# Patient Record
Sex: FEMALE | Race: WHITE | NOT HISPANIC OR LATINO | Employment: OTHER | URBAN - METROPOLITAN AREA
[De-identification: names, ages, dates, MRNs, and addresses within clinical notes are randomized per-mention and may not be internally consistent; named-entity substitution may affect disease eponyms.]

---

## 2020-08-26 ENCOUNTER — TRANSFERRED RECORDS (OUTPATIENT)
Dept: HEALTH INFORMATION MANAGEMENT | Facility: CLINIC | Age: 50
End: 2020-08-26

## 2020-09-07 ENCOUNTER — TRANSFERRED RECORDS (OUTPATIENT)
Dept: HEALTH INFORMATION MANAGEMENT | Facility: CLINIC | Age: 50
End: 2020-09-07

## 2020-11-04 ENCOUNTER — TRANSFERRED RECORDS (OUTPATIENT)
Dept: HEALTH INFORMATION MANAGEMENT | Facility: CLINIC | Age: 50
End: 2020-11-04

## 2020-11-06 ENCOUNTER — TRANSFERRED RECORDS (OUTPATIENT)
Dept: HEALTH INFORMATION MANAGEMENT | Facility: CLINIC | Age: 50
End: 2020-11-06

## 2020-12-27 ENCOUNTER — TRANSFERRED RECORDS (OUTPATIENT)
Dept: HEALTH INFORMATION MANAGEMENT | Facility: CLINIC | Age: 50
End: 2020-12-27

## 2020-12-28 ENCOUNTER — TRANSFERRED RECORDS (OUTPATIENT)
Dept: HEALTH INFORMATION MANAGEMENT | Facility: CLINIC | Age: 50
End: 2020-12-28

## 2021-01-06 ENCOUNTER — TRANSFERRED RECORDS (OUTPATIENT)
Dept: HEALTH INFORMATION MANAGEMENT | Facility: CLINIC | Age: 51
End: 2021-01-06

## 2021-01-11 ENCOUNTER — TRANSFERRED RECORDS (OUTPATIENT)
Dept: HEALTH INFORMATION MANAGEMENT | Facility: CLINIC | Age: 51
End: 2021-01-11

## 2021-01-21 ENCOUNTER — TRANSFERRED RECORDS (OUTPATIENT)
Dept: HEALTH INFORMATION MANAGEMENT | Facility: CLINIC | Age: 51
End: 2021-01-21

## 2021-02-23 ENCOUNTER — MEDICAL CORRESPONDENCE (OUTPATIENT)
Dept: HEALTH INFORMATION MANAGEMENT | Facility: CLINIC | Age: 51
End: 2021-02-23

## 2021-02-24 ENCOUNTER — TRANSCRIBE ORDERS (OUTPATIENT)
Dept: OTHER | Age: 51
End: 2021-02-24

## 2021-02-24 DIAGNOSIS — K86.1 CHRONIC PANCREATITIS (H): Primary | ICD-10-CM

## 2021-03-01 ENCOUNTER — REFERRAL (OUTPATIENT)
Dept: TRANSPLANT | Facility: CLINIC | Age: 51
End: 2021-03-01

## 2021-03-01 DIAGNOSIS — K86.1 CHRONIC PANCREATITIS (H): Primary | ICD-10-CM

## 2021-03-01 NOTE — LETTER
Meme Robins  [address] [facility] [address]                March 9, 2021 March 9, 2021        Meme Robins  [address] [facility] [address]      Dear Meme,       You have recently expressed interest in our Solid Organ Transplant Program and have requested to begin the evaluation process.  We have made several attempts to get in touch with you but have been unable to reach you.    If you are still interested and/or have any questions, please contact us at  [phone] or [phone]   Monday - Friday, between the hours of 8:30am and 5:00pm central time.    We look forward to hearing from you.      Regards,     Solid Organ Transplant Intake   [facility]'s [address], [facility] [address]  [place] [address]

## 2021-03-03 ENCOUNTER — TELEPHONE (OUTPATIENT)
Dept: TRANSPLANT | Facility: CLINIC | Age: 51
End: 2021-03-03

## 2021-03-03 NOTE — TELEPHONE ENCOUNTER
Long discussion with Meme about her health issues.  ~ Gall bladder removed 2001/2  ~First attack of acute pancreatitis December 2003 six months after the birth of her last child.  ~ Multiple episodes with elevated Lipase  ~Was doctored in [place] for several years and then  [facility] . Had had multiple stents, sphincterotomies.etc.  ~John and subsequent revision in 2014. Did better for a while  ~ Continues to have severe flares of pain and nausea  ~ Has port and has intermittent TPN  ~ Last EUS in January this year showed 10 % left pf pancreas  ~ Has intermittent severe ( 20-30 ) episodes oh hypoglycemia. She is NOT taking any Insulin. At this time she has hypoglycemic awareness.  ~ Looking for pain relief and help with the blood sugers

## 2021-03-04 ENCOUNTER — DOCUMENTATION ONLY (OUTPATIENT)
Dept: TRANSPLANT | Facility: CLINIC | Age: 51
End: 2021-03-04

## 2021-03-04 NOTE — PROGRESS NOTES
Patient's case discussed at [facility] ([facility]) on 3/3/21. Plan to get recent records and evaluate for Total Pancreatectomy -Islet Auto Transplant. Patient informed today.

## 2021-03-09 NOTE — TELEPHONE ENCOUNTER
The following questions were asked during patients intake call.     PCP: Dr. Padmini Hatch   Surgeon: Dr. Oseas Jaffe   GI: Dr. Les Sandoval  Endocrinologist: Dr. Juanita Sequeira   Mental Health Provider: Dr. Valdez Bleckley     1. Why are you considering a total pancreatectomy/islet auto-transplant? To get rid of pain. EUS in January. Blood sugars are in the teens and twenty's. Hospitalized four times a year for pancreatitis.   2. Have you been in the hospital in the last six months? Yes-three times.             a. If yes, please give the name and address of the hospital: [facility]             b. When were you there and why? Pancreatitis, nausea, vomiting, pain, and dehydration.   3. Have you had any surgeries or procedures? Yes  If yes, what kind, when and where? Yes, multiple.   4. Have you ever smoked? No             a. If yes, for how long? NA How many packs per day? NA             b. Have you quit? NA             c. Are you using any form of nicotine? No  5. Do you drink alcohol? No  6. Drug use Past Present Frequency: No  Liquor/beer/wine: No  Marijuana: No  Street drugs: No  7. Do you have a history of alcohol or other drug abuse? No  If yes:  a. How long have you been sober or drug-free? NA              b. In the past two years, have you been through treatment? No  8. In the past two years, have you had any counseling or mental health treatment? Yes  If yes, please explain: Sees a psychologist on a regular basis.     Insurance Information: [facility]   Policy [place]: Spouse   Subscriber/Policy/ID Number: [id number]  Group Number: [id number]    Referral intake process completed.  Patient is aware that after financial approval is received, medical records will be requested.   Confirmed coordinator will discuss evaluation process in more detail at the time of their call.   Patient instructed to call assigned coordinator Maddi @ [phone] with questions.     Patient gave verbal consent during  intake call to obtain medical records and documents outside of [facility]:  Yes    CAMILLE Jesus, LPN   Solid Organ Transplant

## 2021-03-30 DIAGNOSIS — K86.1 CHRONIC PANCREATITIS (H): Primary | ICD-10-CM

## 2021-05-01 ENCOUNTER — HEALTH MAINTENANCE LETTER (OUTPATIENT)
Age: 51
End: 2021-05-01

## 2021-05-19 ENCOUNTER — ALLIED HEALTH/NURSE VISIT (OUTPATIENT)
Dept: TRANSPLANT | Facility: CLINIC | Age: 51
End: 2021-05-19
Attending: PEDIATRICS
Payer: COMMERCIAL

## 2021-05-19 VITALS
HEART RATE: 72 BPM | DIASTOLIC BLOOD PRESSURE: 70 MMHG | SYSTOLIC BLOOD PRESSURE: 104 MMHG | WEIGHT: 121.69 LBS | OXYGEN SATURATION: 96 %

## 2021-05-19 VITALS
OXYGEN SATURATION: 96 % | SYSTOLIC BLOOD PRESSURE: 104 MMHG | WEIGHT: 121.69 LBS | HEART RATE: 72 BPM | DIASTOLIC BLOOD PRESSURE: 70 MMHG

## 2021-05-19 DIAGNOSIS — E27.49 GLUCOCORTICOID DEFICIENCY (H): Primary | ICD-10-CM

## 2021-05-19 DIAGNOSIS — K86.1 CHRONIC PANCREATITIS (H): ICD-10-CM

## 2021-05-19 DIAGNOSIS — K91.2 HYPOGLYCEMIA AFTER GI (GASTROINTESTINAL) SURGERY: ICD-10-CM

## 2021-05-19 DIAGNOSIS — R73.03 PRE-DIABETES: ICD-10-CM

## 2021-05-19 DIAGNOSIS — K86.1 CHRONIC PANCREATITIS, UNSPECIFIED PANCREATITIS TYPE (H): Primary | ICD-10-CM

## 2021-05-19 LAB
ALBUMIN SERPL-MCNC: 3.9 G/DL (ref 3.4–5)
ALP SERPL-CCNC: 82 U/L (ref 40–150)
ALT SERPL W P-5'-P-CCNC: 20 U/L (ref 0–50)
AMYLASE SERPL-CCNC: 39 U/L (ref 30–110)
ANION GAP SERPL CALCULATED.3IONS-SCNC: 5 MMOL/L (ref 3–14)
AST SERPL W P-5'-P-CCNC: 16 U/L (ref 0–45)
BASOPHILS # BLD AUTO: 0 10E9/L (ref 0–0.2)
BASOPHILS NFR BLD AUTO: 0.7 %
BILIRUB SERPL-MCNC: 0.5 MG/DL (ref 0.2–1.3)
BUN SERPL-MCNC: 8 MG/DL (ref 7–30)
C PEPTIDE SERPL-MCNC: 1.6 NG/ML (ref 0.9–6.9)
CALCIUM SERPL-MCNC: 9.2 MG/DL (ref 8.5–10.1)
CEA SERPL-MCNC: 1.2 UG/L (ref 0–2.5)
CHLORIDE SERPL-SCNC: 105 MMOL/L (ref 94–109)
CHOLEST SERPL-MCNC: 212 MG/DL
CO2 SERPL-SCNC: 29 MMOL/L (ref 20–32)
CORTIS SERPL-MCNC: 2.7 UG/DL (ref 4–22)
CREAT SERPL-MCNC: 0.67 MG/DL (ref 0.52–1.04)
DIFFERENTIAL METHOD BLD: NORMAL
EOSINOPHIL # BLD AUTO: 0.2 10E9/L (ref 0–0.7)
EOSINOPHIL NFR BLD AUTO: 3.6 %
ERYTHROCYTE [DISTWIDTH] IN BLOOD BY AUTOMATED COUNT: 12.9 % (ref 10–15)
FERRITIN SERPL-MCNC: 27 NG/ML (ref 8–252)
GFR SERPL CREATININE-BSD FRML MDRD: >90 ML/MIN/{1.73_M2}
GLUCOSE SERPL-MCNC: 103 MG/DL (ref 70–99)
GLUCOSE SERPL-MCNC: 112 MG/DL (ref 70–99)
GLUCOSE SERPL-MCNC: 124 MG/DL (ref 70–99)
HBA1C MFR BLD: 6 % (ref 0–5.6)
HCT VFR BLD AUTO: 36.4 % (ref 35–47)
HCV AB SERPL QL IA: NONREACTIVE
HDLC SERPL-MCNC: 62 MG/DL
HGB BLD-MCNC: 12 G/DL (ref 11.7–15.7)
IMM GRANULOCYTES # BLD: 0 10E9/L (ref 0–0.4)
IMM GRANULOCYTES NFR BLD: 0 %
IRON SATN MFR SERPL: 18 % (ref 15–46)
IRON SERPL-MCNC: 61 UG/DL (ref 35–180)
LDLC SERPL CALC-MCNC: 119 MG/DL
LIPASE SERPL-CCNC: 104 U/L (ref 73–393)
LYMPHOCYTES # BLD AUTO: 1.6 10E9/L (ref 0.8–5.3)
LYMPHOCYTES NFR BLD AUTO: 39.2 %
MCH RBC QN AUTO: 28.6 PG (ref 26.5–33)
MCHC RBC AUTO-ENTMCNC: 33 G/DL (ref 31.5–36.5)
MCV RBC AUTO: 87 FL (ref 78–100)
MONOCYTES # BLD AUTO: 0.6 10E9/L (ref 0–1.3)
MONOCYTES NFR BLD AUTO: 14.4 %
NEUTROPHILS # BLD AUTO: 1.8 10E9/L (ref 1.6–8.3)
NEUTROPHILS NFR BLD AUTO: 42.1 %
NONHDLC SERPL-MCNC: 149 MG/DL
NRBC # BLD AUTO: 0 10*3/UL
NRBC BLD AUTO-RTO: 0 /100
PLATELET # BLD AUTO: 233 10E9/L (ref 150–450)
POTASSIUM SERPL-SCNC: 3.6 MMOL/L (ref 3.4–5.3)
PROT SERPL-MCNC: 7.5 G/DL (ref 6.8–8.8)
RBC # BLD AUTO: 4.19 10E12/L (ref 3.8–5.2)
SODIUM SERPL-SCNC: 139 MMOL/L (ref 133–144)
TIBC SERPL-MCNC: 338 UG/DL (ref 240–430)
TRIGL SERPL-MCNC: 151 MG/DL
WBC # BLD AUTO: 4.2 10E9/L (ref 4–11)

## 2021-05-19 PROCEDURE — 86803 HEPATITIS C AB TEST: CPT | Performed by: PATHOLOGY

## 2021-05-19 PROCEDURE — 99000 SPECIMEN HANDLING OFFICE-LAB: CPT | Performed by: PATHOLOGY

## 2021-05-19 PROCEDURE — 83540 ASSAY OF IRON: CPT | Performed by: PATHOLOGY

## 2021-05-19 PROCEDURE — 36415 COLL VENOUS BLD VENIPUNCTURE: CPT | Performed by: PATHOLOGY

## 2021-05-19 PROCEDURE — 84590 ASSAY OF VITAMIN A: CPT | Performed by: PATHOLOGY

## 2021-05-19 PROCEDURE — 85025 COMPLETE CBC W/AUTO DIFF WBC: CPT | Performed by: PATHOLOGY

## 2021-05-19 PROCEDURE — 36415 COLL VENOUS BLD VENIPUNCTURE: CPT | Performed by: PEDIATRICS

## 2021-05-19 PROCEDURE — 80053 COMPREHEN METABOLIC PANEL: CPT | Performed by: PATHOLOGY

## 2021-05-19 PROCEDURE — 82150 ASSAY OF AMYLASE: CPT | Performed by: PATHOLOGY

## 2021-05-19 PROCEDURE — 99417 PROLNG OP E/M EACH 15 MIN: CPT | Performed by: PEDIATRICS

## 2021-05-19 PROCEDURE — 80061 LIPID PANEL: CPT | Performed by: PATHOLOGY

## 2021-05-19 PROCEDURE — 84446 ASSAY OF VITAMIN E: CPT | Performed by: PATHOLOGY

## 2021-05-19 PROCEDURE — 82533 TOTAL CORTISOL: CPT | Performed by: PEDIATRICS

## 2021-05-19 PROCEDURE — 82947 ASSAY GLUCOSE BLOOD QUANT: CPT | Mod: 59 | Performed by: PATHOLOGY

## 2021-05-19 PROCEDURE — 86341 ISLET CELL ANTIBODY: CPT | Performed by: PATHOLOGY

## 2021-05-19 PROCEDURE — 82306 VITAMIN D 25 HYDROXY: CPT | Performed by: PATHOLOGY

## 2021-05-19 PROCEDURE — 99205 OFFICE O/P NEW HI 60 MIN: CPT | Performed by: PEDIATRICS

## 2021-05-19 PROCEDURE — 84681 ASSAY OF C-PEPTIDE: CPT | Performed by: PATHOLOGY

## 2021-05-19 PROCEDURE — 83550 IRON BINDING TEST: CPT | Performed by: PATHOLOGY

## 2021-05-19 PROCEDURE — 83036 HEMOGLOBIN GLYCOSYLATED A1C: CPT | Performed by: PATHOLOGY

## 2021-05-19 PROCEDURE — 86301 IMMUNOASSAY TUMOR CA 19-9: CPT | Performed by: PATHOLOGY

## 2021-05-19 PROCEDURE — 82378 CARCINOEMBRYONIC ANTIGEN: CPT | Mod: 90 | Performed by: PATHOLOGY

## 2021-05-19 PROCEDURE — 83690 ASSAY OF LIPASE: CPT | Performed by: PATHOLOGY

## 2021-05-19 PROCEDURE — 86337 INSULIN ANTIBODIES: CPT | Performed by: PATHOLOGY

## 2021-05-19 PROCEDURE — 82728 ASSAY OF FERRITIN: CPT | Performed by: PATHOLOGY

## 2021-05-19 RX ORDER — CLONAZEPAM 1 MG/1
TABLET ORAL
COMMUNITY
Start: 2021-02-09

## 2021-05-19 RX ORDER — OXYMORPHONE HYDROCHLORIDE 10 MG/1
10 TABLET, FILM COATED, EXTENDED RELEASE ORAL EVERY 8 HOURS
Status: ON HOLD | COMMUNITY
Start: 2021-05-17 | End: 2021-08-20

## 2021-05-19 RX ORDER — SERTRALINE HYDROCHLORIDE 100 MG/1
150 TABLET, FILM COATED ORAL AT BEDTIME
COMMUNITY
Start: 2021-02-01

## 2021-05-19 RX ORDER — ONDANSETRON 4 MG/1
4 TABLET, ORALLY DISINTEGRATING ORAL EVERY 8 HOURS PRN
COMMUNITY

## 2021-05-19 RX ORDER — PANTOPRAZOLE SODIUM 40 MG/1
40 TABLET, DELAYED RELEASE ORAL 2 TIMES DAILY
COMMUNITY
Start: 2021-02-03 | End: 2021-08-26 | Stop reason: DRUGHIGH

## 2021-05-19 RX ORDER — HYDROMORPHONE HYDROCHLORIDE 4 MG/1
4 TABLET ORAL EVERY 6 HOURS PRN
Status: ON HOLD | COMMUNITY
Start: 2021-05-17 | End: 2021-08-20

## 2021-05-19 RX ORDER — TRAZODONE HYDROCHLORIDE 100 MG/1
100 TABLET ORAL AT BEDTIME
COMMUNITY
Start: 2021-02-01

## 2021-05-19 SDOH — HEALTH STABILITY: MENTAL HEALTH: HOW MANY STANDARD DRINKS CONTAINING ALCOHOL DO YOU HAVE ON A TYPICAL DAY?: NOT ASKED

## 2021-05-19 SDOH — HEALTH STABILITY: MENTAL HEALTH: HOW OFTEN DO YOU HAVE A DRINK CONTAINING ALCOHOL?: NOT ASKED

## 2021-05-19 SDOH — HEALTH STABILITY: MENTAL HEALTH: HOW OFTEN DO YOU HAVE 6 OR MORE DRINKS ON ONE OCCASION?: NOT ASKED

## 2021-05-19 NOTE — PROGRESS NOTES
Administered Boost: 9:17am    FBS: 112    Called lab with boost time :     10:20am    11:20am    Patient is aware and given time to return to lab.    Meme Orellana, CMA

## 2021-05-19 NOTE — LETTER
5/19/2021         RE: Meme Robins  [address] [facility] [address]        Dear Colleague,    Thank you for referring your patient, Meme Robins, to the [facility]. Please see a copy of my visit note below.    Pediatric Endocrinology/ Islet Autotransplant  Chronic Pancreatitis Consult    Patient Active Problem List   Diagnosis     Abdominal pain, RUQ (right upper quadrant)     Acute upper respiratory infection     Anemia     Cellulitis and abscess     Chronic abdominal pain     Chronic pancreatitis (H)     Dysfunctional sphincter of Oddi     Diarrhea     Degeneration of cervical intervertebral disc     Dysthymic disorder     Iron deficiency anemia     Glucocorticoid deficiency (H)     Joint pain, hip     Long term current use of anticoagulant therapy     Other symptoms involving urinary system(788.99)     Nausea alone     Myalgia and myositis     Migraine     Malaise and fatigue     Major depressive disorder, single episode     Urinary tract infection     Pulmonary embolism (H)     Primary hypercoagulable state (H)       Assessment/Plan:  1.  Chronic pancreatitis, complicated by atrophy and prior John procedure  2.  Prediabetes  3.  Hypoglycemia     Meme is a 50 year old with pancreatitis, considering surgical management. She has a 17 year history of well documented pancreatitis, initially recurrent acute and then progressing to chronic pancreatitis. She has failed medical and endoscopic management and two prior pancreas surgeries, now having more days with pain than not and on long-acting opioids for about 1 year.  She is very thoughtful about the decision to proceed to TPIAT and I think despite her complexities will be a good candidate.    She does have endocrine history that is notable for pre-diabetes (documented here by both fasting glucose and A1c) as well as hypoglycemia (lowest to 20s).  The hypoglycemia is likely multifactorial related to malnutrition and  reactive hypoglycemia from prior GI surgery and islet dysfunction.   However, she has also had a past history of central adrenal insufficiency, not on treatment for many years, but unclear if she has really be reassessed.  We do see HPA axis dysregulation in some patients with pancreatitis or TPIAT and is probably important to assess this prior to surgery so that we are aware for post op management.     The primary purpose of today's visit was to review the patient's endocrine history and provide counseling on islet autotransplantation.  We discussed the procedure of islet autotransplant, the post-operative management, and the prognosis.  We specifically discussed that all patients are on insulin after this procedure, typically for at least several months.  About 1/3rd of patients will become insulin independent, but I do not expect that to be the case for Meme, given her atrophic pancreas, prior surgery, and pre-diabetes.  We did discuss that for Meme my goal is simply to get enough islets to restore some endogenous beta cell function and keep diabetes easier to manage;  Because of the high risk of diabetes mellitus, all patients must be willing to accept diabetes and insulin injections as a trade off for relief from pancreatic pain.    All surgical consults are reviewed by our multi-disciplinary team to determine if surgery is an appropriate next option.  FAITH Bermeo MD  [facility] [place]  Phone:  [phone]  Fax:  [phone]    I spent 2 hours with Meme and her  face to face, of which about 90 minutes was focused on counseling about the TPIAT surgery and diabetes.                  CC:  Chronic pancreatitis, surgical evaluation    HPI:  Meme Robins is a 50 year old female referred by Dr. Phelps at [facility]  for new patient consultation of endocrine function in the setting of chronic  pancreatitis.    Pancreatitis history is as follows:  Meme has a long history of diagnosed pancreatitis that dates back to 2003.  She has been managed initially in [place] and at [facility] and more recently in [place]. She had her gallbladder out in 2001 for some abdominal pain that in retrospect may be similar to later pancreatitis pain.  She had her first episode of acute pancreatitis that was diagnosed in 2003 and subsequently went about 1.5 years without another episode when it recurred, and she developed recurrent acute pancreatitis.  She then had a number of ERCP procedures with pancreatic sphincterotomy and repeated PD stenting in [place].  She recalls these helped- she was back to 'normal' after the ERCP and towards the end of the ~3 mos interval between stent replacements would have recurrence of pain. She finally reached a point where it was felt that ERCP was no longer indicated and she was seen at [place] in surgical consultation at which time she had a John surgery in 2011.  She did however continue to have recurrent acute pancreatitis which I see very well documented in some older admission notes from [facility] from 2013.  Her  and her recall that around that time in 2013 and 2014 she was spending over 200 days per year in the hospital because she was admitted so often.  They then found that she appeared to have a part of the pancreas duct that had not been opened to drainage during the John, so she went in and they did a modified or revised John. This did help, and her frequency of hospitalizations was decreased.  Now, however, over the past 2 years she is having episodes needing hospital admits about 4-5 times per year and is having more pain days at home. She has started a long-acting oxymorphone 1 year ago and on some days (but not all days) also takes dilaudid. She is on ZenPep 20, 3 per meal, for exocrine insufficiency but struggles with constipation (also linzess  treated) and diarrhea.  She reports she has had genetic testing for pancreatitis in the past that was negative, but I don't have these results to confirm which genes were tested.  Of note, she did have one celiac plexus block done during open surgery in May 2014 (revised John) at which time they did a alcohol block. She also had a history of poorly managed pain during surgery in past. She has notable endocrine history of pre-diabetes, hypoglycemia, and adrenal insufficiency that are documented below.      Evaluation/ imaging/ treatments:  Elevated amylase and lipase by history:  YES- her most recent records are not available in [facility] (but I did review visits scanned from 1/2021 from [facility]) but back in 2013 in Care Everywhere I can see multiple documented AP.  Etiology of disease: iodpathic   Number of hospitalizations in last 1 year: 4-5  Recent imaging studies: CT will be done tomorrow.   She does have an EUS scanned in Epic that I reviewed from earlier this year but it was non descript other than severe atrophy.   Medical treatment(s): as above  ERCP procedures: YES;   Number of ERCPs:  About 30-- as she recalls these did not include biliary sphincterotomy or biliary stenting but it did include pancreatic duct sphincterotomy and PD stenting.   Prior pancreatic surgery: YES, as above  Had cholecystectomy  Had nerve esteban    Endocrine history:    - Previously diagnosed as pre-diabetes and clearly does have pre-diabetes here, C-peptide pending  -- Has hypoglycemia-- describes this as worst in hospital stays, when fasting for prolonged period but also gets reactive hypoglycemia at home.  She does not think any real lab work up done-- sounds like critical sample ordered during a hospital admit but was treated with dextrose before these could be drawn.  Is so far thought secondary to pancreatitis.   -- Interestingly had 3 steroid injections in neck in very distant past (2012 or 2013?) and then developed  or was diagnosed as central adrenal insufficiency, and treated with cortef which I can see documented in 2013.  She thinks she has been off this for at least 3 or 4 years and no recent testing that she recalls.     Other GI:  Nausea is a huge component of her symptom burden.         Review of Systems:  A comprehensive 10 point review of systems was performed and was negative except as noted in the HPI above.    Past Medical History:  Patient Active Problem List   Diagnosis     Abdominal pain, RUQ (right upper quadrant)     Acute upper respiratory infection     Anemia     Cellulitis and abscess     Chronic abdominal pain     Chronic pancreatitis (H)     Dysfunctional sphincter of Oddi     Diarrhea     Degeneration of cervical intervertebral disc     Dysthymic disorder     Iron deficiency anemia     Glucocorticoid deficiency (H)     Joint pain, hip     Long term current use of anticoagulant therapy     Other symptoms involving urinary system(788.99)     Nausea alone     Myalgia and myositis     Migraine     Malaise and fatigue     Major depressive disorder, single episode     Urinary tract infection     Pulmonary embolism (H)     Primary hypercoagulable state (H)     Hypoglycemia after GI (gastrointestinal) surgery       Past Medical History:   Diagnosis Date     Adrenal insufficiency (H)     iatrogenic, around 2013, off treatment for several years as of 2021 visit     Anemia      Depression      Esophageal reflux      Idiopathic chronic pancreatitis (H)      Pre-diabetes      Pulmonary embolism (H)      Past Surgical History:   Procedure Laterality Date     CELIAC PLEXUS BLOCK  05/29/2014    with alcohol x 1     ENDOMETRIAL ABLATION  03/29/2014     ENDOSCOPIC RETROGRADE CHOLANGIOPANCREATOGRAM      about 30, most with PD stentes     ENDOSCOPIC ULTRASOUND, ESOPHAGOSCOPY, GASTROSCOPY, DUODENOSCOPY (EGD), COMBINED        SHOULDER ARTHROSCOPY,SURGICAL BICEPS TENODESIS  02/09/2017     JEJUNOSTOMY CARE      multiple  feeding tubes x 3, at least one was direct J     PANCREAS SURGERY  05/29/2014    John procedure revision     PANCREAS SURGERY  02/2011    John       Current medications:  Current Outpatient Medications   Medication Sig Dispense Refill     clonazePAM (KLONOPIN) 1 MG tablet Take 1 mg by mouth 2 times daily       HYDROmorphone (DILAUDID) 4 MG tablet Take 4 mg by mouth as needed       hyoscyamine SL (LEVSIN/SL) 0.125 MG sublingual tablet Take 125 mcg by mouth as needed       lipase-protease-amylase (ZENPEP) 54737-17779 units CPEP Take 3 capsules by mouth 3 times daily (with meals)       ondansetron (ZOFRAN-ODT) 4 MG ODT tab Take 4 mg by mouth as needed       Oxymorphone HCl 10 MG TB12 Take 10 mg by mouth 3 times daily as needed       pantoprazole (PROTONIX) 40 MG EC tablet Take 40 mg by mouth 2 times daily       sertraline (ZOLOFT) 100 MG tablet Take 150 mg by mouth At Bedtime       traZODone (DESYREL) 100 MG tablet Take 100 mg by mouth At Bedtime         Family History:  The family history was reviewed with particular attention to diabetes and pancreatitis history, and updated by me as pertinent, and is as documented below.    Family History   Problem Relation Age of Onset     Breast Cancer Mother      Diabetes No family hx of      Pancreatitis No family hx of        Social History:  Social History     Social History Narrative    Meme is  and has three children.         Physical Exam:  Vitals: /70   Pulse 72   Wt 55.2 kg (121 lb 11.1 oz)   SpO2 96%   BMI= There is no height or weight on file to calculate BMI.  General:  Appearance is normal, no acute distress  HEENT:  NC/AT, sclera appear white  Neck:  No obvious thyromegaly  CV/Lungs:  Non distressed breathing  Skin:  No apparent rashes  Neuro:  Normal mental status  Psych:  Normal affect      Results:  Hemoglobin A1c levels (this and prior visits):  Lab Results   Component Value Date    A1C 6.0 05/19/2021       Mixed meal test results (from Boost  HP):  C Peptide   Date Value Ref Range Status   05/19/2021 1.6 0.9 - 6.9 ng/mL Final     Glucose   Date Value Ref Range Status   05/19/2021 124 (H) 70 - 99 mg/dL Final   05/19/2021 103 (H) 70 - 99 mg/dL Final   05/19/2021 112 (H) 70 - 99 mg/dL Final         Liver enzymes:  AST   Date Value Ref Range Status   05/19/2021 16 0 - 45 U/L Final     ALT   Date Value Ref Range Status   05/19/2021 20 0 - 50 U/L Final     No results found for: BILICONJ   Bilirubin Total   Date Value Ref Range Status   05/19/2021 0.5 0.2 - 1.3 mg/dL Final     Albumin   Date Value Ref Range Status   05/19/2021 3.9 3.4 - 5.0 g/dL Final     Protein Total   Date Value Ref Range Status   05/19/2021 7.5 6.8 - 8.8 g/dL Final      Alkaline Phosphatase   Date Value Ref Range Status   05/19/2021 82 40 - 150 U/L Final       Creatinine:  Creatinine   Date Value Ref Range Status   05/19/2021 0.67 0.52 - 1.04 mg/dL Final   ]    Complete Blood Count:  CBC RESULTS:   Recent Labs   Lab Test 05/19/21  0903   WBC 4.2   RBC 4.19   HGB 12.0   HCT 36.4   MCV 87   MCH 28.6   MCHC 33.0   RDW 12.9            Again, thank you for allowing me to participate in the care of your patient.        Sincerely,        Patricia Bermeo MD

## 2021-05-19 NOTE — PROGRESS NOTES
[facility] Solid Organ Transplant  Outpatient MNT: TP AIT Evaluation    Current BMI: 22.7 (HT 61 in,  lbs/54 kg)  Goal BMI for TP AIT <30     Time Spent: 30 minutes  Visit Type: Initial   Referring Physician: Fern   Pt accompanied by: her , Brendan     Nutrition Assessment  Limits fat in her diet. Eats smaller, more frequent meals. Nausea bad in AM. Nothing really helps until just has to wait until the afternoon to feel better. Liquids may be tolerated (smoothie, ensure clear) late AM over solids.  Has port and intermittent PN.     Symptoms:  Pain: chronic  N/V: vomiting during a flare, nausea greatly impacts day  Bloating: yes, some  D/C: alternates     Vitamins, Supplements, Pertinent Meds: B complex, vit E, vit D   Herbal Medicines/Supplements: melatonin prn      PERT: zenpep 20,000 x 3 with meals, x 2 with snacks; (1090 ul/kg/meal - within therapeutic range)  - How enzymes are taken in relation to meal: throughout the meal   - Duration of PERT: several years   - Improvements seen since starting: less oil in stool   - Oily/floaty stools (steatorrhea): still some  - Tried other enzyme brands in the past: Creon--no intolerance    Weight hx: hormonal changes/menopause, at most 5 lb gain    Food Security: any concerns about having enough money to buy food or access to grocery stores? No     Diet Recall  Breakfast    Lunch Feels better/grazes ~3 pm   Dinner Protein (chicken or burger 4 oz) + veggies/fruit and less cho    Snacks Yogurt, fruit, crackers, oatmeal, toast with PB   Beverages Water, some juice (8 oz/day), coffee, sips on Coke to help with nausea, occ iced tea   Alcohol None    Dining out 3x/week      Physical Activity  Occasional walking the dogs  Was doing heated yoga pre covid, but has not returned to doing this      Nutrition Diagnosis  No nutrition diagnosis identified at this time.     Nutrition Intervention  Nutrition education provided:  Reviewed current diet. Reviewed some other  alternatives to Ensure Clear ([id number], Premier Protein Clear, whey protein powder mixed into smoothie).      Brief overview of what to expect from nutrition standpoint post TP AIT:   -- Enteral nutrition regimen and anticipated diet advancement   -- Ongoing need for PERT  -- Vitamin/mineral needs, assessing for fat-soluble vitamin deficiencies  -- Need for DM education and brief overview of cho counting    Patient Understanding: Pt verbalized understanding of education provided.  Expected Engagement: Good  Follow-Up Plans: PRN     Nutrition Goals  No nutrition goals     Brianna Pablo, RD, LD, CCTD

## 2021-05-20 ENCOUNTER — ANCILLARY PROCEDURE (OUTPATIENT)
Dept: CT IMAGING | Facility: CLINIC | Age: 51
End: 2021-05-20
Attending: TRANSPLANT SURGERY
Payer: COMMERCIAL

## 2021-05-20 ENCOUNTER — ALLIED HEALTH/NURSE VISIT (OUTPATIENT)
Dept: TRANSPLANT | Facility: CLINIC | Age: 51
End: 2021-05-20
Attending: TRANSPLANT SURGERY
Payer: COMMERCIAL

## 2021-05-20 VITALS
WEIGHT: 120 LBS | SYSTOLIC BLOOD PRESSURE: 100 MMHG | HEART RATE: 73 BPM | OXYGEN SATURATION: 99 % | BODY MASS INDEX: 22.66 KG/M2 | HEIGHT: 61 IN | DIASTOLIC BLOOD PRESSURE: 67 MMHG

## 2021-05-20 DIAGNOSIS — K86.1 CHRONIC PANCREATITIS, UNSPECIFIED PANCREATITIS TYPE (H): Primary | ICD-10-CM

## 2021-05-20 DIAGNOSIS — Z95.828 PORT-A-CATH IN PLACE: Primary | ICD-10-CM

## 2021-05-20 DIAGNOSIS — K86.1 IDIOPATHIC CHRONIC PANCREATITIS (H): Primary | ICD-10-CM

## 2021-05-20 DIAGNOSIS — K86.1 CHRONIC PANCREATITIS (H): ICD-10-CM

## 2021-05-20 LAB
C PEPTIDE SERPL-MCNC: 1.8 NG/ML (ref 0.9–6.9)
C PEPTIDE SERPL-MCNC: 2.5 NG/ML (ref 0.9–6.9)
CANCER AG19-9 SERPL-ACNC: 1 U/ML (ref 0–37)

## 2021-05-20 PROCEDURE — 74178 CT ABD&PLV WO CNTR FLWD CNTR: CPT | Performed by: RADIOLOGY

## 2021-05-20 PROCEDURE — 97802 MEDICAL NUTRITION INDIV IN: CPT | Performed by: DIETITIAN, REGISTERED

## 2021-05-20 PROCEDURE — 99205 OFFICE O/P NEW HI 60 MIN: CPT | Performed by: TRANSPLANT SURGERY

## 2021-05-20 RX ORDER — HEPARIN SODIUM (PORCINE) LOCK FLUSH IV SOLN 100 UNIT/ML 100 UNIT/ML
500 SOLUTION INTRAVENOUS ONCE
Status: COMPLETED | OUTPATIENT
Start: 2021-05-20 | End: 2021-05-20

## 2021-05-20 RX ORDER — IOPAMIDOL 755 MG/ML
74 INJECTION, SOLUTION INTRAVASCULAR ONCE
Status: COMPLETED | OUTPATIENT
Start: 2021-05-20 | End: 2021-05-20

## 2021-05-20 RX ADMIN — HEPARIN SODIUM (PORCINE) LOCK FLUSH IV SOLN 100 UNIT/ML 500 UNITS: 100 SOLUTION at 12:18

## 2021-05-20 RX ADMIN — IOPAMIDOL 74 ML: 755 INJECTION, SOLUTION INTRAVASCULAR at 11:34

## 2021-05-20 ASSESSMENT — MIFFLIN-ST. JEOR: SCORE: 1096.7

## 2021-05-20 NOTE — PROGRESS NOTES
Psychosocial Assessment for Total Pancreatectomy and Auto Islet Cell Transplant  Patient Name/ Age: Meme Robins 50 year old   Medical Record #: [id number]  Duration of Interview: 30 min  Process:   Face-to-Face Interview                (counseling < 50%)   Present at Appointment: Meme and her  Brendan        : JAEL Mcnulty Date:  May 20, 2021            Current Living Situation    Location:   [address] [place] [address]  With Whom: lives with their family-  and 3 sons       Family/ Social Support:    Meme has three sons. Two are in college and one is in high school. She has two older brothers. Her parents live near her.    available, helpful   Committed Relationship: Meme is  to Brendan.     Stable/Supportive   Other Supports: In-laws, friends, neighbors   available, helpful       Activities/ Functional Ability    Current level: ambulatory, visually impaired (glasses) and independent with ADL's     Transportation drives self       Vocational/Employment/Financial     Employment   disabled   Job Description   Meme reported she has been on social security disability for a few years. Her  is employed full time.      Income   SSDI and Spouse's Income   Insurance      At this time, patient can afford medication costs:  Yes  Private Insurance (BCBS through spouse employer) and Medicare Part A       Medical Status    Complications Glucose Unawareness       Behavioral    Tobacco Use No Chemical Dependency No   Meme denied any tobacco use. Meme denied any alcohol, substance use, or chemical dependency treatment.      Psychiatric Impairment Yes   Meme reported she is diagnosed with anxiety and depression. She currently takes medication for her mental health, which is prescribed by her primary care provider. She also sees a psychologist every other week. She reported she feels her mental health is well managed at this time.     Reading Ability  Good  Education Level: Bachelors Degree Recent Legal History No      Coping Style/Strategies: Talk with her  or friends, exercise       Ability to Adhere to Complex Medical Regime: Yes     Adherence History: Meme reported she follows her physician's recommendations, takes her medications as prescribed, and attends her appointments as scheduled.         Education  _X_ Rehabilitation  _X_ Community resources  _X_ Post discharge physician  _X_ Post discharge housing  _X_ Financial resources  _X_ Medical insurance options  _X_ Psych adjustment  _X_ Family adjustment  _X_ Health Care Directive Provided Education   Psychosocial Risks of Transplant Reviewed and Discussed:  _X_ Increased stress related to emotional,            family, social, employment or financial           situation  _X_ Affect on work and/or disability benefits  _X_ Affect on future health and life           insurance  _X_ Transplant outcome expectations may           not be met  _X_ Mental Health Risks: anxiety,           depression, PTSD, guilt, grief and           chronic fatigue     Notable Items:   Discussed the need to remain locally for up to four weeks post discharge from the hospital and what lodging options are available. Also discussed availability and cost of weekly and monthly parking passes if needed. Provided patient with the Pre TP-IAT Cheat Sheet.       Final Evaluation/Assessment   Patient seemed to process information well. Appeared well informed, motivated and able to follow post transplant requirements. Behavior was appropriate during interview. Has adequate income and insurance coverage. Adequate social support. No major contraindications noted for transplant.       Recommendation  Acceptable    Selection Criteria Met:  Plan for support Yes   Chemical Dependence Yes   Smoking Yes   Mental Health Yes   Adequate Finances Yes    Signature: JAEL Guillen  [facility]   Title: Clinical

## 2021-05-20 NOTE — LETTER
5/20/2021         RE: Meme Robins  [address] [facility] [address]        Dear Colleague,    Thank you for referring your patient, Meme Robins, to the [facility]. Please see a copy of my visit note below.    Chronic Pancreatitis Group Consult Note     Patient: Meme Robins,   YOB: 1970,   Medical record number: [id number]  Consult requested by Dr. Phelps for evaluation of chronic pancreatitis.    Assessment:  1. Chronic painful pancreatitis   2. Etiology: Idiopathic Pancreatitis    Criteria for TPIAT: failed endoscopic stenting with small duct pancreatitis, chronic pain affecting daily functioning, non-diabetic.    Recommendations: Ms. Meme Robins appears to be a good candidate for total pancreatectomy and islet autotransplant.   - would like to get hematology consult regarding yasmeen-operative anti-coagulation and risk stratification  - need to discuss yasmeen-operative steroids use in perioperative period given h/o endocrine insuffiency    The majority of our consultation visit today was spent discussing potential surgical and medical complications of total pancreatectomy, the range of outcomes for pain control, diabetes, and long term sequela. The goal of the operation is relief from chronic pain, with restoration of a more normal functional status. Surgical risks include bleeding, possible transfusion, infection, deep vein or portal vein clotting, bile leak or stricture, injury to the liver vasculature, delayed gastric emptying, hernia, need for reoperation, bowel obstruction, difficulties with nausea, constipation and diarrhea, as well as other medical risks such as pneumonia, cardiac risks, malnutrition, brittle diabetes, and a 0.5% perioperative risk of death. The patient was told that oral pancreatic enzyme replacement therapy and acid blockade would be permanently required with meals and snacks in order to prevent malnutrition and vitamin  deficiencies and ulcer disease. There is risk of developing motility issues (diarrhea, constipation, nausea) that may be difficult to manage, especially if the patient cannot completely wean from narcotics. The patient understands that the islet cell autotransplant portion of the procedure is to prevent or ameliorate the otherwise inevitable insulin dependent and brittle diabetes that would result from total pancreatectomy. There is a very low (but not zero) risk of transplanting cancerous pancreatic tissue unknowingly. The patient understands the need to accept diabetes as a potential consequence of proceeding with surgery, and we discussed the importance of proper short and long-term diabetes management. While it is not possible to completely predict the postoperative diabetes outcome with certainty in a single individual's case, in our series of more than 700 cases, approximately 1/3 of patients are insulin independent, 1/3 required basal insulin only, and 1/3 required basal/bolus or 'typical diabetic' insulin therapy.  Rarely, we find intraoperatively that  pancreatectomy with islet cell transplant is not possible/safe due to vascular involvement or discovery of cancer. We discussed the average inpatient length of stay (10 days), the typical yasmeen- and post-operative patient experience, care plan for pain management, nutrition, and posttransplant intensive insulin therapy for at least several months and potentially permanently, as well as the requirement that a  care partner  be available to help with post-discharge outpatient care which usually lasts up to several weeks while the patient remains near the medical center for necessary outpatient care and monitoring.     Overall candidacy for total pancreatectomy and islet autotransplant will be determined by our Multidisciplinary Chronic Pancreatitis Team, and further recommendations will follow.  Thank you for the opportunity to participate in Ms. Meme Robins's  care.    Total time: 60 minutes        Elfego Shirley MD  ---------------------------------------------------------------------------------------------------  HPI:   51 yo lady with h/o pancreatitis since age of 15 yo.  She underwent lap nanci in 01' for abdominal pain.  Her first episode of AP was in 03'.  She eventually developed recurrent AP, managed by ERCP (~30) procedures with sphincterotomy and stenting.  Although, pain has improved with ERCP, symptoms would recurrent at the end of the 3 month period.  She underwent John in 11' in [place], but her symptoms persisted.  Around 2013'-14', she spent over 200 days at the hospital.  She underwent John revision with symptomatic improvement.  However, over the past two years, her symptoms got worse with 4-5 episodes per year.  She is on oxymorphone for pain, zenpep for exocrine insufficiency, but reports constipation.    She has h/o pre-diabetes and adrenal isufficiency.  CT:  There is atrophy of the distal body and tail of the pancreas  associated with a dilated pancreatic duct. This may be related to  chronic pancreatic duct stenosis. No obstructing lesion demonstrated.  No replaced right hepatic artery  Genetic Evaluation: Negative.  Per report            Past Medical History:   Diagnosis Date     Adrenal insufficiency (H)     iatrogenic, around 2013, off treatment for several years as of 2021 visit     Anemia      Depression      Esophageal reflux      Idiopathic chronic pancreatitis (H)      Pre-diabetes      Pulmonary embolism (H)      Past Surgical History:   Procedure Laterality Date     CELIAC PLEXUS BLOCK  05/29/2014    with alcohol x 1     ENDOMETRIAL ABLATION  03/29/2014     ENDOSCOPIC RETROGRADE CHOLANGIOPANCREATOGRAM      about 30, most with PD stentes     ENDOSCOPIC ULTRASOUND, ESOPHAGOSCOPY, GASTROSCOPY, DUODENOSCOPY (EGD), COMBINED        SHOULDER ARTHROSCOPY,SURGICAL BICEPS TENODESIS  02/09/2017     JEJUNOSTOMY CARE      multiple feeding  tubes x 3, at least one was direct J     PANCREAS SURGERY  05/29/2014    John procedure revision     PANCREAS SURGERY  02/2011    John     Family History   Problem Relation Age of Onset     Breast Cancer Mother      Diabetes No family hx of      Pancreatitis No family hx of      Social History     Socioeconomic History     Marital status:      Spouse name: Not on file     Number of children: Not on file     Years of education: Not on file     Highest education level: Not on file   Occupational History     Not on file   Social Needs     Financial resource strain: Not on file     Food insecurity     Worry: Not on file     Inability: Not on file     Transportation needs     Medical: Not on file     Non-medical: Not on file   Tobacco Use     Smoking status: Never Smoker     Smokeless tobacco: Never Used   Substance and Sexual Activity     Alcohol use: Not Currently     Drug use: Never     Sexual activity: Not on file   Lifestyle     Physical activity     Days per week: Not on file     Minutes per session: Not on file     Stress: Not on file   Relationships     Social connections     Talks on phone: Not on file     Gets together: Not on file     Attends [employer] service: Not on file     Active member of club or organization: Not on file     Attends meetings of clubs or organizations: Not on file     Relationship status: Not on file     Intimate partner violence     Fear of current or ex partner: Not on file     Emotionally abused: Not on file     Physically abused: Not on file     Forced sexual activity: Not on file   Other Topics Concern     Not on file   Social History Narrative    Meme is  and has three children.         ROS:  A 12 point review of systems was performed and was negative except for those listed above    Allergies:   Allergies   Allergen Reactions     Metoclopramide      Morphine      Penicillins      Prochlorperazine      Promethazine        Medications:  Prescription Medications as of  6/17/2021       Rx Number Disp Refills Start End Last Dispensed Date Next Fill Date Owning Pharmacy    clonazePAM (KLONOPIN) 1 MG tablet    2/9/2021        Sig: Take 1 mg by mouth 2 times daily    Class: Historical    Route: Oral    HYDROmorphone (DILAUDID) 4 MG tablet    5/17/2021        Sig: Take 4 mg by mouth as needed    Class: Historical    Earliest Fill Date: 5/17/2021    Route: Oral    hyoscyamine SL (LEVSIN/SL) 0.125 MG sublingual tablet            Sig: Take 125 mcg by mouth as needed    Class: Historical    Route: Oral    lipase-protease-amylase (ZENPEP) 84783-84188 units CPEP            Sig: Take 3 capsules by mouth 3 times daily (with meals)    Class: Historical    Route: Oral    ondansetron (ZOFRAN-ODT) 4 MG ODT tab            Sig: Take 4 mg by mouth as needed    Class: Historical    Route: Oral    Oxymorphone HCl 10 MG TB12    5/17/2021        Sig: Take 10 mg by mouth 3 times daily as needed    Class: Historical    Earliest Fill Date: 5/17/2021    Route: Oral    pantoprazole (PROTONIX) 40 MG EC tablet    2/3/2021        Sig: Take 40 mg by mouth 2 times daily    Class: Historical    Route: Oral    sertraline (ZOLOFT) 100 MG tablet    2/1/2021        Sig: Take 150 mg by mouth At Bedtime    Class: Historical    Route: Oral    traZODone (DESYREL) 100 MG tablet    2/1/2021        Sig: Take 100 mg by mouth At Bedtime    Class: Historical    Route: Oral          Exam:      Appearance: in no apparent distress.   Head and Neck: Normal, no rashes or jaundice  Respiratory: easy respirations, normal I:E, no audible wheezing.  Cardiovascular: regular rate and rhythm  Abdomen: No distention   Extremeties: Edema, none  Neuro: without deficit, Full affect.    Diagnostics:   No results found for this or any previous visit (from the past 336 hour(s)).     Negative for prothrombin gene mutation in 2012, negative for Factor V Leiden mutation in 2005  Positive for hexagonal phase phospholipid in 2012    CT: images  interpreted personally, no replaced right hepatic artery, chronic pancreatitis with atrophic pancreas    Lab Results   Component Value Date    WBC 4.2 05/19/2021     Lab Results   Component Value Date    RBC 4.19 05/19/2021     Lab Results   Component Value Date    HGB 12.0 05/19/2021     Lab Results   Component Value Date    HCT 36.4 05/19/2021     No components found for: MCT  Lab Results   Component Value Date    MCV 87 05/19/2021     Lab Results   Component Value Date    MCH 28.6 05/19/2021     Lab Results   Component Value Date    MCHC 33.0 05/19/2021     Lab Results   Component Value Date    RDW 12.9 05/19/2021     Lab Results   Component Value Date     05/19/2021     Last Comprehensive Metabolic Panel:  Sodium   Date Value Ref Range Status   05/19/2021 139 133 - 144 mmol/L Final     Potassium   Date Value Ref Range Status   05/19/2021 3.6 3.4 - 5.3 mmol/L Final     Chloride   Date Value Ref Range Status   05/19/2021 105 94 - 109 mmol/L Final     Carbon Dioxide   Date Value Ref Range Status   05/19/2021 29 20 - 32 mmol/L Final     Anion Gap   Date Value Ref Range Status   05/19/2021 5 3 - 14 mmol/L Final     Glucose   Date Value Ref Range Status   05/19/2021 124 (H) 70 - 99 mg/dL Final     Urea Nitrogen   Date Value Ref Range Status   05/19/2021 8 7 - 30 mg/dL Final     Creatinine   Date Value Ref Range Status   05/19/2021 0.67 0.52 - 1.04 mg/dL Final     GFR Estimate   Date Value Ref Range Status   05/19/2021 >90 >60 mL/min/[1.73_m2] Final     Comment:     Non  GFR Calc  Starting 12/18/2018, serum creatinine based estimated GFR (eGFR) will be   calculated using the Chronic Kidney Disease Epidemiology Collaboration   (CKD-EPI) equation.       Calcium   Date Value Ref Range Status   05/19/2021 9.2 8.5 - 10.1 mg/dL Final     Bilirubin Total   Date Value Ref Range Status   05/19/2021 0.5 0.2 - 1.3 mg/dL Final     Alkaline Phosphatase   Date Value Ref Range Status   05/19/2021 82 40 - 150  U/L Final     ALT   Date Value Ref Range Status   05/19/2021 20 0 - 50 U/L Final     AST   Date Value Ref Range Status   05/19/2021 16 0 - 45 U/L Final     Again, thank you for allowing me to participate in the care of your patient.        Sincerely,        Elfego Shirley MD

## 2021-05-20 NOTE — PROGRESS NOTES
Pediatric Endocrinology/ Islet Autotransplant  Chronic Pancreatitis Consult    Patient Active Problem List   Diagnosis     Abdominal pain, RUQ (right upper quadrant)     Acute upper respiratory infection     Anemia     Cellulitis and abscess     Chronic abdominal pain     Chronic pancreatitis (H)     Dysfunctional sphincter of Oddi     Diarrhea     Degeneration of cervical intervertebral disc     Dysthymic disorder     Iron deficiency anemia     Glucocorticoid deficiency (H)     Joint pain, hip     Long term current use of anticoagulant therapy     Other symptoms involving urinary system(788.99)     Nausea alone     Myalgia and myositis     Migraine     Malaise and fatigue     Major depressive disorder, single episode     Urinary tract infection     Pulmonary embolism (H)     Primary hypercoagulable state (H)       Assessment/Plan:  1.  Chronic pancreatitis, complicated by atrophy and prior John procedure  2.  Prediabetes  3.  Hypoglycemia     Meme is a 50 year old with pancreatitis, considering surgical management. She has a 17 year history of well documented pancreatitis, initially recurrent acute and then progressing to chronic pancreatitis. She has failed medical and endoscopic management and two prior pancreas surgeries, now having more days with pain than not and on long-acting opioids for about 1 year.  She is very thoughtful about the decision to proceed to TPIAT and I think despite her complexities will be a good candidate.    She does have endocrine history that is notable for pre-diabetes (documented here by both fasting glucose and A1c) as well as hypoglycemia (lowest to 20s).  The hypoglycemia is likely multifactorial related to malnutrition and reactive hypoglycemia from prior GI surgery and islet dysfunction.   However, she has also had a past history of central adrenal insufficiency, not on treatment for many years, but unclear if she has really be reassessed.  We do see HPA axis dysregulation in  some patients with pancreatitis or TPIAT and is probably important to assess this prior to surgery so that we are aware for post op management.     The primary purpose of today's visit was to review the patient's endocrine history and provide counseling on islet autotransplantation.  We discussed the procedure of islet autotransplant, the post-operative management, and the prognosis.  We specifically discussed that all patients are on insulin after this procedure, typically for at least several months.  About 1/3rd of patients will become insulin independent, but I do not expect that to be the case for Meme, given her atrophic pancreas, prior surgery, and pre-diabetes.  We did discuss that for Meme my goal is simply to get enough islets to restore some endogenous beta cell function and keep diabetes easier to manage;  Because of the high risk of diabetes mellitus, all patients must be willing to accept diabetes and insulin injections as a trade off for relief from pancreatic pain.    All surgical consults are reviewed by our multi-disciplinary team to determine if surgery is an appropriate next option.  I    Patricia Bermeo MD  [facility]  Phone:  [phone]  Fax:  [phone]    I spent 2 hours with Meme and her  face to face, of which about 90 minutes was focused on counseling about the TPIAT surgery and diabetes.                  CC:  Chronic pancreatitis, surgical evaluation    HPI:  Meme Robins is a 50 year old female referred by Dr. Phelps at [facility]  for new patient consultation of endocrine function in the setting of chronic pancreatitis.    Pancreatitis history is as follows:  Meme has a long history of diagnosed pancreatitis that dates back to 2003.  She has been managed initially in [place] and at [facility] and more recently in [place]. She had her gallbladder out in 2001 for some  abdominal pain that in retrospect may be similar to later pancreatitis pain.  She had her first episode of acute pancreatitis that was diagnosed in 2003 and subsequently went about 1.5 years without another episode when it recurred, and she developed recurrent acute pancreatitis.  She then had a number of ERCP procedures with pancreatic sphincterotomy and repeated PD stenting in [place].  She recalls these helped- she was back to 'normal' after the ERCP and towards the end of the ~3 mos interval between stent replacements would have recurrence of pain. She finally reached a point where it was felt that ERCP was no longer indicated and she was seen at [place] in surgical consultation at which time she had a John surgery in 2011.  She did however continue to have recurrent acute pancreatitis which I see very well documented in some older admission notes from [facility] from 2013.  Her  and her recall that around that time in 2013 and 2014 she was spending over 200 days per year in the hospital because she was admitted so often.  They then found that she appeared to have a part of the pancreas duct that had not been opened to drainage during the John, so she went in and they did a modified or revised John. This did help, and her frequency of hospitalizations was decreased.  Now, however, over the past 2 years she is having episodes needing hospital admits about 4-5 times per year and is having more pain days at home. She has started a long-acting oxymorphone 1 year ago and on some days (but not all days) also takes dilaudid. She is on ZenPep 20, 3 per meal, for exocrine insufficiency but struggles with constipation (also linzess treated) and diarrhea.  She reports she has had genetic testing for pancreatitis in the past that was negative, but I don't have these results to confirm which genes were tested.  Of note, she did have one celiac plexus block done during open surgery in May 2014 (revised John) at  which time they did a alcohol block. She also had a history of poorly managed pain during surgery in past. She has notable endocrine history of pre-diabetes, hypoglycemia, and adrenal insufficiency that are documented below.      Evaluation/ imaging/ treatments:  Elevated amylase and lipase by history:  YES- her most recent records are not available in [facility] (but I did review visits scanned from 1/2021 from [place] [facility]) but back in 2013 in [facility] I can see multiple documented AP.  Etiology of disease: iodpathic   Number of hospitalizations in last 1 year: 4-5  Recent imaging studies: CT will be done tomorrow.   She does have an EUS scanned in Epic that I reviewed from earlier this year but it was non descript other than severe atrophy.   Medical treatment(s): as above  ERCP procedures: YES;   Number of ERCPs:  About 30-- as she recalls these did not include biliary sphincterotomy or biliary stenting but it did include pancreatic duct sphincterotomy and PD stenting.   Prior pancreatic surgery: YES, as above  Had cholecystectomy  Had nerve esteban    Endocrine history:    - Previously diagnosed as pre-diabetes and clearly does have pre-diabetes here, C-peptide pending  -- Has hypoglycemia-- describes this as worst in hospital stays, when fasting for prolonged period but also gets reactive hypoglycemia at home.  She does not think any real lab work up done-- sounds like critical sample ordered during a hospital admit but was treated with dextrose before these could be drawn.  Is so far thought secondary to pancreatitis.   -- Interestingly had 3 steroid injections in neck in very distant past (2012 or 2013?) and then developed or was diagnosed as central adrenal insufficiency, and treated with cortef which I can see documented in 2013.  She thinks she has been off this for at least 3 or 4 years and no recent testing that she recalls.     Other GI:  Nausea is a huge component of her symptom burden.          Review of Systems:  A comprehensive 10 point review of systems was performed and was negative except as noted in the HPI above.    Past Medical History:  Patient Active Problem List   Diagnosis     Abdominal pain, RUQ (right upper quadrant)     Acute upper respiratory infection     Anemia     Cellulitis and abscess     Chronic abdominal pain     Chronic pancreatitis (H)     Dysfunctional sphincter of Oddi     Diarrhea     Degeneration of cervical intervertebral disc     Dysthymic disorder     Iron deficiency anemia     Glucocorticoid deficiency (H)     Joint pain, hip     Long term current use of anticoagulant therapy     Other symptoms involving urinary system(788.99)     Nausea alone     Myalgia and myositis     Migraine     Malaise and fatigue     Major depressive disorder, single episode     Urinary tract infection     Pulmonary embolism (H)     Primary hypercoagulable state (H)     Hypoglycemia after GI (gastrointestinal) surgery       Past Medical History:   Diagnosis Date     Adrenal insufficiency (H)     iatrogenic, around 2013, off treatment for several years as of 2021 visit     Anemia      Depression      Esophageal reflux      Idiopathic chronic pancreatitis (H)      Pre-diabetes      Pulmonary embolism (H)      Past Surgical History:   Procedure Laterality Date     CELIAC PLEXUS BLOCK  05/29/2014    with alcohol x 1     ENDOMETRIAL ABLATION  03/29/2014     ENDOSCOPIC RETROGRADE CHOLANGIOPANCREATOGRAM      about 30, most with PD stentes     ENDOSCOPIC ULTRASOUND, ESOPHAGOSCOPY, GASTROSCOPY, DUODENOSCOPY (EGD), COMBINED       HC SHOULDER ARTHROSCOPY,SURGICAL BICEPS TENODESIS  02/09/2017     JEJUNOSTOMY CARE      multiple feeding tubes x 3, at least one was direct J     PANCREAS SURGERY  05/29/2014    John procedure revision     PANCREAS SURGERY  02/2011    John       Current medications:  Current Outpatient Medications   Medication Sig Dispense Refill     clonazePAM (KLONOPIN) 1 MG tablet Take 1  mg by mouth 2 times daily       HYDROmorphone (DILAUDID) 4 MG tablet Take 4 mg by mouth as needed       hyoscyamine SL (LEVSIN/SL) 0.125 MG sublingual tablet Take 125 mcg by mouth as needed       lipase-protease-amylase (ZENPEP) 20143-69731 units CPEP Take 3 capsules by mouth 3 times daily (with meals)       ondansetron (ZOFRAN-ODT) 4 MG ODT tab Take 4 mg by mouth as needed       Oxymorphone HCl 10 MG TB12 Take 10 mg by mouth 3 times daily as needed       pantoprazole (PROTONIX) 40 MG EC tablet Take 40 mg by mouth 2 times daily       sertraline (ZOLOFT) 100 MG tablet Take 150 mg by mouth At Bedtime       traZODone (DESYREL) 100 MG tablet Take 100 mg by mouth At Bedtime         Family History:  The family history was reviewed with particular attention to diabetes and pancreatitis history, and updated by me as pertinent, and is as documented below.    Family History   Problem Relation Age of Onset     Breast Cancer Mother      Diabetes No family hx of      Pancreatitis No family hx of        Social History:  Social History     Social History Narrative    Meme is  and has three children.         Physical Exam:  Vitals: /70   Pulse 72   Wt 55.2 kg (121 lb 11.1 oz)   SpO2 96%   BMI= There is no height or weight on file to calculate BMI.  General:  Appearance is normal, no acute distress  HEENT:  NC/AT, sclera appear white  Neck:  No obvious thyromegaly  CV/Lungs:  Non distressed breathing  Skin:  No apparent rashes  Neuro:  Normal mental status  Psych:  Normal affect      Results:  Hemoglobin A1c levels (this and prior visits):  Lab Results   Component Value Date    A1C 6.0 05/19/2021       Mixed meal test results (from [facility]):  C Peptide   Date Value Ref Range Status   05/19/2021 1.6 0.9 - 6.9 ng/mL Final     Glucose   Date Value Ref Range Status   05/19/2021 124 (H) 70 - 99 mg/dL Final   05/19/2021 103 (H) 70 - 99 mg/dL Final   05/19/2021 112 (H) 70 - 99 mg/dL Final         Liver enzymes:  AST    Date Value Ref Range Status   05/19/2021 16 0 - 45 U/L Final     ALT   Date Value Ref Range Status   05/19/2021 20 0 - 50 U/L Final     No results found for: BILICONJ   Bilirubin Total   Date Value Ref Range Status   05/19/2021 0.5 0.2 - 1.3 mg/dL Final     Albumin   Date Value Ref Range Status   05/19/2021 3.9 3.4 - 5.0 g/dL Final     Protein Total   Date Value Ref Range Status   05/19/2021 7.5 6.8 - 8.8 g/dL Final      Alkaline Phosphatase   Date Value Ref Range Status   05/19/2021 82 40 - 150 U/L Final       Creatinine:  Creatinine   Date Value Ref Range Status   05/19/2021 0.67 0.52 - 1.04 mg/dL Final   ]    Complete Blood Count:  CBC RESULTS:   Recent Labs   Lab Test 05/19/21  0903   WBC 4.2   RBC 4.19   HGB 12.0   HCT 36.4   MCV 87   MCH 28.6   MCHC 33.0   RDW 12.9

## 2021-05-22 LAB
A-TOCOPHEROL VIT E SERPL-MCNC: 14.2 MG/L (ref 5.5–18)
ACYLCARNITINE SERPL-SCNC: 15 UMOL/L (ref 5–29)
ANNOTATION COMMENT IMP: NORMAL
BETA+GAMMA TOCOPHEROL SERPL-MCNC: 1.2 MG/L (ref 0–6)
CARN ESTERS/C0 SERPL-SRTO: 0.4 {RATIO} (ref 0.1–1)
CARNITINE FREE SERPL-SCNC: 34 UMOL/L (ref 25–60)
CARNITINE SERPL-SCNC: 49 UMOL/L (ref 34–86)
DEPRECATED CALCIDIOL+CALCIFEROL SERPL-MC: <84 UG/L (ref 20–75)
PANC ISLET CELL AB TITR SER: NORMAL {TITER}
RETINYL PALMITATE SERPL-MCNC: <0.02 MG/L (ref 0–0.1)
VIT A SERPL-MCNC: 0.52 MG/L (ref 0.3–1.2)
VITAMIN D2 SERPL-MCNC: <5 UG/L
VITAMIN D3 SERPL-MCNC: 79 UG/L

## 2021-05-23 LAB
GAD65 AB SER IA-ACNC: <5 IU/ML (ref 0–5)
INSULIN HUMAN AB SER-ACNC: <0.4 U/ML (ref 0–0.4)

## 2021-05-27 ENCOUNTER — DOCUMENTATION ONLY (OUTPATIENT)
Dept: TRANSPLANT | Facility: CLINIC | Age: 51
End: 2021-05-27

## 2021-05-27 NOTE — PROGRESS NOTES
Patient's case discussed at [facility] on 5/26/21.     After team discussion, it was determined that patient is a candidate for a Total Pancreatectomy-Islet Auto Transplant.     Patient requirements before scheduling surgery: pre-op immunizations.     Patient updated and agreeable to plan of care: LVM for patient with update on status. Provided direct contact information with a request to call back to discuss surgical dates and pre-op scheduling.        Verified that patient has coordinator contact information should they have any further questions. Coordinator will work with patient to have pre-op vaccinations adminstered and appointments scheduled.         Maddi Haynes RN BSN  Transplant coordinator   Total Pancreatectomy and Islet Auto Transplant program

## 2021-06-14 ENCOUNTER — PREP FOR PROCEDURE (OUTPATIENT)
Dept: TRANSPLANT | Facility: CLINIC | Age: 51
End: 2021-06-14

## 2021-06-14 DIAGNOSIS — K86.1 CHRONIC PANCREATITIS (H): Primary | ICD-10-CM

## 2021-06-17 DIAGNOSIS — Z11.59 ENCOUNTER FOR SCREENING FOR OTHER VIRAL DISEASES: ICD-10-CM

## 2021-06-18 NOTE — PROGRESS NOTES
Ch[facility] Pancreatitis Group Consult Note     Patient: Meme Robins,   YOB: 1970,   Medical record number: [id number]  Consult requested by Dr. Phelps for evaluation of chronic pancreatitis.    Assessment:  1. Chronic painful pancreatitis   2. Etiology: Idiopathic Pancreatitis    Criteria for TPIAT: failed endoscopic stenting with small duct pancreatitis, chronic pain affecting daily functioning, non-diabetic.    Recommendations: Ms. Meme Robins appears to be a good candidate for total pancreatectomy and islet autotransplant.   - would like to get hematology consult regarding yasmeen-operative anti-coagulation and risk stratification  - need to discuss yasmeen-operative steroids use in perioperative period given h/o endocrine insuffiency    The majority of our consultation visit today was spent discussing potential surgical and medical complications of total pancreatectomy, the range of outcomes for pain control, diabetes, and long term sequela. The goal of the operation is relief from chronic pain, with restoration of a more normal functional status. Surgical risks include bleeding, possible transfusion, infection, deep vein or portal vein clotting, bile leak or stricture, injury to the liver vasculature, delayed gastric emptying, hernia, need for reoperation, bowel obstruction, difficulties with nausea, constipation and diarrhea, as well as other medical risks such as pneumonia, cardiac risks, malnutrition, brittle diabetes, and a 0.5% perioperative risk of death. The patient was told that oral pancreatic enzyme replacement therapy and acid blockade would be permanently required with meals and snacks in order to prevent malnutrition and vitamin deficiencies and ulcer disease. There is risk of developing motility issues (diarrhea, constipation, nausea) that may be difficult to manage, especially if the patient cannot completely wean from narcotics. The patient understands that the islet cell  autotransplant portion of the procedure is to prevent or ameliorate the otherwise inevitable insulin dependent and brittle diabetes that would result from total pancreatectomy. There is a very low (but not zero) risk of transplanting cancerous pancreatic tissue unknowingly. The patient understands the need to accept diabetes as a potential consequence of proceeding with surgery, and we discussed the importance of proper short and long-term diabetes management. While it is not possible to completely predict the postoperative diabetes outcome with certainty in a single individual's case, in our series of more than 700 cases, approximately 1/3 of patients are insulin independent, 1/3 required basal insulin only, and 1/3 required basal/bolus or 'typical diabetic' insulin therapy.  Rarely, we find intraoperatively that  pancreatectomy with islet cell transplant is not possible/safe due to vascular involvement or discovery of cancer. We discussed the average inpatient length of stay (10 days), the typical yasmeen- and post-operative patient experience, care plan for pain management, nutrition, and posttransplant intensive insulin therapy for at least several months and potentially permanently, as well as the requirement that a  care partner  be available to help with post-discharge outpatient care which usually lasts up to several weeks while the patient remains near the [facility] center for necessary outpatient care and monitoring.     Overall candidacy for total pancreatectomy and islet autotransplant will be determined by our Multidisciplinary Chronic Pancreatitis Team, and further recommendations will follow.  Thank you for the opportunity to participate in Ms. Meme Robins's care.    Total time: 60 minutes        Elfego Shirley MD  ---------------------------------------------------------------------------------------------------  HPI:   51 yo lady with h/o pancreatitis since age of 17 yo.  She underwent lap nanci  in 01' for abdominal pain.  Her first episode of AP was in 03'.  She eventually developed recurrent AP, managed by ERCP (~30) procedures with sphincterotomy and stenting.  Although, pain has improved with ERCP, symptoms would recurrent at the end of the 3 month period.  She underwent John in 11' in [place], but her symptoms persisted.  Around 2013'-14', she spent over 200 days at the hospital.  She underwent John revision with symptomatic improvement.  However, over the past two years, her symptoms got worse with 4-5 episodes per year.  She is on oxymorphone for pain, zenpep for exocrine insufficiency, but reports constipation.    She has h/o pre-diabetes and adrenal isufficiency.  CT:  There is atrophy of the distal body and tail of the pancreas  associated with a dilated pancreatic duct. This may be related to  chronic pancreatic duct stenosis. No obstructing lesion demonstrated.  No replaced right hepatic artery  Genetic Evaluation: Negative.  Per report            Past Medical History:   Diagnosis Date     Adrenal insufficiency (H)     iatrogenic, around 2013, off treatment for several years as of 2021 visit     Anemia      Depression      Esophageal reflux      Idiopathic chronic pancreatitis (H)      Pre-diabetes      Pulmonary embolism (H)      Past Surgical History:   Procedure Laterality Date     CELIAC PLEXUS BLOCK  05/29/2014    with alcohol x 1     ENDOMETRIAL ABLATION  03/29/2014     ENDOSCOPIC RETROGRADE CHOLANGIOPANCREATOGRAM      about 30, most with PD stentes     ENDOSCOPIC ULTRASOUND, ESOPHAGOSCOPY, GASTROSCOPY, DUODENOSCOPY (EGD), COMBINED        SHOULDER ARTHROSCOPY,SURGICAL BICEPS TENODESIS  02/09/2017     JEJUNOSTOMY CARE      multiple feeding tubes x 3, at least one was direct J     PANCREAS SURGERY  05/29/2014    John procedure revision     PANCREAS SURGERY  02/2011    John     Family History   Problem Relation Age of Onset     Breast Cancer Mother      Diabetes No family hx of       Pancreatitis No family hx of      Social History     Socioeconomic History     Marital status:      Spouse name: Not on file     Number of children: Not on file     Years of education: Not on file     Highest education level: Not on file   Occupational History     Not on file   Social Needs     Financial resource strain: Not on file     Food insecurity     Worry: Not on file     Inability: Not on file     Transportation needs     Medical: Not on file     Non-medical: Not on file   Tobacco Use     Smoking status: Never Smoker     Smokeless tobacco: Never Used   Substance and Sexual Activity     Alcohol use: Not Currently     Drug use: Never     Sexual activity: Not on file   Lifestyle     Physical activity     Days per week: Not on file     Minutes per session: Not on file     Stress: Not on file   Relationships     Social connections     Talks on phone: Not on file     Gets together: Not on file     Attends Congregational service: Not on file     Active member of club or organization: Not on file     Attends meetings of clubs or organizations: Not on file     Relationship status: Not on file     Intimate partner violence     Fear of current or ex partner: Not on file     Emotionally abused: Not on file     Physically abused: Not on file     Forced sexual activity: Not on file   Other Topics Concern     Not on file   Social History Narrative    Meme is  and has three children.         ROS:  A 12 point review of systems was performed and was negative except for those listed above    Allergies:   Allergies   Allergen Reactions     Metoclopramide      Morphine      Penicillins      Prochlorperazine      Promethazine        Medications:  Prescription Medications as of 6/17/2021       Rx Number Disp Refills Start End Last Dispensed Date Next Fill Date Owning Pharmacy    clonazePAM (KLONOPIN) 1 MG tablet    2/9/2021        Sig: Take 1 mg by mouth 2 times daily    Class: Historical    Route: Oral    HYDROmorphone  (DILAUDID) 4 MG tablet    5/17/2021        Sig: Take 4 mg by mouth as needed    Class: Historical    Earliest Fill Date: 5/17/2021    Route: Oral    hyoscyamine SL (LEVSIN/SL) 0.125 MG sublingual tablet            Sig: Take 125 mcg by mouth as needed    Class: Historical    Route: Oral    lipase-protease-amylase (ZENPEP) 95121-04955 units CPEP            Sig: Take 3 capsules by mouth 3 times daily (with meals)    Class: Historical    Route: Oral    ondansetron (ZOFRAN-ODT) 4 MG ODT tab            Sig: Take 4 mg by mouth as needed    Class: Historical    Route: Oral    Oxymorphone HCl 10 MG TB12    5/17/2021        Sig: Take 10 mg by mouth 3 times daily as needed    Class: Historical    Earliest Fill Date: 5/17/2021    Route: Oral    pantoprazole (PROTONIX) 40 MG EC tablet    2/3/2021        Sig: Take 40 mg by mouth 2 times daily    Class: Historical    Route: Oral    sertraline (ZOLOFT) 100 MG tablet    2/1/2021        Sig: Take 150 mg by mouth At Bedtime    Class: Historical    Route: Oral    traZODone (DESYREL) 100 MG tablet    2/1/2021        Sig: Take 100 mg by mouth At Bedtime    Class: Historical    Route: Oral          Exam:      Appearance: in no apparent distress.   Head and Neck: Normal, no rashes or jaundice  Respiratory: easy respirations, normal I:E, no audible wheezing.  Cardiovascular: regular rate and rhythm  Abdomen: No distention   Extremeties: Edema, none  Neuro: without deficit, Full affect.    Diagnostics:   No results found for this or any previous visit (from the past 336 hour(s)).     Negative for prothrombin gene mutation in 2012, negative for Factor V Leiden mutation in 2005  Positive for hexagonal phase phospholipid in 2012    CT: images interpreted personally, no replaced right hepatic artery, chronic pancreatitis with atrophic pancreas    Lab Results   Component Value Date    WBC 4.2 05/19/2021     Lab Results   Component Value Date    RBC 4.19 05/19/2021     Lab Results   Component  Value Date    HGB 12.0 05/19/2021     Lab Results   Component Value Date    HCT 36.4 05/19/2021     No components found for: MCT  Lab Results   Component Value Date    MCV 87 05/19/2021     Lab Results   Component Value Date    MCH 28.6 05/19/2021     Lab Results   Component Value Date    MCHC 33.0 05/19/2021     Lab Results   Component Value Date    RDW 12.9 05/19/2021     Lab Results   Component Value Date     05/19/2021     Last Comprehensive Metabolic Panel:  Sodium   Date Value Ref Range Status   05/19/2021 139 133 - 144 mmol/L Final     Potassium   Date Value Ref Range Status   05/19/2021 3.6 3.4 - 5.3 mmol/L Final     Chloride   Date Value Ref Range Status   05/19/2021 105 94 - 109 mmol/L Final     Carbon Dioxide   Date Value Ref Range Status   05/19/2021 29 20 - 32 mmol/L Final     Anion Gap   Date Value Ref Range Status   05/19/2021 5 3 - 14 mmol/L Final     Glucose   Date Value Ref Range Status   05/19/2021 124 (H) 70 - 99 mg/dL Final     Urea Nitrogen   Date Value Ref Range Status   05/19/2021 8 7 - 30 mg/dL Final     Creatinine   Date Value Ref Range Status   05/19/2021 0.67 0.52 - 1.04 mg/dL Final     GFR Estimate   Date Value Ref Range Status   05/19/2021 >90 >60 mL/min/[1.73_m2] Final     Comment:     Non  GFR Calc  Starting 12/18/2018, serum creatinine based estimated GFR (eGFR) will be   calculated using the Chronic Kidney Disease Epidemiology Collaboration   (CKD-EPI) equation.       Calcium   Date Value Ref Range Status   05/19/2021 9.2 8.5 - 10.1 mg/dL Final     Bilirubin Total   Date Value Ref Range Status   05/19/2021 0.5 0.2 - 1.3 mg/dL Final     Alkaline Phosphatase   Date Value Ref Range Status   05/19/2021 82 40 - 150 U/L Final     ALT   Date Value Ref Range Status   05/19/2021 20 0 - 50 U/L Final     AST   Date Value Ref Range Status   05/19/2021 16 0 - 45 U/L Final

## 2021-06-24 ENCOUNTER — TELEPHONE (OUTPATIENT)
Dept: TRANSPLANT | Facility: CLINIC | Age: 51
End: 2021-06-24

## 2021-06-24 NOTE — TELEPHONE ENCOUNTER
LMV asking Meme to call me re Dr Muñoz pre op recommendations  Hematology consult to assess risk for PE  ACT stim test

## 2021-07-02 ENCOUNTER — DOCUMENTATION ONLY (OUTPATIENT)
Dept: ENDOCRINOLOGY | Facility: CLINIC | Age: 51
End: 2021-07-02

## 2021-07-02 DIAGNOSIS — K86.1 CHRONIC PANCREATITIS (H): Primary | ICD-10-CM

## 2021-07-08 ENCOUNTER — DOCUMENTATION ONLY (OUTPATIENT)
Dept: TRANSPLANT | Facility: CLINIC | Age: 51
End: 2021-07-08

## 2021-07-08 DIAGNOSIS — E27.49 GLUCOCORTICOID DEFICIENCY (H): Primary | ICD-10-CM

## 2021-07-08 DIAGNOSIS — I26.99 PULMONARY EMBOLISM (H): ICD-10-CM

## 2021-07-08 RX ORDER — MEPERIDINE HYDROCHLORIDE 25 MG/ML
25 INJECTION INTRAMUSCULAR; INTRAVENOUS; SUBCUTANEOUS EVERY 30 MIN PRN
Status: CANCELLED | OUTPATIENT
Start: 2021-08-04

## 2021-07-08 RX ORDER — DIPHENHYDRAMINE HYDROCHLORIDE 50 MG/ML
50 INJECTION INTRAMUSCULAR; INTRAVENOUS
Status: CANCELLED
Start: 2021-08-04

## 2021-07-08 RX ORDER — METHYLPREDNISOLONE SODIUM SUCCINATE 125 MG/2ML
125 INJECTION, POWDER, LYOPHILIZED, FOR SOLUTION INTRAMUSCULAR; INTRAVENOUS
Status: CANCELLED
Start: 2021-08-04

## 2021-07-08 RX ORDER — ALBUTEROL SULFATE 90 UG/1
1-2 AEROSOL, METERED RESPIRATORY (INHALATION)
Status: CANCELLED
Start: 2021-08-04

## 2021-07-08 RX ORDER — ALBUTEROL SULFATE 0.83 MG/ML
2.5 SOLUTION RESPIRATORY (INHALATION)
Status: CANCELLED | OUTPATIENT
Start: 2021-08-04

## 2021-07-08 RX ORDER — EPINEPHRINE 1 MG/ML
0.3 INJECTION, SOLUTION, CONCENTRATE INTRAVENOUS EVERY 5 MIN PRN
Status: CANCELLED | OUTPATIENT
Start: 2021-08-04

## 2021-07-08 RX ORDER — NALOXONE HYDROCHLORIDE 0.4 MG/ML
0.2 INJECTION, SOLUTION INTRAMUSCULAR; INTRAVENOUS; SUBCUTANEOUS
Status: CANCELLED | OUTPATIENT
Start: 2021-08-04

## 2021-07-08 RX ORDER — COSYNTROPIN 0.25 MG/ML
250 INJECTION, POWDER, FOR SOLUTION INTRAMUSCULAR; INTRAVENOUS ONCE
Status: CANCELLED
Start: 2021-08-04 | End: 2021-08-04

## 2021-07-09 NOTE — TELEPHONE ENCOUNTER
FUTURE VISIT INFORMATION      SURGERY INFORMATION:    Date: 8/9/21    Location: uu or    Surgeon:  Elfego Shirley MD    Anesthesia Type:  Combined General with Block    Procedure: Laparoscopic hand-assisted total pancreatectomy with autologous islet cell transplantation, possible cholecystectomy, splenectomy, and incidental appendectomy    RECORDS REQUESTED FROM:       Primary Care Provider:  Padmini Hatch MD- [facility]    Pertinent Medical History: pulmonary embolism    Most recent ECHO: 12/21/12-  [facility]

## 2021-07-20 ENCOUNTER — DOCUMENTATION ONLY (OUTPATIENT)
Dept: TRANSPLANT | Facility: CLINIC | Age: 51
End: 2021-07-20

## 2021-07-20 DIAGNOSIS — Z01.812 PRE-PROCEDURE LAB EXAM: Primary | ICD-10-CM

## 2021-08-03 RX ORDER — POTASSIUM CHLORIDE 1500 MG/1
20 TABLET, EXTENDED RELEASE ORAL DAILY PRN
Status: ON HOLD | COMMUNITY
End: 2021-08-20

## 2021-08-03 RX ORDER — DIPHENHYDRAMINE HCL 25 MG
25 TABLET ORAL EVERY 6 HOURS PRN
COMMUNITY
End: 2022-03-06

## 2021-08-03 RX ORDER — FUROSEMIDE 20 MG
20 TABLET ORAL DAILY PRN
Status: ON HOLD | COMMUNITY
End: 2021-08-20

## 2021-08-03 RX ORDER — METHOCARBAMOL 750 MG/1
750 TABLET, FILM COATED ORAL 4 TIMES DAILY PRN
Status: ON HOLD | COMMUNITY
End: 2021-08-20

## 2021-08-03 RX ORDER — PHENOL 1.4 %
10 AEROSOL, SPRAY (ML) MUCOUS MEMBRANE
COMMUNITY

## 2021-08-03 RX ORDER — AMOXICILLIN 250 MG
1 CAPSULE ORAL DAILY PRN
Status: ON HOLD | COMMUNITY
End: 2021-08-20

## 2021-08-03 NOTE — PHARMACY - PREOPERATIVE ASSESSMENT CENTER
Preoperative Assessment Center Medication History Note    Medication history completed on August 3, 2021 by this writer. See Epic admission navigator for prior to admission medications. Operating room staff will still need to confirm medications and last dose information on day of surgery.     Medication history interview sources  Patient interview: Yes  Care Everywhere records: Yes  Surescripts pharmacy refill records: Yes  Other (if applicable): None    Changes made to PTA medication list (reason)  Added:   -- benadryl  -- potassium  -- furosemide  -- senokot  -- melatonin    Deleted: None    Changed:   -- added directions for methocarbamol  -- updated instructions for oxymorphone     Additional medication history information (including reliability of information, actions taken by pharmacist):    -- No recent (within 30 days) course of antibiotics  -- No recent (within 30 days) course of systemic steroids  -- Patient declines being on any other prescription or over-the-counter medications  -- has lasix as needed for fluid retention uses it about twice a month, will take potassium when she does use lasix  -- takes oxyCODONE morphone 10 every 8 in addition to HYDROmorphone 4mg by mouth every 6 hour as needed (averages 3 doses/day). I asked her about use of oxyCODONE since we don't carry oxymorphone and she said she can't recall. oxyCODONE gets metabolized into oxymorphone so I imagine she would tolerate a switch to oxycontin, which we have on formulary.     Prior to Admission medications    Medication Sig Last Dose Taking? Auth Provider   clonazePAM (KLONOPIN) 1 MG tablet 1mg morning and 1.5mg every evening Taking Yes Reported, Patient   diphenhydrAMINE (BENADRYL) 25 MG tablet Take 25 mg by mouth every 6 hours as needed for itching or allergies Taking Yes Unknown, Entered By History   furosemide (LASIX) 20 MG tablet Take 20 mg by mouth daily as needed Fluid retention Taking Yes Unknown, Entered By History    HYDROmorphone (DILAUDID) 4 MG tablet Take 4 mg by mouth every 6 hours as needed  Taking Yes Reported, Patient   hyoscyamine SL (LEVSIN/SL) 0.125 MG sublingual tablet Take 125 mcg by mouth 4 times daily as needed  Taking Yes Reported, Patient   lipase-protease-amylase (ZENPEP) 91367-24791 units CPEP Take 3 capsules by mouth 3 times daily (with meals) Taking Yes Reported, Patient   Melatonin 10 MG TABS tablet Take 10 mg by mouth nightly as needed for sleep Taking Yes Unknown, Entered By History   methocarbamol (ROBAXIN) 750 MG tablet Take 750 mg by mouth 4 times daily as needed for muscle spasms Taking Yes Unknown, Entered By History   ondansetron (ZOFRAN-ODT) 4 MG ODT tab Take 4 mg by mouth as needed Taking Yes Reported, Patient   Oxymorphone HCl 10 MG TB12 Take 10 mg by mouth every 8 hours  Taking Yes Reported, Patient   pantoprazole (PROTONIX) 40 MG EC tablet Take 40 mg by mouth 2 times daily Taking Yes Reported, Patient   potassium chloride ER (KLOR-CON M) 20 MEQ CR tablet Take 20 mEq by mouth daily as needed for potassium supplementation Taking Yes Unknown, Entered By History   senna-docusate (SENOKOT-S/PERICOLACE) 8.6-50 MG tablet Take 1 tablet by mouth daily as needed for constipation Taking Yes Unknown, Entered By History   sertraline (ZOLOFT) 100 MG tablet Take 150 mg by mouth At Bedtime Taking Yes Reported, Patient   traZODone (DESYREL) 100 MG tablet Take 100 mg by mouth At Bedtime Taking Yes Reported, Patient       Medication history completed by: Chandra Al RPH

## 2021-08-04 ENCOUNTER — ALLIED HEALTH/NURSE VISIT (OUTPATIENT)
Dept: EDUCATION SERVICES | Facility: CLINIC | Age: 51
End: 2021-08-04
Payer: COMMERCIAL

## 2021-08-04 ENCOUNTER — LAB (OUTPATIENT)
Dept: LAB | Facility: CLINIC | Age: 51
End: 2021-08-04
Attending: PEDIATRICS
Payer: COMMERCIAL

## 2021-08-04 ENCOUNTER — LAB (OUTPATIENT)
Dept: LAB | Facility: CLINIC | Age: 51
End: 2021-08-04
Payer: COMMERCIAL

## 2021-08-04 ENCOUNTER — VIRTUAL VISIT (OUTPATIENT)
Dept: HEMATOLOGY | Facility: CLINIC | Age: 51
End: 2021-08-04
Attending: INTERNAL MEDICINE
Payer: COMMERCIAL

## 2021-08-04 ENCOUNTER — ALLIED HEALTH/NURSE VISIT (OUTPATIENT)
Dept: TRANSPLANT | Facility: CLINIC | Age: 51
End: 2021-08-04
Attending: PEDIATRICS
Payer: COMMERCIAL

## 2021-08-04 ENCOUNTER — LAB (OUTPATIENT)
Dept: LAB | Facility: CLINIC | Age: 51
End: 2021-08-04
Attending: TRANSPLANT SURGERY
Payer: COMMERCIAL

## 2021-08-04 VITALS — WEIGHT: 122 LBS | BODY MASS INDEX: 23.05 KG/M2

## 2021-08-04 VITALS — WEIGHT: 122.14 LBS | BODY MASS INDEX: 23.08 KG/M2

## 2021-08-04 DIAGNOSIS — K86.1 CHRONIC PANCREATITIS (H): ICD-10-CM

## 2021-08-04 DIAGNOSIS — Z01.812 PRE-PROCEDURE LAB EXAM: ICD-10-CM

## 2021-08-04 DIAGNOSIS — Z01.812 PRE-PROCEDURE LAB EXAM: Primary | ICD-10-CM

## 2021-08-04 DIAGNOSIS — Z86.711 HISTORY OF PULMONARY EMBOLISM: Primary | ICD-10-CM

## 2021-08-04 LAB
ABO/RH(D): NORMAL
ALBUMIN SERPL-MCNC: 3.6 G/DL (ref 3.4–5)
ALBUMIN UR-MCNC: NEGATIVE MG/DL
ALP SERPL-CCNC: 88 U/L (ref 40–150)
ALT SERPL W P-5'-P-CCNC: 23 U/L (ref 0–50)
ANION GAP SERPL CALCULATED.3IONS-SCNC: 2 MMOL/L (ref 3–14)
ANTIBODY SCREEN: NEGATIVE
APPEARANCE UR: ABNORMAL
AST SERPL W P-5'-P-CCNC: 16 U/L (ref 0–45)
BASOPHILS # BLD AUTO: 0.1 10E3/UL (ref 0–0.2)
BASOPHILS NFR BLD AUTO: 1 %
BILIRUB SERPL-MCNC: 0.5 MG/DL (ref 0.2–1.3)
BILIRUB UR QL STRIP: NEGATIVE
BUN SERPL-MCNC: 14 MG/DL (ref 7–30)
C PEPTIDE SERPL-MCNC: 1.1 NG/ML (ref 0.9–6.9)
C PEPTIDE SERPL-MCNC: 3 NG/ML (ref 0.9–6.9)
C PEPTIDE SERPL-MCNC: 3.4 NG/ML (ref 0.9–6.9)
CALCIUM SERPL-MCNC: 9.3 MG/DL (ref 8.5–10.1)
CHLORIDE BLD-SCNC: 106 MMOL/L (ref 94–109)
CHOLEST SERPL-MCNC: 240 MG/DL
CO2 SERPL-SCNC: 29 MMOL/L (ref 20–32)
COLOR UR AUTO: YELLOW
CREAT SERPL-MCNC: 0.71 MG/DL (ref 0.52–1.04)
CREAT UR-MCNC: 193 MG/DL
EOSINOPHIL # BLD AUTO: 0.1 10E3/UL (ref 0–0.7)
EOSINOPHIL NFR BLD AUTO: 1 %
ERYTHROCYTE [DISTWIDTH] IN BLOOD BY AUTOMATED COUNT: 12.7 % (ref 10–15)
FASTING STATUS PATIENT QL REPORTED: NO
FASTING STATUS PATIENT QL REPORTED: NO
FASTING STATUS PATIENT QL REPORTED: YES
FASTING STATUS PATIENT QL REPORTED: YES
GFR SERPL CREATININE-BSD FRML MDRD: >90 ML/MIN/1.73M2
GLUCOSE BLD-MCNC: 109 MG/DL (ref 70–99)
GLUCOSE BLD-MCNC: 88 MG/DL (ref 70–99)
GLUCOSE BLD-MCNC: 89 MG/DL (ref 70–99)
GLUCOSE BLD-MCNC: 90 MG/DL (ref 70–99)
GLUCOSE UR STRIP-MCNC: NEGATIVE MG/DL
HBA1C MFR BLD: 5.5 % (ref 0–5.6)
HCG UR QL: NEGATIVE
HCT VFR BLD AUTO: 36.3 % (ref 35–47)
HDLC SERPL-MCNC: 74 MG/DL
HGB BLD-MCNC: 11.9 G/DL (ref 11.7–15.7)
HGB UR QL STRIP: NEGATIVE
HYALINE CASTS: 3 /LPF
IMM GRANULOCYTES # BLD: 0 10E3/UL
IMM GRANULOCYTES NFR BLD: 0 %
INR PPP: 1 (ref 0.85–1.15)
KETONES UR STRIP-MCNC: NEGATIVE MG/DL
LDLC SERPL CALC-MCNC: 150 MG/DL
LEUKOCYTE ESTERASE UR QL STRIP: ABNORMAL
LYMPHOCYTES # BLD AUTO: 1.7 10E3/UL (ref 0.8–5.3)
LYMPHOCYTES NFR BLD AUTO: 30 %
MCH RBC QN AUTO: 28.4 PG (ref 26.5–33)
MCHC RBC AUTO-ENTMCNC: 32.8 G/DL (ref 31.5–36.5)
MCV RBC AUTO: 87 FL (ref 78–100)
MICROALBUMIN UR-MCNC: 14 MG/L
MICROALBUMIN/CREAT UR: 7.25 MG/G CR (ref 0–25)
MONOCYTES # BLD AUTO: 0.6 10E3/UL (ref 0–1.3)
MONOCYTES NFR BLD AUTO: 11 %
MUCOUS THREADS #/AREA URNS LPF: PRESENT /LPF
NEUTROPHILS # BLD AUTO: 3.2 10E3/UL (ref 1.6–8.3)
NEUTROPHILS NFR BLD AUTO: 57 %
NITRATE UR QL: NEGATIVE
NONHDLC SERPL-MCNC: 166 MG/DL
NRBC # BLD AUTO: 0 10E3/UL
NRBC BLD AUTO-RTO: 0 /100
PH UR STRIP: 5 [PH] (ref 5–7)
PLATELET # BLD AUTO: 339 10E3/UL (ref 150–450)
POTASSIUM BLD-SCNC: 3.8 MMOL/L (ref 3.4–5.3)
PREALB SERPL IA-MCNC: 26 MG/DL (ref 15–45)
PROT SERPL-MCNC: 7.3 G/DL (ref 6.8–8.8)
RBC # BLD AUTO: 4.19 10E6/UL (ref 3.8–5.2)
RBC URINE: 1 /HPF
SODIUM SERPL-SCNC: 137 MMOL/L (ref 133–144)
SP GR UR STRIP: 1.02 (ref 1–1.03)
SPECIMEN EXPIRATION DATE: NORMAL
SQUAMOUS EPITHELIAL: 1 /HPF
TRANSITIONAL EPI: 1 /HPF
TRIGL SERPL-MCNC: 82 MG/DL
UROBILINOGEN UR STRIP-MCNC: NORMAL MG/DL
WBC # BLD AUTO: 5.7 10E3/UL (ref 4–11)
WBC URINE: 12 /HPF

## 2021-08-04 PROCEDURE — 81001 URINALYSIS AUTO W/SCOPE: CPT | Performed by: PATHOLOGY

## 2021-08-04 PROCEDURE — 85610 PROTHROMBIN TIME: CPT | Performed by: PATHOLOGY

## 2021-08-04 PROCEDURE — 83036 HEMOGLOBIN GLYCOSYLATED A1C: CPT | Performed by: PATHOLOGY

## 2021-08-04 PROCEDURE — 36415 COLL VENOUS BLD VENIPUNCTURE: CPT | Performed by: PATHOLOGY

## 2021-08-04 PROCEDURE — 84446 ASSAY OF VITAMIN E: CPT | Mod: 90 | Performed by: PATHOLOGY

## 2021-08-04 PROCEDURE — 99207 PR DIABETES SELF MANAGEMENT: CPT | Performed by: REGISTERED NURSE

## 2021-08-04 PROCEDURE — 82947 ASSAY GLUCOSE BLOOD QUANT: CPT

## 2021-08-04 PROCEDURE — 81025 URINE PREGNANCY TEST: CPT | Performed by: PATHOLOGY

## 2021-08-04 PROCEDURE — 85025 COMPLETE CBC W/AUTO DIFF WBC: CPT | Performed by: PATHOLOGY

## 2021-08-04 PROCEDURE — 84134 ASSAY OF PREALBUMIN: CPT | Performed by: PATHOLOGY

## 2021-08-04 PROCEDURE — 99204 OFFICE O/P NEW MOD 45 MIN: CPT | Mod: 95 | Performed by: INTERNAL MEDICINE

## 2021-08-04 PROCEDURE — 86900 BLOOD TYPING SEROLOGIC ABO: CPT | Mod: 90 | Performed by: PATHOLOGY

## 2021-08-04 PROCEDURE — 82947 ASSAY GLUCOSE BLOOD QUANT: CPT | Performed by: PATHOLOGY

## 2021-08-04 PROCEDURE — 82043 UR ALBUMIN QUANTITATIVE: CPT | Performed by: PATHOLOGY

## 2021-08-04 PROCEDURE — 84590 ASSAY OF VITAMIN A: CPT | Mod: 90 | Performed by: PATHOLOGY

## 2021-08-04 PROCEDURE — 86901 BLOOD TYPING SEROLOGIC RH(D): CPT | Mod: 90 | Performed by: PATHOLOGY

## 2021-08-04 PROCEDURE — 86850 RBC ANTIBODY SCREEN: CPT | Mod: 90 | Performed by: PATHOLOGY

## 2021-08-04 PROCEDURE — 80053 COMPREHEN METABOLIC PANEL: CPT | Performed by: PATHOLOGY

## 2021-08-04 PROCEDURE — 80061 LIPID PANEL: CPT | Performed by: PATHOLOGY

## 2021-08-04 PROCEDURE — 250N000011 HC RX IP 250 OP 636: Performed by: PEDIATRICS

## 2021-08-04 PROCEDURE — 82306 VITAMIN D 25 HYDROXY: CPT | Performed by: TRANSPLANT SURGERY

## 2021-08-04 PROCEDURE — 84681 ASSAY OF C-PEPTIDE: CPT | Performed by: TRANSPLANT SURGERY

## 2021-08-04 PROCEDURE — 87086 URINE CULTURE/COLONY COUNT: CPT | Performed by: TRANSPLANT SURGERY

## 2021-08-04 PROCEDURE — 84681 ASSAY OF C-PEPTIDE: CPT

## 2021-08-04 RX ORDER — HEPARIN SODIUM (PORCINE) LOCK FLUSH IV SOLN 100 UNIT/ML 100 UNIT/ML
5 SOLUTION INTRAVENOUS EVERY 8 HOURS
Status: DISCONTINUED | OUTPATIENT
Start: 2021-08-04 | End: 2021-08-13 | Stop reason: CLARIF

## 2021-08-04 RX ADMIN — Medication 5 ML: at 11:09

## 2021-08-04 RX ADMIN — Medication 5 ML: at 10:21

## 2021-08-04 NOTE — PROGRESS NOTES
Chief Complaint   Patient presents with     Allied Health Visit     Boost      Administered Boost:8:53am    FBS: 108    Called lab with boost time :     9:55am    10:55am    Patient is aware and given time to return to lab    Meme Orellana CMA

## 2021-08-04 NOTE — PROGRESS NOTES
[facility]  [facility]ot[facility]  [facility], [address], [place] [address]  Main: [phone], Fax: [phone]        Outpatient Clinic Visit  Date:  08/04/2021      NOTE:  Due to the ongoing COVID-19 pandemic, this visit was conducted by video, with the patient's approval.      Meme Robins is a 51-year-old woman with chronic pancreatitis, scheduled for total pancreatectomy and islet autotransplant on August 9, 2021.  We are asked to see her with regard to her history of venous thromboembolism.    She presented in 2012 with bilateral pulmonary emboli.  Her symptoms developed abruptly and consisted of shortness of breath and a sensation of chest heaviness.  She had no symptoms to suggest deep vein thrombosis and none were found on imaging.  She was treated with warfarin for 1 year.  She had no prior history of venous thrombosis nor any subsequent events.    She had been started on oral estrogen about 3 months prior, to manage perimenopausal symptoms.  She had been on estrogen in the past and had 3 full-term pregnancies, but had not been estrogen exposed for at least a few years prior being restarted on estrogen 3 months prior to the clotting event.  The estrogen was stopped and she was told to avoid it in the future.  In addition, she had traveled by air from [place] to [place] and back about 1 to 2 weeks prior to this, although the flight is only approximately 90 minutes.  Thus, this is not a significant risk factor.  No other triggers including recent surgery, immobility, illness, or trauma are identified.  She had not had a flare of her chronic pancreatitis for at least a few months prior to this.  She has no residual pulmonary symptoms.    There is no known family history of venous thrombosis.    Physical exam: She appears healthy.  Detailed exam not performed (video visit).    Labs: No labs obtained as part of today's visit.  Testing in 2012  reportedly showed that she does not have factor V Leiden or the prothrombin gene mutation.      ASSESSMENT / PLAN:  History of pulmonary embolism.    Meme has a history of a single episode of venous thromboembolism which occurred almost 10 years ago.  This was clearly a provoked event, triggered by initiation of oral estrogen therapy about 3 months prior.  No other triggers are identified.  She has had multiple hospitalizations and surgical procedures related to her chronic pancreatitis, with no history of thrombosis associated with those triggers.    She has been tested for the 2 most common genetic thrombophilias and not found to have either.  Given her personal history and the absence of any family history of venous thrombosis, additional thrombophilia testing is not indicated.    For the upcoming surgery, she should receive standard perioperative VTE prophylaxis.      Total time 45 minutes, including review of medical records and labs, video visit, and documentation.      Alonso Hutchinson MD  Professor of Medicine  Division of Hematology, Oncology, and Transplantation  Director, [facility]

## 2021-08-04 NOTE — NURSING NOTE
Chief Complaint   Patient presents with     Port Draw     heparin locked,      120 min post boost labs drawn and T&C    covid test to be done Friday    Oralia Gilmore RN on 8/4/2021 at 11:12 AM

## 2021-08-04 NOTE — PROGRESS NOTES
Patient was contacted to complete the pre-visit call prior to their video visit with the provider.      Allergies and medications were reviewed.    I thanked them for their time to cover this information     Syd Burgos CMA

## 2021-08-05 ENCOUNTER — OFFICE VISIT (OUTPATIENT)
Dept: TRANSPLANT | Facility: CLINIC | Age: 51
End: 2021-08-05
Attending: TRANSPLANT SURGERY
Payer: COMMERCIAL

## 2021-08-05 ENCOUNTER — OFFICE VISIT (OUTPATIENT)
Dept: SURGERY | Facility: CLINIC | Age: 51
End: 2021-08-05
Attending: TRANSPLANT SURGERY
Payer: COMMERCIAL

## 2021-08-05 ENCOUNTER — ANESTHESIA EVENT (OUTPATIENT)
Dept: SURGERY | Facility: CLINIC | Age: 51
End: 2021-08-05

## 2021-08-05 ENCOUNTER — PRE VISIT (OUTPATIENT)
Dept: SURGERY | Facility: CLINIC | Age: 51
End: 2021-08-05

## 2021-08-05 ENCOUNTER — INFUSION THERAPY VISIT (OUTPATIENT)
Dept: INFUSION THERAPY | Facility: CLINIC | Age: 51
End: 2021-08-05
Attending: PEDIATRICS
Payer: COMMERCIAL

## 2021-08-05 VITALS
HEART RATE: 94 BPM | BODY MASS INDEX: 23.49 KG/M2 | OXYGEN SATURATION: 98 % | WEIGHT: 124.3 LBS | SYSTOLIC BLOOD PRESSURE: 113 MMHG | DIASTOLIC BLOOD PRESSURE: 75 MMHG

## 2021-08-05 VITALS
HEART RATE: 98 BPM | DIASTOLIC BLOOD PRESSURE: 81 MMHG | BODY MASS INDEX: 23.62 KG/M2 | TEMPERATURE: 98.4 F | WEIGHT: 125 LBS | SYSTOLIC BLOOD PRESSURE: 120 MMHG | OXYGEN SATURATION: 97 % | RESPIRATION RATE: 16 BRPM

## 2021-08-05 VITALS
HEART RATE: 78 BPM | SYSTOLIC BLOOD PRESSURE: 144 MMHG | TEMPERATURE: 98 F | OXYGEN SATURATION: 98 % | RESPIRATION RATE: 16 BRPM | DIASTOLIC BLOOD PRESSURE: 84 MMHG

## 2021-08-05 DIAGNOSIS — E27.49 GLUCOCORTICOID DEFICIENCY (H): Primary | ICD-10-CM

## 2021-08-05 DIAGNOSIS — E27.49 GLUCOCORTICOID DEFICIENCY (H): ICD-10-CM

## 2021-08-05 DIAGNOSIS — Z01.818 PREOP EXAMINATION: Primary | ICD-10-CM

## 2021-08-05 DIAGNOSIS — K86.1 CHRONIC PANCREATITIS (H): ICD-10-CM

## 2021-08-05 LAB
BACTERIA UR CULT: NORMAL
CORTICOSTER 1H P 250 UG ACTH SERPL-SCNC: 41.4 UG/DL (ref 20–150)
CORTICOSTER 30M P 250 UG ACTH SERPL-SCNC: 39.5 UG/DL (ref 20–150)
CORTICOSTER SERPL-MCNC: 35.6 UG/DL (ref 4–22)
TIME OF COSYNTROPIN INJECTION: NORMAL

## 2021-08-05 PROCEDURE — 99204 OFFICE O/P NEW MOD 45 MIN: CPT | Performed by: PHYSICIAN ASSISTANT

## 2021-08-05 PROCEDURE — 80400 ACTH STIMULATION PANEL: CPT | Performed by: PEDIATRICS

## 2021-08-05 PROCEDURE — 99207 PR SATISFY VISIT NUMBER: CPT | Performed by: NURSE PRACTITIONER

## 2021-08-05 PROCEDURE — 36591 DRAW BLOOD OFF VENOUS DEVICE: CPT | Performed by: NURSE PRACTITIONER

## 2021-08-05 PROCEDURE — 250N000011 HC RX IP 250 OP 636: Performed by: PEDIATRICS

## 2021-08-05 PROCEDURE — 82533 TOTAL CORTISOL: CPT | Performed by: PEDIATRICS

## 2021-08-05 PROCEDURE — 96374 THER/PROPH/DIAG INJ IV PUSH: CPT

## 2021-08-05 PROCEDURE — 36591 DRAW BLOOD OFF VENOUS DEVICE: CPT

## 2021-08-05 PROCEDURE — G0463 HOSPITAL OUTPT CLINIC VISIT: HCPCS | Mod: 25

## 2021-08-05 RX ORDER — DIPHENHYDRAMINE HYDROCHLORIDE 50 MG/ML
50 INJECTION INTRAMUSCULAR; INTRAVENOUS
Status: CANCELLED
Start: 2021-08-05

## 2021-08-05 RX ORDER — EPINEPHRINE 1 MG/ML
0.3 INJECTION, SOLUTION INTRAMUSCULAR; SUBCUTANEOUS EVERY 5 MIN PRN
Status: CANCELLED | OUTPATIENT
Start: 2021-08-05

## 2021-08-05 RX ORDER — COSYNTROPIN 0.25 MG/ML
250 INJECTION, POWDER, FOR SOLUTION INTRAMUSCULAR; INTRAVENOUS ONCE
Status: CANCELLED
Start: 2021-08-05 | End: 2021-08-05

## 2021-08-05 RX ORDER — NALOXONE HYDROCHLORIDE 0.4 MG/ML
0.2 INJECTION, SOLUTION INTRAMUSCULAR; INTRAVENOUS; SUBCUTANEOUS
Status: CANCELLED | OUTPATIENT
Start: 2021-08-05

## 2021-08-05 RX ORDER — MEPERIDINE HYDROCHLORIDE 25 MG/ML
25 INJECTION INTRAMUSCULAR; INTRAVENOUS; SUBCUTANEOUS EVERY 30 MIN PRN
Status: CANCELLED | OUTPATIENT
Start: 2021-08-05

## 2021-08-05 RX ORDER — METHYLPREDNISOLONE SODIUM SUCCINATE 125 MG/2ML
125 INJECTION, POWDER, LYOPHILIZED, FOR SOLUTION INTRAMUSCULAR; INTRAVENOUS
Status: CANCELLED
Start: 2021-08-05

## 2021-08-05 RX ORDER — COSYNTROPIN 0.25 MG/ML
250 INJECTION, POWDER, FOR SOLUTION INTRAMUSCULAR; INTRAVENOUS ONCE
Status: COMPLETED | OUTPATIENT
Start: 2021-08-05 | End: 2021-08-05

## 2021-08-05 RX ORDER — ALBUTEROL SULFATE 0.83 MG/ML
2.5 SOLUTION RESPIRATORY (INHALATION)
Status: CANCELLED | OUTPATIENT
Start: 2021-08-05

## 2021-08-05 RX ORDER — ALBUTEROL SULFATE 90 UG/1
1-2 AEROSOL, METERED RESPIRATORY (INHALATION)
Status: CANCELLED
Start: 2021-08-05

## 2021-08-05 RX ADMIN — COSYNTROPIN 250 MCG: 0.25 INJECTION, POWDER, LYOPHILIZED, FOR SOLUTION INTRAMUSCULAR; INTRAVENOUS at 09:32

## 2021-08-05 ASSESSMENT — PAIN SCALES - GENERAL: PAINLEVEL: MILD PAIN (3)

## 2021-08-05 NOTE — PROGRESS NOTES
[facility] Consult Note     Patient: Meme Robins,   YOB: 1970,   Medical record number: [id number]    Assessment:  1. Chronic painful pancreatitis   2. Etiology: Idiopathic Pancreatitis  3. Medically and surgically appropriatetotal pancreatectomy and islet autotransplant.       Criteria for TPIAT: failed endoscopic stenting with small duct pancreatitis, chronic pain affecting daily functioning, non-diabetic.    Recommendations: Ms. Meme Robins appears to be a good candidate for total pancreatectomy and islet autotransplant.     The majority of our  visit today was spent discussing potential surgical and medical complications of total pancreatectomy, the range of outcomes for pain control, diabetes, and long term sequela.  All of this was previously discussed with the surgeon of record.       Total time: 35 minutes  Counselling time: 25 minutes      ---------------------------------------------------------------------------------------------------  HPI:     50 yo lady with h/o pancreatitis since age of 17 yo.  She underwent lap nanci in 01' for abdominal pain.  Her first episode of AP was in 03'.  She eventually developed recurrent AP, managed by ERCP (~30) procedures with sphincterotomy and stenting.  Although, pain has improved with ERCP, symptoms would recurrent at the end of the 3 month period.  She underwent John in 11' in [place], but her symptoms persisted.  Around 2013'-14', she spent over 200 days at the hospital.  She underwent John revision with symptomatic improvement.  However, over the past two years, her symptoms got worse with 4-5 episodes per year.  She is on oxymorphone for pain, zenpep for exocrine insufficiency, but reports constipation.    She has h/o pre-diabetes and adrenal isufficiency. She has previously been on pred.   No recent fevers, or chills.     She is concerned everyone know she only tolerates zofran.        CT:  There is atrophy of the distal body  and tail of the pancreas  associated with a dilated pancreatic duct. This may be related to  chronic pancreatic duct stenosis. No obstructing lesion demonstrated.  No replaced right hepatic artery  Genetic Evaluation: Negative.  Per report    Past Medical History:   Diagnosis Date     Adrenal insufficiency (H)     iatrogenic, around 2013, off treatment for several years as of 2021 visit     Anemia      Depression      Esophageal reflux      Idiopathic chronic pancreatitis (H)      Pre-diabetes      Pulmonary embolism (H)      Past Surgical History:   Procedure Laterality Date     CELIAC PLEXUS BLOCK  05/29/2014    with alcohol x 1     ENDOMETRIAL ABLATION  03/29/2014     ENDOSCOPIC RETROGRADE CHOLANGIOPANCREATOGRAM      about 30, most with PD stentes     ENDOSCOPIC ULTRASOUND, ESOPHAGOSCOPY, GASTROSCOPY, DUODENOSCOPY (EGD), COMBINED        SHOULDER ARTHROSCOPY,SURGICAL BICEPS TENODESIS  02/09/2017     JEJUNOSTOMY CARE      multiple feeding tubes x 3, at least one was direct J     PANCREAS SURGERY  05/29/2014    John procedure revision     PANCREAS SURGERY  02/2011    John     Family History   Problem Relation Age of Onset     Breast Cancer Mother      Diabetes No family hx of      Pancreatitis No family hx of      Social History     Socioeconomic History     Marital status:      Spouse name: Not on file     Number of children: Not on file     Years of education: Not on file     Highest education level: Not on file   Occupational History     Not on file   Tobacco Use     Smoking status: Never Smoker     Smokeless tobacco: Never Used   Substance and Sexual Activity     Alcohol use: Not Currently     Drug use: Never     Sexual activity: Not on file   Other Topics Concern     Not on file   Social History Narrative    Meme is  and has three children.       Social Determinants of Health     Financial Resource Strain:      Difficulty of Paying Living Expenses:    Food Insecurity:      Worried About Running  Out of Food in the Last Year:      Ran Out of Food in the Last Year:    Transportation Needs:      Lack of Transportation (Medical):      Lack of Transportation (Non-Medical):    Physical Activity:      Days of Exercise per Week:      Minutes of Exercise per Session:    Stress:      Feeling of Stress :    Social Connections:      Frequency of Communication with Friends and Family:      Frequency of Social Gatherings with Friends and Family:      Attends [employer] Services:      Active Member of Clubs or Organizations:      Attends Club or Organization Meetings:      Marital Status:    Intimate Partner Violence:      Fear of Current or Ex-Partner:      Emotionally Abused:      Physically Abused:      Sexually Abused:        ROS:  A 12 point review of systems was performed and was negative except for those listed above.     Allergies:   Allergies   Allergen Reactions     Metoclopramide      Morphine      Penicillins      Prochlorperazine      Promethazine        Medications:  Prescription Medications as of 2021         Rx Number Disp Refills Start End Last Dispensed Date Next Fill Date Owning Pharmacy    clonazePAM (KLONOPIN) 1 MG tablet    2021        Simg morning and 1.5mg every evening    Class: Historical    diphenhydrAMINE (BENADRYL) 25 MG tablet            Sig: Take 25 mg by mouth every 6 hours as needed for itching or allergies    Class: Historical    Route: Oral    furosemide (LASIX) 20 MG tablet            Sig: Take 20 mg by mouth daily as needed Fluid retention    Class: Historical    Route: Oral    HYDROmorphone (DILAUDID) 4 MG tablet    2021        Sig: Take 4 mg by mouth every 6 hours as needed     Class: Historical    Earliest Fill Date: 2021    Route: Oral    hyoscyamine SL (LEVSIN/SL) 0.125 MG sublingual tablet            Sig: Take 125 mcg by mouth 4 times daily as needed     Class: Historical    Route: Oral    lipase-protease-amylase (ZENPEP) 78108-90257 units CPEP             Sig: Take 3 capsules by mouth 3 times daily (with meals)    Class: Historical    Route: Oral    Melatonin 10 MG TABS tablet            Sig: Take 10 mg by mouth nightly as needed for sleep    Class: Historical    Route: Oral    methocarbamol (ROBAXIN) 750 MG tablet            Sig: Take 750 mg by mouth 4 times daily as needed for muscle spasms    Class: Historical    Route: Oral    ondansetron (ZOFRAN-ODT) 4 MG ODT tab            Sig: Take 4 mg by mouth as needed    Class: Historical    Route: Oral    Oxymorphone HCl 10 MG TB12    5/17/2021        Sig: Take 10 mg by mouth every 8 hours     Class: Historical    Earliest Fill Date: 5/17/2021    Route: Oral    pantoprazole (PROTONIX) 40 MG EC tablet    2/3/2021        Sig: Take 40 mg by mouth 2 times daily    Class: Historical    Route: Oral    potassium chloride ER (KLOR-CON M) 20 MEQ CR tablet            Sig: Take 20 mEq by mouth daily as needed for potassium supplementation    Class: Historical    Route: Oral    senna-docusate (SENOKOT-S/PERICOLACE) 8.6-50 MG tablet            Sig: Take 1 tablet by mouth daily as needed for constipation    Class: Historical    Route: Oral    sertraline (ZOLOFT) 100 MG tablet    2/1/2021        Sig: Take 150 mg by mouth At Bedtime    Class: Historical    Route: Oral    traZODone (DESYREL) 100 MG tablet    2/1/2021        Sig: Take 100 mg by mouth At Bedtime    Class: Historical    Route: Oral          Clinic-Administered Medications as of 8/9/2021         Dose Frequency Start End    heparin 100 UNIT/ML injection 5 mL 5 mL EVERY 8 HOURS 8/4/2021     Route: Intracatheter    heparin 100 UNIT/ML injection 5 mL 5 mL EVERY 8 HOURS 8/4/2021     Route: Intracatheter          Hospital Medications as of 8/9/2021         Dose Frequency Start End    - MEDICATION INSTRUCTIONS -  CONTINUOUS PRN 8/9/2021     Admin Instructions: - All continuous nerve block medications must be preservative free<BR>- All orders must be compounded in preservative free  Normal Saline<BR>- All perineural medications must be PRESERVATIVE FREE <BR>- All orders must be compounded in preservative free Normal Saline <BR>- Absolutely NO anticoagulants, thrombolytics, or antiplatelet medications without prior notification of the managing Anesthesia Service <BR>- All patients who receive perineural medications must have IV access.    Class: E-Prescribe    Route: Does not apply    acetaminophen (TYLENOL) tablet 975 mg (Completed) 975 mg ONCE 8/9/2021 8/9/2021    Admin Instructions: Not to exceed 1g in last 8 hours<BR>Maximum acetaminophen dose from all sources = 75 mg/kg/day not to exceed 4 grams/day.    Class: E-Prescribe    Route: Oral    ertapenem (INVanz) 1 g vial to attach to  mL bag (Completed) 1 g PRE-OP/PRE-PROCEDURE 8/9/2021 8/9/2021    Admin Instructions: First dose within 1 hour prior to incision.    Class: E-Prescribe    Route: Intravenous    ertapenem (INVanz) 1 g vial to attach to  mL bag 1 g EVERY 8 HOURS PRN 8/9/2021     Admin Instructions: Give every 8 hours while patient is in surgery, starting 8 hours after pre-op dose. Re-Dosing in renal impairment: If CrCl less than 50 mL/min, DOUBLE the time interval. If CrCl less than 10 mL/min (on dialysis), do NOT re-dose.    Class: E-Prescribe    Route: Intravenous    esmolol (BREVIBLOC) injection  PRN 8/9/2021     Route: Intravenous    fentaNYL (PF) (SUBLIMAZE) injection 25-50 mcg 25-50 mcg EVERY 2 MIN PRN 8/9/2021     Admin Instructions: Caution: may have synergistic effect when used with midazolam (VERSED). If inadequate response may repeat 25-50 mcg IV slowly Q 5 minutes PRN pain (Maximum of 200 mcg total dose in 60 minutes.) Doses can be exceeded under direct oversight of patient by provider.Only given for procedural sedation while provider present. Nurse to discontinue this medication when procedure complete.<BR>For ordered IV doses 1-100 mcg give IV Push undiluted over a minimum of 3-5 minutes.    Route:  "Intravenous    fentaNYL (PF) (SUBLIMAZE) injection  PRN 8/9/2021     Route: Intravenous    fluconazole (DIFLUCAN) intermittent infusion 400 mg in NaCl 400 mg EVERY 24 HOURS 8/9/2021     Class: E-Prescribe    Route: Intravenous    flumazenil (ROMAZICON) injection 0.2 mg 0.2 mg EVERY 1 MIN PRN 8/9/2021     Admin Instructions: Give over 15 seconds. If inadequate response after 45 seconds, may repeat up to a MAX total dose of 1 mg. Continue monitoring until discharge criteria met of minimum of 2 hours.<BR>Irritant. For ordered IV doses 0.1-1 mg, give IV Push undiluted. Administer each 0.2mg over 15 seconds.<BR>Use with caution in patients on benzodiazepine therapy.    Class: E-Prescribe    Route: Intravenous    gabapentin (NEURONTIN) capsule 300 mg (Completed) 300 mg ONCE 8/9/2021 8/9/2021    Class: E-Prescribe    Route: Oral    heparin 5,000 units in 500 mL 0.9% sodium chloride  PRN 8/9/2021     Class: E-Prescribe    insulin regular (HumuLIN R,NovoLIN R) infusion (1 unit/mL) ADULT/PEDS  CONTINUOUS PRN 8/9/2021     Route: Intravenous    ketamine (KETALAR) 2 mg/mL in sodium chloride 0.9 % 50 mL ANALGESIA infusion 5-30 mg/hr CONTINUOUS 8/9/2021     Class: E-Prescribe    Route: Intravenous    labetalol (NORMODYNE/TRANDATE) syringe  PRN 8/9/2021     Route: Intravenous    lactated ringers infusion  CONTINUOUS 8/9/2021     Admin Instructions: IF patient NOT on dialysis.    Class: E-Prescribe    Route: Intravenous    lactated ringers infusion  CONTINUOUS PRN 8/9/2021     Route: Intravenous    lidocaine (LMX4) cream  EVERY 1 HOUR PRN 8/9/2021     Admin Instructions: Apply at least 30 minutes prior to VAD insertion in divided doses as needed for size of site for insertion.<BR>MAX Dose: 2.5 g (  of 5 g tube)<BR>Do NOT give if patient has a history of allergy to any local anesthetic or any \"karin\" product.<BR>Do NOT use both lidocaine intradermal/subcutaneous injection and the lidocaine cream on the same site.    Class: " "E-Prescribe    Route: Topical    lidocaine (LMX4) cream  EVERY 1 HOUR PRN 8/9/2021     Admin Instructions: Apply at least 30 minutes prior to VAD insertion in divided doses as needed for size of site for insertion.<BR>MAX Dose: 2.5 g (  of 5 g tube)<BR>Do NOT give if patient has a history of allergy to any local anesthetic or any \"karin\" product.<BR>Do NOT use both lidocaine intradermal/subcutaneous injection and the lidocaine cream on the same site.    Class: E-Prescribe    Route: Topical    lidocaine 1 % 0.1-1 mL 0.1-1 mL EVERY 1 HOUR PRN 8/9/2021     Admin Instructions: MAX dose 1 mL subcutaneous OR intradermal along the side of the vein in divided doses as needed for VAD insertion.<BR>Do NOT give if patient has a history of allergy to any local anesthetic or any \"karin\" product.<BR>Do NOT use both lidocaine intradermal/subcutaneous injection and the lidocaine cream on the same site.    Class: E-Prescribe    Route: Other    lidocaine 1 % 0.1-1 mL 0.1-1 mL EVERY 1 HOUR PRN 8/9/2021     Admin Instructions: MAX dose 1 mL subcutaneous OR intradermal along the side of the vein in divided doses as needed for VAD insertion.<BR>Do NOT give if patient has a history of allergy to any local anesthetic or any \"karin\" product.<BR>Do NOT use both lidocaine intradermal/subcutaneous injection and the lidocaine cream on the same site.    Class: E-Prescribe    Route: Other    midazolam (VERSED) injection 1-2 mg 1-2 mg EVERY 4 MIN PRN 8/9/2021     Admin Instructions: Caution: when used with opioids, may need lower doses. If inadequate response may repeat 1 mg IV slowly Q 4 minutes PRN sedation until desired response (Maximum of 5 mg total dose.) Doses can be exceeded under direct oversight of patient by provider. Nurse to discontinue this medication when procedure complete.<BR>This drug may cause significant respiratory depression.  Monitor respiratory status and vital signs carefully for 1 hour after each dose.    Class: " "E-Prescribe    Route: Intravenous    midazolam (VERSED) injection  PRN 8/9/2021     Route: Intravenous    naloxone (NARCAN) injection 0.2 mg 0.2 mg EVERY 2 MIN PRN 8/9/2021     Admin Instructions: Administer intravenous route when available and notify provider when administered.<BR>For unintended sedation or respiratory depression if all of the below criteria are met:<BR>~ respiratory rate LESS than or EQUAL to 8. <BR>~SaO2 less than 92% and or/end-tidal CO2 is greater than 50.<BR>~ the patient is receiving an opioid, has unintended sedations assessed as RASS (-3), and is currently not on mechanical ventilation.  RASS scale moderate (-3) is movement or eye opening to voice but no eye contact.<BR><BR>Patient Monitoring<BR>Once the patient has demonstrated a response to the naloxone, continue to monitor respiratory rate, depth, oxygen saturation and end-tidal CO2 (if available) every 15 minutes x 2, then every 30 minutes x 2, then every 1 hour x 1 after each naloxone dose.  Consider transfer to ICU if patient respiratory parameters have not improved after 4 naloxone doses.<BR>For ordered IV doses 0.1-2mg give IVP. Give each 0.4mg over 15 seconds in emergency situations. For non-emergent situations further dilute in 9mL of NS to facilitate titration of response.    Class: E-Prescribe    Route: Intravenous    Linked Group 1: \"Or\" Linked Group Details        naloxone (NARCAN) injection 0.2 mg 0.2 mg EVERY 2 MIN PRN 8/9/2021     Admin Instructions: Administer intramuscular if an intravenous route is not available and notify provider when administered.<BR>For unintended sedation or respiratory depression if all of the below criteria are met:<BR>~ respiratory rate LESS than or EQUAL to 8. <BR>~SaO2 less than 92% and or/end-tidal CO2 is greater than 50.<BR>~ the patient is receiving an opioid, has unintended sedations assessed as RASS (-3), and is currently not on mechanical ventilation.  RASS scale moderate (-3) is movement " "or eye opening to voice but no eye contact.<BR><BR>Patient Monitoring<BR>Once the patient has demonstrated a response to the naloxone, continue to monitor respiratory rate, depth, oxygen saturation and end-tidal CO2 (if available) every 15 minutes x 2, then every 30 minutes x 2, then every 1 hour x 1 after each naloxone dose.  Consider transfer to ICU if patient respiratory parameters have not improved after 4 naloxone doses.<BR>For ordered IV doses 0.1-2mg give IVP. Give each 0.4mg over 15 seconds in emergency situations. For non-emergent situations further dilute in 9mL of NS to facilitate titration of response.    Class: E-Prescribe    Route: Intramuscular    Linked Group 1: \"Or\" Linked Group Details        naloxone (NARCAN) injection 0.4 mg 0.4 mg EVERY 2 MIN PRN 8/9/2021     Admin Instructions: Administer intravenous route when available and notify provider when administered.<BR>For unintended sedation or respiratory depression if all of the below criteria are met:<BR>~ respiratory rate LESS than or EQUAL to 8.<BR>~ SaO2 less than 92% and or/end-tidal CO2 is greater than 50.<BR>~ the patient is receiving an opioid, has unintended sedation assessed as RASS (-4) or (-5) and patient is currently not on mechanical ventilation.  RASS scale (-4) is deep sedation with no response to voice but movement or eye opening to physical stimulation.  RASS scale (-5) is unarousable.  <BR><BR>Patient Monitoring<BR>Once the patient has demonstrated a response to the naloxone, continue to monitor respiratory rate, depth, oxygen saturation and end-tidal CO2 (if available) every 15 minutes x 2, then every 30 minutes x 2, then every 1 hour x 1 after each naloxone dose.  Consider transfer to ICU if patient respiratory parameters have not improved after 4 naloxone doses.<BR>For ordered IV doses 0.1-2mg give IVP. Give each 0.4mg over 15 seconds in emergency situations. For non-emergent situations further dilute in 9mL of NS to " "facilitate titration of response.    Class: E-Prescribe    Route: Intravenous    Linked Group 1: \"Or\" Linked Group Details        naloxone (NARCAN) injection 0.4 mg 0.4 mg EVERY 2 MIN PRN 8/9/2021     Admin Instructions: Administer intramuscular if an intravenous route is not available and notify provider when administered.<BR>For unintended sedation or respiratory depression if all of the below criteria are met:<BR>~ respiratory rate LESS than or EQUAL to 8.<BR>~ SaO2 less than 92% and or/end-tidal CO2 is greater than 50.<BR>~ the patient is receiving an opioid, has unintended sedation assessed as RASS (-4) or (-5) and patient is currently not on mechanical ventilation.  RASS scale (-4) is deep sedation with no response to voice but movement or eye opening to physical stimulation.  RASS scale (-5) is unarousable.  <BR><BR>Patient Monitoring<BR>Once the patient has demonstrated a response to the naloxone, continue to monitor respiratory rate, depth, oxygen saturation and end-tidal CO2 (if available) every 15 minutes x 2, then every 30 minutes x 2, then every 1 hour x 1 after each naloxone dose.  Consider transfer to ICU if patient respiratory parameters have not improved after 4 naloxone doses.<BR>For ordered IV doses 0.1-2mg give IVP. Give each 0.4mg over 15 seconds in emergency situations. For non-emergent situations further dilute in 9mL of NS to facilitate titration of response.    Class: E-Prescribe    Route: Intramuscular    Linked Group 1: \"Or\" Linked Group Details        No Anticoagulation unless approved by Anesthesia Provider.  CONTINUOUS PRN 8/9/2021     Admin Instructions: .    Class: E-Prescribe    Route: Does not apply    ondansetron (ZOFRAN) injection  PRN 8/9/2021     Route: Intravenous    propofol (DIPRIVAN) injection 10 mg/mL vial  PRN 8/9/2021     Route: Intravenous    rocuronium injection  PRN 8/9/2021     Route: Intravenous    ropivacaine 0.2% (NAROPIN) 750 mL in ON-Q C-Bloc select flow ([id number] " "holds 600-750 mL) dual cath disposable pump  CONTINUOUS 8/9/2021     Admin Instructions: Type of Nerve Block: erector spinae (ES)<BR>Location: bilateral <BR>Instructions: Initiate infusion at 7mL/hr each catheter, total infusion 14 mL/hr. Do NOT titrate unless ordered by managing Anesthesia Service and call provider with any questions.    Class: E-Prescribe    Route: Irrigation    scopolamine (TRANSDERM) 72 hr patch 1 patch (Completed) 1 patch ONCE 8/9/2021 8/9/2021    Admin Instructions: Apply patch to skin, behind ear.  Remove every 72 hours.<BR>Each 1.5 mg patch delivers 1 mg of scopolamine.  Reminder: Remove previous patch before applying new patch.    Class: E-Prescribe    Route: Transdermal    Linked Group 2: \"And\" Linked Group Details        scopolamine (TRANSDERM-SCOP) Patch in Place  EVERY 8 HOURS 8/9/2021     Admin Instructions: Chart every shift, confirming that patch is still in place on patient (no barcode scan needed). See patch order for dose information.    Class: E-Prescribe    Route: Transdermal    Linked Group 2: \"And\" Linked Group Details        sodium chloride (PF) 0.9% PF flush 3 mL 3 mL EVERY 8 HOURS 8/9/2021     Admin Instructions: to lock peripheral IV dormant line    Class: E-Prescribe    Route: Intracatheter    sodium chloride (PF) 0.9% PF flush 3 mL 3 mL EVERY 1 MIN PRN 8/9/2021     Class: E-Prescribe    Route: Intracatheter    sodium chloride (PF) 0.9% PF flush 3 mL 3 mL EVERY 8 HOURS 8/9/2021     Admin Instructions: to lock peripheral IV dormant line    Class: E-Prescribe    Route: Intracatheter    sodium chloride (PF) 0.9% PF flush 3 mL 3 mL EVERY 1 MIN PRN 8/9/2021     Class: E-Prescribe    Route: Intracatheter            Exam:   Temp:  [98.6  F (37  C)] 98.6  F (37  C)  Pulse:  [71-85] 71  Resp:  [16] 16  BP: (110-129)/(70-83) 115/73  SpO2:  [96 %-100 %] 100 %  Appearance: in no apparent distress.   Head and Neck: Normal, no rashes or jaundice  Respiratory: easy respirations, normal " I:E, no audible wheezing.  Cardiovascular: regular rate and rhythm  Abdomen: No distention   Extremeties: Edema, none  Neuro: without deficit, Full affect.    Diagnostics:   Recent Results (from the past 336 hour(s))   INR    Collection Time: 08/04/21  8:29 AM   Result Value Ref Range    INR 1.00 0.85 - 1.15   Vitamin E    Collection Time: 08/04/21  8:29 AM   Result Value Ref Range    Vitamin E 13.2 5.5 - 18.0 mg/L    Vitamin E Gamma 0.8 0.0 - 6.0 mg/L   Vitamin A    Collection Time: 08/04/21  8:29 AM   Result Value Ref Range    Vitamin A 0.60 0.30 - 1.20 mg/L    Retinol Palmitate <0.02 0.00 - 0.10 mg/L    Vitamin A Interp Normal    25 Hydroxyvitamin D2 and D3    Collection Time: 08/04/21  8:29 AM   Result Value Ref Range    25 OH Vitamin D2 <5 ug/L    25 OH Vitamin D3 73 ug/L    25 OH Vit D Total <78 (H) 20 - 75 ug/L   Glucose   *Fasting    Collection Time: 08/04/21  8:29 AM   Result Value Ref Range    Glucose 88 70 - 99 mg/dL    Patient Fasting > 8hrs? Yes    C-peptide  *Fasting    Collection Time: 08/04/21  8:29 AM   Result Value Ref Range    C Peptide 1.1 0.9 - 6.9 ng/mL   Lipid Profile    Collection Time: 08/04/21  8:29 AM   Result Value Ref Range    Cholesterol 240 (H) <200 mg/dL    Triglycerides 82 <150 mg/dL    Direct Measure HDL 74 >=50 mg/dL    LDL Cholesterol Calculated 150 (H) <=100 mg/dL    Non HDL Cholesterol 166 (H) <130 mg/dL    Patient Fasting > 8hrs? Yes    Prealbumin    Collection Time: 08/04/21  8:29 AM   Result Value Ref Range    Prealbumin 26 15 - 45 mg/dL   UA with Microscopic reflex to Culture    Collection Time: 08/04/21  8:37 AM    Specimen: Urine, Midstream   Result Value Ref Range    Color Urine Yellow Colorless, Straw, Light Yellow, Yellow    Appearance Urine Slightly Cloudy (A) Clear    Glucose Urine Negative Negative mg/dL    Bilirubin Urine Negative Negative    Ketones Urine Negative Negative mg/dL    Specific Gravity Urine 1.018 1.003 - 1.035    Blood Urine Negative Negative    pH  Urine 5.0 5.0 - 7.0    Protein Albumin Urine Negative Negative mg/dL    Urobilinogen Urine Normal Normal, 2.0 mg/dL    Nitrite Urine Negative Negative    Leukocyte Esterase Urine Trace (A) Negative    Mucus Urine Present (A) None Seen /LPF    RBC Urine 1 <=2 /HPF    WBC Urine 12 (H) <=5 /HPF    Squamous Epithelials Urine 1 <=1 /HPF    Transitional Epithelials Urine 1 <=1 /HPF    Hyaline Casts Urine 3 (H) <=2 /LPF   Albumin Random Urine Quantitative with Creat Ratio    Collection Time: 08/04/21  8:37 AM   Result Value Ref Range    Creatinine Urine mg/dL 193 mg/dL    Albumin Urine mg/L 14 mg/L    Albumin Urine mg/g Cr 7.25 0.00 - 25.00 mg/g Cr   Urine Culture    Collection Time: 08/04/21  8:37 AM    Specimen: Urine, Midstream   Result Value Ref Range    Culture <10,000 CFU/mL Mixture of urogenital patti    HCG qualitative urine    Collection Time: 08/04/21  8:37 AM   Result Value Ref Range    hCG Urine Qualitative Negative Negative   Hemoglobin A1c    Collection Time: 08/04/21  9:59 AM   Result Value Ref Range    Hemoglobin A1C 5.5 0.0 - 5.6 %   Comprehensive metabolic panel    Collection Time: 08/04/21  9:59 AM   Result Value Ref Range    Sodium 137 133 - 144 mmol/L    Potassium 3.8 3.4 - 5.3 mmol/L    Chloride 106 94 - 109 mmol/L    Carbon Dioxide (CO2) 29 20 - 32 mmol/L    Anion Gap 2 (L) 3 - 14 mmol/L    Urea Nitrogen 14 7 - 30 mg/dL    Creatinine 0.71 0.52 - 1.04 mg/dL    Calcium 9.3 8.5 - 10.1 mg/dL    Glucose 90 70 - 99 mg/dL    Alkaline Phosphatase 88 40 - 150 U/L    AST 16 0 - 45 U/L    ALT 23 0 - 50 U/L    Protein Total 7.3 6.8 - 8.8 g/dL    Albumin 3.6 3.4 - 5.0 g/dL    Bilirubin Total 0.5 0.2 - 1.3 mg/dL    GFR Estimate >90 >60 mL/min/1.73m2   CBC with platelets and differential    Collection Time: 08/04/21  9:59 AM   Result Value Ref Range    WBC Count 5.7 4.0 - 11.0 10e3/uL    RBC Count 4.19 3.80 - 5.20 10e6/uL    Hemoglobin 11.9 11.7 - 15.7 g/dL    Hematocrit 36.3 35.0 - 47.0 %    MCV 87 78 - 100 fL     MCH 28.4 26.5 - 33.0 pg    MCHC 32.8 31.5 - 36.5 g/dL    RDW 12.7 10.0 - 15.0 %    Platelet Count 339 150 - 450 10e3/uL    % Neutrophils 57 %    % Lymphocytes 30 %    % Monocytes 11 %    % Eosinophils 1 %    % Basophils 1 %    % Immature Granulocytes 0 %    NRBCs per 100 WBC 0 <1 /100    Absolute Neutrophils 3.2 1.6 - 8.3 10e3/uL    Absolute Lymphocytes 1.7 0.8 - 5.3 10e3/uL    Absolute Monocytes 0.6 0.0 - 1.3 10e3/uL    Absolute Eosinophils 0.1 0.0 - 0.7 10e3/uL    Absolute Basophils 0.1 0.0 - 0.2 10e3/uL    Absolute Immature Granulocytes 0.0 <=0.0 10e3/uL    Absolute NRBCs 0.0 10e3/uL   Glucose *60 minutes after boost/mixed meal    Collection Time: 08/04/21  9:59 AM   Result Value Ref Range    Glucose 89 70 - 99 mg/dL    Patient Fasting > 8hrs? No    C-peptide *60 minutes after boost/mixed meal    Collection Time: 08/04/21  9:59 AM   Result Value Ref Range    C Peptide 3.4 0.9 - 6.9 ng/mL   C-peptide *120 minutes after boost/mixed meal    Collection Time: 08/04/21 11:04 AM   Result Value Ref Range    C Peptide 3.0 0.9 - 6.9 ng/mL   Adult Type and Screen    Collection Time: 08/04/21 11:04 AM   Result Value Ref Range    ABO/RH(D) A POS     Antibody Screen Negative Negative    SPECIMEN EXPIRATION DATE [id number]    Glucose    Collection Time: 08/04/21 11:04 AM   Result Value Ref Range    Glucose 109 (H) 70 - 99 mg/dL    Patient Fasting > 8hrs? No    Cosyntropin stimulation study baseline    Collection Time: 08/05/21  9:30 AM   Result Value Ref Range    Cortisol Baseline 35.6 (H) 4.0 - 22.0 ug/dL   Cosyntropin stimulation study post 30    Collection Time: 08/05/21 10:11 AM   Result Value Ref Range    Cortisol 30 min Post-dose 39.5 >20.0 - 150.0 ug/dL   Cosyntropin stimulation study post 60    Collection Time: 08/05/21 10:41 AM   Result Value Ref Range    Cortisol 60 min Post-dose 41.4 >20.0 - 150.0 ug/dL    Time of Cosyntropin Injection 8/5/21    SARS-COV2 (COVID-19) Virus RT-PCR    Collection Time: 08/06/21  3:29  PM    Specimen: Nasopharyngeal; Swab   Result Value Ref Range    SARS CoV2 PCR Negative Negative   Glucose by meter    Collection Time: 08/09/21  5:47 AM   Result Value Ref Range    GLUCOSE BY METER POCT 104 (H) 70 - 99 mg/dL   Creatinine    Collection Time: 08/09/21  7:03 AM   Result Value Ref Range    Creatinine 0.70 0.52 - 1.04 mg/dL    GFR Estimate >90 >60 mL/min/1.73m2   Potassium    Collection Time: 08/09/21  7:03 AM   Result Value Ref Range    Potassium 3.7 3.4 - 5.3 mmol/L   INR    Collection Time: 08/09/21  7:03 AM   Result Value Ref Range    INR 0.99 0.85 - 1.15   Hemoglobin    Collection Time: 08/09/21  7:05 AM   Result Value Ref Range    Hemoglobin 10.9 (L) 11.7 - 15.7 g/dL   Adult Type and Screen    Collection Time: 08/09/21  7:37 AM   Result Value Ref Range    ABO/RH(D) A POS     Antibody Screen Negative Negative    SPECIMEN EXPIRATION DATE [id number]    Arterial Panel POCT    Collection Time: 08/09/21 10:15 AM   Result Value Ref Range    pH Arterial POCT 7.48 (H) 7.35 - 7.45    pCO2 Arterial POCT 35 35 - 45 mm Hg    pO2 Arterial POCT 142 (H) 80 - 105 mm Hg    Bicarbonate Arterial POCT 26 21 - 28 mmol/L    Sodium POCT 140 133 - 144 mmol/L    Potassium POCT 3.5 3.5 - 5.0 mmol/L    Hemoglobin POCT 12.0 11.7 - 15.7 g/dL    Glucose Whole Blood POCT 166 (H) 70 - 99 mg/dL    Calcium, Ionized Whole Blood POCT 4.8 4.4 - 5.2 mg/dL    Base Excess/Deficit (+/-) POCT 2.5 (H) -9.6 - 2.0 mmol/L    Lactic Acid POCT 1.4 <=2.0 mmol/L   Oxyhemoglobin, arterial POCT    Collection Time: 08/09/21 10:15 AM   Result Value Ref Range    Oxyhemoglobin POCT 97 92 - 100 %

## 2021-08-05 NOTE — PATIENT INSTRUCTIONS
Preparing for Your Surgery      Name:  Meme Robins   MRN:  [id number]   :  1970   Today's Date:  2021       Arriving for surgery:  Surgery date:2021    Arrival time:  5:00 am    Restrictions due to COVID 19:       One visitor is allowed in the Pre Op area. When you go into surgery, one visitor is allowed to wait in the Surgery Waiting Room       (provided there is enough space to social distance).   After surgery- Two visitors are allowed at a time if you have a private room and one visitor is allowed for those in a semi-private room.   Every 4 days the visitor(s) can rotate. During the 4 day period, the visitor(s) must be consistent. No visitors under the age of 18 years old.   Visiting Hours: 8 am - 8:30 pm   No ill visitors.   All visitors must wear face mask.    [facility] parking is available for anyone with mobility limitations or disabilities.  ([facility]  24 hours/ 7 days a week; [facility]  7 am- 3:30 pm, Mon- Fri)    Please come to:     [facility] [address]  [address] [place] [address]       -    Please proceed to [address] on the 3rd floor. [phone]?     - ?If you are in need of directions, wheelchair or escort please stop at the Information Desk in the lobby.       What can I eat or drink?  -  You may eat and drink normally for up to 8 hours before your surgery. (Until 21 at 11 pm)  -  You may have clear liquids until 2 hours before surgery. (Until 5 am arrival time)    Examples of clear liquids:  Water  Clear broth  Juices (apple, white grape, white cranberry  and cider) without pulp  Noncarbonated, powder based beverages  (lemonade and Jose-Aid)  Sodas (Sprite, 7-Up, ginger ale and seltzer)  Coffee or tea (without milk or cream)  Gatorade    -  No Alcohol for at least 24 hours before surgery     Which medicines can I take?    Hold Aspirin for 7 days before surgery.   Hold Multivitamins for 7 days before  surgery.  Hold Supplements for 7 days before surgery.  Hold Ibuprofen (Advil, Motrin) for 1 day before surgery--unless otherwise directed by surgeon.  Hold Naproxen (Aleve) for 4 days before surgery.    -  DO NOT take these medications the day of surgery:  Furosemide   Lipase -protease (Zenpep)  Potassium      -  PLEASE TAKE these medications the day of surgery:  Clonazepam (klonopin)   Hyoscyamine   Methocarbamol  Ondansetron if needed  Oxymorphone per your routine  Pantoprazole       How do I prepare myself?  - Please take 2 showers before surgery using Scrubcare or Hibiclens soap.    Use this soap only from the neck to your toes.     Leave the soap on your skin for one minute--then rinse thoroughly.      You may use your own shampoo and conditioner; no other hair products.   - Please remove all jewelry and body piercings.  - No lotions, deodorants or fragrance.  - No makeup or fingernail polish.   - Bring your ID and insurance card.    -If you have a Deep Brain Stimulator, Spinal Cord Stimulator or any neuro stimulator device---you must bring the remote control to the hospital     - All patients are required to have a Covid-19 test within 4 days of surgery/procedure.      -Patients will be contacted by the [facility] scheduling team within 1 week of surgery to make an appointment.      - Patients may call the Scheduling team at [phone] if they have not been scheduled within 4 days of  surgery.          Questions or Concerns:    - For any questions regarding the day of surgery or your hospital stay, please contact the Pre Admission Nursing Office at [phone].       - If you have health changes between today and your surgery please call your surgeon.       For questions after surgery please call your surgeons office.

## 2021-08-05 NOTE — PROGRESS NOTES
"Meme Robins presents today for education related to diabetes secondary to total pancreatectomy with auto-islet cell transplant planned for Monday, August 9, 2021  Meme Robins is accompanied by son, Shaheed who will be assisting with her post op care.      Has pre-diabetes  She has a long history of pancreatitis and has had several pancreatic procedures done.      ASSESSMENT:  Patient glucose self monitoring as follows: She is checking periodically because of history of hypoglycemia.  Name of glucose meter:  One Touch Verio     Exercise: no regular exercise program    Nutrition:  Because of difficulties with digestion, she generally fasts daily until about 8pm and consumes most of her calories between 8pm and MN.  Weight is stable, she states.           EDUCATION and INSTRUCTION PROVIDED AT THIS VISIT:     She has been having significant hypoglycemia which seems to occur without precipitating factors, like high carb meals.  She states that her glucose drops rapidly.  Most recently she had a BG of 22, which happened \"out of the blue\".  This was about a week ago.  She states that she has frequently required assistance from a family member, and has come close to losing consciousness, but has not to date.  She states that her previous endocrinologist recommended a Nneka sensor, which she tried for a while but felt that it was inaccurate, and stopped wearing it.  She has a One Touch Verio meter and checks when she doesn't feel right.  In the interest of keeping her safe, we started her on a Dexcom today, using a starter kit.  She was instructed in all aspects of using the CGM, was assisted with pairing it with her phone and linking her Dexcom account with our clinic.  She was given an additional sensor to use after this one is done.  We will need to wait until she has a diagnosis of diabetes to submit an application for her own Dexcom.  We will also need supporting documentation from Dr. Bermeo.  Low alert set at 80 " with a high alert of 150.  She will be having both her son, Shaheed, and her  following her.        Expectations following surgery related to taking insulin, checking blood glucose, and need for excellent control in the period immediately following surgery and beyond.   Reviewed the normal endocrine function of the pancreas, and how exogenous insulin differs from endogenous insulin   Explained the difference between short and long acting insulin, including onset, duration, and action.   Explained the difference between meal dosing insulin and correction insulin, and explained how she will be using both a long and short acting insulin to control BG's following surgery.   Demonstrated and had patient do return demonstration of administration of insulin using an insulin pen. Patient was able to do this successfully.    Reviewed BG normals and BG goals and emphasized the importance of contacting medical team if goals are not being met.   Explained the recognition and treatment of hypoglycemia . Will instruct in glucagon use during  followup visit after discharge from the hospital.   Patient-stated goal written and given to Meme Robins.  Verbalized and demonstrated understanding of instructions.     PLAN:  See patient instructions  AVS printed and given to patient    FOLLOW-UP:      Follow up visit with RN and BERTA FOOTEE's scheduled for 3 weeks from date of surgery.  Goal at that visit with be to teach carbohydrate counting and administration of glucagon, as well as other diabetes education    Time spent with patient at today's visit was 60 minutes.

## 2021-08-05 NOTE — LETTER
8/5/2021         RE: Meme Robins  [id number] [facility] [id number]        Dear Colleague,    Thank you for referring your patient, Meme Robins, to the [facility]. Please see a copy of my visit note below.    Cosyntropin Stimulation Study Nursing Note    [facility] for a [facility]H timed test. Pt's vitals recorded  and entered into flow sheet. Medications recorded on MAR. Access recorded in flow sheet.    Progress Note    Vitals were reviewed     Patient tolerated the procedure well    Note:   RN provided patient with educational handout regarding timed test.  RN confirmed patient did not take steroids (hydrocortisone) on the morning of the test. Port accessed and  baseline labs drawn. RN administered Cortrosyn per IV push followed by 2 mls NS. Cortisol labs drawn again at 30 minutes and 60 minutes. Following test patient instructed to take usual dose of hydrocortisone for the day.    Medication given:     Administrations This Visit     cosyntropin (CORTROSYN) injection 250 mcg     Admin Date  08/05/2021 Action  Given Dose  250 mcg Route  Intravenous Administered By  Karen Horton RN                Discharge Plan    Discharge instructions reviewed with patient: YES  Patient/Representative verbalized understanding, all questions answered: YES    Discharged from unit at 1100 with whom: self to next appointment.    Karen Horton RN   BP (!) 144/84   Pulse 78   Temp 98  F (36.7  C) (Oral)   Resp 16   SpO2 98%         Again, thank you for allowing me to participate in the care of your patient.        Sincerely,        [facility]

## 2021-08-05 NOTE — ANESTHESIA PREPROCEDURE EVALUATION
Anesthesia Pre-Procedure Evaluation    Patient: Meme Robins   MRN: [id number] : 1970        Preoperative Diagnosis: * No surgery found *   Procedure :      Past Medical History:   Diagnosis Date     Adrenal insufficiency (H)     iatrogenic, around , off treatment for several years as of  visit     Anemia      Depression      Esophageal reflux      Idiopathic chronic pancreatitis (H)      Pre-diabetes      Pulmonary embolism (H)       Past Surgical History:   Procedure Laterality Date     CELIAC PLEXUS BLOCK  2014    with alcohol x 1     ENDOMETRIAL ABLATION  2014     ENDOSCOPIC RETROGRADE CHOLANGIOPANCREATOGRAM      about 30, most with PD stentes     ENDOSCOPIC ULTRASOUND, ESOPHAGOSCOPY, GASTROSCOPY, DUODENOSCOPY (EGD), COMBINED       HC SHOULDER ARTHROSCOPY,SURGICAL BICEPS TENODESIS  2017     JEJUNOSTOMY CARE      multiple feeding tubes x 3, at least one was direct J     PANCREAS SURGERY  2014    John procedure revision     PANCREAS SURGERY  2011    John      Allergies   Allergen Reactions     Metoclopramide      Morphine      Penicillins      Prochlorperazine      Promethazine       Social History     Tobacco Use     Smoking status: Never Smoker     Smokeless tobacco: Never Used   Substance Use Topics     Alcohol use: Not Currently      Wt Readings from Last 1 Encounters:   21 55.3 kg (122 lb)        Anesthesia Evaluation   Pt has had prior anesthetic. Type: General.    History of anesthetic complications  - PONV.      ROS/MED HX  ENT/Pulmonary:  - neg pulmonary ROS     Neurologic:       Cardiovascular:     (+) -----No previous cardiac testing     METS/Exercise Tolerance: >4 METS    Hematologic: Comments: PE     (+) History of blood clots, pt is not anticoagulated, history of blood transfusion, no previous transfusion reaction,     Musculoskeletal: Comment: Degenerative disc disease of cervical spine        GI/Hepatic: Comment: Chronic pancreatitis     (+)  GERD, Asymptomatic on medication,     Renal/Genitourinary:  - neg Renal ROS     Endo: Comment: H/o adrenal insufficiency ~ 2012  Has been off of Cortef for many years  Recent Cortisol low at 2.7     A1c 5.5, 8/4/21   (-) thyroid disease   Psychiatric/Substance Use:     (+) psychiatric history anxiety and depression     Infectious Disease:  - neg infectious disease ROS     Malignancy:  - neg malignancy ROS     Other:  - neg other ROS          Physical Exam    Airway  airway exam normal      Mallampati: II   TM distance: > 3 FB   Neck ROM: full   Mouth opening: > 3 cm    Respiratory Devices and Support         Dental  no notable dental history         Cardiovascular          Rhythm and rate: regular and normal     Pulmonary   pulmonary exam normal        breath sounds clear to auscultation           OUTSIDE LABS:  CBC:   Lab Results   Component Value Date    WBC 5.7 08/04/2021    WBC 4.2 05/19/2021    HGB 11.9 08/04/2021    HGB 12.0 05/19/2021    HCT 36.3 08/04/2021    HCT 36.4 05/19/2021     08/04/2021     05/19/2021     BMP:   Lab Results   Component Value Date     08/04/2021     05/19/2021    POTASSIUM 3.8 08/04/2021    POTASSIUM 3.6 05/19/2021    CHLORIDE 106 08/04/2021    CHLORIDE 105 05/19/2021    CO2 29 08/04/2021    CO2 29 05/19/2021    BUN 14 08/04/2021    BUN 8 05/19/2021    CR 0.71 08/04/2021    CR 0.67 05/19/2021     (H) 08/04/2021    GLC 90 08/04/2021    GLC 89 08/04/2021     COAGS:   Lab Results   Component Value Date    INR 1.00 08/04/2021     POC:   Lab Results   Component Value Date    HCG Negative 08/04/2021     HEPATIC:   Lab Results   Component Value Date    ALBUMIN 3.6 08/04/2021    PROTTOTAL 7.3 08/04/2021    ALT 23 08/04/2021    AST 16 08/04/2021    ALKPHOS 88 08/04/2021    BILITOTAL 0.5 08/04/2021     OTHER:   Lab Results   Component Value Date    A1C 5.5 08/04/2021    VIKAS 9.3 08/04/2021    LIPASE 104 05/19/2021    AMYLASE 39 05/19/2021             PAC  Discussion and Assessment    ASA Classification: 3  Case is suitable for: [employer]  Anesthetic techniques and relevant risks discussed: GA with regional block for post-op pain control  Invasive monitoring and risk discussed: No    Possibility and Risk of blood transfusion discussed: Yes            PAC Resident/NP Anesthesia Assessment: Meme Robins is a 51 year old female scheduled for Laparoscopic hand-assisted total pancreatectomy with autologous islet cell transplantation, possible splenectomy, and incidental appendectomy on 8/9/21 by Dr. Shirley in treatment of chronic pancreatitis.  PAC referral for risk assessment and optimization for anesthesia:    Pre-operative considerations:  1.  Cardiac:  Functional status- METS >4.  High risk surgery with 0.9% (RCRI #) risk of major adverse cardiac event.   --denies chest pain, SOB, NELSON, palpitations, edema  --no cardiac history    2.  Pulm:  Airway feasible.  GALE risk: Low  --non smoker    3.  GI:  Risk of PONV score =  4.  If > 2, anti-emetic intervention recommended.  --Pt reports h/o nausea after prior procedures.  She states Zofran is very helpful for her.   --continue pantoprazole  --long h/o pancreatitis s/p multiple ERCP, John procedure 2011, subsequent John revision with extended hospital stay (200 days).      4.  Endo:  --h/o adrenal insufficiency; was on Cortef per chart records.  By patient report she has been off of treatment for many years  --currently being worked up by Dr. Bermeo, endocrinology.  Had cortisol drawn in May which was low.  --ACTH stimulation test currently in process  --consider stress dose steroids if indicated.  Final plan per attending anesthesiologist DOS  --A1c 5.5, 8/4/21    5.  Pain:  --takes oxymorphone 10mg every 8 in addition to HYDROmorphone 4mg by mouth every 6 hour as needed (averages 3 doses/day).    6.  Heme:  --h/o provoked PE 2012 after starting OCP  --recent eval by Dr. Evangelina Morgan. Recommends standard VTE  prophylaxis.    7.  Psych:  --h/o anxiety and depression  --continue Zoloft at HS, Klonopin bid prn    VTE risk: 0.26%    Patient is optimized and is acceptable candidate for the proposed procedure.  No further diagnostic evaluation is needed.         **For further details of assessment, testing, and physical exam please see H and P completed on same date.          Rimma Holden PA-C, [facility]    Reviewed and Signed by PAC Mid-Level Provider/Resident  Mid-Level Provider/Resident: Rimma Holden  Date: 8/4/21                                 Rimma Holden PA-C

## 2021-08-05 NOTE — H&P
Pre-Operative H & P     CC:  Preoperative exam to assess for increased cardiopulmonary risk while undergoing surgery and anesthesia.    Date of Encounter: 8/5/2021  Primary Care Physician:  Padmini Hatch  Associated Diagnosis: chronic  Pancreatitis    HPI  Meme Robins is a 51 year old female who presents for pre-operative H & P in preparation for Laparoscopic hand-assisted total pancreatectomy with autologous islet cell transplantation, possible splenectomy, and incidental appendectomy with Dr. Shirley on 8/9/21 at [facility].     Ms. Robins is a 51 year old female with PMH significant for longstanding chronic painful pancreatitis, PE in 2012, adrenal insufficiency, GERD, and anxiety depression.  Patient developed pancreatitis when she was 16 years old.  She has undergone many ERCP procedures and underwent for a procedure in 2011.  Her symptoms persisted and then in [id number] she underwent a for a revision and spent 200+ days in the hospital.  Over the past 2 years her symptoms have been getting worse and she has been having acute flares of pancreatitis for 5 times a year.  She does take narcotics for her pain control and enzymes for pancreatic insufficiency.  Of note she is currently being worked up for adrenal insufficiency and her ACTH stimulation test is in process. She has no cardiac history, and reports positive history of postoperative nausea and vomiting.    History is obtained from the patient and the medical record.     Past Medical History  Past Medical History:   Diagnosis Date     Adrenal insufficiency (H)     iatrogenic, around 2013, off treatment for several years as of 2021 visit     Anemia      Depression      Esophageal reflux      Idiopathic chronic pancreatitis (H)      Pre-diabetes      Pulmonary embolism (H)        Past Surgical History  Past Surgical History:   Procedure Laterality Date     CELIAC PLEXUS BLOCK  05/29/2014    with alcohol x  1     ENDOMETRIAL ABLATION  03/29/2014     ENDOSCOPIC RETROGRADE CHOLANGIOPANCREATOGRAM      about 30, most with PD stentes     ENDOSCOPIC ULTRASOUND, ESOPHAGOSCOPY, GASTROSCOPY, DUODENOSCOPY (EGD), COMBINED        SHOULDER ARTHROSCOPY,SURGICAL BICEPS TENODESIS  02/09/2017     JEJUNOSTOMY CARE      multiple feeding tubes x 3, at least one was direct J     PANCREAS SURGERY  05/29/2014    John procedure revision     PANCREAS SURGERY  02/2011    John       Hx of Blood transfusions/reactions: yes, no reaction     Hx of abnormal bleeding or anti-platelet use: denies    Menstrual history: No LMP recorded. Patient is postmenopausal.:     Steroid use in the last year: denies    Personal or FH with difficulty with Anesthesia:  PONV    Prior to Admission Medications  Current Outpatient Medications   Medication Sig Dispense Refill     clonazePAM (KLONOPIN) 1 MG tablet 1mg morning and 1.5mg every evening       diphenhydrAMINE (BENADRYL) 25 MG tablet Take 25 mg by mouth every 6 hours as needed for itching or allergies       furosemide (LASIX) 20 MG tablet Take 20 mg by mouth daily as needed Fluid retention       HYDROmorphone (DILAUDID) 4 MG tablet Take 4 mg by mouth every 6 hours as needed        hyoscyamine SL (LEVSIN/SL) 0.125 MG sublingual tablet Take 125 mcg by mouth 4 times daily as needed        lipase-protease-amylase (ZENPEP) 23370-19846 units CPEP Take 3 capsules by mouth 3 times daily (with meals)       Melatonin 10 MG TABS tablet Take 10 mg by mouth nightly as needed for sleep       methocarbamol (ROBAXIN) 750 MG tablet Take 750 mg by mouth 4 times daily as needed for muscle spasms       ondansetron (ZOFRAN-ODT) 4 MG ODT tab Take 4 mg by mouth as needed       Oxymorphone HCl 10 MG TB12 Take 10 mg by mouth every 8 hours        pantoprazole (PROTONIX) 40 MG EC tablet Take 40 mg by mouth 2 times daily       potassium chloride ER (KLOR-CON M) 20 MEQ CR tablet Take 20 mEq by mouth daily as needed for potassium  supplementation       senna-docusate (SENOKOT-S/PERICOLACE) 8.6-50 MG tablet Take 1 tablet by mouth daily as needed for constipation       sertraline (ZOLOFT) 100 MG tablet Take 150 mg by mouth At Bedtime       traZODone (DESYREL) 100 MG tablet Take 100 mg by mouth At Bedtime         Allergies  Allergies   Allergen Reactions     Metoclopramide      Morphine      Penicillins      Prochlorperazine      Promethazine        Social History  Social History     Socioeconomic History     Marital status:      Spouse name: Not on file     Number of children: Not on file     Years of education: Not on file     Highest education level: Not on file   Occupational History     Not on file   Tobacco Use     Smoking status: Never Smoker     Smokeless tobacco: Never Used   Substance and Sexual Activity     Alcohol use: Not Currently     Drug use: Never     Sexual activity: Not on file   Other Topics Concern     Not on file   Social History Narrative    Meme is  and has three children.       Social Determinants of Health     Financial Resource Strain:      Difficulty of Paying Living Expenses:    Food Insecurity:      Worried About Running Out of Food in the Last Year:      Ran Out of Food in the Last Year:    Transportation Needs:      Lack of Transportation (Medical):      Lack of Transportation (Non-Medical):    Physical Activity:      Days of Exercise per Week:      Minutes of Exercise per Session:    Stress:      Feeling of Stress :    Social Connections:      Frequency of Communication with Friends and Family:      Frequency of Social Gatherings with Friends and Family:      Attends Tenri[employer] Services:      Active Member of Clubs or Organizations:      Attends Club or Organization Meetings:      Marital Status:    Intimate Partner Violence:      Fear of Current or Ex-Partner:      Emotionally Abused:      Physically Abused:      Sexually Abused:        Family History  Family History   Problem Relation Age of Onset      Breast Cancer Mother      Diabetes No family hx of      Pancreatitis No family hx of            Anesthesia Evaluation   Pt has had prior anesthetic. Type: General.    History of anesthetic complications  - PONV.      ROS/MED HX  ENT/Pulmonary:  - neg pulmonary ROS     Neurologic:       Cardiovascular:     (+) -----No previous cardiac testing     METS/Exercise Tolerance: >4 METS    Hematologic: Comments: PE 2012    (+) History of blood clots, pt is not anticoagulated, history of blood transfusion, no previous transfusion reaction,     Musculoskeletal: Comment: Degenerative disc disease of cervical spine        GI/Hepatic: Comment: Chronic pancreatitis     (+) GERD, Asymptomatic on medication,     Renal/Genitourinary:  - neg Renal ROS     Endo: Comment: H/o adrenal insufficiency ~ 2012  Has been off of Cortef for many years  Recent Cortisol low at 2.7     A1c 5.5, 8/4/21   (-) thyroid disease   Psychiatric/Substance Use:     (+) psychiatric history anxiety and depression     Infectious Disease:  - neg infectious disease ROS     Malignancy:  - neg malignancy ROS     Other:  - neg other ROS        The complete review of systems is negative other than noted in the HPI or here.   Temp: 98.4  F (36.9  C) Temp src: Oral BP: 120/81 Pulse: 98   Resp: 16 SpO2: 97 %         125 lbs 0 oz  Data Unavailable   Body mass index is 23.62 kg/m .       Physical Exam  Constitutional: Awake, alert, cooperative, no apparent distress, and appears stated age.  Eyes: Pupils equal, round and reactive to light, extra ocular muscles intact, sclera clear, conjunctiva normal.  HENT: Normocephalic, oral pharynx with moist mucus membranes, good dentition. No goiter appreciated.   Respiratory: Clear to auscultation bilaterally, no crackles or wheezing.  Cardiovascular: Regular rate and rhythm, normal S1 and S2, and no murmur noted.  Carotids +2, no bruits. No edema. Palpable pulses to radial  DP and PT arteries.   GI: Normal bowel  sounds  Lymph/Hematologic: No cervical lymphadenopathy and no supraclavicular lymphadenopathy.  Genitourinary:  deferred  Skin: Warm and dry.  No rashes at anticipated surgical site.   Musculoskeletal: Full ROM of neck. There is no redness, warmth, or swelling of the joints. Gross motor strength is normal.    Neurologic: Awake, alert, oriented to name, place and time. Cranial nerves II-XII are grossly intact. Neuropsychiatric: Calm, cooperative. Normal affect.     PRIOR LABS/DIAGNOSTIC STUDIES:  All labs and imaging personally reviewed    Component      Latest Ref Rng & Units 8/4/2021   WBC      4.0 - 11.0 10e3/uL 5.7   RBC Count      3.80 - 5.20 10e6/uL 4.19   Hemoglobin      11.7 - 15.7 g/dL 11.9   Hematocrit      35.0 - 47.0 % 36.3   MCV      78 - 100 fL 87   MCH      26.5 - 33.0 pg 28.4   MCHC      31.5 - 36.5 g/dL 32.8   RDW      10.0 - 15.0 % 12.7   Platelet Count      150 - 450 10e3/uL 339   % Neutrophils      % 57   % Lymphocytes      % 30   % Monocytes      % 11   % Eosinophils      % 1   % Basophils      % 1   % Immature Granulocytes      % 0   NRBCs per 100 WBC      <1 /100 0   Absolute Neutrophils      1.6 - 8.3 10e3/uL 3.2   Absolute Lymphocytes      0.8 - 5.3 10e3/uL 1.7   Absolute Monocytes      0.0 - 1.3 10e3/uL 0.6   Absolute Eosinophils      0.0 - 0.7 10e3/uL 0.1   Absolute Basophils      0.0 - 0.2 10e3/uL 0.1   Absolute Immature Granulocytes      <=0.0 10e3/uL 0.0   Absolute NRBCs      10e3/uL 0.0     Component      Latest Ref Rng & Units 8/4/2021   Sodium      133 - 144 mmol/L 137   Potassium      3.4 - 5.3 mmol/L 3.8   Chloride      94 - 109 mmol/L 106   Carbon Dioxide      20 - 32 mmol/L 29   Anion Gap      3 - 14 mmol/L 2 (L)   Urea Nitrogen      7 - 30 mg/dL 14   Creatinine      0.52 - 1.04 mg/dL 0.71   Calcium      8.5 - 10.1 mg/dL 9.3   Glucose      70 - 99 mg/dL 90   Alkaline Phosphatase      40 - 150 U/L 88   AST      0 - 45 U/L 16   ALT      0 - 50 U/L 23   Protein Total      6.8 - 8.8  g/dL 7.3   Albumin      3.4 - 5.0 g/dL 3.6   Bilirubin Total      0.2 - 1.3 mg/dL 0.5   GFR Estimate      >60 mL/min/1.73m2 >90     Component      Latest Ref Rng & Units 8/4/2021   Hemoglobin A1C      0.0 - 5.6 % 5.5     Component      Latest Ref Rng & Units 5/19/2021   Cortisol Serum      4 - 22 ug/dL 2.7 (L)         Outside records reviewed from: care everywhere    ASSESSMENT and PLAN  Meme Robins is a 51 year old female scheduled for Laparoscopic hand-assisted total pancreatectomy with autologous islet cell transplantation, possible splenectomy, and incidental appendectomy on 8/9/21 by Dr. Shirley in treatment of chronic pancreatitis.  PAC referral for risk assessment and optimization for anesthesia:    Pre-operative considerations:  1.  Cardiac:  Functional status- METS >4.  High risk surgery with 0.9% (RCRI #) risk of major adverse cardiac event.   --denies chest pain, SOB, NELSON, palpitations, edema  --no cardiac history    2.  Pulm:  Airway feasible.  GALE risk: Low  --non smoker    3.  GI:  Risk of PONV score =  4.  If > 2, anti-emetic intervention recommended.  --Pt reports h/o nausea after prior procedures.  She states Zofran is very helpful for her.   --continue pantoprazole  --long h/o pancreatitis s/p multiple ERCP, John procedure 2011, subsequent John revision with extended hospital stay (200 days).      4.  Endo:  --h/o adrenal insufficiency; was on Cortef per chart records.  By patient report she has been off of treatment for many years  --currently being worked up by Dr. Bermeo, endocrinology.  Had cortisol drawn in May which was low.  --ACTH stimulation test currently in process  --consider stress dose steroids if indicated.  Final plan per attending anesthesiologist DOS  --A1c 5.5, 8/4/21    5.  Pain:  --takes oxymorphone 10mg every 8 in addition to HYDROmorphone 4mg by mouth every 6 hour as needed (averages 3 doses/day).    6.  Heme:  --h/o provoked PE 2012 after starting OCP  --recent eval by  Dr. Evangelina Morgan. Recommends standard VTE prophylaxis.    7.  Psych:  --h/o anxiety and depression  --continue Zoloft at HS, Klonopin bid prn    VTE risk: 0.26%    Patient is optimized and is acceptable candidate for the proposed procedure.  No further diagnostic evaluation is needed.         Rimma Holden PA-C  Preoperative Assessment Center  [facility]  Phone: [phone]  Fax: [phone]

## 2021-08-05 NOTE — LETTER
8/5/2021         RE: Meme Robins  [id number] [facility] [id number]        Dear Colleague,    Thank you for referring your patient, Meme Robins, to the [facility]. Please see a copy of my visit note below.    Chronic Pancreatitis Group Consult Note     Patient: Meme Robins,   YOB: 1970,   Medical record number: [id number]    Assessment:  1. Chronic painful pancreatitis   2. Etiology: Idiopathic Pancreatitis  3. Medically and surgically appropriatetotal pancreatectomy and islet autotransplant.       Criteria for TPIAT: failed endoscopic stenting with small duct pancreatitis, chronic pain affecting daily functioning, non-diabetic.    Recommendations: Ms. Meme Robins appears to be a good candidate for total pancreatectomy and islet autotransplant.     The majority of our  visit today was spent discussing potential surgical and medical complications of total pancreatectomy, the range of outcomes for pain control, diabetes, and long term sequela.  All of this was previously discussed with the surgeon of record.       Total time: 35 minutes  Counselling time: 25 minutes      ---------------------------------------------------------------------------------------------------  HPI:     50 yo lady with h/o pancreatitis since age of 15 yo.  She underwent lap nanci in 01' for abdominal pain.  Her first episode of AP was in 03'.  She eventually developed recurrent AP, managed by ERCP (~30) procedures with sphincterotomy and stenting.  Although, pain has improved with ERCP, symptoms would recurrent at the end of the 3 month period.  She underwent John in 11' in [place], but her symptoms persisted.  Around 2013'-14', she spent over 200 days at the hospital.  She underwent John revision with symptomatic improvement.  However, over the past two years, her symptoms got worse with 4-5 episodes per year.  She is on oxymorphone for pain, zenpep for exocrine insufficiency,  but reports constipation.    She has h/o pre-diabetes and adrenal isufficiency. She has previously been on pred.   No recent fevers, or chills.     She is concerned everyone know she only tolerates zofran.        CT:  There is atrophy of the distal body and tail of the pancreas  associated with a dilated pancreatic duct. This may be related to  chronic pancreatic duct stenosis. No obstructing lesion demonstrated.  No replaced right hepatic artery  Genetic Evaluation: Negative.  Per report    Past Medical History:   Diagnosis Date     Adrenal insufficiency (H)     iatrogenic, around 2013, off treatment for several years as of 2021 visit     Anemia      Depression      Esophageal reflux      Idiopathic chronic pancreatitis (H)      Pre-diabetes      Pulmonary embolism (H)      Past Surgical History:   Procedure Laterality Date     CELIAC PLEXUS BLOCK  05/29/2014    with alcohol x 1     ENDOMETRIAL ABLATION  03/29/2014     ENDOSCOPIC RETROGRADE CHOLANGIOPANCREATOGRAM      about 30, most with PD stentes     ENDOSCOPIC ULTRASOUND, ESOPHAGOSCOPY, GASTROSCOPY, DUODENOSCOPY (EGD), COMBINED        SHOULDER ARTHROSCOPY,SURGICAL BICEPS TENODESIS  02/09/2017     JEJUNOSTOMY CARE      multiple feeding tubes x 3, at least one was direct J     PANCREAS SURGERY  05/29/2014    John procedure revision     PANCREAS SURGERY  02/2011    John     Family History   Problem Relation Age of Onset     Breast Cancer Mother      Diabetes No family hx of      Pancreatitis No family hx of      Social History     Socioeconomic History     Marital status:      Spouse name: Not on file     Number of children: Not on file     Years of education: Not on file     Highest education level: Not on file   Occupational History     Not on file   Tobacco Use     Smoking status: Never Smoker     Smokeless tobacco: Never Used   Substance and Sexual Activity     Alcohol use: Not Currently     Drug use: Never     Sexual activity: Not on file   Other  Topics Concern     Not on file   Social History Narrative    Meme is  and has three children.       Social Determinants of Health     Financial Resource Strain:      Difficulty of Paying Living Expenses:    Food Insecurity:      Worried About Running Out of Food in the Last Year:      Ran Out of Food in the Last Year:    Transportation Needs:      Lack of Transportation (Medical):      Lack of Transportation (Non-Medical):    Physical Activity:      Days of Exercise per Week:      Minutes of Exercise per Session:    Stress:      Feeling of Stress :    Social Connections:      Frequency of Communication with Friends and Family:      Frequency of Social Gatherings with Friends and Family:      Attends Islam Services:      Active Member of Clubs or Organizations:      Attends Club or Organization Meetings:      Marital Status:    Intimate Partner Violence:      Fear of Current or Ex-Partner:      Emotionally Abused:      Physically Abused:      Sexually Abused:        ROS:  A 12 point review of systems was performed and was negative except for those listed above.     Allergies:   Allergies   Allergen Reactions     Metoclopramide      Morphine      Penicillins      Prochlorperazine      Promethazine        Medications:  Prescription Medications as of 2021         Rx Number Disp Refills Start End Last Dispensed Date Next Fill Date Owning Pharmacy    clonazePAM (KLONOPIN) 1 MG tablet    2021        Simg morning and 1.5mg every evening    Class: Historical    diphenhydrAMINE (BENADRYL) 25 MG tablet            Sig: Take 25 mg by mouth every 6 hours as needed for itching or allergies    Class: Historical    Route: Oral    furosemide (LASIX) 20 MG tablet            Sig: Take 20 mg by mouth daily as needed Fluid retention    Class: Historical    Route: Oral    HYDROmorphone (DILAUDID) 4 MG tablet    2021        Sig: Take 4 mg by mouth every 6 hours as needed     Class: Historical    Earliest Fill  Date: 5/17/2021    Route: Oral    hyoscyamine SL (LEVSIN/SL) 0.125 MG sublingual tablet            Sig: Take 125 mcg by mouth 4 times daily as needed     Class: Historical    Route: Oral    lipase-protease-amylase (ZENPEP) 42270-96319 units CPEP            Sig: Take 3 capsules by mouth 3 times daily (with meals)    Class: Historical    Route: Oral    Melatonin 10 MG TABS tablet            Sig: Take 10 mg by mouth nightly as needed for sleep    Class: Historical    Route: Oral    methocarbamol (ROBAXIN) 750 MG tablet            Sig: Take 750 mg by mouth 4 times daily as needed for muscle spasms    Class: Historical    Route: Oral    ondansetron (ZOFRAN-ODT) 4 MG ODT tab            Sig: Take 4 mg by mouth as needed    Class: Historical    Route: Oral    Oxymorphone HCl 10 MG TB12    5/17/2021        Sig: Take 10 mg by mouth every 8 hours     Class: Historical    Earliest Fill Date: 5/17/2021    Route: Oral    pantoprazole (PROTONIX) 40 MG EC tablet    2/3/2021        Sig: Take 40 mg by mouth 2 times daily    Class: Historical    Route: Oral    potassium chloride ER (KLOR-CON M) 20 MEQ CR tablet            Sig: Take 20 mEq by mouth daily as needed for potassium supplementation    Class: Historical    Route: Oral    senna-docusate (SENOKOT-S/PERICOLACE) 8.6-50 MG tablet            Sig: Take 1 tablet by mouth daily as needed for constipation    Class: Historical    Route: Oral    sertraline (ZOLOFT) 100 MG tablet    2/1/2021        Sig: Take 150 mg by mouth At Bedtime    Class: Historical    Route: Oral    traZODone (DESYREL) 100 MG tablet    2/1/2021        Sig: Take 100 mg by mouth At Bedtime    Class: Historical    Route: Oral          Clinic-Administered Medications as of 8/9/2021         Dose Frequency Start End    heparin 100 UNIT/ML injection 5 mL 5 mL EVERY 8 HOURS 8/4/2021     Route: Intracatheter    heparin 100 UNIT/ML injection 5 mL 5 mL EVERY 8 HOURS 8/4/2021     Route: Intracatheter          Hospital  Medications as of 8/9/2021         Dose Frequency Start End    - MEDICATION INSTRUCTIONS -  CONTINUOUS PRN 8/9/2021     Admin Instructions: - All continuous nerve block medications must be preservative free- All orders must be compounded in preservative free Normal Saline- All perineural medications must be PRESERVATIVE FREE - All orders must be compounded in preservative free Normal Saline - Absolutely NO anticoagulants, thrombolytics, or antiplatelet medications without prior notification of the managing Anesthesia Service - All patients who receive perineural medications must have IV access.    Class: E-Prescribe    Route: Does not apply    acetaminophen (TYLENOL) tablet 975 mg (Completed) 975 mg ONCE 8/9/2021 8/9/2021    Admin Instructions: Not to exceed 1g in last 8 hoursMaximum acetaminophen dose from all sources = 75 mg/kg/day not to exceed 4 grams/day.    Class: E-Prescribe    Route: Oral    ertapenem (INVanz) 1 g vial to attach to  mL bag (Completed) 1 g PRE-OP/PRE-PROCEDURE 8/9/2021 8/9/2021    Admin Instructions: First dose within 1 hour prior to incision.    Class: E-Prescribe    Route: Intravenous    ertapenem (INVanz) 1 g vial to attach to  mL bag 1 g EVERY 8 HOURS PRN 8/9/2021     Admin Instructions: Give every 8 hours while patient is in surgery, starting 8 hours after pre-op dose. Re-Dosing in renal impairment: If CrCl less than 50 mL/min, DOUBLE the time interval. If CrCl less than 10 mL/min (on dialysis), do NOT re-dose.    Class: E-Prescribe    Route: Intravenous    esmolol (BREVIBLOC) injection  PRN 8/9/2021     Route: Intravenous    fentaNYL (PF) (SUBLIMAZE) injection 25-50 mcg 25-50 mcg EVERY 2 MIN PRN 8/9/2021     Admin Instructions: Caution: may have synergistic effect when used with midazolam (VERSED). If inadequate response may repeat 25-50 mcg IV slowly Q 5 minutes PRN pain (Maximum of 200 mcg total dose in 60 minutes.) Doses can be exceeded under direct oversight of patient  by provider.Only given for procedural sedation while provider present. Nurse to discontinue this medication when procedure complete.For ordered IV doses 1-100 mcg give IV Push undiluted over a minimum of 3-5 minutes.    Route: Intravenous    fentaNYL (PF) (SUBLIMAZE) injection  PRN 8/9/2021     Route: Intravenous    fluconazole (DIFLUCAN) intermittent infusion 400 mg in NaCl 400 mg EVERY 24 HOURS 8/9/2021     Class: E-Prescribe    Route: Intravenous    flumazenil (ROMAZICON) injection 0.2 mg 0.2 mg EVERY 1 MIN PRN 8/9/2021     Admin Instructions: Give over 15 seconds. If inadequate response after 45 seconds, may repeat up to a MAX total dose of 1 mg. Continue monitoring until discharge criteria met of minimum of 2 hours.Irritant. For ordered IV doses 0.1-1 mg, give IV Push undiluted. Administer each 0.2mg over 15 seconds.Use with caution in patients on benzodiazepine therapy.    Class: E-Prescribe    Route: Intravenous    gabapentin (NEURONTIN) capsule 300 mg (Completed) 300 mg ONCE 8/9/2021 8/9/2021    Class: E-Prescribe    Route: Oral    heparin 5,000 units in 500 mL 0.9% sodium chloride  PRN 8/9/2021     Class: E-Prescribe    insulin regular (HumuLIN R,NovoLIN R) infusion (1 unit/mL) ADULT/PEDS  CONTINUOUS PRN 8/9/2021     Route: Intravenous    ketamine (KETALAR) 2 mg/mL in sodium chloride 0.9 % 50 mL ANALGESIA infusion 5-30 mg/hr CONTINUOUS 8/9/2021     Class: E-Prescribe    Route: Intravenous    labetalol (NORMODYNE/TRANDATE) syringe  PRN 8/9/2021     Route: Intravenous    lactated ringers infusion  CONTINUOUS 8/9/2021     Admin Instructions: IF patient NOT on dialysis.    Class: E-Prescribe    Route: Intravenous    lactated ringers infusion  CONTINUOUS PRN 8/9/2021     Route: Intravenous    lidocaine (LMX4) cream  EVERY 1 HOUR PRN 8/9/2021     Admin Instructions: Apply at least 30 minutes prior to VAD insertion in divided doses as needed for size of site for insertion.MAX Dose: 2.5 g (  of 5 g tube)Do NOT give  "if patient has a history of allergy to any local anesthetic or any \"karin\" product.Do NOT use both lidocaine intradermal/subcutaneous injection and the lidocaine cream on the same site.    Class: E-Prescribe    Route: Topical    lidocaine (LMX4) cream  EVERY 1 HOUR PRN 8/9/2021     Admin Instructions: Apply at least 30 minutes prior to VAD insertion in divided doses as needed for size of site for insertion.MAX Dose: 2.5 g (  of 5 g tube)Do NOT give if patient has a history of allergy to any local anesthetic or any \"karin\" product.Do NOT use both lidocaine intradermal/subcutaneous injection and the lidocaine cream on the same site.    Class: E-Prescribe    Route: Topical    lidocaine 1 % 0.1-1 mL 0.1-1 mL EVERY 1 HOUR PRN 8/9/2021     Admin Instructions: MAX dose 1 mL subcutaneous OR intradermal along the side of the vein in divided doses as needed for VAD insertion.Do NOT give if patient has a history of allergy to any local anesthetic or any \"karin\" product.Do NOT use both lidocaine intradermal/subcutaneous injection and the lidocaine cream on the same site.    Class: E-Prescribe    Route: Other    lidocaine 1 % 0.1-1 mL 0.1-1 mL EVERY 1 HOUR PRN 8/9/2021     Admin Instructions: MAX dose 1 mL subcutaneous OR intradermal along the side of the vein in divided doses as needed for VAD insertion.Do NOT give if patient has a history of allergy to any local anesthetic or any \"karin\" product.Do NOT use both lidocaine intradermal/subcutaneous injection and the lidocaine cream on the same site.    Class: E-Prescribe    Route: Other    midazolam (VERSED) injection 1-2 mg 1-2 mg EVERY 4 MIN PRN 8/9/2021     Admin Instructions: Caution: when used with opioids, may need lower doses. If inadequate response may repeat 1 mg IV slowly Q 4 minutes PRN sedation until desired response (Maximum of 5 mg total dose.) Doses can be exceeded under direct oversight of patient by provider. Nurse to discontinue this medication when procedure " "complete.This drug may cause significant respiratory depression.  Monitor respiratory status and vital signs carefully for 1 hour after each dose.    Class: E-Prescribe    Route: Intravenous    midazolam (VERSED) injection  PRN 8/9/2021     Route: Intravenous    naloxone (NARCAN) injection 0.2 mg 0.2 mg EVERY 2 MIN PRN 8/9/2021     Admin Instructions: Administer intravenous route when available and notify provider when administered.For unintended sedation or respiratory depression if all of the below criteria are met:~ respiratory rate LESS than or EQUAL to 8. ~SaO2 less than 92% and or/end-tidal CO2 is greater than 50.~ the patient is receiving an opioid, has unintended sedations assessed as RASS (-3), and is currently not on mechanical ventilation.  RASS scale moderate (-3) is movement or eye opening to voice but no eye contact.Patient MonitoringOnce the patient has demonstrated a response to the naloxone, continue to monitor respiratory rate, depth, oxygen saturation and end-tidal CO2 (if available) every 15 minutes x 2, then every 30 minutes x 2, then every 1 hour x 1 after each naloxone dose.  Consider transfer to ICU if patient respiratory parameters have not improved after 4 naloxone doses.For ordered IV doses 0.1-2mg give IVP. Give each 0.4mg over 15 seconds in emergency situations. For non-emergent situations further dilute in 9mL of NS to facilitate titration of response.    Class: E-Prescribe    Route: Intravenous    Linked Group 1: \"Or\" Linked Group Details        naloxone (NARCAN) injection 0.2 mg 0.2 mg EVERY 2 MIN PRN 8/9/2021     Admin Instructions: Administer intramuscular if an intravenous route is not available and notify provider when administered.For unintended sedation or respiratory depression if all of the below criteria are met:~ respiratory rate LESS than or EQUAL to 8. ~SaO2 less than 92% and or/end-tidal CO2 is greater than 50.~ the patient is receiving an opioid, has unintended sedations " "assessed as RASS (-3), and is currently not on mechanical ventilation.  RASS scale moderate (-3) is movement or eye opening to voice but no eye contact.Patient MonitoringOnce the patient has demonstrated a response to the naloxone, continue to monitor respiratory rate, depth, oxygen saturation and end-tidal CO2 (if available) every 15 minutes x 2, then every 30 minutes x 2, then every 1 hour x 1 after each naloxone dose.  Consider transfer to ICU if patient respiratory parameters have not improved after 4 naloxone doses.For ordered IV doses 0.1-2mg give IVP. Give each 0.4mg over 15 seconds in emergency situations. For non-emergent situations further dilute in 9mL of NS to facilitate titration of response.    Class: E-Prescribe    Route: Intramuscular    Linked Group 1: \"Or\" Linked Group Details        naloxone (NARCAN) injection 0.4 mg 0.4 mg EVERY 2 MIN PRN 8/9/2021     Admin Instructions: Administer intravenous route when available and notify provider when administered.For unintended sedation or respiratory depression if all of the below criteria are met:~ respiratory rate LESS than or EQUAL to 8.~ SaO2 less than 92% and or/end-tidal CO2 is greater than 50.~ the patient is receiving an opioid, has unintended sedation assessed as RASS (-4) or (-5) and patient is currently not on mechanical ventilation.  RASS scale (-4) is deep sedation with no response to voice but movement or eye opening to physical stimulation.  RASS scale (-5) is unarousable.  Patient MonitoringOnce the patient has demonstrated a response to the naloxone, continue to monitor respiratory rate, depth, oxygen saturation and end-tidal CO2 (if available) every 15 minutes x 2, then every 30 minutes x 2, then every 1 hour x 1 after each naloxone dose.  Consider transfer to ICU if patient respiratory parameters have not improved after 4 naloxone doses.For ordered IV doses 0.1-2mg give IVP. Give each 0.4mg over 15 seconds in emergency situations. For " "non-emergent situations further dilute in 9mL of NS to facilitate titration of response.    Class: E-Prescribe    Route: Intravenous    Linked Group 1: \"Or\" Linked Group Details        naloxone (NARCAN) injection 0.4 mg 0.4 mg EVERY 2 MIN PRN 8/9/2021     Admin Instructions: Administer intramuscular if an intravenous route is not available and notify provider when administered.For unintended sedation or respiratory depression if all of the below criteria are met:~ respiratory rate LESS than or EQUAL to 8.~ SaO2 less than 92% and or/end-tidal CO2 is greater than 50.~ the patient is receiving an opioid, has unintended sedation assessed as RASS (-4) or (-5) and patient is currently not on mechanical ventilation.  RASS scale (-4) is deep sedation with no response to voice but movement or eye opening to physical stimulation.  RASS scale (-5) is unarousable.  Patient MonitoringOnce the patient has demonstrated a response to the naloxone, continue to monitor respiratory rate, depth, oxygen saturation and end-tidal CO2 (if available) every 15 minutes x 2, then every 30 minutes x 2, then every 1 hour x 1 after each naloxone dose.  Consider transfer to ICU if patient respiratory parameters have not improved after 4 naloxone doses.For ordered IV doses 0.1-2mg give IVP. Give each 0.4mg over 15 seconds in emergency situations. For non-emergent situations further dilute in 9mL of NS to facilitate titration of response.    Class: E-Prescribe    Route: Intramuscular    Linked Group 1: \"Or\" Linked Group Details        No Anticoagulation unless approved by Anesthesia Provider.  CONTINUOUS PRN 8/9/2021     Admin Instructions: .    Class: E-Prescribe    Route: Does not apply    ondansetron (ZOFRAN) injection  PRN 8/9/2021     Route: Intravenous    propofol (DIPRIVAN) injection 10 mg/mL vial  PRN 8/9/2021     Route: Intravenous    rocuronium injection  PRN 8/9/2021     Route: Intravenous    ropivacaine 0.2% (NAROPIN) 750 mL in ON-Q " "C-Bloc select flow ([id number] holds 600-750 mL) dual cath disposable pump  CONTINUOUS 8/9/2021     Admin Instructions: Type of Nerve Block: erector spinae (ES)Location: bilateral Instructions: Initiate infusion at 7mL/hr each catheter, total infusion 14 mL/hr. Do NOT titrate unless ordered by managing Anesthesia Service and call provider with any questions.    Class: E-Prescribe    Route: Irrigation    scopolamine (TRANSDERM) 72 hr patch 1 patch (Completed) 1 patch ONCE 8/9/2021 8/9/2021    Admin Instructions: Apply patch to skin, behind ear.  Remove every 72 hours.Each 1.5 mg patch delivers 1 mg of scopolamine.  Reminder: Remove previous patch before applying new patch.    Class: E-Prescribe    Route: Transdermal    Linked Group 2: \"And\" Linked Group Details        scopolamine (TRANSDERM-SCOP) Patch in Place  EVERY 8 HOURS 8/9/2021     Admin Instructions: Chart every shift, confirming that patch is still in place on patient (no barcode scan needed). See patch order for dose information.    Class: E-Prescribe    Route: Transdermal    Linked Group 2: \"And\" Linked Group Details        sodium chloride (PF) 0.9% PF flush 3 mL 3 mL EVERY 8 HOURS 8/9/2021     Admin Instructions: to lock peripheral IV dormant line    Class: E-Prescribe    Route: Intracatheter    sodium chloride (PF) 0.9% PF flush 3 mL 3 mL EVERY 1 MIN PRN 8/9/2021     Class: E-Prescribe    Route: Intracatheter    sodium chloride (PF) 0.9% PF flush 3 mL 3 mL EVERY 8 HOURS 8/9/2021     Admin Instructions: to lock peripheral IV dormant line    Class: E-Prescribe    Route: Intracatheter    sodium chloride (PF) 0.9% PF flush 3 mL 3 mL EVERY 1 MIN PRN 8/9/2021     Class: E-Prescribe    Route: Intracatheter            Exam:   Temp:  [98.6  F (37  C)] 98.6  F (37  C)  Pulse:  [71-85] 71  Resp:  [16] 16  BP: (110-129)/(70-83) 115/73  SpO2:  [96 %-100 %] 100 %  Appearance: in no apparent distress.   Head and Neck: Normal, no rashes or jaundice  Respiratory: easy " respirations, normal I:E, no audible wheezing.  Cardiovascular: regular rate and rhythm  Abdomen: No distention   Extremeties: Edema, none  Neuro: without deficit, Full affect.    Diagnostics:   Recent Results (from the past 336 hour(s))   INR    Collection Time: 08/04/21  8:29 AM   Result Value Ref Range    INR 1.00 0.85 - 1.15   Vitamin E    Collection Time: 08/04/21  8:29 AM   Result Value Ref Range    Vitamin E 13.2 5.5 - 18.0 mg/L    Vitamin E Gamma 0.8 0.0 - 6.0 mg/L   Vitamin A    Collection Time: 08/04/21  8:29 AM   Result Value Ref Range    Vitamin A 0.60 0.30 - 1.20 mg/L    Retinol Palmitate <0.02 0.00 - 0.10 mg/L    Vitamin A Interp Normal    25 Hydroxyvitamin D2 and D3    Collection Time: 08/04/21  8:29 AM   Result Value Ref Range    25 OH Vitamin D2 <5 ug/L    25 OH Vitamin D3 73 ug/L    25 OH Vit D Total <78 (H) 20 - 75 ug/L   Glucose   *Fasting    Collection Time: 08/04/21  8:29 AM   Result Value Ref Range    Glucose 88 70 - 99 mg/dL    Patient Fasting > 8hrs? Yes    C-peptide  *Fasting    Collection Time: 08/04/21  8:29 AM   Result Value Ref Range    C Peptide 1.1 0.9 - 6.9 ng/mL   Lipid Profile    Collection Time: 08/04/21  8:29 AM   Result Value Ref Range    Cholesterol 240 (H) <200 mg/dL    Triglycerides 82 <150 mg/dL    Direct Measure HDL 74 >=50 mg/dL    LDL Cholesterol Calculated 150 (H) <=100 mg/dL    Non HDL Cholesterol 166 (H) <130 mg/dL    Patient Fasting > 8hrs? Yes    Prealbumin    Collection Time: 08/04/21  8:29 AM   Result Value Ref Range    Prealbumin 26 15 - 45 mg/dL   UA with Microscopic reflex to Culture    Collection Time: 08/04/21  8:37 AM    Specimen: Urine, Midstream   Result Value Ref Range    Color Urine Yellow Colorless, Straw, Light Yellow, Yellow    Appearance Urine Slightly Cloudy (A) Clear    Glucose Urine Negative Negative mg/dL    Bilirubin Urine Negative Negative    Ketones Urine Negative Negative mg/dL    Specific Gravity Urine 1.018 1.003 - 1.035    Blood Urine  Negative Negative    pH Urine 5.0 5.0 - 7.0    Protein Albumin Urine Negative Negative mg/dL    Urobilinogen Urine Normal Normal, 2.0 mg/dL    Nitrite Urine Negative Negative    Leukocyte Esterase Urine Trace (A) Negative    Mucus Urine Present (A) None Seen /LPF    RBC Urine 1 <=2 /HPF    WBC Urine 12 (H) <=5 /HPF    Squamous Epithelials Urine 1 <=1 /HPF    Transitional Epithelials Urine 1 <=1 /HPF    Hyaline Casts Urine 3 (H) <=2 /LPF   Albumin Random Urine Quantitative with Creat Ratio    Collection Time: 08/04/21  8:37 AM   Result Value Ref Range    Creatinine Urine mg/dL 193 mg/dL    Albumin Urine mg/L 14 mg/L    Albumin Urine mg/g Cr 7.25 0.00 - 25.00 mg/g Cr   Urine Culture    Collection Time: 08/04/21  8:37 AM    Specimen: Urine, Midstream   Result Value Ref Range    Culture <10,000 CFU/mL Mixture of urogenital patti    HCG qualitative urine    Collection Time: 08/04/21  8:37 AM   Result Value Ref Range    hCG Urine Qualitative Negative Negative   Hemoglobin A1c    Collection Time: 08/04/21  9:59 AM   Result Value Ref Range    Hemoglobin A1C 5.5 0.0 - 5.6 %   Comprehensive metabolic panel    Collection Time: 08/04/21  9:59 AM   Result Value Ref Range    Sodium 137 133 - 144 mmol/L    Potassium 3.8 3.4 - 5.3 mmol/L    Chloride 106 94 - 109 mmol/L    Carbon Dioxide (CO2) 29 20 - 32 mmol/L    Anion Gap 2 (L) 3 - 14 mmol/L    Urea Nitrogen 14 7 - 30 mg/dL    Creatinine 0.71 0.52 - 1.04 mg/dL    Calcium 9.3 8.5 - 10.1 mg/dL    Glucose 90 70 - 99 mg/dL    Alkaline Phosphatase 88 40 - 150 U/L    AST 16 0 - 45 U/L    ALT 23 0 - 50 U/L    Protein Total 7.3 6.8 - 8.8 g/dL    Albumin 3.6 3.4 - 5.0 g/dL    Bilirubin Total 0.5 0.2 - 1.3 mg/dL    GFR Estimate >90 >60 mL/min/1.73m2   CBC with platelets and differential    Collection Time: 08/04/21  9:59 AM   Result Value Ref Range    WBC Count 5.7 4.0 - 11.0 10e3/uL    RBC Count 4.19 3.80 - 5.20 10e6/uL    Hemoglobin 11.9 11.7 - 15.7 g/dL    Hematocrit 36.3 35.0 - 47.0 %     MCV 87 78 - 100 fL    MCH 28.4 26.5 - 33.0 pg    MCHC 32.8 31.5 - 36.5 g/dL    RDW 12.7 10.0 - 15.0 %    Platelet Count 339 150 - 450 10e3/uL    % Neutrophils 57 %    % Lymphocytes 30 %    % Monocytes 11 %    % Eosinophils 1 %    % Basophils 1 %    % Immature Granulocytes 0 %    NRBCs per 100 WBC 0 <1 /100    Absolute Neutrophils 3.2 1.6 - 8.3 10e3/uL    Absolute Lymphocytes 1.7 0.8 - 5.3 10e3/uL    Absolute Monocytes 0.6 0.0 - 1.3 10e3/uL    Absolute Eosinophils 0.1 0.0 - 0.7 10e3/uL    Absolute Basophils 0.1 0.0 - 0.2 10e3/uL    Absolute Immature Granulocytes 0.0 <=0.0 10e3/uL    Absolute NRBCs 0.0 10e3/uL   Glucose *60 minutes after boost/mixed meal    Collection Time: 08/04/21  9:59 AM   Result Value Ref Range    Glucose 89 70 - 99 mg/dL    Patient Fasting > 8hrs? No    C-peptide *60 minutes after boost/mixed meal    Collection Time: 08/04/21  9:59 AM   Result Value Ref Range    C Peptide 3.4 0.9 - 6.9 ng/mL   C-peptide *120 minutes after boost/mixed meal    Collection Time: 08/04/21 11:04 AM   Result Value Ref Range    C Peptide 3.0 0.9 - 6.9 ng/mL   Adult Type and Screen    Collection Time: 08/04/21 11:04 AM   Result Value Ref Range    ABO/RH(D) A POS     Antibody Screen Negative Negative    SPECIMEN EXPIRATION DATE [id number]    Glucose    Collection Time: 08/04/21 11:04 AM   Result Value Ref Range    Glucose 109 (H) 70 - 99 mg/dL    Patient Fasting > 8hrs? No    Cosyntropin stimulation study baseline    Collection Time: 08/05/21  9:30 AM   Result Value Ref Range    Cortisol Baseline 35.6 (H) 4.0 - 22.0 ug/dL   Cosyntropin stimulation study post 30    Collection Time: 08/05/21 10:11 AM   Result Value Ref Range    Cortisol 30 min Post-dose 39.5 >20.0 - 150.0 ug/dL   Cosyntropin stimulation study post 60    Collection Time: 08/05/21 10:41 AM   Result Value Ref Range    Cortisol 60 min Post-dose 41.4 >20.0 - 150.0 ug/dL    Time of Cosyntropin Injection 8/5/21    SARS-COV2 (COVID-19) Virus RT-PCR     Collection Time: 08/06/21  3:29 PM    Specimen: Nasopharyngeal; Swab   Result Value Ref Range    SARS CoV2 PCR Negative Negative   Glucose by meter    Collection Time: 08/09/21  5:47 AM   Result Value Ref Range    GLUCOSE BY METER POCT 104 (H) 70 - 99 mg/dL   Creatinine    Collection Time: 08/09/21  7:03 AM   Result Value Ref Range    Creatinine 0.70 0.52 - 1.04 mg/dL    GFR Estimate >90 >60 mL/min/1.73m2   Potassium    Collection Time: 08/09/21  7:03 AM   Result Value Ref Range    Potassium 3.7 3.4 - 5.3 mmol/L   INR    Collection Time: 08/09/21  7:03 AM   Result Value Ref Range    INR 0.99 0.85 - 1.15   Hemoglobin    Collection Time: 08/09/21  7:05 AM   Result Value Ref Range    Hemoglobin 10.9 (L) 11.7 - 15.7 g/dL   Adult Type and Screen    Collection Time: 08/09/21  7:37 AM   Result Value Ref Range    ABO/RH(D) A POS     Antibody Screen Negative Negative    SPECIMEN EXPIRATION DATE [id number]    Arterial Panel POCT    Collection Time: 08/09/21 10:15 AM   Result Value Ref Range    pH Arterial POCT 7.48 (H) 7.35 - 7.45    pCO2 Arterial POCT 35 35 - 45 mm Hg    pO2 Arterial POCT 142 (H) 80 - 105 mm Hg    Bicarbonate Arterial POCT 26 21 - 28 mmol/L    Sodium POCT 140 133 - 144 mmol/L    Potassium POCT 3.5 3.5 - 5.0 mmol/L    Hemoglobin POCT 12.0 11.7 - 15.7 g/dL    Glucose Whole Blood POCT 166 (H) 70 - 99 mg/dL    Calcium, Ionized Whole Blood POCT 4.8 4.4 - 5.2 mg/dL    Base Excess/Deficit (+/-) POCT 2.5 (H) -9.6 - 2.0 mmol/L    Lactic Acid POCT 1.4 <=2.0 mmol/L   Oxyhemoglobin, arterial POCT    Collection Time: 08/09/21 10:15 AM   Result Value Ref Range    Oxyhemoglobin POCT 97 92 - 100 %          Again, thank you for allowing me to participate in the care of your patient.        Sincerely,        Esperanza Araujo NP

## 2021-08-05 NOTE — PATIENT INSTRUCTIONS
Dear Meme Robins    Thank you for choosing [facility] ([facility]) for your procedure.  The following information is a summary of our appointment as well as important reminders.      We look forward in seeing you on your next appointment here at [facility] ([facility]).  Please don t hesitate to call us at [phone] to reschedule any of your appointments or to speak with one of the [facility] registered nurses.  It was a pleasure taking care of you today.    Sincerely,    [facility]  [address] [place] [address]  [id number]  Phone:  [phone]  Patient Education     ACTH (Blood)  Does this test have other names?  Adrenocorticotropic hormone blood test, corticotropin  What is this test?  This is a blood test that measures the amount of adrenocorticotropic hormone (ACTH) the pituitary gland produces. This gland is a tiny organ that sits just below your brain. It secretes adrenocorticotropic hormone, which controls the production of another hormone called cortisol.  Cortisol is made by your adrenal glands, which are located at the top of your kidneys. Cortisol helps break down protein, sugar, and fat in your food. It also helps to regulate your blood pressure and your body's ability to fight infection. Cortisol is also one of the hormones that helps you deal with stress. Cortisol levels should peak in the morning and be at their lowest in the evening.  Why do I need this test?  You might have this test if your healthcare provider suspects you have hormone problems. This test is used along with other tests to diagnose conditions, including:    Cushing disease, in which the pituitary gland makes too much ACTH    Cushing syndrome, in which the adrenal glands make too much cortisol    [place] disease, in which the adrenal glands don't make enough  cortisol    Hypopituitarism, a disorder of the pituitary gland that keeps it from producing enough vital hormones  What other tests might I have along with this test?  Your healthcare provider also might order tests to measure your cortisol levels, including:    Adrenocorticotropic hormone stimulation test to determine why your adrenal glands aren't working properly    Dexamethasone suppression test to find an underlying reason for Cushing    Adrenocorticotropic hormone stimulation with metyrapone test also to find an underlying reason for Cushing  What do my test results mean?  Many things may affect your lab test results. These include the method each lab uses to do the test. Even if your test results are different from the normal value, you may not have a problem. To learn what the results mean for you, talk with your healthcare provider.  ACTH is measured in picograms per milliliter (pg/mL). Test results are influenced by the time of day the test was done. Normal results are:    Adults: 6-76 pg/ml (1.3-16.7 pmol/L)  If your ACTH level is low or high you may have Cushing syndrome. If your ACTH level is high you may have Hawthorne disease. A low ACTH level can also be an indication of hypopituitarism.  How is this test done?  The test requires a blood sample, which is drawn through a needle from a vein in your arm.  You may have blood drawn in the morning and in the afternoon or evening. This is to check for variations in your levels of ACTH hormone.  Does this test pose any risks?  Taking a blood sample with a needle carries risks that include bleeding, infection, bruising, or feeling dizzy. When the needle pricks your arm, you may feel a slight stinging sensation or pain. Afterward, the site may be slightly sore.  What might affect my test results?  Your test results might be affected if you:    Are under a great deal of stress    Recently had a trauma    Are menstruating or pregnant    Are taking certain  medicines, including steroids, hormones, or insulin    Did not sleep well the night before the test  How do I get ready for this test?  Don't eat after midnight on the day of your test. Get a good night's sleep. Follow any other directions from your healthcare provider. Be sure your healthcare provider knows about all medicines, herbs, vitamins, and supplements you are taking. This includes medicines that don't need a prescription and any illicit drugs you may use.  [email or web address] last reviewed this educational content on 4/1/2019 2000-2021 [employer]. All rights reserved. This information is not intended as a substitute for professional medical care. Always follow your healthcare professional's instructions.

## 2021-08-05 NOTE — PROGRESS NOTES
Cosyntropin Stimulation Study Nursing Note    Meme Robins comes to [facility] today for a ACTH timed test. Pt's vitals recorded  and entered into flow sheet. Medications recorded on MAR. Access recorded in flow sheet.    Progress Note    Vitals were reviewed     Patient tolerated the procedure well    Note:   RN provided patient with educational handout regarding timed test.  RN confirmed patient did not take steroids (hydrocortisone) on the morning of the test. Port accessed and  baseline labs drawn. RN administered Cortrosyn per IV push followed by 2 mls NS. Cortisol labs drawn again at 30 minutes and 60 minutes. Following test patient instructed to take usual dose of hydrocortisone for the day.    Medication given:     Administrations This Visit     cosyntropin (CORTROSYN) injection 250 mcg     Admin Date  08/05/2021 Action  Given Dose  250 mcg Route  Intravenous Administered By  Karen Horton RN                Discharge Plan    Discharge instructions reviewed with patient: YES  Patient/Representative verbalized understanding, all questions answered: YES    Discharged from unit at 1100 with whom: self to next appointment.    Karen Horton RN   BP (!) 144/84   Pulse 78   Temp 98  F (36.7  C) (Oral)   Resp 16   SpO2 98%

## 2021-08-06 ENCOUNTER — ANESTHESIA EVENT (OUTPATIENT)
Dept: SURGERY | Facility: CLINIC | Age: 51
End: 2021-08-06
Payer: COMMERCIAL

## 2021-08-06 LAB
DEPRECATED CALCIDIOL+CALCIFEROL SERPL-MC: <78 UG/L (ref 20–75)
VITAMIN D2 SERPL-MCNC: <5 UG/L
VITAMIN D3 SERPL-MCNC: 73 UG/L

## 2021-08-06 PROCEDURE — U0005 INFEC AGEN DETEC AMPLI PROBE: HCPCS | Performed by: TRANSPLANT SURGERY

## 2021-08-07 LAB
A-TOCOPHEROL VIT E SERPL-MCNC: 13.2 MG/L
ANNOTATION COMMENT IMP: NORMAL
BETA+GAMMA TOCOPHEROL SERPL-MCNC: 0.8 MG/L
RETINYL PALMITATE SERPL-MCNC: <0.02 MG/L
SARS-COV-2 RNA RESP QL NAA+PROBE: NEGATIVE
VIT A SERPL-MCNC: 0.6 MG/L

## 2021-08-09 ENCOUNTER — APPOINTMENT (OUTPATIENT)
Dept: ULTRASOUND IMAGING | Facility: CLINIC | Age: 51
End: 2021-08-09
Attending: STUDENT IN AN ORGANIZED HEALTH CARE EDUCATION/TRAINING PROGRAM
Payer: COMMERCIAL

## 2021-08-09 ENCOUNTER — ANESTHESIA (OUTPATIENT)
Dept: SURGERY | Facility: CLINIC | Age: 51
End: 2021-08-09
Payer: COMMERCIAL

## 2021-08-09 ENCOUNTER — HOSPITAL ENCOUNTER (INPATIENT)
Facility: CLINIC | Age: 51
LOS: 11 days | Discharge: HOME HEALTH | End: 2021-08-20
Attending: TRANSPLANT SURGERY | Admitting: TRANSPLANT SURGERY
Payer: COMMERCIAL

## 2021-08-09 ENCOUNTER — ANCILLARY PROCEDURE (OUTPATIENT)
Dept: ULTRASOUND IMAGING | Facility: CLINIC | Age: 51
End: 2021-08-09
Payer: COMMERCIAL

## 2021-08-09 ENCOUNTER — APPOINTMENT (OUTPATIENT)
Dept: GENERAL RADIOLOGY | Facility: CLINIC | Age: 51
End: 2021-08-09
Attending: STUDENT IN AN ORGANIZED HEALTH CARE EDUCATION/TRAINING PROGRAM
Payer: COMMERCIAL

## 2021-08-09 DIAGNOSIS — E89.1 POST-PANCREATECTOMY DIABETES (H): ICD-10-CM

## 2021-08-09 DIAGNOSIS — K91.2 HYPOGLYCEMIA AFTER GI (GASTROINTESTINAL) SURGERY: ICD-10-CM

## 2021-08-09 DIAGNOSIS — K86.1 OTHER CHRONIC PANCREATITIS (H): ICD-10-CM

## 2021-08-09 DIAGNOSIS — K86.1 CHRONIC PANCREATITIS (H): ICD-10-CM

## 2021-08-09 DIAGNOSIS — E13.9 POST-PANCREATECTOMY DIABETES (H): ICD-10-CM

## 2021-08-09 DIAGNOSIS — Z90.410 POST-PANCREATECTOMY DIABETES (H): ICD-10-CM

## 2021-08-09 DIAGNOSIS — Z94.83 S/P PANCREATIC ISLET CELL TRANSPLANTATION (H): Primary | ICD-10-CM

## 2021-08-09 LAB
ABO/RH(D): NORMAL
ALBUMIN SERPL-MCNC: 3 G/DL (ref 3.4–5)
ALP SERPL-CCNC: 75 U/L (ref 40–150)
ALT SERPL W P-5'-P-CCNC: 521 U/L (ref 0–50)
ANION GAP SERPL CALCULATED.3IONS-SCNC: 4 MMOL/L (ref 3–14)
ANTIBODY SCREEN: NEGATIVE
APTT PPP: 77 SECONDS (ref 22–38)
AST SERPL W P-5'-P-CCNC: 974 U/L (ref 0–45)
BASE EXCESS BLDA CALC-SCNC: -0.2 MMOL/L (ref -9.6–2)
BASE EXCESS BLDA CALC-SCNC: -0.3 MMOL/L (ref -9.6–2)
BASE EXCESS BLDA CALC-SCNC: -0.4 MMOL/L (ref -9.6–2)
BASE EXCESS BLDA CALC-SCNC: -0.5 MMOL/L (ref -9.6–2)
BASE EXCESS BLDA CALC-SCNC: -0.6 MMOL/L (ref -9.6–2)
BASE EXCESS BLDA CALC-SCNC: -0.6 MMOL/L (ref -9.6–2)
BASE EXCESS BLDA CALC-SCNC: -0.7 MMOL/L (ref -9.6–2)
BASE EXCESS BLDA CALC-SCNC: -1.3 MMOL/L (ref -9.6–2)
BASE EXCESS BLDA CALC-SCNC: -1.6 MMOL/L (ref -9.6–2)
BASE EXCESS BLDA CALC-SCNC: -2.4 MMOL/L (ref -9.6–2)
BASE EXCESS BLDA CALC-SCNC: 0 MMOL/L (ref -9.6–2)
BASE EXCESS BLDA CALC-SCNC: 1.3 MMOL/L (ref -9.6–2)
BASE EXCESS BLDA CALC-SCNC: 2.5 MMOL/L (ref -9.6–2)
BILIRUB SERPL-MCNC: 0.8 MG/DL (ref 0.2–1.3)
BLD PROD TYP BPU: NORMAL
BLD PROD TYP BPU: NORMAL
BLOOD COMPONENT TYPE: NORMAL
BLOOD COMPONENT TYPE: NORMAL
BUN SERPL-MCNC: 6 MG/DL (ref 7–30)
CA-I BLD-MCNC: 4.5 MG/DL (ref 4.4–5.2)
CA-I BLD-MCNC: 4.6 MG/DL (ref 4.4–5.2)
CA-I BLD-MCNC: 4.8 MG/DL (ref 4.4–5.2)
CA-I BLD-MCNC: 4.9 MG/DL (ref 4.4–5.2)
CA-I BLD-MCNC: 5 MG/DL (ref 4.4–5.2)
CA-I BLD-MCNC: 5 MG/DL (ref 4.4–5.2)
CA-I BLD-MCNC: 5.1 MG/DL (ref 4.4–5.2)
CA-I BLD-MCNC: 5.3 MG/DL (ref 4.4–5.2)
CA-I BLD-MCNC: 5.9 MG/DL (ref 4.4–5.2)
CALCIUM SERPL-MCNC: 7.7 MG/DL (ref 8.5–10.1)
CHLORIDE BLD-SCNC: 111 MMOL/L (ref 94–109)
CO2 SERPL-SCNC: 26 MMOL/L (ref 20–32)
CODING SYSTEM: NORMAL
CODING SYSTEM: NORMAL
CREAT SERPL-MCNC: 0.56 MG/DL (ref 0.52–1.04)
CREAT SERPL-MCNC: 0.7 MG/DL (ref 0.52–1.04)
CROSSMATCH: NORMAL
CROSSMATCH: NORMAL
ERYTHROCYTE [DISTWIDTH] IN BLOOD BY AUTOMATED COUNT: 13.2 % (ref 10–15)
FIBRINOGEN PPP-MCNC: 160 MG/DL (ref 170–490)
GFR SERPL CREATININE-BSD FRML MDRD: >90 ML/MIN/1.73M2
GFR SERPL CREATININE-BSD FRML MDRD: >90 ML/MIN/1.73M2
GLUCOSE BLD-MCNC: 106 MG/DL (ref 70–99)
GLUCOSE BLD-MCNC: 107 MG/DL (ref 70–99)
GLUCOSE BLD-MCNC: 108 MG/DL (ref 70–99)
GLUCOSE BLD-MCNC: 109 MG/DL (ref 70–99)
GLUCOSE BLD-MCNC: 111 MG/DL (ref 70–99)
GLUCOSE BLD-MCNC: 114 MG/DL (ref 70–99)
GLUCOSE BLD-MCNC: 116 MG/DL (ref 70–99)
GLUCOSE BLD-MCNC: 117 MG/DL (ref 70–99)
GLUCOSE BLD-MCNC: 117 MG/DL (ref 70–99)
GLUCOSE BLD-MCNC: 130 MG/DL (ref 70–99)
GLUCOSE BLD-MCNC: 135 MG/DL (ref 70–99)
GLUCOSE BLD-MCNC: 166 MG/DL (ref 70–99)
GLUCOSE BLD-MCNC: 184 MG/DL (ref 70–99)
GLUCOSE BLD-MCNC: 96 MG/DL (ref 70–99)
GLUCOSE BLDC GLUCOMTR-MCNC: 103 MG/DL (ref 70–99)
GLUCOSE BLDC GLUCOMTR-MCNC: 104 MG/DL (ref 70–99)
GLUCOSE BLDC GLUCOMTR-MCNC: 120 MG/DL (ref 70–99)
GLUCOSE BLDC GLUCOMTR-MCNC: 158 MG/DL (ref 70–99)
GLUCOSE BLDC GLUCOMTR-MCNC: 166 MG/DL (ref 70–99)
GLUCOSE BLDC GLUCOMTR-MCNC: 99 MG/DL (ref 70–99)
HCO3 BLDA-SCNC: 23 MMOL/L (ref 21–28)
HCO3 BLDA-SCNC: 24 MMOL/L (ref 21–28)
HCO3 BLDA-SCNC: 25 MMOL/L (ref 21–28)
HCO3 BLDA-SCNC: 26 MMOL/L (ref 21–28)
HCT VFR BLD AUTO: 30.8 % (ref 35–47)
HGB BLD-MCNC: 10.2 G/DL (ref 11.7–15.7)
HGB BLD-MCNC: 10.3 G/DL (ref 11.7–15.7)
HGB BLD-MCNC: 10.3 G/DL (ref 11.7–15.7)
HGB BLD-MCNC: 10.6 G/DL (ref 11.7–15.7)
HGB BLD-MCNC: 10.9 G/DL (ref 11.7–15.7)
HGB BLD-MCNC: 12 G/DL (ref 11.7–15.7)
HGB BLD-MCNC: 12.5 G/DL (ref 11.7–15.7)
HGB BLD-MCNC: 9.2 G/DL (ref 11.7–15.7)
HGB BLD-MCNC: 9.3 G/DL (ref 11.7–15.7)
HGB BLD-MCNC: 9.6 G/DL (ref 11.7–15.7)
HGB BLD-MCNC: 9.6 G/DL (ref 11.7–15.7)
INR PPP: 0.99 (ref 0.85–1.15)
INR PPP: 1.45 (ref 0.85–1.15)
ISSUE DATE AND TIME: NORMAL
LACTATE BLD-SCNC: 1.2 MMOL/L
LACTATE BLD-SCNC: 1.4 MMOL/L
LACTATE BLD-SCNC: 2.3 MMOL/L
LACTATE BLD-SCNC: 2.5 MMOL/L
LACTATE BLD-SCNC: 2.5 MMOL/L
LACTATE BLD-SCNC: 2.6 MMOL/L
LACTATE BLD-SCNC: 2.7 MMOL/L
LACTATE BLD-SCNC: 2.7 MMOL/L
LACTATE BLD-SCNC: 2.8 MMOL/L
LACTATE BLD-SCNC: 3.2 MMOL/L
LACTATE BLD-SCNC: 3.2 MMOL/L
LACTATE SERPL-SCNC: 2.1 MMOL/L (ref 0.7–2)
MCH RBC QN AUTO: 28.3 PG (ref 26.5–33)
MCHC RBC AUTO-ENTMCNC: 33.1 G/DL (ref 31.5–36.5)
MCV RBC AUTO: 86 FL (ref 78–100)
OXYHGB MFR BLDA: 97 % (ref 92–100)
PCO2 BLDA: 35 MM HG (ref 35–45)
PCO2 BLDA: 38 MM HG (ref 35–45)
PCO2 BLDA: 39 MM HG (ref 35–45)
PCO2 BLDA: 40 MM HG (ref 35–45)
PCO2 BLDA: 41 MM HG (ref 35–45)
PCO2 BLDA: 41 MM HG (ref 35–45)
PCO2 BLDA: 42 MM HG (ref 35–45)
PCO2 BLDA: 42 MM HG (ref 35–45)
PH BLDA: 7.35 [PH] (ref 7.35–7.45)
PH BLDA: 7.36 [PH] (ref 7.35–7.45)
PH BLDA: 7.37 [PH] (ref 7.35–7.45)
PH BLDA: 7.39 [PH] (ref 7.35–7.45)
PH BLDA: 7.4 [PH] (ref 7.35–7.45)
PH BLDA: 7.44 [PH] (ref 7.35–7.45)
PH BLDA: 7.48 [PH] (ref 7.35–7.45)
PLATELET # BLD AUTO: 208 10E3/UL (ref 150–450)
PO2 BLDA: 142 MM HG (ref 80–105)
PO2 BLDA: 143 MM HG (ref 80–105)
PO2 BLDA: 148 MM HG (ref 80–105)
PO2 BLDA: 156 MM HG (ref 80–105)
PO2 BLDA: 156 MM HG (ref 80–105)
PO2 BLDA: 161 MM HG (ref 80–105)
PO2 BLDA: 163 MM HG (ref 80–105)
PO2 BLDA: 166 MM HG (ref 80–105)
PO2 BLDA: 166 MM HG (ref 80–105)
PO2 BLDA: 168 MM HG (ref 80–105)
POTASSIUM BLD-SCNC: 2.8 MMOL/L (ref 3.5–5)
POTASSIUM BLD-SCNC: 3.1 MMOL/L (ref 3.5–5)
POTASSIUM BLD-SCNC: 3.4 MMOL/L (ref 3.5–5)
POTASSIUM BLD-SCNC: 3.5 MMOL/L (ref 3.5–5)
POTASSIUM BLD-SCNC: 3.5 MMOL/L (ref 3.5–5)
POTASSIUM BLD-SCNC: 3.7 MMOL/L (ref 3.4–5.3)
POTASSIUM BLD-SCNC: 3.7 MMOL/L (ref 3.4–5.3)
POTASSIUM BLD-SCNC: 3.7 MMOL/L (ref 3.5–5)
POTASSIUM BLD-SCNC: 3.8 MMOL/L (ref 3.5–5)
POTASSIUM BLD-SCNC: 3.9 MMOL/L (ref 3.5–5)
POTASSIUM BLD-SCNC: 4.2 MMOL/L (ref 3.5–5)
PROT SERPL-MCNC: 4.6 G/DL (ref 6.8–8.8)
RBC # BLD AUTO: 3.6 10E6/UL (ref 3.8–5.2)
SODIUM BLD-SCNC: 139 MMOL/L (ref 133–144)
SODIUM BLD-SCNC: 140 MMOL/L (ref 133–144)
SODIUM BLD-SCNC: 141 MMOL/L (ref 133–144)
SODIUM BLD-SCNC: 143 MMOL/L (ref 133–144)
SODIUM SERPL-SCNC: 141 MMOL/L (ref 133–144)
SPECIMEN EXPIRATION DATE: NORMAL
UNIT ABO/RH: NORMAL
UNIT ABO/RH: NORMAL
UNIT NUMBER: NORMAL
UNIT NUMBER: NORMAL
UNIT STATUS: NORMAL
UNIT STATUS: NORMAL
UNIT TYPE ISBT: 6200
UNIT TYPE ISBT: 6200
WBC # BLD AUTO: 8.2 10E3/UL (ref 4–11)

## 2021-08-09 PROCEDURE — 258N000003 HC RX IP 258 OP 636: Performed by: TRANSPLANT SURGERY

## 2021-08-09 PROCEDURE — 3E030U0 INTRODUCTION OF AUTOLOGOUS PANCREATIC ISLET CELLS INTO PERIPHERAL VEIN, OPEN APPROACH: ICD-10-PCS | Performed by: TRANSPLANT SURGERY

## 2021-08-09 PROCEDURE — 0D160ZA BYPASS STOMACH TO JEJUNUM, OPEN APPROACH: ICD-10-PCS | Performed by: TRANSPLANT SURGERY

## 2021-08-09 PROCEDURE — 0F190ZB BYPASS COMMON BILE DUCT TO SMALL INTESTINE, OPEN APPROACH: ICD-10-PCS | Performed by: TRANSPLANT SURGERY

## 2021-08-09 PROCEDURE — 82803 BLOOD GASES ANY COMBINATION: CPT

## 2021-08-09 PROCEDURE — 82810 BLOOD GASES O2 SAT ONLY: CPT

## 2021-08-09 PROCEDURE — 87205 SMEAR GRAM STAIN: CPT | Performed by: TRANSPLANT SURGERY

## 2021-08-09 PROCEDURE — 0FTG0ZZ RESECTION OF PANCREAS, OPEN APPROACH: ICD-10-PCS | Performed by: TRANSPLANT SURGERY

## 2021-08-09 PROCEDURE — 250N000009 HC RX 250: Performed by: NURSE ANESTHETIST, CERTIFIED REGISTERED

## 2021-08-09 PROCEDURE — 999N000065 XR CHEST PORT 1 VIEW

## 2021-08-09 PROCEDURE — 250N000011 HC RX IP 250 OP 636: Performed by: NURSE PRACTITIONER

## 2021-08-09 PROCEDURE — 999N000155 HC STATISTIC RAPCV CVP MONITORING

## 2021-08-09 PROCEDURE — 250N000013 HC RX MED GY IP 250 OP 250 PS 637: Performed by: ANESTHESIOLOGY

## 2021-08-09 PROCEDURE — 07TP0ZZ RESECTION OF SPLEEN, OPEN APPROACH: ICD-10-PCS | Performed by: TRANSPLANT SURGERY

## 2021-08-09 PROCEDURE — 250N000011 HC RX IP 250 OP 636: Performed by: NURSE ANESTHETIST, CERTIFIED REGISTERED

## 2021-08-09 PROCEDURE — 258N000003 HC RX IP 258 OP 636: Performed by: NURSE ANESTHETIST, CERTIFIED REGISTERED

## 2021-08-09 PROCEDURE — 250N000024 HC ISOFLURANE, PER MIN: Performed by: TRANSPLANT SURGERY

## 2021-08-09 PROCEDURE — 0DHA0UZ INSERTION OF FEEDING DEVICE INTO JEJUNUM, OPEN APPROACH: ICD-10-PCS | Performed by: TRANSPLANT SURGERY

## 2021-08-09 PROCEDURE — 86923 COMPATIBILITY TEST ELECTRIC: CPT | Performed by: TRANSPLANT SURGERY

## 2021-08-09 PROCEDURE — 272N000001 HC OR GENERAL SUPPLY STERILE: Performed by: TRANSPLANT SURGERY

## 2021-08-09 PROCEDURE — 84132 ASSAY OF SERUM POTASSIUM: CPT | Performed by: ANESTHESIOLOGY

## 2021-08-09 PROCEDURE — 83605 ASSAY OF LACTIC ACID: CPT | Performed by: STUDENT IN AN ORGANIZED HEALTH CARE EDUCATION/TRAINING PROGRAM

## 2021-08-09 PROCEDURE — 250N000009 HC RX 250: Performed by: STUDENT IN AN ORGANIZED HEALTH CARE EDUCATION/TRAINING PROGRAM

## 2021-08-09 PROCEDURE — 710N000010 HC RECOVERY PHASE 1, LEVEL 2, PER MIN: Performed by: TRANSPLANT SURGERY

## 2021-08-09 PROCEDURE — P9041 ALBUMIN (HUMAN),5%, 50ML: HCPCS | Performed by: NURSE ANESTHETIST, CERTIFIED REGISTERED

## 2021-08-09 PROCEDURE — 360N000077 HC SURGERY LEVEL 4, PER MIN: Performed by: TRANSPLANT SURGERY

## 2021-08-09 PROCEDURE — 82565 ASSAY OF CREATININE: CPT | Performed by: ANESTHESIOLOGY

## 2021-08-09 PROCEDURE — 85018 HEMOGLOBIN: CPT | Performed by: ANESTHESIOLOGY

## 2021-08-09 PROCEDURE — 0DTJ0ZZ RESECTION OF APPENDIX, OPEN APPROACH: ICD-10-PCS | Performed by: TRANSPLANT SURGERY

## 2021-08-09 PROCEDURE — 250N000011 HC RX IP 250 OP 636: Performed by: TRANSPLANT SURGERY

## 2021-08-09 PROCEDURE — 250N000013 HC RX MED GY IP 250 OP 250 PS 637: Performed by: STUDENT IN AN ORGANIZED HEALTH CARE EDUCATION/TRAINING PROGRAM

## 2021-08-09 PROCEDURE — 88302 TISSUE EXAM BY PATHOLOGIST: CPT | Mod: TC | Performed by: TRANSPLANT SURGERY

## 2021-08-09 PROCEDURE — 811N000005 HC ACQUISITON PANCREAS AUTOLOGOUS ISLET

## 2021-08-09 PROCEDURE — 200N000002 HC R&B ICU UMMC

## 2021-08-09 PROCEDURE — 999N000157 HC STATISTIC RCP TIME EA 10 MIN

## 2021-08-09 PROCEDURE — 0DNW0ZZ RELEASE PERITONEUM, OPEN APPROACH: ICD-10-PCS | Performed by: TRANSPLANT SURGERY

## 2021-08-09 PROCEDURE — 85730 THROMBOPLASTIN TIME PARTIAL: CPT | Performed by: STUDENT IN AN ORGANIZED HEALTH CARE EDUCATION/TRAINING PROGRAM

## 2021-08-09 PROCEDURE — 84295 ASSAY OF SERUM SODIUM: CPT | Performed by: STUDENT IN AN ORGANIZED HEALTH CARE EDUCATION/TRAINING PROGRAM

## 2021-08-09 PROCEDURE — 85610 PROTHROMBIN TIME: CPT | Performed by: ANESTHESIOLOGY

## 2021-08-09 PROCEDURE — 36591 DRAW BLOOD OFF VENOUS DEVICE: CPT | Performed by: STUDENT IN AN ORGANIZED HEALTH CARE EDUCATION/TRAINING PROGRAM

## 2021-08-09 PROCEDURE — 370N000017 HC ANESTHESIA TECHNICAL FEE, PER MIN: Performed by: TRANSPLANT SURGERY

## 2021-08-09 PROCEDURE — 71045 X-RAY EXAM CHEST 1 VIEW: CPT | Mod: 26 | Performed by: RADIOLOGY

## 2021-08-09 PROCEDURE — 93975 VASCULAR STUDY: CPT | Mod: 26 | Performed by: RADIOLOGY

## 2021-08-09 PROCEDURE — 250N000011 HC RX IP 250 OP 636: Performed by: STUDENT IN AN ORGANIZED HEALTH CARE EDUCATION/TRAINING PROGRAM

## 2021-08-09 PROCEDURE — 258N000003 HC RX IP 258 OP 636: Performed by: ANESTHESIOLOGY

## 2021-08-09 PROCEDURE — 999N000141 HC STATISTIC PRE-PROCEDURE NURSING ASSESSMENT: Performed by: TRANSPLANT SURGERY

## 2021-08-09 PROCEDURE — 85384 FIBRINOGEN ACTIVITY: CPT | Performed by: STUDENT IN AN ORGANIZED HEALTH CARE EDUCATION/TRAINING PROGRAM

## 2021-08-09 PROCEDURE — 250N000009 HC RX 250: Performed by: ANESTHESIOLOGY

## 2021-08-09 PROCEDURE — 271N000002 HC RX 271: Performed by: STUDENT IN AN ORGANIZED HEALTH CARE EDUCATION/TRAINING PROGRAM

## 2021-08-09 PROCEDURE — 85027 COMPLETE CBC AUTOMATED: CPT | Performed by: STUDENT IN AN ORGANIZED HEALTH CARE EDUCATION/TRAINING PROGRAM

## 2021-08-09 PROCEDURE — 85610 PROTHROMBIN TIME: CPT | Performed by: STUDENT IN AN ORGANIZED HEALTH CARE EDUCATION/TRAINING PROGRAM

## 2021-08-09 PROCEDURE — 86901 BLOOD TYPING SEROLOGIC RH(D): CPT | Performed by: ANESTHESIOLOGY

## 2021-08-09 PROCEDURE — 84100 ASSAY OF PHOSPHORUS: CPT

## 2021-08-09 PROCEDURE — 99221 1ST HOSP IP/OBS SF/LOW 40: CPT | Mod: 24 | Performed by: INTERNAL MEDICINE

## 2021-08-09 PROCEDURE — 999N000015 HC STATISTIC ARTERIAL MONITORING DAILY

## 2021-08-09 PROCEDURE — 83735 ASSAY OF MAGNESIUM: CPT

## 2021-08-09 PROCEDURE — 87186 SC STD MICRODIL/AGAR DIL: CPT | Performed by: TRANSPLANT SURGERY

## 2021-08-09 PROCEDURE — 0FB00ZX EXCISION OF LIVER, OPEN APPROACH, DIAGNOSTIC: ICD-10-PCS | Performed by: TRANSPLANT SURGERY

## 2021-08-09 PROCEDURE — 0DB90ZZ EXCISION OF DUODENUM, OPEN APPROACH: ICD-10-PCS | Performed by: TRANSPLANT SURGERY

## 2021-08-09 PROCEDURE — 93975 VASCULAR STUDY: CPT

## 2021-08-09 DEVICE — IMPLANTABLE DEVICE
Type: IMPLANTABLE DEVICE | Site: BILE DUCT | Status: NON-FUNCTIONAL
Removed: 2022-03-07

## 2021-08-09 RX ORDER — ACETAMINOPHEN 325 MG/1
975 TABLET ORAL ONCE
Status: COMPLETED | OUTPATIENT
Start: 2021-08-09 | End: 2021-08-09

## 2021-08-09 RX ORDER — CALCIUM CHLORIDE 100 MG/ML
INJECTION INTRAVENOUS; INTRAVENTRICULAR PRN
Status: DISCONTINUED | OUTPATIENT
Start: 2021-08-09 | End: 2021-08-09

## 2021-08-09 RX ORDER — HYDRALAZINE HYDROCHLORIDE 20 MG/ML
2.5-5 INJECTION INTRAMUSCULAR; INTRAVENOUS EVERY 10 MIN PRN
Status: DISCONTINUED | OUTPATIENT
Start: 2021-08-09 | End: 2021-08-09 | Stop reason: HOSPADM

## 2021-08-09 RX ORDER — NALOXONE HYDROCHLORIDE 0.4 MG/ML
0.2 INJECTION, SOLUTION INTRAMUSCULAR; INTRAVENOUS; SUBCUTANEOUS
Status: DISCONTINUED | OUTPATIENT
Start: 2021-08-09 | End: 2021-08-20 | Stop reason: HOSPADM

## 2021-08-09 RX ORDER — HEPARIN SODIUM 1000 [USP'U]/ML
INJECTION, SOLUTION INTRAVENOUS; SUBCUTANEOUS PRN
Status: DISCONTINUED | OUTPATIENT
Start: 2021-08-09 | End: 2021-08-09

## 2021-08-09 RX ORDER — ACETAMINOPHEN 325 MG/10.15ML
650 LIQUID ORAL EVERY 6 HOURS
Status: DISCONTINUED | OUTPATIENT
Start: 2021-08-09 | End: 2021-08-10

## 2021-08-09 RX ORDER — SODIUM CHLORIDE, SODIUM LACTATE, POTASSIUM CHLORIDE, CALCIUM CHLORIDE 600; 310; 30; 20 MG/100ML; MG/100ML; MG/100ML; MG/100ML
INJECTION, SOLUTION INTRAVENOUS CONTINUOUS
Status: DISCONTINUED | OUTPATIENT
Start: 2021-08-09 | End: 2021-08-09 | Stop reason: HOSPADM

## 2021-08-09 RX ORDER — FLUCONAZOLE 2 MG/ML
200 INJECTION, SOLUTION INTRAVENOUS EVERY 24 HOURS
Status: DISCONTINUED | OUTPATIENT
Start: 2021-08-09 | End: 2021-08-18

## 2021-08-09 RX ORDER — VANCOMYCIN HYDROCHLORIDE 1 G/200ML
1000 INJECTION, SOLUTION INTRAVENOUS ONCE
Status: COMPLETED | OUTPATIENT
Start: 2021-08-09 | End: 2021-08-09

## 2021-08-09 RX ORDER — OXYCODONE HYDROCHLORIDE 5 MG/1
5 TABLET ORAL EVERY 4 HOURS PRN
Status: CANCELLED | OUTPATIENT
Start: 2021-08-09

## 2021-08-09 RX ORDER — POTASSIUM CHLORIDE 29.8 MG/ML
INJECTION INTRAVENOUS PRN
Status: DISCONTINUED | OUTPATIENT
Start: 2021-08-09 | End: 2021-08-09

## 2021-08-09 RX ORDER — DOPAMINE HYDROCHLORIDE 160 MG/100ML
2-20 INJECTION, SOLUTION INTRAVENOUS CONTINUOUS
Status: DISCONTINUED | OUTPATIENT
Start: 2021-08-09 | End: 2021-08-09 | Stop reason: HOSPADM

## 2021-08-09 RX ORDER — LABETALOL HYDROCHLORIDE 5 MG/ML
10 INJECTION, SOLUTION INTRAVENOUS
Status: DISCONTINUED | OUTPATIENT
Start: 2021-08-09 | End: 2021-08-09 | Stop reason: HOSPADM

## 2021-08-09 RX ORDER — ERTAPENEM 1 G/1
1 INJECTION, POWDER, LYOPHILIZED, FOR SOLUTION INTRAMUSCULAR; INTRAVENOUS
Status: COMPLETED | OUTPATIENT
Start: 2021-08-09 | End: 2021-08-09

## 2021-08-09 RX ORDER — NALOXONE HYDROCHLORIDE 0.4 MG/ML
0.4 INJECTION, SOLUTION INTRAMUSCULAR; INTRAVENOUS; SUBCUTANEOUS
Status: DISCONTINUED | OUTPATIENT
Start: 2021-08-09 | End: 2021-08-20 | Stop reason: HOSPADM

## 2021-08-09 RX ORDER — GABAPENTIN 250 MG/5ML
300 SOLUTION ORAL 2 TIMES DAILY
Status: DISCONTINUED | OUTPATIENT
Start: 2021-08-10 | End: 2021-08-20 | Stop reason: ALTCHOICE

## 2021-08-09 RX ORDER — ONDANSETRON 4 MG/1
4 TABLET, ORALLY DISINTEGRATING ORAL EVERY 6 HOURS PRN
Status: DISCONTINUED | OUTPATIENT
Start: 2021-08-09 | End: 2021-08-20 | Stop reason: HOSPADM

## 2021-08-09 RX ORDER — NALOXONE HYDROCHLORIDE 0.4 MG/ML
0.4 INJECTION, SOLUTION INTRAMUSCULAR; INTRAVENOUS; SUBCUTANEOUS
Status: DISCONTINUED | OUTPATIENT
Start: 2021-08-09 | End: 2021-08-09 | Stop reason: HOSPADM

## 2021-08-09 RX ORDER — DEXTROSE MONOHYDRATE 25 G/50ML
25-50 INJECTION, SOLUTION INTRAVENOUS
Status: DISCONTINUED | OUTPATIENT
Start: 2021-08-09 | End: 2021-08-09

## 2021-08-09 RX ORDER — DEXMEDETOMIDINE HYDROCHLORIDE 4 UG/ML
0.2-0.7 INJECTION, SOLUTION INTRAVENOUS CONTINUOUS
Status: DISCONTINUED | OUTPATIENT
Start: 2021-08-09 | End: 2021-08-09 | Stop reason: HOSPADM

## 2021-08-09 RX ORDER — AMOXICILLIN 250 MG
1 CAPSULE ORAL 2 TIMES DAILY
Status: CANCELLED | OUTPATIENT
Start: 2021-08-10

## 2021-08-09 RX ORDER — BISACODYL 10 MG
10 SUPPOSITORY, RECTAL RECTAL DAILY PRN
Status: DISCONTINUED | OUTPATIENT
Start: 2021-08-09 | End: 2021-08-12

## 2021-08-09 RX ORDER — ONDANSETRON 4 MG/1
4 TABLET, ORALLY DISINTEGRATING ORAL EVERY 30 MIN PRN
Status: DISCONTINUED | OUTPATIENT
Start: 2021-08-09 | End: 2021-08-09 | Stop reason: HOSPADM

## 2021-08-09 RX ORDER — FLUCONAZOLE 2 MG/ML
400 INJECTION, SOLUTION INTRAVENOUS EVERY 24 HOURS
Status: DISCONTINUED | OUTPATIENT
Start: 2021-08-09 | End: 2021-08-09 | Stop reason: HOSPADM

## 2021-08-09 RX ORDER — HEPARIN SODIUM 10000 [USP'U]/100ML
200 INJECTION, SOLUTION INTRAVENOUS CONTINUOUS
Status: DISCONTINUED | OUTPATIENT
Start: 2021-08-09 | End: 2021-08-10

## 2021-08-09 RX ORDER — LABETALOL 20 MG/4 ML (5 MG/ML) INTRAVENOUS SYRINGE
PRN
Status: DISCONTINUED | OUTPATIENT
Start: 2021-08-09 | End: 2021-08-09

## 2021-08-09 RX ORDER — HYDROMORPHONE HCL IN WATER/PF 6 MG/30 ML
.2-.4 PATIENT CONTROLLED ANALGESIA SYRINGE INTRAVENOUS EVERY 5 MIN PRN
Status: DISCONTINUED | OUTPATIENT
Start: 2021-08-09 | End: 2021-08-09 | Stop reason: HOSPADM

## 2021-08-09 RX ORDER — ERTAPENEM 1 G/1
1 INJECTION, POWDER, LYOPHILIZED, FOR SOLUTION INTRAMUSCULAR; INTRAVENOUS EVERY 8 HOURS PRN
Status: DISCONTINUED | OUTPATIENT
Start: 2021-08-09 | End: 2021-08-09 | Stop reason: HOSPADM

## 2021-08-09 RX ORDER — DEXTROSE MONOHYDRATE 100 MG/ML
INJECTION, SOLUTION INTRAVENOUS CONTINUOUS PRN
Status: DISCONTINUED | OUTPATIENT
Start: 2021-08-09 | End: 2021-08-13

## 2021-08-09 RX ORDER — EPHEDRINE SULFATE 50 MG/ML
INJECTION, SOLUTION INTRAMUSCULAR; INTRAVENOUS; SUBCUTANEOUS PRN
Status: DISCONTINUED | OUTPATIENT
Start: 2021-08-09 | End: 2021-08-09

## 2021-08-09 RX ORDER — NICOTINE POLACRILEX 4 MG
15-30 LOZENGE BUCCAL
Status: DISCONTINUED | OUTPATIENT
Start: 2021-08-09 | End: 2021-08-18

## 2021-08-09 RX ORDER — METOPROLOL TARTRATE 1 MG/ML
INJECTION, SOLUTION INTRAVENOUS PRN
Status: DISCONTINUED | OUTPATIENT
Start: 2021-08-09 | End: 2021-08-09

## 2021-08-09 RX ORDER — ERTAPENEM 1 G/1
1 INJECTION, POWDER, LYOPHILIZED, FOR SOLUTION INTRAMUSCULAR; INTRAVENOUS EVERY 24 HOURS
Status: DISCONTINUED | OUTPATIENT
Start: 2021-08-09 | End: 2021-08-17

## 2021-08-09 RX ORDER — NALOXONE HYDROCHLORIDE 0.4 MG/ML
0.2 INJECTION, SOLUTION INTRAMUSCULAR; INTRAVENOUS; SUBCUTANEOUS
Status: DISCONTINUED | OUTPATIENT
Start: 2021-08-09 | End: 2021-08-09 | Stop reason: HOSPADM

## 2021-08-09 RX ORDER — DEXMEDETOMIDINE HYDROCHLORIDE 4 UG/ML
0.2-0.7 INJECTION, SOLUTION INTRAVENOUS CONTINUOUS
Status: DISCONTINUED | OUTPATIENT
Start: 2021-08-09 | End: 2021-08-09

## 2021-08-09 RX ORDER — FENTANYL CITRATE 50 UG/ML
25-50 INJECTION, SOLUTION INTRAMUSCULAR; INTRAVENOUS
Status: DISCONTINUED | OUTPATIENT
Start: 2021-08-09 | End: 2021-08-09 | Stop reason: HOSPADM

## 2021-08-09 RX ORDER — DIPHENHYDRAMINE HYDROCHLORIDE 50 MG/ML
INJECTION INTRAMUSCULAR; INTRAVENOUS PRN
Status: DISCONTINUED | OUTPATIENT
Start: 2021-08-09 | End: 2021-08-09

## 2021-08-09 RX ORDER — SCOLOPAMINE TRANSDERMAL SYSTEM 1 MG/1
1 PATCH, EXTENDED RELEASE TRANSDERMAL ONCE
Status: COMPLETED | OUTPATIENT
Start: 2021-08-09 | End: 2021-08-09

## 2021-08-09 RX ORDER — POLYETHYLENE GLYCOL 3350 17 G/17G
17 POWDER, FOR SOLUTION ORAL DAILY
Status: CANCELLED | OUTPATIENT
Start: 2021-08-10

## 2021-08-09 RX ORDER — FENTANYL CITRATE 50 UG/ML
INJECTION, SOLUTION INTRAMUSCULAR; INTRAVENOUS PRN
Status: DISCONTINUED | OUTPATIENT
Start: 2021-08-09 | End: 2021-08-09

## 2021-08-09 RX ORDER — ONDANSETRON 2 MG/ML
INJECTION INTRAMUSCULAR; INTRAVENOUS PRN
Status: DISCONTINUED | OUTPATIENT
Start: 2021-08-09 | End: 2021-08-09

## 2021-08-09 RX ORDER — METHOCARBAMOL 100 MG/ML
500 INJECTION, SOLUTION INTRAMUSCULAR; INTRAVENOUS EVERY 6 HOURS
Status: DISPENSED | OUTPATIENT
Start: 2021-08-09 | End: 2021-08-12

## 2021-08-09 RX ORDER — GABAPENTIN 300 MG/1
300 CAPSULE ORAL ONCE
Status: COMPLETED | OUTPATIENT
Start: 2021-08-09 | End: 2021-08-09

## 2021-08-09 RX ORDER — POTASSIUM CHLORIDE 14.9 MG/ML
INJECTION INTRAVENOUS PRN
Status: DISCONTINUED | OUTPATIENT
Start: 2021-08-09 | End: 2021-08-09

## 2021-08-09 RX ORDER — SODIUM CHLORIDE, SODIUM LACTATE, POTASSIUM CHLORIDE, CALCIUM CHLORIDE 600; 310; 30; 20 MG/100ML; MG/100ML; MG/100ML; MG/100ML
INJECTION, SOLUTION INTRAVENOUS CONTINUOUS PRN
Status: DISCONTINUED | OUTPATIENT
Start: 2021-08-09 | End: 2021-08-09

## 2021-08-09 RX ORDER — LIDOCAINE 40 MG/G
CREAM TOPICAL
Status: DISCONTINUED | OUTPATIENT
Start: 2021-08-09 | End: 2021-08-20 | Stop reason: HOSPADM

## 2021-08-09 RX ORDER — DEXTROSE MONOHYDRATE 100 MG/ML
INJECTION, SOLUTION INTRAVENOUS CONTINUOUS PRN
Status: DISCONTINUED | OUTPATIENT
Start: 2021-08-09 | End: 2021-08-20 | Stop reason: HOSPADM

## 2021-08-09 RX ORDER — DEXTROSE, SODIUM CHLORIDE, SODIUM LACTATE, POTASSIUM CHLORIDE, AND CALCIUM CHLORIDE 5; .6; .31; .03; .02 G/100ML; G/100ML; G/100ML; G/100ML; G/100ML
INJECTION, SOLUTION INTRAVENOUS CONTINUOUS
Status: DISCONTINUED | OUTPATIENT
Start: 2021-08-09 | End: 2021-08-20 | Stop reason: HOSPADM

## 2021-08-09 RX ORDER — DEXTROSE MONOHYDRATE 25 G/50ML
25-50 INJECTION, SOLUTION INTRAVENOUS
Status: DISCONTINUED | OUTPATIENT
Start: 2021-08-09 | End: 2021-08-18

## 2021-08-09 RX ORDER — VANCOMYCIN HYDROCHLORIDE 1 G/200ML
1000 INJECTION, SOLUTION INTRAVENOUS EVERY 12 HOURS
Status: DISCONTINUED | OUTPATIENT
Start: 2021-08-10 | End: 2021-08-11

## 2021-08-09 RX ORDER — PROPOFOL 10 MG/ML
INJECTION, EMULSION INTRAVENOUS PRN
Status: DISCONTINUED | OUTPATIENT
Start: 2021-08-09 | End: 2021-08-09

## 2021-08-09 RX ORDER — ONDANSETRON 2 MG/ML
4 INJECTION INTRAMUSCULAR; INTRAVENOUS EVERY 6 HOURS PRN
Status: DISCONTINUED | OUTPATIENT
Start: 2021-08-09 | End: 2021-08-18

## 2021-08-09 RX ORDER — FENTANYL CITRATE 50 UG/ML
25-50 INJECTION, SOLUTION INTRAMUSCULAR; INTRAVENOUS EVERY 5 MIN PRN
Status: DISCONTINUED | OUTPATIENT
Start: 2021-08-09 | End: 2021-08-09 | Stop reason: HOSPADM

## 2021-08-09 RX ORDER — FLUMAZENIL 0.1 MG/ML
0.2 INJECTION, SOLUTION INTRAVENOUS
Status: DISCONTINUED | OUTPATIENT
Start: 2021-08-09 | End: 2021-08-09 | Stop reason: HOSPADM

## 2021-08-09 RX ORDER — ONDANSETRON 2 MG/ML
4 INJECTION INTRAMUSCULAR; INTRAVENOUS EVERY 30 MIN PRN
Status: DISCONTINUED | OUTPATIENT
Start: 2021-08-09 | End: 2021-08-09 | Stop reason: HOSPADM

## 2021-08-09 RX ORDER — ALBUMIN, HUMAN INJ 5% 5 %
SOLUTION INTRAVENOUS CONTINUOUS PRN
Status: DISCONTINUED | OUTPATIENT
Start: 2021-08-09 | End: 2021-08-09

## 2021-08-09 RX ORDER — GLYCOPYRROLATE 0.2 MG/ML
INJECTION, SOLUTION INTRAMUSCULAR; INTRAVENOUS PRN
Status: DISCONTINUED | OUTPATIENT
Start: 2021-08-09 | End: 2021-08-09

## 2021-08-09 RX ORDER — NICOTINE POLACRILEX 4 MG
15-30 LOZENGE BUCCAL
Status: DISCONTINUED | OUTPATIENT
Start: 2021-08-09 | End: 2021-08-09

## 2021-08-09 RX ORDER — DEXMEDETOMIDINE HYDROCHLORIDE 4 UG/ML
0.2-0.7 INJECTION, SOLUTION INTRAVENOUS CONTINUOUS
Status: DISCONTINUED | OUTPATIENT
Start: 2021-08-09 | End: 2021-08-11

## 2021-08-09 RX ORDER — LIDOCAINE 40 MG/G
CREAM TOPICAL
Status: DISCONTINUED | OUTPATIENT
Start: 2021-08-09 | End: 2021-08-09 | Stop reason: HOSPADM

## 2021-08-09 RX ORDER — LORAZEPAM 2 MG/ML
0.5 INJECTION INTRAMUSCULAR EVERY 6 HOURS
Status: DISCONTINUED | OUTPATIENT
Start: 2021-08-09 | End: 2021-08-13

## 2021-08-09 RX ORDER — ESMOLOL HYDROCHLORIDE 10 MG/ML
INJECTION INTRAVENOUS PRN
Status: DISCONTINUED | OUTPATIENT
Start: 2021-08-09 | End: 2021-08-09

## 2021-08-09 RX ADMIN — Medication 5 MG: at 15:20

## 2021-08-09 RX ADMIN — SUGAMMADEX 200 MG: 100 INJECTION, SOLUTION INTRAVENOUS at 17:51

## 2021-08-09 RX ADMIN — ROCURONIUM BROMIDE 30 MG: 10 INJECTION INTRAVENOUS at 10:46

## 2021-08-09 RX ADMIN — PHENYLEPHRINE HYDROCHLORIDE 200 MCG: 10 INJECTION INTRAVENOUS at 17:06

## 2021-08-09 RX ADMIN — Medication: at 20:57

## 2021-08-09 RX ADMIN — ALBUMIN (HUMAN): 12.5 SOLUTION INTRAVENOUS at 13:00

## 2021-08-09 RX ADMIN — HEPARIN SODIUM 1000 UNITS: 1000 INJECTION INTRAVENOUS; SUBCUTANEOUS at 16:47

## 2021-08-09 RX ADMIN — ROCURONIUM BROMIDE 20 MG: 10 INJECTION INTRAVENOUS at 11:37

## 2021-08-09 RX ADMIN — HYDROCORTISONE SODIUM SUCCINATE 100 MG: 100 INJECTION, POWDER, FOR SOLUTION INTRAMUSCULAR; INTRAVENOUS at 17:02

## 2021-08-09 RX ADMIN — LABETALOL 20 MG/4 ML (5 MG/ML) INTRAVENOUS SYRINGE 10 MG: at 10:10

## 2021-08-09 RX ADMIN — PHENYLEPHRINE HYDROCHLORIDE 200 MCG: 10 INJECTION INTRAVENOUS at 17:00

## 2021-08-09 RX ADMIN — Medication 5 MG: at 15:56

## 2021-08-09 RX ADMIN — Medication 14 ML/HR: at 09:49

## 2021-08-09 RX ADMIN — ACETAMINOPHEN 650 MG: 325 SOLUTION ORAL at 22:07

## 2021-08-09 RX ADMIN — ESMOLOL HYDROCHLORIDE 30 MG: 10 INJECTION, SOLUTION INTRAVENOUS at 09:52

## 2021-08-09 RX ADMIN — CALCIUM CHLORIDE 250 MG: 100 INJECTION INTRAVENOUS; INTRAVENTRICULAR at 12:24

## 2021-08-09 RX ADMIN — CALCIUM CHLORIDE 500 MG: 100 INJECTION INTRAVENOUS; INTRAVENTRICULAR at 13:07

## 2021-08-09 RX ADMIN — SODIUM CHLORIDE, POTASSIUM CHLORIDE, SODIUM LACTATE AND CALCIUM CHLORIDE: 600; 310; 30; 20 INJECTION, SOLUTION INTRAVENOUS at 09:15

## 2021-08-09 RX ADMIN — SODIUM CHLORIDE, SODIUM LACTATE, POTASSIUM CHLORIDE, CALCIUM CHLORIDE AND DEXTROSE MONOHYDRATE: 5; 600; 310; 30; 20 INJECTION, SOLUTION INTRAVENOUS at 19:13

## 2021-08-09 RX ADMIN — PHENYLEPHRINE HYDROCHLORIDE 300 MCG: 10 INJECTION INTRAVENOUS at 16:49

## 2021-08-09 RX ADMIN — ALBUMIN (HUMAN): 12.5 SOLUTION INTRAVENOUS at 11:30

## 2021-08-09 RX ADMIN — ALBUMIN (HUMAN): 12.5 SOLUTION INTRAVENOUS at 14:21

## 2021-08-09 RX ADMIN — DOPAMINE HYDROCHLORIDE 5 MCG/KG/MIN: 160 INJECTION, SOLUTION INTRAVENOUS at 13:37

## 2021-08-09 RX ADMIN — ROCURONIUM BROMIDE 20 MG: 10 INJECTION INTRAVENOUS at 13:42

## 2021-08-09 RX ADMIN — PHENYLEPHRINE HYDROCHLORIDE 100 MCG: 10 INJECTION INTRAVENOUS at 11:38

## 2021-08-09 RX ADMIN — DEXMEDETOMIDINE 0.5 MCG/KG/HR: 100 INJECTION, SOLUTION, CONCENTRATE INTRAVENOUS at 16:43

## 2021-08-09 RX ADMIN — MIDAZOLAM 2 MG: 1 INJECTION INTRAMUSCULAR; INTRAVENOUS at 08:05

## 2021-08-09 RX ADMIN — KETAMINE HYDROCHLORIDE 10 MG/HR: 100 INJECTION, SOLUTION, CONCENTRATE INTRAMUSCULAR; INTRAVENOUS at 09:25

## 2021-08-09 RX ADMIN — METHOCARBAMOL 500 MG: 100 INJECTION INTRAMUSCULAR; INTRAVENOUS at 21:16

## 2021-08-09 RX ADMIN — FENTANYL CITRATE 100 MCG: 50 INJECTION, SOLUTION INTRAMUSCULAR; INTRAVENOUS at 09:55

## 2021-08-09 RX ADMIN — DIPHENHYDRAMINE HYDROCHLORIDE 25 MG: 50 INJECTION, SOLUTION INTRAMUSCULAR; INTRAVENOUS at 17:02

## 2021-08-09 RX ADMIN — SODIUM CHLORIDE, POTASSIUM CHLORIDE, SODIUM LACTATE AND CALCIUM CHLORIDE: 600; 310; 30; 20 INJECTION, SOLUTION INTRAVENOUS at 09:00

## 2021-08-09 RX ADMIN — VANCOMYCIN HYDROCHLORIDE 1000 MG: 1 INJECTION, SOLUTION INTRAVENOUS at 17:10

## 2021-08-09 RX ADMIN — ONDANSETRON 4 MG: 2 INJECTION INTRAMUSCULAR; INTRAVENOUS at 08:05

## 2021-08-09 RX ADMIN — HUMAN INSULIN 2 UNITS/HR: 100 INJECTION, SOLUTION SUBCUTANEOUS at 10:22

## 2021-08-09 RX ADMIN — PROPOFOL 80 MG: 10 INJECTION, EMULSION INTRAVENOUS at 08:22

## 2021-08-09 RX ADMIN — HEPARIN SODIUM 200 UNITS/HR: 10000 INJECTION, SOLUTION INTRAVENOUS at 19:46

## 2021-08-09 RX ADMIN — ERTAPENEM SODIUM 1 G: 1 INJECTION, POWDER, LYOPHILIZED, FOR SOLUTION INTRAMUSCULAR; INTRAVENOUS at 23:24

## 2021-08-09 RX ADMIN — FENTANYL CITRATE 150 MCG: 50 INJECTION, SOLUTION INTRAMUSCULAR; INTRAVENOUS at 09:25

## 2021-08-09 RX ADMIN — PHENYLEPHRINE HYDROCHLORIDE 200 MCG: 10 INJECTION INTRAVENOUS at 17:13

## 2021-08-09 RX ADMIN — LABETALOL 20 MG/4 ML (5 MG/ML) INTRAVENOUS SYRINGE 20 MG: at 11:25

## 2021-08-09 RX ADMIN — FLUCONAZOLE 400 MG: 400 INJECTION, SOLUTION INTRAVENOUS at 08:30

## 2021-08-09 RX ADMIN — LABETALOL 20 MG/4 ML (5 MG/ML) INTRAVENOUS SYRINGE 5 MG: at 18:38

## 2021-08-09 RX ADMIN — ROCURONIUM BROMIDE 100 MG: 10 INJECTION INTRAVENOUS at 08:22

## 2021-08-09 RX ADMIN — ACETAMINOPHEN 975 MG: 325 TABLET, FILM COATED ORAL at 06:34

## 2021-08-09 RX ADMIN — ESMOLOL HYDROCHLORIDE 30 MG: 10 INJECTION, SOLUTION INTRAVENOUS at 10:07

## 2021-08-09 RX ADMIN — SCOPALAMINE 1 PATCH: 1 PATCH, EXTENDED RELEASE TRANSDERMAL at 07:16

## 2021-08-09 RX ADMIN — FAMOTIDINE 20 MG: 10 INJECTION, SOLUTION INTRAVENOUS at 17:02

## 2021-08-09 RX ADMIN — SODIUM CHLORIDE, POTASSIUM CHLORIDE, SODIUM LACTATE AND CALCIUM CHLORIDE: 600; 310; 30; 20 INJECTION, SOLUTION INTRAVENOUS at 12:51

## 2021-08-09 RX ADMIN — HYDROMORPHONE HYDROCHLORIDE 1 MG: 1 INJECTION, SOLUTION INTRAMUSCULAR; INTRAVENOUS; SUBCUTANEOUS at 17:55

## 2021-08-09 RX ADMIN — FENTANYL CITRATE 50 MCG: 50 INJECTION, SOLUTION INTRAMUSCULAR; INTRAVENOUS at 18:00

## 2021-08-09 RX ADMIN — POTASSIUM CHLORIDE 20 MEQ: 29.8 INJECTION, SOLUTION INTRAVENOUS at 12:20

## 2021-08-09 RX ADMIN — FLUCONAZOLE IN SODIUM CHLORIDE 200 MG: 2 INJECTION, SOLUTION INTRAVENOUS at 22:13

## 2021-08-09 RX ADMIN — Medication 10 MG: at 13:10

## 2021-08-09 RX ADMIN — GABAPENTIN 300 MG: 300 CAPSULE ORAL at 06:35

## 2021-08-09 RX ADMIN — ROCURONIUM BROMIDE 20 MG: 10 INJECTION INTRAVENOUS at 15:56

## 2021-08-09 RX ADMIN — MIDAZOLAM 1 MG: 1 INJECTION INTRAMUSCULAR; INTRAVENOUS at 07:34

## 2021-08-09 RX ADMIN — PHENYLEPHRINE HYDROCHLORIDE 100 MCG: 10 INJECTION INTRAVENOUS at 17:12

## 2021-08-09 RX ADMIN — PHENYLEPHRINE HYDROCHLORIDE 300 MCG: 10 INJECTION INTRAVENOUS at 16:50

## 2021-08-09 RX ADMIN — ROCURONIUM BROMIDE 20 MG: 10 INJECTION INTRAVENOUS at 12:25

## 2021-08-09 RX ADMIN — ESMOLOL HYDROCHLORIDE 40 MG: 10 INJECTION, SOLUTION INTRAVENOUS at 09:57

## 2021-08-09 RX ADMIN — METOPROLOL TARTRATE 1 MG: 5 INJECTION INTRAVENOUS at 16:19

## 2021-08-09 RX ADMIN — SODIUM CHLORIDE, POTASSIUM CHLORIDE, SODIUM LACTATE AND CALCIUM CHLORIDE: 600; 310; 30; 20 INJECTION, SOLUTION INTRAVENOUS at 08:45

## 2021-08-09 RX ADMIN — FENTANYL CITRATE 50 MCG: 50 INJECTION, SOLUTION INTRAMUSCULAR; INTRAVENOUS at 07:38

## 2021-08-09 RX ADMIN — ROCURONIUM BROMIDE 20 MG: 10 INJECTION INTRAVENOUS at 10:08

## 2021-08-09 RX ADMIN — PHENYLEPHRINE HYDROCHLORIDE 100 MCG: 10 INJECTION INTRAVENOUS at 17:09

## 2021-08-09 RX ADMIN — ROCURONIUM BROMIDE 10 MG: 10 INJECTION INTRAVENOUS at 17:38

## 2021-08-09 RX ADMIN — FENTANYL CITRATE 50 MCG: 50 INJECTION, SOLUTION INTRAMUSCULAR; INTRAVENOUS at 18:38

## 2021-08-09 RX ADMIN — SODIUM CHLORIDE, POTASSIUM CHLORIDE, SODIUM LACTATE AND CALCIUM CHLORIDE: 600; 310; 30; 20 INJECTION, SOLUTION INTRAVENOUS at 07:15

## 2021-08-09 RX ADMIN — FENTANYL CITRATE 50 MCG: 50 INJECTION, SOLUTION INTRAMUSCULAR; INTRAVENOUS at 07:34

## 2021-08-09 RX ADMIN — ALBUMIN (HUMAN): 12.5 SOLUTION INTRAVENOUS at 12:20

## 2021-08-09 RX ADMIN — FENTANYL CITRATE 100 MCG: 50 INJECTION, SOLUTION INTRAMUSCULAR; INTRAVENOUS at 08:22

## 2021-08-09 RX ADMIN — PHENYLEPHRINE HYDROCHLORIDE 300 MCG: 10 INJECTION INTRAVENOUS at 16:51

## 2021-08-09 RX ADMIN — FENTANYL CITRATE 150 MCG: 50 INJECTION, SOLUTION INTRAMUSCULAR; INTRAVENOUS at 09:37

## 2021-08-09 RX ADMIN — MIDAZOLAM 1 MG: 1 INJECTION INTRAMUSCULAR; INTRAVENOUS at 07:39

## 2021-08-09 RX ADMIN — ERTAPENEM SODIUM 1 G: 1 INJECTION, POWDER, LYOPHILIZED, FOR SOLUTION INTRAMUSCULAR; INTRAVENOUS at 06:35

## 2021-08-09 RX ADMIN — ROCURONIUM BROMIDE 30 MG: 10 INJECTION INTRAVENOUS at 09:25

## 2021-08-09 RX ADMIN — CALCIUM CHLORIDE 250 MG: 100 INJECTION INTRAVENOUS; INTRAVENTRICULAR at 14:49

## 2021-08-09 ASSESSMENT — MIFFLIN-ST. JEOR: SCORE: 1099.38

## 2021-08-09 NOTE — H&P
SURGICAL ICU ADMISSION NOTE  8/9/2021    PRIMARY TEAM: Transplant  PRIMARY PHYSICIAN: Dr. ILAN Shirley    REASON FOR CRITICAL CARE ADMISSION: Hemodynamic monitoring, pain control    ADMITTING PHYSICIAN: Dr. Shrestha    ASSESSMENT: Meme Robins is a 51-year-old woman with chronic pancreatitis, who is S/P total pancreatectomy and islet autotransplant on August 9, 2021, who was admitted to the ICU for hemodynamic monitoring and pain control. Reported to have had intraop hypotension with islet infusion, thought to be 2/2 anaphylaxis and treated with benadryl, steroids and resolved.     PLAN:   Neuro/ pain/ sedation:  #Acute post op pain   #Pain 2/2 chronic pancreatitis   #Anxiety, depression   -Monitor neurological status. Notify the MD for any acute changes in exam.  -Precedex gtt  -Tylenol, gabapentin, robaxin, ativan, ketamine, dPCA, onQ pump  -Holding PTA sertraline, trazodone, klonopin      Pulmonary care:   #H/O PE  -Supplemental oxygen to keep saturation above 92 %.  -Incentive spirometer every 15- 30 minutes when awake.      Cardiovascular:    #Intraop hypotension during islet infusion, resolved   -Continue to monitor hemodynamic status     GI care:   #GERD  -Zofran prn   -PTA Protonix 40mg daily   -Bowel regimen       Fluids/ Electrolytes/ Nutrition:   -NPO, ok for ice chips   -D5 LR @ 100cc/hr for IV fluid hydration  -Aquadeks multivitamin daily   -Nutrition consulted      Renal/ Fluid Balance:    -Miller in place  -continue to monitor intake and output  -AM chem      Endocrine:    #Chronic pancreatitis s/p TPAIT  #Adrenal insufficiency   -Insulin gtt  -Endocrinology consulted, recommendations pending      ID/ Antibiotics:  -Ertapenem 1g q24h   -Fluconazole 200mg q24h  -Vancomycin, pharmacy to dose   -AM CBC      Heme:     #Acute blood loss   #Chronic anemia  #H/O PE  -Heparin gtt 200u/hr. If hgb check at midnight stable, will increase to 400u/hr per transplant team    -q8h hgb      MSK:    #Post op weakness,  deconditioning   -PT consulted. Appreciate recs.     Prophylaxis:    -Heparin gtt   -Protonix 40mg daily      Lines/ tubes/ drains:  -ARTEMIO drain   -GJ tube   -NGT  -Miller  -PIV     Disposition:  -Surgical ICU     Patient seen, findings and plan discussed with surgical ICU staff.     Ila Owens MD  General Surgery, PGY3  [id number]    - - - - - - - - - - - - - - - - - - - - - - - - - - - - - - - - - - - - - - - - - - - - - - - - - - - - - - - - - - - - - - - - -     HISTORY PRESENTING ILLNESS: 51 yr old female with PMHx of PE, adrenal insufficiency, chronic pancreatitis, anxiety, depression and GERD who underwent TPAIT 8/9 with the transplant service. Reportedly experienced intraop hypotension with islet infusion thought to be 2/2 to anaphylaxis, treated with steroids, benadryl with resolution. Admitted to SICU post op for pain control and close hemodynamic monitoring.     REVIEW OF SYSTEMS: 10 point ROS neg other than the symptoms noted above in the HPI.    PAST MEDICAL HISTORY:    has a past medical history of Adrenal insufficiency (H), Anemia, Depression, Esophageal reflux, Idiopathic chronic pancreatitis (H), Pre-diabetes, and Pulmonary embolism (H).    SURGICAL HISTORY:    has a past surgical history that includes Endoscopic ultrasound, esophagoscopy, gastroscopy, duodenoscopy (EGD), combined; Endoscopic retrograde cholangiopancreatogram; ARTHROSCOPY SHOULDER SURGICAL BICEPS TENODESIS (02/09/2017); Pancreas surgery (05/29/2014); Celiac Plexus Block (05/29/2014); Endometrial Ablation (03/29/2014); Jejunostomy care; and Pancreas surgery (02/2011).    SOCIAL HISTORY:    reports that she has never smoked. She has never used smokeless tobacco. She reports previous alcohol use. She reports that she does not use drugs.    FAMILY HISTORY:   Non contributory     ALLERGIES:      Allergies   Allergen Reactions     Metoclopramide      Morphine      Penicillins      Prochlorperazine      Promethazine        MEDICATIONS:  No  current facility-administered medications on file prior to encounter.  clonazePAM (KLONOPIN) 1 MG tablet, 1mg morning and 1.5mg every evening  HYDROmorphone (DILAUDID) 4 MG tablet, Take 4 mg by mouth every 6 hours as needed   lipase-protease-amylase (ZENPEP) 63447-40234 units CPEP, Take 3 capsules by mouth 3 times daily (with meals)  ondansetron (ZOFRAN-ODT) 4 MG ODT tab, Take 4 mg by mouth as needed  Oxymorphone HCl 10 MG TB12, Take 10 mg by mouth every 8 hours   pantoprazole (PROTONIX) 40 MG EC tablet, Take 40 mg by mouth 2 times daily  sertraline (ZOLOFT) 100 MG tablet, Take 150 mg by mouth At Bedtime  traZODone (DESYREL) 100 MG tablet, Take 100 mg by mouth At Bedtime  hyoscyamine SL (LEVSIN/SL) 0.125 MG sublingual tablet, Take 125 mcg by mouth 4 times daily as needed         PHYSICAL EXAMINATION:  Temp:  [96.8  F (36  C)-98.6  F (37  C)] 96.8  F (36  C)  Pulse:  [71-89] 89  Resp:  [14-16] 14  BP: (110-132)/(70-83) 132/80  MAP:  [96 mmHg-111 mmHg] 111 mmHg  Arterial Line BP: (131-146)/(80-84) 146/84  SpO2:  [96 %-100 %] 100 %  Gen: awake, NAD  HEENT: NCAT, EOMI  CV: rate low 100s, non cyanotic   Resp: unlabored breathing   Abd: soft, non distended. Incision c/d/i.  ARTEMIO with SS output. GJ present.   Ext: warm, well perfused   Neuro: no focal deficits, moves extremities x 4 spontaneously     LABS: Reviewed.   Arterial Blood Gases   Recent Labs   Lab 08/09/21  1744 08/09/21  1715 08/09/21  1616 08/09/21  1548   PH 7.40 7.37 7.39 7.40   PCO2 39 41 40 40   PO2 161* 156* 148* 168*   HCO3 24 24 24 25     Complete Blood Count   Recent Labs   Lab 08/09/21  1842 08/09/21  1744 08/09/21  1715 08/09/21  1616 08/09/21  0705 08/04/21  0959   WBC 8.2  --   --   --   --  5.7   HGB 10.2* 10.6* 9.2* 10.9*   < > 11.9     --   --   --   --  339    < > = values in this interval not displayed.     Basic Metabolic Panel  Recent Labs   Lab 08/09/21 2027 08/09/21  1938 08/09/21  1842 08/09/21  1840 08/09/21  1744 08/09/21  1715  08/09/21  1616 08/09/21  0703 08/04/21  1104 08/04/21  0959   NA  --   --  141  --  139 140 141  --    < > 137   POTASSIUM  --   --  3.7  --  3.4* 3.5 3.8 3.7   < > 3.8   CHLORIDE  --   --  111*  --   --   --   --   --   --  106   CO2  --   --  26  --   --   --   --   --   --  29   BUN  --   --  6*  --   --   --   --   --   --  14   CR  --   --  0.56  --   --   --   --  0.70  --  0.71   * 103* 96 99 135* 106* 108*  --    < > 90  89    < > = values in this interval not displayed.     Liver Function Tests  Recent Labs   Lab 08/09/21  1842 08/09/21  0703 08/04/21  0959 08/04/21  0829   *  --  16  --    *  --  23  --    ALKPHOS 75  --  88  --    BILITOTAL 0.8  --  0.5  --    ALBUMIN 3.0*  --  3.6  --    INR 1.45* 0.99  --  1.00     Coagulation Profile  Recent Labs   Lab 08/09/21 1842 08/09/21  0703 08/04/21  0829   INR 1.45* 0.99 1.00   PTT 77*  --   --      IMAGING:  Recent Results (from the past 24 hour(s))   POC US Guidance Needle Placement    Narrative    Bilateral erector spinae plane catheter placement.   XR Chest Port 1 View    Impression    RESIDENT PRELIMINARY INTERPRETATION  IMPRESSION:   1. Enteric tube sidehole projects over the mid to lower esophagus.  Suggest advancement.  2. No acute airspace opacities.   US Abd/Pelvis Duplex Complete Portable    Narrative    EXAMINATION: US abdomen Doppler complete  8/9/2021 7:42 PM      CLINICAL HISTORY: Status post TPIAT. Evaluate for portal vein patency  and flows    COMPARISON: Abdomen and pelvis CT 5/20/2021    TECHNIQUE: The abdomen was scanned in standard fashion with  specialized ultrasound transducer(s) using both gray-scale, color  Doppler, and spectral flow techniques.    Findings:  Liver: The liver demonstrates normal homogeneous echotexture. No  evidence of a focal hepatic mass.     Extrahepatic portal vein flow is antegrade at 17 cm/s.  Right portal vein flow is antegrade, measuring 13 cm/s.  Left portal vein flow is antegrade,  measuring 12 cm/s.    Flow in the hepatic artery is towards the liver and:  206 cm/s peak systolic  0.48 resistive index.     Right hepatic artery peak systolic velocity: 98 cm/s, hepatic artery:  76 cm/s    The middle right and left hepatic veins are patent with flow towards  the IVC. IVC is patent with velocity 63 cm/s. Splenic vein was not  visualized       Impression    Impression:   Patent Doppler evaluation. Splenic vein was not visualized.      I have personally reviewed the examination and initial interpretation  and I agree with the findings.    MIHIR PRUITT MD         SYSTEM ID:  [id number]

## 2021-08-09 NOTE — ANESTHESIA PROCEDURE NOTES
Arterial Line Procedure Note  Pre-Procedure   Staff -        Anesthesiologist:  Italo Holland MD       Performed By: anesthesiologist       Location: OR       Pre-Anesthestic Checklist: patient identified, IV checked, risks and benefits discussed, informed consent, monitors and equipment checked, pre-op evaluation and at physician/surgeon's request  Timeout:       Correct Patient: Yes        Correct Procedure: Yes        Correct Site: Yes        Correct Position: Yes   Procedure   Procedure: arterial line       Laterality: right       Insertion Site: radial.  Sterile Prep        Standard elements of sterile barrier followed       Skin prep: Chloraprep  Insertion/Injection        Technique: Seldinger Technique        Catheter Type/Size: 20 G, 1.75 in/4.5 cm quick cath (integral wire)  Narrative         Secured by: suture       Tegaderm dressing used.       Complications: None apparent,      Arterial waveform: Yes

## 2021-08-09 NOTE — ANESTHESIA PREPROCEDURE EVALUATION
Anesthesia Pre-Procedure Evaluation    Patient: Meme Robins   MRN: [id number] : 1970        Preoperative Diagnosis: Chronic pancreatitis (H) [K86.1]   Procedure : Procedure(s):  Laparoscopic hand-assisted total pancreatectomy with autologous islet cell transplantation, possible cholecystectomy, splenectomy, and incidental appendectomy     Past Medical History:   Diagnosis Date     Adrenal insufficiency (H)     iatrogenic, around , off treatment for several years as of  visit     Anemia      Depression      Esophageal reflux      Idiopathic chronic pancreatitis (H)      Pre-diabetes      Pulmonary embolism (H)       Past Surgical History:   Procedure Laterality Date     CELIAC PLEXUS BLOCK  2014    with alcohol x 1     ENDOMETRIAL ABLATION  2014     ENDOSCOPIC RETROGRADE CHOLANGIOPANCREATOGRAM      about 30, most with PD stentes     ENDOSCOPIC ULTRASOUND, ESOPHAGOSCOPY, GASTROSCOPY, DUODENOSCOPY (EGD), COMBINED       HC SHOULDER ARTHROSCOPY,SURGICAL BICEPS TENODESIS  2017     JEJUNOSTOMY CARE      multiple feeding tubes x 3, at least one was direct J     PANCREAS SURGERY  2014    John procedure revision     PANCREAS SURGERY  2011    John      Allergies   Allergen Reactions     Metoclopramide      Morphine      Penicillins      Prochlorperazine      Promethazine       Social History     Tobacco Use     Smoking status: Never Smoker     Smokeless tobacco: Never Used   Substance Use Topics     Alcohol use: Not Currently      Wt Readings from Last 1 Encounters:   21 55.2 kg (121 lb 11.1 oz)        Anesthesia Evaluation   Pt has had prior anesthetic. Type: General.    History of anesthetic complications (done well with scopolamine patch and zofran)  - PONV.      ROS/MED HX  ENT/Pulmonary:  - neg pulmonary ROS     Neurologic: Comment: c-spine DDD; numbness in fingers with neck flexion      (+) migraines (hasn't had any in years),     Cardiovascular:       METS/Exercise  Tolerance: 4 - Raking leaves, gardening    Hematologic:     (+) History of blood clots (PE 2012 2/2 horomone therapy for perimenopausal sxs; hematology recommends normal ppx perioperatively),     Musculoskeletal:       GI/Hepatic: Comment: Chronic pancreatitis s/p John procedure, multiple ERCPs    (+) GERD, Asymptomatic on medication,     Renal/Genitourinary:       Endo: Comment: Adrenal insufficiency -> off cortisol; recent cortisol testing normal per endocrinologist      Psychiatric/Substance Use:     (+) H/O chronic opiod use .     Infectious Disease:       Malignancy:       Other:            Physical Exam    Airway        Mallampati: II   TM distance: > 3 FB   Neck ROM: full   Mouth opening: > 3 cm    Respiratory Devices and Support         Dental  no notable dental history         Cardiovascular          Rhythm and rate: regular and normal     Pulmonary           breath sounds clear to auscultation           OUTSIDE LABS:  CBC:   Lab Results   Component Value Date    WBC 5.7 08/04/2021    WBC 4.2 05/19/2021    HGB 10.9 (L) 08/09/2021    HGB 11.9 08/04/2021    HCT 36.3 08/04/2021    HCT 36.4 05/19/2021     08/04/2021     05/19/2021     BMP:   Lab Results   Component Value Date     08/04/2021     05/19/2021    POTASSIUM 3.8 08/04/2021    POTASSIUM 3.6 05/19/2021    CHLORIDE 106 08/04/2021    CHLORIDE 105 05/19/2021    CO2 29 08/04/2021    CO2 29 05/19/2021    BUN 14 08/04/2021    BUN 8 05/19/2021    CR 0.71 08/04/2021    CR 0.67 05/19/2021     (H) 08/09/2021     (H) 08/04/2021     COAGS:   Lab Results   Component Value Date    INR 1.00 08/04/2021     POC:   Lab Results   Component Value Date    HCG Negative 08/04/2021     HEPATIC:   Lab Results   Component Value Date    ALBUMIN 3.6 08/04/2021    PROTTOTAL 7.3 08/04/2021    ALT 23 08/04/2021    AST 16 08/04/2021    ALKPHOS 88 08/04/2021    BILITOTAL 0.5 08/04/2021     OTHER:   Lab Results   Component Value Date    A1C 5.5  08/04/2021    VIKAS 9.3 08/04/2021    LIPASE 104 05/19/2021    AMYLASE 39 05/19/2021       Anesthesia Plan    ASA Status:  2   NPO Status:  NPO Appropriate    Anesthesia Type: General.     - Airway: ETT         Techniques and Equipment:     - Lines/Monitors: 2nd IV, Arterial Line, Central Line, Port in situ     Consents    Anesthesia Plan(s) and associated risks, benefits, and realistic alternatives discussed. Questions answered and patient/representative(s) expressed understanding.     - Discussed with:  Patient      - Extended Intubation/Ventilatory Support Discussed: Yes.      - Patient is DNR/DNI Status: No    Use of blood products discussed: Yes.     - Discussed with: Patient.     - Consented: consented to blood products            Reason for refusal: other.     Postoperative Care    Pain management: Multi-modal analgesia.        Comments:    Focus in neck/head in neutral position to avoid neuropathy/numbness in fingers. Discussed potentially waking with numbness in fingers and worsening numbness and potentially long-term/irreversible numbness due to prolonged positioning for surgery. Patient understands these risks specifically with her c-spine pathology and wishes to proceed with surgery.            Italo Holland MD

## 2021-08-09 NOTE — ANESTHESIA CARE TRANSFER NOTE
Patient: Meme Robins    Procedure(s):  Open total pancreatectomy with autologous islet cell transplantation, splenectomy, and incidental appendectomy    Diagnosis: Chronic pancreatitis (H) [K86.1]  Diagnosis Additional Information: No value filed.    Anesthesia Type:   General     Note:    Oropharynx: oropharynx clear of all foreign objects and spontaneously breathing  Level of Consciousness: drowsy  Oxygen Supplementation: face mask  Level of Supplemental Oxygen (L/min / FiO2): 6  Independent Airway: airway patency satisfactory and stable  Dentition: dentition unchanged  Vital Signs Stable: post-procedure vital signs reviewed and stable  Report to RN Given: handoff report given  Patient transferred to: PACU    Handoff Report: Identifed the Patient, Identified the Reponsible Provider, Reviewed the pertinent medical history, Discussed the surgical course, Reviewed Intra-OP anesthesia mangement and issues during anesthesia, Set expectations for post-procedure period and Allowed opportunity for questions and acknowledgement of understanding      Vitals:  Vitals Value Taken Time   BP     Temp     Pulse     Resp     SpO2         Electronically Signed By: ABBEY Catalan CRNA  August 9, 2021  6:45 PM

## 2021-08-09 NOTE — ANESTHESIA PROCEDURE NOTES
Central Line/PA Catheter Placement  Pre-Procedure   Staff -        Anesthesiologist:  Italo Holland MD       Performed By: anesthesiologist       Location: OR       Pre-Anesthestic Checklist: patient identified, IV checked, site marked, risks and benefits discussed, informed consent, monitors and equipment checked, pre-op evaluation and at physician/surgeon's request  Timeout:       Correct Patient: Yes        Correct Procedure: Yes        Correct Site: Yes        Correct Position: Yes        Correct Laterality: Yes     Procedure   Procedure: central line       Laterality: left       Insertion Site: internal jugular.       Patient Position: Trendelenburg  Sterile Prep        Skin prep: Chloraprep  Insertion/Injection        Technique: ultrasound guided and Seldinger Technique        1. Ultrasound was used to evaluate the access site.       2. Vein evaluated via ultrasound for patency/adequacy.       3. Using real-time ultrasound the needle/catheter was observed entering the artery/vein.       5. The visualized structures were anatomically normal.       6. There were no apparent abnormal pathologic findings.       Catheter size: 8.5 Fr quad lumen (4-lumen)  Narrative         Secured by: suture       Tegaderm and Biopatch dressing used.       Complications: None apparent,        blood aspirated from all lumens,        All lumens flushed: Yes       Verification method: Placement to be verified post-op

## 2021-08-09 NOTE — PROGRESS NOTES
Bilateral Erector spinae block performed without complications.  VSS.  Pt tolerated well.  Will continue to monitor.

## 2021-08-09 NOTE — ANESTHESIA PROCEDURE NOTES
Erector spinae Procedure Note  Pre-Procedure   Staff -        Anesthesiologist:  Long Faustin MD       Resident/Fellow: Bear Dodson MD       Performed By: resident       Location: pre-op       Procedure Start/Stop Times: 8/9/2021 7:40 AM and 8/9/2021 8:04 AM       Pre-Anesthestic Checklist: patient identified, IV checked, site marked, risks and benefits discussed, informed consent, monitors and equipment checked, pre-op evaluation, at physician/surgeon's request and post-op pain management  Timeout:       Correct Patient: Yes        Correct Procedure: Yes        Correct Site: Yes        Correct Position: Yes        Correct Laterality: Yes        Site Marked: Yes  Procedure Documentation  Procedure: Erector spinae       Laterality: bilateral       Patient Position: prone       Skin prep: Chloraprep       Local skin infiltrated with 5 mL of 1% lidocaine.        Insertion Site: T6-7.       Needle Type: Touhy needle       Needle Gauge: 17.        Needle Length (Inches): 4         Catheter threaded easily.         Ultrasound guided       1. Ultrasound was used to identify targeted nerve, plexus, vascular marker, or fascial plane and place a needle adjacent to it in real-time.       2. Ultrasound was used to visualize the spread of anesthetic in close proximity to the above referenced structure.       3. A permanent image is entered into the patient's record.       4. The visualized anatomic structures appeared normal.       5. There were no apparent abnormal pathologic findings.    Assessment/Narrative         The placement was negative for: blood aspirated, painful injection and site bleeding       Paresthesias: No.      Bolus given via. No blood aspirated via catheter.        Secured via Tegaderm and steristrips.        Complications: none

## 2021-08-09 NOTE — BRIEF OP NOTE
[facility]    Brief Operative Note    Pre-operative diagnosis: Chronic pancreatitis (H) [K86.1]  Post-operative diagnosis Same as pre-operative diagnosis    Procedure: Procedure(s):  Open total pancreatectomy with autologous islet cell transplantation, splenectomy, and incidental appendectomy  Surgeon: Surgeon(s) and Role:     * Elfego Shirley MD - Primary     * Marvin Benito MD - Fellow - Assisting  Anesthesia: Combined General with Block   Estimated blood loss: 300 ml  Drains: Davide drain  Specimens:   ID Type Source Tests Collected by Time Destination   1 : Appendix Tissue Appendix SURGICAL PATHOLOGY EXAM Elfego Shirley MD 8/9/2021 10:12 AM    2 : Spleen Tissue Spleen SURGICAL PATHOLOGY EXAM Elfego Shirley MD 8/9/2021 12:29 PM    3 : Jejunum Tissue Small Intestine, Jejunum SURGICAL PATHOLOGY EXAM Elfego Shirley MD 8/9/2021 12:30 PM    4 : Pancreatic tail Tissue Pancreas SURGICAL PATHOLOGY EXAM Elfego Shirley MD 8/9/2021 12:30 PM    5 : Pancreatic body Tissue Pancreas SURGICAL PATHOLOGY EXAM Elfego Shirley MD 8/9/2021 12:31 PM    6 : Pancreatic neck Tissue Pancreas SURGICAL PATHOLOGY EXAM Elfego Shirley MD 8/9/2021 12:31 PM    7 : Uncinate process Tissue Pancreas SURGICAL PATHOLOGY EXAM Elfego Shirley MD 8/9/2021 12:32 PM    8 : Duodenum Tissue Small Intestine, Duodenum SURGICAL PATHOLOGY EXAM Elfego Shirley MD 8/9/2021 12:36 PM    9 : liver biopsy Tissue Liver SURGICAL PATHOLOGY EXAM Elfego Shirley MD 8/9/2021  5:15 PM      Findings:   See op report.  Complications: Hypotensive during infusion of islets possible anaphylaxis reaction  Implants:   Implant Name Type Inv. Item Serial No.  Lot No. LRB No. Used Action   ENFit NeoConnect Polyurethane (PUR) Feeding Tube with ENFit connector (Radiopaque)    [employer] [id number] N/A 1 Implanted

## 2021-08-09 NOTE — ANESTHESIA PROCEDURE NOTES
Airway       Patient location during procedure: OR       Procedure Start/Stop Times: 8/9/2021 8:24 AM  Staff -        CRNA: Italo Kendall APRN CRNA       Performed By: CRNA  Consent for Airway        Urgency: elective  Indications and Patient Condition       Indications for airway management: yasmeen-procedural       Induction type:intravenous       Mask difficulty assessment: 2 - vent by mask + OA or adjuvant +/- NMBA    Final Airway Details       Final airway type: endotracheal airway       Successful airway: ETT - single  Endotracheal Airway Details        ETT size (mm): 7.0       Cuffed: yes       Successful intubation technique: direct laryngoscopy       DL Blade Type: Dickson 2       Grade View of Cords: 1       Adjucts: stylet       Position: Right       Measured from: gums/teeth       Secured at (cm): 21    Post intubation assessment        Placement verified by: capnometry, equal breath sounds and chest rise        Number of attempts at approach: 1       Secured with: cloth tape       Ease of procedure: easy       Dentition: Intact

## 2021-08-10 ENCOUNTER — APPOINTMENT (OUTPATIENT)
Dept: PHYSICAL THERAPY | Facility: CLINIC | Age: 51
End: 2021-08-10
Attending: STUDENT IN AN ORGANIZED HEALTH CARE EDUCATION/TRAINING PROGRAM
Payer: COMMERCIAL

## 2021-08-10 LAB
ALBUMIN SERPL-MCNC: 2.6 G/DL (ref 3.4–5)
ALP SERPL-CCNC: 66 U/L (ref 40–150)
ALT SERPL W P-5'-P-CCNC: 363 U/L (ref 0–50)
AMYLASE SERPL-CCNC: 166 U/L (ref 30–110)
ANION GAP SERPL CALCULATED.3IONS-SCNC: 9 MMOL/L (ref 3–14)
APTT PPP: 49 SECONDS (ref 22–38)
AST SERPL W P-5'-P-CCNC: 331 U/L (ref 0–45)
BASOPHILS # BLD AUTO: 0 10E3/UL (ref 0–0.2)
BASOPHILS NFR BLD AUTO: 0 %
BILIRUB DIRECT SERPL-MCNC: 0.2 MG/DL (ref 0–0.2)
BILIRUB SERPL-MCNC: 0.5 MG/DL (ref 0.2–1.3)
BUN SERPL-MCNC: 9 MG/DL (ref 7–30)
CALCIUM SERPL-MCNC: 7.8 MG/DL (ref 8.5–10.1)
CHLORIDE BLD-SCNC: 108 MMOL/L (ref 94–109)
CO2 SERPL-SCNC: 23 MMOL/L (ref 20–32)
CREAT SERPL-MCNC: 0.62 MG/DL (ref 0.52–1.04)
EOSINOPHIL # BLD AUTO: 0 10E3/UL (ref 0–0.7)
EOSINOPHIL NFR BLD AUTO: 0 %
ERYTHROCYTE [DISTWIDTH] IN BLOOD BY AUTOMATED COUNT: 13.2 % (ref 10–15)
GFR SERPL CREATININE-BSD FRML MDRD: >90 ML/MIN/1.73M2
GLUCOSE BLD-MCNC: 168 MG/DL (ref 70–99)
GLUCOSE BLDC GLUCOMTR-MCNC: 102 MG/DL (ref 70–99)
GLUCOSE BLDC GLUCOMTR-MCNC: 103 MG/DL (ref 70–99)
GLUCOSE BLDC GLUCOMTR-MCNC: 106 MG/DL (ref 70–99)
GLUCOSE BLDC GLUCOMTR-MCNC: 112 MG/DL (ref 70–99)
GLUCOSE BLDC GLUCOMTR-MCNC: 116 MG/DL (ref 70–99)
GLUCOSE BLDC GLUCOMTR-MCNC: 123 MG/DL (ref 70–99)
GLUCOSE BLDC GLUCOMTR-MCNC: 153 MG/DL (ref 70–99)
GLUCOSE BLDC GLUCOMTR-MCNC: 154 MG/DL (ref 70–99)
GLUCOSE BLDC GLUCOMTR-MCNC: 157 MG/DL (ref 70–99)
GLUCOSE BLDC GLUCOMTR-MCNC: 169 MG/DL (ref 70–99)
GLUCOSE BLDC GLUCOMTR-MCNC: 70 MG/DL (ref 70–99)
GLUCOSE BLDC GLUCOMTR-MCNC: 73 MG/DL (ref 70–99)
GLUCOSE BLDC GLUCOMTR-MCNC: 73 MG/DL (ref 70–99)
GLUCOSE BLDC GLUCOMTR-MCNC: 78 MG/DL (ref 70–99)
GLUCOSE BLDC GLUCOMTR-MCNC: 80 MG/DL (ref 70–99)
GLUCOSE BLDC GLUCOMTR-MCNC: 83 MG/DL (ref 70–99)
GLUCOSE BLDC GLUCOMTR-MCNC: 92 MG/DL (ref 70–99)
GLUCOSE BLDC GLUCOMTR-MCNC: 93 MG/DL (ref 70–99)
GLUCOSE BLDC GLUCOMTR-MCNC: 98 MG/DL (ref 70–99)
GRAM STAIN RESULT: NORMAL
HCT VFR BLD AUTO: 24.5 % (ref 35–47)
HCT VFR BLD AUTO: 25.5 % (ref 35–47)
HGB BLD-MCNC: 7.8 G/DL (ref 11.7–15.7)
HGB BLD-MCNC: 8 G/DL (ref 11.7–15.7)
HGB BLD-MCNC: 8 G/DL (ref 11.7–15.7)
HGB BLD-MCNC: 8.5 G/DL (ref 11.7–15.7)
HGB BLD-MCNC: 9.5 G/DL (ref 11.7–15.7)
IMM GRANULOCYTES # BLD: 0 10E3/UL
IMM GRANULOCYTES NFR BLD: 0 %
LIPASE SERPL-CCNC: 1875 U/L (ref 73–393)
LYMPHOCYTES # BLD AUTO: 0.5 10E3/UL (ref 0.8–5.3)
LYMPHOCYTES NFR BLD AUTO: 5 %
MAGNESIUM SERPL-MCNC: 1.3 MG/DL (ref 1.6–2.3)
MAGNESIUM SERPL-MCNC: 1.4 MG/DL (ref 1.6–2.3)
MCH RBC QN AUTO: 28.6 PG (ref 26.5–33)
MCHC RBC AUTO-ENTMCNC: 33.3 G/DL (ref 31.5–36.5)
MCV RBC AUTO: 86 FL (ref 78–100)
MONOCYTES # BLD AUTO: 0.7 10E3/UL (ref 0–1.3)
MONOCYTES NFR BLD AUTO: 6 %
NEUTROPHILS # BLD AUTO: 9.9 10E3/UL (ref 1.6–8.3)
NEUTROPHILS NFR BLD AUTO: 89 %
NRBC # BLD AUTO: 0 10E3/UL
NRBC BLD AUTO-RTO: 0 /100
PHOSPHATE SERPL-MCNC: 3.2 MG/DL (ref 2.5–4.5)
PHOSPHATE SERPL-MCNC: 3.8 MG/DL (ref 2.5–4.5)
PLATELET # BLD AUTO: 149 10E3/UL (ref 150–450)
POTASSIUM BLD-SCNC: 3.8 MMOL/L (ref 3.4–5.3)
PROT SERPL-MCNC: 4.5 G/DL (ref 6.8–8.8)
RBC # BLD AUTO: 2.97 10E6/UL (ref 3.8–5.2)
SODIUM SERPL-SCNC: 140 MMOL/L (ref 133–144)
WBC # BLD AUTO: 11.1 10E3/UL (ref 4–11)

## 2021-08-10 PROCEDURE — 250N000011 HC RX IP 250 OP 636: Performed by: TRANSPLANT SURGERY

## 2021-08-10 PROCEDURE — 250N000011 HC RX IP 250 OP 636: Performed by: STUDENT IN AN ORGANIZED HEALTH CARE EDUCATION/TRAINING PROGRAM

## 2021-08-10 PROCEDURE — 85025 COMPLETE CBC W/AUTO DIFF WBC: CPT | Performed by: STUDENT IN AN ORGANIZED HEALTH CARE EDUCATION/TRAINING PROGRAM

## 2021-08-10 PROCEDURE — 250N000013 HC RX MED GY IP 250 OP 250 PS 637: Performed by: TRANSPLANT SURGERY

## 2021-08-10 PROCEDURE — 80048 BASIC METABOLIC PNL TOTAL CA: CPT | Performed by: STUDENT IN AN ORGANIZED HEALTH CARE EDUCATION/TRAINING PROGRAM

## 2021-08-10 PROCEDURE — 250N000011 HC RX IP 250 OP 636: Performed by: NURSE PRACTITIONER

## 2021-08-10 PROCEDURE — 250N000013 HC RX MED GY IP 250 OP 250 PS 637: Performed by: STUDENT IN AN ORGANIZED HEALTH CARE EDUCATION/TRAINING PROGRAM

## 2021-08-10 PROCEDURE — 250N000009 HC RX 250: Performed by: STUDENT IN AN ORGANIZED HEALTH CARE EDUCATION/TRAINING PROGRAM

## 2021-08-10 PROCEDURE — 82248 BILIRUBIN DIRECT: CPT | Performed by: STUDENT IN AN ORGANIZED HEALTH CARE EDUCATION/TRAINING PROGRAM

## 2021-08-10 PROCEDURE — 82150 ASSAY OF AMYLASE: CPT | Performed by: STUDENT IN AN ORGANIZED HEALTH CARE EDUCATION/TRAINING PROGRAM

## 2021-08-10 PROCEDURE — 36592 COLLECT BLOOD FROM PICC: CPT | Performed by: STUDENT IN AN ORGANIZED HEALTH CARE EDUCATION/TRAINING PROGRAM

## 2021-08-10 PROCEDURE — 85018 HEMOGLOBIN: CPT

## 2021-08-10 PROCEDURE — 97162 PT EVAL MOD COMPLEX 30 MIN: CPT | Mod: GP | Performed by: PHYSICAL THERAPIST

## 2021-08-10 PROCEDURE — 250N000011 HC RX IP 250 OP 636: Performed by: PHYSICIAN ASSISTANT

## 2021-08-10 PROCEDURE — 120N000011 HC R&B TRANSPLANT UMMC

## 2021-08-10 PROCEDURE — 250N000013 HC RX MED GY IP 250 OP 250 PS 637: Performed by: PHYSICIAN ASSISTANT

## 2021-08-10 PROCEDURE — 97530 THERAPEUTIC ACTIVITIES: CPT | Mod: GP | Performed by: PHYSICAL THERAPIST

## 2021-08-10 PROCEDURE — 83735 ASSAY OF MAGNESIUM: CPT | Performed by: TRANSPLANT SURGERY

## 2021-08-10 PROCEDURE — 84100 ASSAY OF PHOSPHORUS: CPT | Performed by: TRANSPLANT SURGERY

## 2021-08-10 PROCEDURE — 85730 THROMBOPLASTIN TIME PARTIAL: CPT | Performed by: STUDENT IN AN ORGANIZED HEALTH CARE EDUCATION/TRAINING PROGRAM

## 2021-08-10 PROCEDURE — 85014 HEMATOCRIT: CPT | Performed by: STUDENT IN AN ORGANIZED HEALTH CARE EDUCATION/TRAINING PROGRAM

## 2021-08-10 PROCEDURE — 99024 POSTOP FOLLOW-UP VISIT: CPT | Performed by: TRANSPLANT SURGERY

## 2021-08-10 PROCEDURE — 250N000009 HC RX 250: Performed by: TRANSPLANT SURGERY

## 2021-08-10 PROCEDURE — 250N000011 HC RX IP 250 OP 636

## 2021-08-10 PROCEDURE — 99231 SBSQ HOSP IP/OBS SF/LOW 25: CPT | Mod: 24 | Performed by: SURGERY

## 2021-08-10 PROCEDURE — 258N000001 HC RX 258: Performed by: PHYSICIAN ASSISTANT

## 2021-08-10 PROCEDURE — 83690 ASSAY OF LIPASE: CPT | Performed by: STUDENT IN AN ORGANIZED HEALTH CARE EDUCATION/TRAINING PROGRAM

## 2021-08-10 PROCEDURE — 85018 HEMOGLOBIN: CPT | Performed by: DIETITIAN, REGISTERED

## 2021-08-10 RX ORDER — ACETAMINOPHEN 325 MG/10.15ML
650 LIQUID ORAL EVERY 6 HOURS
Status: DISCONTINUED | OUTPATIENT
Start: 2021-08-10 | End: 2021-08-10

## 2021-08-10 RX ORDER — SERTRALINE HYDROCHLORIDE 20 MG/ML
150 SOLUTION ORAL AT BEDTIME
Status: DISCONTINUED | OUTPATIENT
Start: 2021-08-10 | End: 2021-08-20 | Stop reason: ALTCHOICE

## 2021-08-10 RX ORDER — BUPIVACAINE HYDROCHLORIDE 2.5 MG/ML
10 INJECTION, SOLUTION EPIDURAL; INFILTRATION; INTRACAUDAL ONCE
Status: COMPLETED | OUTPATIENT
Start: 2021-08-10 | End: 2021-08-10

## 2021-08-10 RX ORDER — KETAMINE HYDROCHLORIDE 10 MG/ML
5-10 INJECTION, SOLUTION INTRAMUSCULAR; INTRAVENOUS EVERY 6 HOURS
Status: DISCONTINUED | OUTPATIENT
Start: 2021-08-10 | End: 2021-08-13

## 2021-08-10 RX ORDER — DIPHENHYDRAMINE HYDROCHLORIDE 50 MG/ML
25 INJECTION INTRAMUSCULAR; INTRAVENOUS EVERY 6 HOURS PRN
Status: DISCONTINUED | OUTPATIENT
Start: 2021-08-10 | End: 2021-08-18

## 2021-08-10 RX ORDER — MAGNESIUM SULFATE HEPTAHYDRATE 40 MG/ML
2 INJECTION, SOLUTION INTRAVENOUS ONCE
Status: COMPLETED | OUTPATIENT
Start: 2021-08-10 | End: 2021-08-10

## 2021-08-10 RX ORDER — HEPARIN SODIUM 10000 [USP'U]/100ML
400 INJECTION, SOLUTION INTRAVENOUS CONTINUOUS
Status: DISCONTINUED | OUTPATIENT
Start: 2021-08-10 | End: 2021-08-13

## 2021-08-10 RX ORDER — DEXTROSE MONOHYDRATE 100 MG/ML
INJECTION, SOLUTION INTRAVENOUS CONTINUOUS PRN
Status: DISCONTINUED | OUTPATIENT
Start: 2021-08-10 | End: 2021-08-20 | Stop reason: HOSPADM

## 2021-08-10 RX ADMIN — METHOCARBAMOL 500 MG: 100 INJECTION INTRAMUSCULAR; INTRAVENOUS at 20:54

## 2021-08-10 RX ADMIN — PANTOPRAZOLE SODIUM 40 MG: 40 TABLET, DELAYED RELEASE ORAL at 08:00

## 2021-08-10 RX ADMIN — ONDANSETRON 4 MG: 2 INJECTION INTRAMUSCULAR; INTRAVENOUS at 15:20

## 2021-08-10 RX ADMIN — MAGNESIUM SULFATE HEPTAHYDRATE 2 G: 40 INJECTION, SOLUTION INTRAVENOUS at 07:57

## 2021-08-10 RX ADMIN — Medication 10 MG: at 13:46

## 2021-08-10 RX ADMIN — LORAZEPAM 0.5 MG: 2 INJECTION INTRAMUSCULAR; INTRAVENOUS at 20:52

## 2021-08-10 RX ADMIN — ONDANSETRON 4 MG: 2 INJECTION INTRAMUSCULAR; INTRAVENOUS at 07:57

## 2021-08-10 RX ADMIN — ACETAMINOPHEN 650 MG: 325 SOLUTION ORAL at 10:46

## 2021-08-10 RX ADMIN — LORAZEPAM 0.5 MG: 2 INJECTION INTRAMUSCULAR; INTRAVENOUS at 13:47

## 2021-08-10 RX ADMIN — VANCOMYCIN HYDROCHLORIDE 1000 MG: 1 INJECTION, SOLUTION INTRAVENOUS at 16:55

## 2021-08-10 RX ADMIN — DIPHENHYDRAMINE HYDROCHLORIDE 25 MG: 50 INJECTION, SOLUTION INTRAMUSCULAR; INTRAVENOUS at 21:00

## 2021-08-10 RX ADMIN — SERTRALINE HYDROCHLORIDE 150 MG: 20 SOLUTION ORAL at 22:12

## 2021-08-10 RX ADMIN — GABAPENTIN 300 MG: 250 SUSPENSION ORAL at 21:00

## 2021-08-10 RX ADMIN — GABAPENTIN 300 MG: 250 SUSPENSION ORAL at 07:59

## 2021-08-10 RX ADMIN — BUPIVACAINE HYDROCHLORIDE 25 MG: 2.5 INJECTION, SOLUTION EPIDURAL; INFILTRATION; INTRACAUDAL; PERINEURAL at 09:02

## 2021-08-10 RX ADMIN — SODIUM CHLORIDE, SODIUM LACTATE, POTASSIUM CHLORIDE, CALCIUM CHLORIDE AND DEXTROSE MONOHYDRATE: 5; 600; 310; 30; 20 INJECTION, SOLUTION INTRAVENOUS at 19:08

## 2021-08-10 RX ADMIN — ACETAMINOPHEN 650 MG: 325 SOLUTION ORAL at 16:55

## 2021-08-10 RX ADMIN — VANCOMYCIN HYDROCHLORIDE 1000 MG: 1 INJECTION, SOLUTION INTRAVENOUS at 04:21

## 2021-08-10 RX ADMIN — KETAMINE HYDROCHLORIDE 5 MG: 10 INJECTION INTRAMUSCULAR; INTRAVENOUS at 21:55

## 2021-08-10 RX ADMIN — DEXTROSE MONOHYDRATE: 100 INJECTION, SOLUTION INTRAVENOUS at 21:33

## 2021-08-10 RX ADMIN — METHOCARBAMOL 500 MG: 100 INJECTION INTRAMUSCULAR; INTRAVENOUS at 03:14

## 2021-08-10 RX ADMIN — TRAZODONE HYDROCHLORIDE 100 MG: 100 TABLET ORAL at 22:11

## 2021-08-10 RX ADMIN — Medication: at 23:11

## 2021-08-10 RX ADMIN — DIPHENHYDRAMINE HYDROCHLORIDE 25 MG: 50 INJECTION, SOLUTION INTRAMUSCULAR; INTRAVENOUS at 13:31

## 2021-08-10 RX ADMIN — ERTAPENEM SODIUM 1 G: 1 INJECTION, POWDER, LYOPHILIZED, FOR SOLUTION INTRAMUSCULAR; INTRAVENOUS at 22:12

## 2021-08-10 RX ADMIN — Medication 40 MG: at 20:52

## 2021-08-10 RX ADMIN — DEXTROSE MONOHYDRATE: 100 INJECTION, SOLUTION INTRAVENOUS at 21:10

## 2021-08-10 RX ADMIN — Medication 2.5 ML: at 08:00

## 2021-08-10 RX ADMIN — Medication 5 MG: at 07:58

## 2021-08-10 RX ADMIN — HUMAN INSULIN 3 UNITS/HR: 100 INJECTION, SOLUTION SUBCUTANEOUS at 12:01

## 2021-08-10 RX ADMIN — METHOCARBAMOL 500 MG: 100 INJECTION INTRAMUSCULAR; INTRAVENOUS at 15:11

## 2021-08-10 RX ADMIN — FLUCONAZOLE IN SODIUM CHLORIDE 200 MG: 2 INJECTION, SOLUTION INTRAVENOUS at 21:06

## 2021-08-10 RX ADMIN — ACETAMINOPHEN 650 MG: 325 SOLUTION ORAL at 04:21

## 2021-08-10 RX ADMIN — LORAZEPAM 0.5 MG: 2 INJECTION INTRAMUSCULAR; INTRAVENOUS at 07:58

## 2021-08-10 RX ADMIN — METHOCARBAMOL 500 MG: 100 INJECTION INTRAMUSCULAR; INTRAVENOUS at 08:00

## 2021-08-10 RX ADMIN — ACETAMINOPHEN 650 MG: 160 LIQUID ORAL at 23:03

## 2021-08-10 RX ADMIN — Medication 15 ML: at 10:46

## 2021-08-10 ASSESSMENT — MIFFLIN-ST. JEOR: SCORE: 1123.45

## 2021-08-10 ASSESSMENT — ACTIVITIES OF DAILY LIVING (ADL)
ADLS_ACUITY_SCORE: 17

## 2021-08-10 NOTE — PHARMACY-CONSULT NOTE
Pharmacy Tube Feeding Consult    Medication reviewed for administration by feeding tube and for potential food/drug interactions.    Recommendation: No changes are needed at this time.     Pharmacy will continue to follow as new medications are ordered.    Aye Whitehead, PharmD Resident

## 2021-08-10 NOTE — PLAN OF CARE
Shift Assessment    Neuro: A&O x4. Pain with activity. Patient sleeping on and off throughout shift.  CV: -110s. -140s. Afebrile.   Pulm: Room air. Clear lungs.   GI: Tube feeding started at 10 ml/hr. Ice chips intermittently. NG tube removed.   : Urinary catheter. Adequate output. Per team, leaving shook in for another day.   IV/Gtt: Precedex discontinued. PCA pump remains. Heparin drip. IVFs. Insulin drip.    Art line removed, pressure dressing applied.  Transport orders received, awaiting room.     Will continue to monitor for safety and comfort.    Rosemarie Hayward RN

## 2021-08-10 NOTE — PHARMACY-VANCOMYCIN DOSING SERVICE
Pharmacy Vancomycin Initial Note  Date of Service 2021  Patient's  1970  51 year old, female    Indication: Surgical Prophylaxis    Current estimated CrCl = Estimated Creatinine Clearance: 102.4 mL/min (based on SCr of 0.56 mg/dL).    Creatinine for last 3 days  2021:  7:03 AM Creatinine 0.70 mg/dL;  6:42 PM Creatinine 0.56 mg/dL    Recent Vancomycin Level(s) for last 3 days  No results found for requested labs within last 72 hours.      Vancomycin IV Administrations (past 72 hours)                   vancomycin (VANCOCIN) 1000 mg in dextrose 5% 200 mL PREMIX (mg) 1,000 mg Given 21 1710                Nephrotoxins and other renal medications (From now, onward)    Start     Dose/Rate Route Frequency Ordered Stop    08/10/21 0500  vancomycin (VANCOCIN) 1000 mg in dextrose 5% 200 mL PREMIX      1,000 mg  200 mL/hr over 1 Hours Intravenous EVERY 12 HOURS 21 2206            Contrast Orders - past 72 hours (72h ago, onward)    None          Regimen: 1000 mg IV every 12 hours.  Exposure target: AUC24 (range)400-600 mg/L.hr   AUC24,ss: 497 mg/L.hr  Probability of AUC24 > 400: 72 %  Ctrough,ss: 14.2 mg/L  Probability of Ctrough,ss > 20: 24 %  Probability of nephrotoxicity (Lodise HOLLIS ): 9 %        Plan:  1. Start vancomycin  1000 mg IV q12h.   2. Vancomycin monitoring method: AUC  3. Vancomycin therapeutic monitoring goal: 400-600 mg*h/L  4. Pharmacy will check vancomycin levels as appropriate in 1-3 Days.    5. Serum creatinine levels will be ordered daily for the first week of therapy and at least twice weekly for subsequent weeks.      Rama Muse, [facility]

## 2021-08-10 NOTE — PROGRESS NOTES
REGIONAL ANESTHESIA PAIN SERVICE (RAPS) EVALUATION:    Summary: Bilateral ES Bolus   - Time: 19:30  - Subjective: Patient reports 8/10 pain intensity with current therapy of continuous infusion at 7mL/hr bilateral ES catheters (0.2% ropivacaine) now approximately 90 minutes post surgery  - Objective:  Current vitals: /90, off of pressors  General: alert, and moderate distress  Catheter system integrity and skin: Unable to assess this short post-op.     - Assessment/Plan     INTERVENTION: Bilateral Bolus administered    MEDICATION: PF bupivacaine 0.125%  total bolus 20mL, 10 mL via each catheter and given slowly over 20 minutes at 19:30, as patient was hypotensive intraop requiring pressors (currently on no pressors and hypertensive); administered without complication with negative aspirate before and between each mL.  No symptoms of local anesthetic systemic toxicity (LAST). Post bolus vitals stable for next 5min. Bedside RN aware of need to continue BP, P and MAP monitoring Q 10 min for an additional 20 min. Contact RAPS if any of the following: patient experiencing any untoward effects, SBP< 90, P < 50 or > 120, MAP < 60. Patient can be evaluated to receive local anesthetic bolus Q 12 hr PRN pain not controlled with continuous infusion.  Bedside nurse must page RAPS to request bolus    RAPS Contact Info (24 hour job code pager is the last 4 digits) For in-house use only:  Attune phone: [place] [phone], [place] [phone], Peds [phone], then enter call-back number.    Text: Use [employer] on the Intranet <Paging/Directory> tab and enter Jobcode ID.   If no call back at any time, contact the hospital  and ask for RAPS attending or backup     Stu Prescott MD   Anesthesia resident CA-2  08/09/2021   Asc [phone]

## 2021-08-10 NOTE — PROGRESS NOTES
"Pancreatitis Service - Daily Progress Note  08/10/2021    Assessment & Plan: Meme Robins is a 51-year-old woman with chronic pancreatitis, who is s/p 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 Shirley. 2300 IE/kg. Intraop hypotension during islet infusion, thought to be 2/2 anaphylaxis and treated with benadryl, steroids and resolved.     Cardiorespiratory: Stable, blood pressures stable.  GI/Nutrition: Her g tube is currently draining and her j tube is running tube is running tube feeds at 10 cc/h plus meds. Will advance tube feeds by 10 mL/hr daily  Fluid/Electrolytes: D5LR @ 100 mL/hr, replaced magnesium  : Miller to remain due to block  Post-pancreatectomy diabetes: Continue insulin drip at this time, will plan to transition to subcutaneous insulin after tube feeds at goal, appreciate Endocrine consult.  Infection: Growing gram negative bacilli, gram positive cocci in pairs and chain. Continue vanc/ertapenem/ and fluconazole  Prophylaxis: PPI, Anticoagulation: Heparin 400 U/hr  Pain control: Precedex drip weaned off late morning. Dilaudid PCA, On Q, ketamine, ativan, neurontin  Activity: Up with assist  Anticipated LOS/Discharge: 7-10 days    Medical Decision Making: Medium  Subsequent visit 23400 (moderate level decision making)    JOSE ALBERTO/Fellow/Resident Provider: Marycruz Guerra PA-C     Faculty: Clyde Shirley MD  __________________________________________________________________  Transplant History: Admitted 8/9/2021 for TP-AIT  8/9/2021 (Islet), Postoperative day: 1     Interval History: History is obtained from the patient  Overnight events: Pain control adequate, but still painful to move. Feels a little \"loopy\" with the pain meds    ROS:   A 10-point review of systems was negative except as noted above.    Curent Meds:    acetaminophen  650 mg Per J Tube Q6H     ertapenem " "(INVanz) IV  1 g Intravenous Q24H     fluconazole  200 mg Intravenous Q24H     gabapentin  300 mg Oral or J tube BID     ketamine  5-10 mg Intravenous Q6H     LORazepam  0.5 mg Intravenous Q6H     methocarbamol  500 mg Intravenous Q6H     multivitamins w/minerals  15 mL Per Feeding Tube Daily     pantoprazole  40 mg Oral or J tube BID     sertraline  150 mg Per J Tube At Bedtime     sodium chloride (PF)  3 mL Intracatheter Q8H     traZODone  100 mg Per J Tube At Bedtime     vancomycin  1,000 mg Intravenous Q12H       Physical Exam:     Admit Weight: 55.2 kg (121 lb 11.1 oz)    Current Vitals:   BP (!) 146/89   Pulse 116   Temp 98.6  F (37  C) (Oral)   Resp 14   Ht 1.549 m (5' 1\")   Wt 57.6 kg (127 lb)   SpO2 94%   BMI 24.00 kg/m      CVP (mmHg): 16 mmHg    Vital sign ranges:    Temp:  [96.8  F (36  C)-100.3  F (37.9  C)] 98.6  F (37  C)  Pulse:  [] 116  Resp:  [12-27] 14  BP: (132-146)/(80-89) 146/89  MAP:  [65 mmHg-119 mmHg] 119 mmHg  Arterial Line BP: ()/(47-86) 164/85  SpO2:  [92 %-100 %] 94 %  Patient Vitals for the past 24 hrs:   BP Temp Temp src Pulse Resp SpO2 Weight   08/10/21 1500 -- -- -- 116 14 94 % --   08/10/21 1400 -- -- -- 112 27 100 % --   08/10/21 1300 -- -- -- 105 21 94 % --   08/10/21 1200 -- 98.8  F (37.1  C) Oral 105 12 -- --   08/10/21 1100 -- -- -- 96 15 95 % --   08/10/21 1000 -- -- -- 96 15 95 % --   08/10/21 0900 -- -- -- 92 15 96 % --   08/10/21 0800 -- 98.6  F (37  C) Oral 96 15 96 % --   08/10/21 0700 -- -- -- 94 13 95 % --   08/10/21 0600 -- -- -- -- -- -- 57.6 kg (127 lb)   08/10/21 0500 -- -- -- 98 20 96 % --   08/10/21 0400 -- 98.9  F (37.2  C) Oral 96 19 94 % --   08/10/21 0300 -- -- -- 102 19 95 % --   08/10/21 0200 -- -- -- 101 22 94 % --   08/10/21 0100 -- -- -- 106 20 93 % --   08/10/21 0000 -- 100.3  F (37.9  C) Oral 105 18 94 % --   08/09/21 2300 -- -- -- 101 -- 94 % --   08/09/21 2200 -- -- -- 101 -- 94 % --   08/09/21 2100 -- -- -- 103 -- 92 % -- "   08/09/21 2030 -- 99.6  F (37.6  C) Axillary -- 18 -- --   08/09/21 2000 (!) 146/89 -- -- 101 -- 100 % --   08/09/21 1832 132/80 96.8  F (36  C) Axillary 89 14 100 % --     General Appearance: in no apparent distress.   Skin: normal, warm  Heart: regular rate and rhythm  Lungs: Nonlabored resps on RA  Abdomen: The abdomen is flat, and non-tender to light palpation. The wound is dermabonded, healing well. Tube/Drain sites are: G tube to gravity drainage, brown output. J tube with tube feeds infusing. ARTEMIO: serosanguinous  : shook is present, clear yellow urine  Extremities: edema: absent.     Data:   CMP  Recent Labs   Lab 08/10/21  1506 08/10/21  1414 08/10/21  0414 08/09/21  1842 08/09/21  1744 08/09/21  1715 08/09/21  1715   NA  --   --  140 141 139  --  140   POTASSIUM  --   --  3.8 3.7 3.4*  --  3.5   CHLORIDE  --   --  108 111*  --   --   --    CO2  --   --  23 26  --   --   --    GLC 73 83 168* 96 135*   < > 106*   BUN  --   --  9 6*  --   --   --    CR  --   --  0.62 0.56  --   --   --    GFRESTIMATED  --   --  >90 >90  --   --   --    VIKAS  --   --  7.8* 7.7*  --   --   --    ICAW  --   --   --   --  4.8  --  4.8   MAG  --   --  1.4* 1.3*  --   --   --    PHOS  --   --  3.8 3.2  --   --   --    AMYLASE  --   --  166*  --   --   --   --    LIPASE  --   --  1,875*  --   --   --   --    ALBUMIN  --   --  2.6* 3.0*  --   --   --    BILITOTAL  --   --  0.5 0.8  --   --   --    ALKPHOS  --   --  66 75  --   --   --    AST  --   --  331* 974*  --   --   --    ALT  --   --  363* 521*  --   --   --     < > = values in this interval not displayed.     CBC  Recent Labs   Lab 08/10/21  1258 08/10/21  0823 08/10/21  0012 08/09/21  1842 08/09/21  0705 08/04/21  0959   HGB 7.8* 8.0* 8.5* 10.2*   < > 11.9   WBC  --   --  11.1* 8.2  --  5.7   PLT  --   --  149* 208  --  339   A1C  --   --   --   --   --  5.5    < > = values in this interval not displayed.     Coags  Recent Labs   Lab 08/10/21  0414 08/09/21 1842  08/09/21  0703   INR  --  1.45* 0.99   PTT 49* 77*  --       Urinalysis  Recent Labs   Lab Test 08/04/21  0837   COLOR Yellow   APPEARANCE Slightly Cloudy*   URINEGLC Negative   URINEBILI Negative   URINEKETONE Negative   SG 1.018   UBLD Negative   URINEPH 5.0   PROTEIN Negative   NITRITE Negative   LEUKEST Trace*   RBCU 1   WBCU 12*

## 2021-08-10 NOTE — PLAN OF CARE
Admitted/transferred from: PACU  2 RN skin assessment: completed by Brianna De Leon  Result of skin assessment and interventions/actions: scattered bruising, surgical drains  Height, weight, drug calc weight: Done  Patient belongings (see Flowsheet)  MDRO education added to care plan: N/A  ?

## 2021-08-10 NOTE — PROGRESS NOTES
"[facility] Nerve Block Daily Progress Note  08/10/21    24 Hour Events  - Overnight no acute events  - NG/OG tube, Miller catheter in place  - has not been OOB yet.    - Diet: NPO    Subjective  - Patient reports generalized abdominal pain, describes as tolerable but hurts to take a deep breath  - Tolerating nerve block catheter infusion, and good relief with hand delivered local anesthetic bolus last evening.  Patient requests a bolus this morning.  History of chronic abdominal pain managed outpatient on oxymorphone 10 mg PO BID and Dilaudid 4 mg PO PRN, tolerating IV and enteral analgesia as ordered.    - Denies LE weakness, paresthesias, circumoral numbness, metallic taste, tinnitus    - Clinician hand-administered local anesthetic bolus:  0905   VSS.  PF bupivacaine 0.25%, total amount given 10 mL, 5 mL via each nerve block catheter, administered incrementally; negative aspirate before and during bolus.  No symptoms of local anesthetic systemic toxicity (LAST). Remained with and assessed patient for 10 min post-injection. BP, P and MAP stable. Bedside RN aware of need to continue BP, P and MAP monitoring Q 10 min for an additional 20 min. Contact RAPS (jobcode ID: [id number]) if experiencing any untoward effects or MAP less than 60    Objective  Exam  Vitals:   BP (!) 146/89   Pulse 98   Temp 98.9  F (37.2  C) (Oral)   Resp 20   Ht 1.549 m (5' 1\")   Wt 57.6 kg (127 lb)   SpO2 96%   BMI 24.00 kg/m        General: Alert, oriented, NAD  MSK/Neuro: Strength BLE 5/5 and overall symmetric.    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 Prior to surgery RAPS placed bilateral T6-7 erector spinae (ES) catheters for analgesia. " Pain well controlled with multimodal analgesia. Motor function intact and no evidence of adverse side effects related to local anesthetic continuous infusion.     Plan  - Catheter Day #1 bilateral T6-7 ES catheters/infusion:    Continue ropivacaine 0.2% via OnQ infusion at 7 mL/hr each catheter, total infusion 14 mL/hr   o Plan to maintain catheters max of 7 days  o On-Q device is due to be changed tomorrow    Patient can be evaluated to receive local anesthetic bolus Q 12 hr PRN pain, bedside RN should page RAPS to request bolus    - Antithrombotics/Thrombolytics ordered:     Okay to continue Heparin IV infusion at 400 units/hr as ordered by primary service  o Please contact RAPS jobcode pager [phone] before ordering or making any medication changes    RAPS will continue to follow and adjust as needed  Discussed with attending anesthesiologist    Analgesic Medications ordered:  Medications related to Pain Management (From now, onward)    Start     Dose/Rate Route Frequency Ordered Stop    08/10/21 0800  gabapentin (NEURONTIN) solution 300 mg      300 mg Oral or J tube 2 TIMES DAILY 08/09/21 2022 08/09/21 2200  LORazepam (ATIVAN) injection 0.5 mg      0.5 mg Intravenous EVERY 6 HOURS 08/09/21 1824 08/09/21 2200  acetaminophen (TYLENOL) solution 650 mg      650 mg Oral EVERY 6 HOURS 08/09/21 1824 08/09/21 2030  HYDROmorphone (DILAUDID) PCA 0.2 mg/mL OPIOID TOLERANT       Intravenous CONTINUOUS 08/09/21 2022 08/09/21 2022  lidocaine 1 % 0.1-1 mL      0.1-1 mL Other EVERY 1 HOUR PRN 08/09/21 2022 08/09/21 2022  lidocaine (LMX4) cream       Topical EVERY 1 HOUR PRN 08/09/21 2022 08/09/21 2022  bisacodyl (DULCOLAX) Suppository 10 mg      10 mg Rectal DAILY PRN 08/09/21 2022 08/09/21 2000  dexmedetomidine (PRECEDEX) 400 mcg in 0.9% sodium chloride 100 mL      0.2-0.7 mcg/kg/hr × 55.2 kg  2.8-9.7 mL/hr  Intravenous CONTINUOUS 08/09/21 1932 08/09/21 1900  ketamine (KETALAR) injection  5-10 mg      5-10 mg  over 2-5 Minutes Intravenous EVERY 6 HOURS 08/09/21 1824      08/09/21 1900  methocarbamol (ROBAXIN) injection 500 mg      500 mg Intravenous EVERY 6 HOURS 08/09/21 1824 08/12/21 2059 08/09/21 0830  ropivacaine 0.2% (NAROPIN) 750 mL in ON-Q C-Bloc select flow ([id number] holds 600-750 mL) dual cath disposable pump      14 mL/hr  Irrigation CONTINUOUS 08/09/21 0811                Labs/Diagnostics  Heme/INR:  Recent Labs   Lab Test 08/10/21  0823 08/10/21  0414 08/10/21  0012 08/09/21  1842   WBC  --   --  11.1* 8.2   RBC  --   --  2.97* 3.60*   HGB 8.0* 9.5* 8.5* 10.2*   HCT  --   --  25.5* 30.8*   MCV  --   --  86 86   MCH  --   --  28.6 28.3   MCHC  --   --  33.3 33.1   RDW  --   --  13.2 13.2   PLT  --   --  149* 208       Lab Results   Component Value Date    INR 1.45 08/09/2021    INR 0.99 08/09/2021    INR 1.00 08/04/2021     ---------------------------  ABBEY Galeas [facility]  [facility]  8/10/2021 9:10 AM    RAPS Contact Info (24 hour job code pager is the last 4 digits) For in-house use only:   Job code ID: [place] [id number]   [place] [facility] [id number]  Peds [phone]  [employer] phone: dial * * * [phone], enter jobcode ID, then enter call-back number.    Text: Use AMCOM on the Intranet <Paging/Directory> tab and enter Jobcode ID.   If no call back at any time, contact the hospital  and ask for RAPS attending or backup

## 2021-08-10 NOTE — PHARMACY-ADMISSION MEDICATION HISTORY
Admission Medication History Completed by Pharmacy    See [facility] Admission Navigator for allergy information, preferred outpatient pharmacy, prior to admission medications and immunization status.     Medication History Sources:     Patient interview conducted by the Pre-op assessment center PharmD (see note dated 8/5/2021)    Changes made to PTA medication list (reason):    Added: None    Deleted: None    Changed: None    Additional Information:    None    Prior to Admission medications    Medication Sig Last Dose Taking? Auth Provider   clonazePAM (KLONOPIN) 1 MG tablet 1mg morning and 1.5mg every evening 8/8/2021 at 2200 Yes Reported, Patient   diphenhydrAMINE (BENADRYL) 25 MG tablet Take 25 mg by mouth every 6 hours as needed for itching or allergies Past Week at Unknown time Yes Unknown, Entered By History   furosemide (LASIX) 20 MG tablet Take 20 mg by mouth daily as needed Fluid retention  Yes Unknown, Entered By History   HYDROmorphone (DILAUDID) 4 MG tablet Take 4 mg by mouth every 6 hours as needed  Past Week at Unknown time Yes Reported, Patient   hyoscyamine SL (LEVSIN/SL) 0.125 MG sublingual tablet Take 125 mcg by mouth 4 times daily as needed   Yes Reported, Patient   lipase-protease-amylase (ZENPEP) 89699-78949 units CPEP Take 3 capsules by mouth 3 times daily (with meals) 8/8/2021 at Unknown time Yes Reported, Patient   Melatonin 10 MG TABS tablet Take 10 mg by mouth nightly as needed for sleep 8/8/2021 at 2200 Yes Unknown, Entered By History   methocarbamol (ROBAXIN) 750 MG tablet Take 750 mg by mouth 4 times daily as needed for muscle spasms 8/8/2021 at 2200 Yes Unknown, Entered By History   ondansetron (ZOFRAN-ODT) 4 MG ODT tab Take 4 mg by mouth as needed 8/8/2021 at 1500 Yes Reported, Patient   Oxymorphone HCl 10 MG TB12 Take 10 mg by mouth every 8 hours  8/9/2021 at 430 Yes Reported, Patient   pantoprazole (PROTONIX) 40 MG EC tablet Take 40 mg by mouth 2 times daily 8/9/2021 at 430 Yes  Reported, Patient   senna-docusate (SENOKOT-S/PERICOLACE) 8.6-50 MG tablet Take 1 tablet by mouth daily as needed for constipation  Yes Unknown, Entered By History   sertraline (ZOLOFT) 100 MG tablet Take 150 mg by mouth At Bedtime 8/8/2021 at 2200 Yes Reported, Patient   traZODone (DESYREL) 100 MG tablet Take 100 mg by mouth At Bedtime 8/8/2021 at 2200 Yes Reported, Patient   potassium chloride ER (KLOR-CON M) 20 MEQ CR tablet Take 20 mEq by mouth daily as needed for potassium supplementation Unknown at Unknown time  Unknown, Entered By History       Date completed: 08/10/21    Medication history completed by: Aye Whitehead, PharmD Resident

## 2021-08-10 NOTE — PROGRESS NOTES
SPIRITUAL HEALTH SERVICES  SPIRITUAL ASSESSMENT Progress Note  [facility] ([place]) 4A     REFERRAL SOURCE: Patient request at admission      Patient receiving cares when visit attempted.    PLAN: I will refer to Fr. Graylevi when he returns on Wednesday. Spiritual Health Services remains available for patient, family, and staff support.     Please page the on call  either via [facility] (Spiritual Health/[facility]) or by placing a STAT or ASAP Epic referral for Spiritual Health (which will roll to the on call pager).        Aliza Alvarez  Chaplain Resident  Pager: [phone]

## 2021-08-10 NOTE — PROGRESS NOTES
CLINICAL NUTRITION SERVICES - ASSESSMENT NOTE    RECOMMENDATIONS FOR MDs/PROVIDERS TO ORDER:  Fluids and bowel regimen per team  Ability to continue to advance TF to goal Vital AF 1.2 @ 55 ml/hr.     Malnutrition  None at this time    Recommendations already ordered by Registered Dietitian (RD):  1.  Once confirm JT placement/approval for use post surgery, begin TF with Vital AF 1.2 @ 10 ml/hr and advance by 10 ml q 24 hrs (and/or ONLY advance rate upon MD approval after daily evaluation) to goal 55     - Vital 1.2 Ryan @ goal of  55ml/hr  (1320ml/day)  will provide: 1584 kcals, 99 g PRO, 1070 ml free H20, 146 g CHO, and 6.7 g fiber daily.    2. Entered the following within TF order:  RN: Change 1 RELIZORB cartridge every 24 hours with TF rate at 10-20 ml/hr.  Change 1 RELIZORB cartridge every 12 hours with TF rates >20 ml/hr.  RN: Obtain cartridges from 4A med room and/or 4A unit nurse manager.  (Relizorb is a immobilized lipase cartridge used to hydrolyze fat within the TF formula for easier absorption s/p total pancreatectomy)    3. Entered the following within a patient care order:  RELIZORB CARTRIDGES  *Change 1 cartridge every 24 hours with TF rate @ 10-20 ml/hr  *Change 1 cartridge every 12 hours with TF rate >20 ml/hr  *Supplies: Obtain cartridges in the 4A/7A unit med room or from 4A/7A unit Nurse Manager    4.  Once begin TFs, order multivitamin/mineral (15 ml/day via JT) to help ensure micronutrient needs being met with suspected hypermetabolic demands, anticipated slow adv of TFs to goal infusion and potential interruptions to TF infusions.    5.  Ordered to check Magnesium, Phosphorus levels over the next 5 days for monitoring with TF start    6.  Ordered to check prealbumin weekly for monitoring on immune modulating TF therapy.      7.  Discontinued oral pancreatic enzyme replacement therapy (PERT) - not necessary during inpatient stay      Future/Additional Recommendations:  -- Tolerance to TF and  "ability to advance to goal      REASON FOR ASSESSMENT  Meme Robins is a 51 year old female seen by Registered Dietitian for Provider Order - Registered Dietitian to Assess and Order TF per Medical Nutrition protocol    NUTRITION HISTORY  -Per review of EMR, pt previously seen by outpatient RD on 5/10/21 and at that encounter reported \"Limits fat in her diet. Eats smaller, more frequent meals. Nausea bad in AM. Nothing really helps until just has to wait until the afternoon to feel better. Liquids may be tolerated (smoothie, ensure clear) late AM over solids.  Has port and intermittent PN.\"  - PERT: zenpep 20,000 x 3 with meals, x 2 with snacks; (1090 ul/kg/meal - within therapeutic range)    -Per pt's report today, pt was very fatigued, not feeling very well today. Was beginning to become confused, but also asking appropriate questions in between. Pt reported weight gain since her OP RD visit, and reported no decreased PO intakes. Reported taking her enzymes with meals and snacks.     CURRENT NUTRITION ORDERS  Diet:  NPO -  Anticipate prolonged NPO status post AIT    LABS  -Prealb 26mg/dL (baseline, n/a) - anticipate negative phase PROs to decline postop and good candidate for immune-modulating TF therapy.  -Vitamin A .6 (WNL)-8/4  -Vitamin E 12.2 (WNL)-8/4  -Vitamin D-25 OH Vit D total: 78 (H)  - , , Amylase 166, Lipase 1875, Lactate 2.1    MEDICATIONS  AquADEK - anticipate does not need based on above vitamin lab levels and recommend discontinue this supplementation (continue only with a regular multivitamin/mineral).    Creon 12 (3-4 capsules orally) - recommend discontinue oral enzyme dosing and change to alternative regimen via TF formula (see recs above)     ANTHROPOMETRICS  Height: 5' 1\"    Most Recent Weight: 121# (55.2 kg)  IBW: 47.7 kg  BMI: Normal BMI  Weight History:   Wt Readings from Last 10 Encounters:   08/10/21 57.6 kg (127 lb)   08/05/21 56.4 kg (124 lb 4.8 oz)   08/05/21 56.7 kg " (125 lb)   08/04/21 55.3 kg (122 lb)   08/04/21 55.4 kg (122 lb 2.2 oz)   05/20/21 54.4 kg (120 lb)   05/19/21 55.2 kg (121 lb 11.1 oz)   05/19/21 55.2 kg (121 lb 11.1 oz)     Dosing Weight: 55 kg ( lowest weight this admit on 8/9)    ASSESSED NUTRITION NEEDS  Estimated Energy Needs: 9717-8519 kcals (25-30 Kcal/Kg)  Justification: post-op and pending EN absorption post total pancreatectomy  Estimated Protein Needs: 72-99 grams protein (1.3-1.8 g pro/Kg)+  Justification: post-op and pending EN absorption post total pancreatectomy  Estimated Fluid Needs: 1 mL/Kcal or per MD pending fluid status    PHYSICAL FINDINGS  See malnutrition section below.    MALNUTRITION  % Intake: No decreased intake noted  % Weight Loss: None noted  Subcutaneous Fat Loss: None observed  Muscle Loss: None observed  Fluid Accumulation/Edema: Mild  Malnutrition Diagnosis: Patient does not meet two of the established criteria necessary for diagnosing malnutrition but is at risk for malnutrition    NUTRITION DIAGNOSIS  Inadequate oral intake r/t post op TPAIT inhibiting PO intakes per surgeon AEB sonya for EN via GJ tube.      INTERVENTIONS  Implementation  -Nutrition education: Provided education on role of RD and nutrition POC   -Collaboration and Referral of Nutrition care - Discussed plan for FEN/GI on rounds with MDs- OK for trophic feeds.      Goals  1.  Tolerate adv of TF to goal infusion without sx of refeeding within the next 5-7 days.  2.  Once TF at goal, total ave EN intakes provide minimum of 25 kcal/kg/day and 1.3 g/kg/day (per 55 kg)      Monitoring/Evaluation  Progress toward goals will be monitored and evaluated per protocol.     Hollie Huizar, RD, MS, LD  SICU: [id number]

## 2021-08-10 NOTE — ANESTHESIA POSTPROCEDURE EVALUATION
Patient: Meme Robins    Procedure(s):  Open total pancreatectomy with autologous islet cell transplantation, splenectomy, and incidental appendectomy    Diagnosis:Chronic pancreatitis (H) [K86.1]  Diagnosis Additional Information: No value filed.    Anesthesia Type:  General    Note:  Disposition: Inpatient   Postop Pain Control: Uneventful            Sign Out: Well controlled pain   PONV: No   Neuro/Psych: Uneventful            Sign Out: Acceptable/Baseline neuro status   Airway/Respiratory: Uneventful            Sign Out: Acceptable/Baseline resp. status   CV/Hemodynamics: Uneventful            Sign Out: Acceptable CV status; No obvious hypovolemia; No obvious fluid overload   Other NRE: NONE   DID A NON-ROUTINE EVENT OCCUR? No           Last vitals:  Vitals Value Taken Time   /80 08/09/21 1832   Temp 36  C (96.8  F) 08/09/21 1832   Pulse 91 08/09/21 1901   Resp 14 08/09/21 1832   SpO2 99 % 08/09/21 1901   Vitals shown include unvalidated device data.    Electronically Signed By: Juli Velasco MD  August 9, 2021  7:02 PM

## 2021-08-10 NOTE — PLAN OF CARE
Major Shift Events:  Pt up from PACU around 2030. HR 100s, BP trending down from 140s to 80s, temp 100.3, but down to 98.9 with tylenol. Drowsy but oriented x4 and follows commands in all extremities. On room air. UOP adequate. No BM. 20-50mL/hr out of ARTEMIO, serosanguineous.   Plan: Continue to monitor and control pain.  For vital signs and complete assessments, please see documentation flowsheets.       Problem: Adult Inpatient Plan of Care  Goal: Readiness for Transition of Care  Outcome: No Change     Problem: Risk for Delirium  Goal: Improved Attention and Thought Clarity  Outcome: No Change     Problem: Adult Inpatient Plan of Care  Goal: Plan of Care Review  Outcome: Improving  Goal: Absence of Hospital-Acquired Illness or Injury  Outcome: Improving  Intervention: Identify and Manage Fall Risk  Recent Flowsheet Documentation  Taken 8/10/2021 0000 by Shan Scott, RN  Safety Promotion/Fall Prevention:    clutter free environment maintained    lighting adjusted    increase visualization of patient    room door open    room near nurse's station  Taken 8/9/2021 2030 by Shan Scott, RN  Safety Promotion/Fall Prevention:    clutter free environment maintained    lighting adjusted    increase visualization of patient    room door open    room near nurse's station  Intervention: Prevent Skin Injury  Recent Flowsheet Documentation  Taken 8/10/2021 0000 by Shan Scott RN  Body Position:    turned    left  Taken 8/9/2021 2200 by Shan Scott, RN  Body Position:    turned    right  Taken 8/9/2021 2030 by Shan Scott, RN  Body Position:    turned    left  Intervention: Prevent and Manage VTE (Venous Thromboembolism) Risk  Recent Flowsheet Documentation  Taken 8/10/2021 0000 by Shan Scott, RN  VTE Prevention/Management: pneumatic compression device  Taken 8/9/2021 2030 by Shan Scott, RN  VTE Prevention/Management: pneumatic compression device  Goal: Optimal Comfort and Wellbeing  Outcome:  Improving     Problem: Risk for Delirium  Goal: Optimal Coping  Outcome: Improving  Goal: Improved Behavioral Control  Outcome: Improving  Goal: Improved Sleep  Outcome: Improving     Problem: Pancreas Transplantation  Goal: Pancreas Transplantation  Description: Signs and symptoms of listed problems will be absent or manageable.  Outcome: Improving

## 2021-08-10 NOTE — PROGRESS NOTES
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 Reported to have had intraop hypotension with islet infusion, thought to be 2/2 anaphylaxis and treated with benadryl, steroids. resolved.     CHANGES and MAJOR THINGS TODAY:   - remove arterial line  - discontinue precedex gtt  - remove NG tube  - being trickle Tube Feeds through J tube  - restart PTA sertraline and trazodone suspensions  - Hgb recheck at 1230, adjust Heparin if needed    PLAN:    Neurological:  # Acute Post Op Pain  # Pain 2/2 chronic pancreatitis  # Anxiety, Depression   - Monitor neurological status. Notify MD/DO for any acute changes  - discontinue Precedex gtt  - Tylenol, gabapentin, robaxin, ativan, ketamine, dPCA, onQ pump  - Restarting PTA sertraline and trazodone as suspensions    Pulmonary:  # Hx of PE  - Supplemental oxygen to keep saturation above 92%. Incentive spirometer every 15-30 min while awake     Cardiovascular:    # Intraop Hypotension during islet infusion, resolved   - Monitor hemodynamic status.  - BP 90/53, near her baseline per patient  - HR 90s-100s  - remove arterial line    GI:    # GERD  # s/p TPIAT  - G tube for decompression only  - J tube for solution meds (no crushed meds) and tube feeds only, start trickle feeds today at 10 ml/h  - , , Amylase 166, Lipase 1875, Lactate 2.1  - Phosphate 3.2  - Zofran prn  - Protonix 40 mg bid  - bowel regimen   - RUQ ARTEMIO with serosanguinous output  - NG tube removed per transplant    Fluids/Electrolytes/Nutrition:  - NPO ok for ice chips  - D5 LR @ 100ml/h for IVF hydration  - Aquadecks multivitamin daily  - Nutrition consult  - Trickle Tube feeds: 10cc/hr. RD following.   - electrolyte replacement  protocol  In place.     Renal:   - Shook in place  - Urine output  2150 yesterday, 245 this morning . Will continue to monitor intake and output.  - Cr. 0.62    Endocrine:  # Chronic Pancreatitis  # Adrenal insufficiency  # s/p TPIAT   - Insulin gtt, blood glucose goal of   - Endocrinology consulted, awaiting recs    Infectious disease:   # Positive cultures on Islets cell therapy  - WBC 11.1  - TMax 100.3F  - Ertapenem 1g q24hr  - Fluconazole 200mg q24hr  - Vancomycin 1000 mg q12hr, per pharmacy      Positive cultures:  - Gram positive cocci in pair and chains and gram negative bacilli in islets and final product    Hematology:    # Acute Blood loss  # Chronic Anemia  # Hx of PE  - Surgery  ml  - Hgb 9.5 --> 8.0, recheck later today  - Heparin gtt at 400 unit(s)/hr, will decrease if Hgb lower on recheck    MSK:  # Weakness and deconditioning of critical illness    - Physical and occupational therapy consults.     General Cares/Prophylaxis:    DVT Prophylaxis: Heparin gtt  GI Prophylaxis: PPI    Lines/ tubes/ drains:  - NG (to be removed), LIJ, arterial line (to be removed) PIVx2, GJ tube, ARTEMIO drain, shook    Disposition:  - Surgical ICU, Likely Transfer to floor status this PM pending afternoon evaluation with Transplant     Patient seen, findings and plan discussed with surgical ICU staff, Dr. Woods.    Keagan Berg, MS4    Gage Robles MD  Orthopaedic Surgery PGY1    ====================================  INTERVAL HISTORY:   Meme Robins is POD1 following TPIAT. She is hemodynamically stable and has not had any further episodes of hypotension, but she does have low BP (90s/50s) at her baseline. Her pain has been improving on her current regimen throughout the night and morning. She has had good urinary output and her blood glucoses have been at goal on her insulin gtt.     OBJECTIVE:   1. VITAL SIGNS:   Temp:  [96.8  F (36  C)-100.3  F (37.9  C)] 98.9  F (37.2  C)  Pulse:  []  98  Resp:  [14-22] 20  BP: (115-146)/(70-89) 146/89  MAP:  [80 mmHg-112 mmHg] 87 mmHg  Arterial Line BP: (109-156)/(63-86) 118/68  SpO2:  [92 %-100 %] 96 %  Resp: 20      2. INTAKE/ OUTPUT:   I/O last 3 completed shifts:  In: 6232.43 [I.V.:5232.43]  Out: 2998 [Urine:2395; Emesis/NG output:45; Drains:258; Blood:300]    3. PHYSICAL EXAMINATION:  General: awake, no acute distress  HEENT: normocephalic, atraumatic, EOMI. NG in place  Neuro: A&Ox3, NAD, moves all extremities spontaneously  Pulm/Resp: Clear breath sounds bilaterally, breathing non-labored on room jayda  CV: RRR, intermittently tachy to low 100s  Abdomen: Soft, non-distended, appropriately tender, no rebound tenderness or guarding, no masses  :  shook catheter in place, urine yellow and clear  Incisions/Skin: GJ tube in place with J tube capped and G tube to gravity. RUQ ARTEMIO drain in place with serosanguinous output. Incision is well approximated and intact.  MSK/Extremities: no peripheral edema, moving all extremities, peripheral pulses intact, extremities well perfused    4. INVESTIGATIONS:   Arterial Blood Gases   Recent Labs   Lab 08/09/21  1744 08/09/21  1715 08/09/21  1616 08/09/21  1548   PH 7.40 7.37 7.39 7.40   PCO2 39 41 40 40   PO2 161* 156* 148* 168*   HCO3 24 24 24 25     Complete Blood Count   Recent Labs   Lab 08/10/21  0414 08/10/21  0012 08/09/21  1842 08/09/21  1744 08/09/21  0705 08/04/21  0959   WBC  --  11.1* 8.2  --   --  5.7   HGB 9.5* 8.5* 10.2* 10.6*   < > 11.9   PLT  --  149* 208  --   --  339    < > = values in this interval not displayed.     Basic Metabolic Panel  Recent Labs   Lab 08/10/21  0610 08/10/21  0418 08/10/21  0414 08/10/21  0317 08/09/21  1842 08/09/21  1744 08/09/21  1715 08/09/21  1715 08/09/21  0703 08/04/21  1104 08/04/21  0959   NA  --   --  140  --  141 139  --  140  --    < > 137   POTASSIUM  --   --  3.8  --  3.7 3.4*  --  3.5 3.7   < > 3.8   CHLORIDE  --   --  108  --  111*  --   --   --   --   --  106   CO2   --   --  23  --  26  --   --   --   --   --  29   BUN  --   --  9  --  6*  --   --   --   --   --  14   CR  --   --  0.62  --  0.56  --   --   --  0.70  --  0.71   * 169* 168* 157* 96 135*   < > 106*  --    < > 90  89    < > = values in this interval not displayed.     Liver Function Tests  Recent Labs   Lab 08/10/21  0414 08/09/21  1842 08/09/21  0703 08/04/21  0959 08/04/21  0829   * 974*  --  16  --    * 521*  --  23  --    ALKPHOS 66 75  --  88  --    BILITOTAL 0.5 0.8  --  0.5  --    ALBUMIN 2.6* 3.0*  --  3.6  --    INR  --  1.45* 0.99  --  1.00     Pancreatic Enzymes  Recent Labs   Lab 08/10/21  0414   LIPASE 1,875*   AMYLASE 166*     Coagulation Profile  Recent Labs   Lab 08/10/21  0414 08/09/21  1842 08/09/21  0703 08/04/21  0829   INR  --  1.45* 0.99 1.00   PTT 49* 77*  --   --          5. RADIOLOGY:   Recent Results (from the past 24 hour(s))   XR Chest Port 1 View    Narrative    XR CHEST PORT 1 VIEW  8/9/2021 6:47 PM      HISTORY: s/p TPIAT for position of lines and NG tube    COMPARISON: None    FINDINGS: AP view of the chest. Enteric tube sidehole projects over  the mid-distal esophagus with tip near the GE junction. Left IJ CVC  tip is at the superior to mid SVC. Right chest port tip is at the  cavoatrial junction. Cardiac silhouette is normal limits. No acute  airspace opacities. No pleural effusion or pneumothorax. Surgical  changes of the left upper quadrant.      Impression    IMPRESSION:   1. Enteric tube sidehole projects over the mid to lower esophagus.  Suggest advancement.  2. No acute airspace opacities.    I have personally reviewed the examination and initial interpretation  and I agree with the findings.    MIHIR PRUITT MD         SYSTEM ID:  [id number]   US Abd/Pelvis Duplex Complete Portable    Narrative    EXAMINATION: US abdomen Doppler complete  8/9/2021 7:42 PM      CLINICAL HISTORY: Status post TPIAT. Evaluate for portal vein patency  and  flows    COMPARISON: Abdomen and pelvis CT 5/20/2021    TECHNIQUE: The abdomen was scanned in standard fashion with  specialized ultrasound transducer(s) using both gray-scale, color  Doppler, and spectral flow techniques.    Findings:  Liver: The liver demonstrates normal homogeneous echotexture. No  evidence of a focal hepatic mass.     Extrahepatic portal vein flow is antegrade at 17 cm/s.  Right portal vein flow is antegrade, measuring 13 cm/s.  Left portal vein flow is antegrade, measuring 12 cm/s.    Flow in the hepatic artery is towards the liver and:  206 cm/s peak systolic  0.48 resistive index.     Right hepatic artery peak systolic velocity: 98 cm/s, hepatic artery:  76 cm/s    The middle right and left hepatic veins are patent with flow towards  the IVC. IVC is patent with velocity 63 cm/s. Splenic vein was not  visualized       Impression    Impression:   Patent Doppler evaluation. Splenic vein was not visualized.      I have personally reviewed the examination and initial interpretation  and I agree with the findings.    MIHIR PRUITT MD         SYSTEM ID:  [id number]       =========================================

## 2021-08-10 NOTE — PROGRESS NOTES
08/10/21 1323   Quick Adds   Type of Visit Initial PT Evaluation   Living Environment   People in home spouse;child(devorah), dependent  (and high school son)   Current Living Arrangements house   Home Accessibility no concerns;stairs to enter home;stairs within home   Number of Stairs, Main Entrance 2;greater than 10 stairs   Stair Railings, Main Entrance   (1 rail)   Number of Stairs, Within Home, Primary greater than 10 stairs  (12 stairs)   Stair Railings, Within Home, Primary railings on both sides of stairs   Self-Care   Usual Activity Tolerance good   Current Activity Tolerance fair   Regular Exercise Yes   Activity/Exercise Type walking   Exercise Amount/Frequency 45 mins;1 hr  (3x/wk)   Equipment Currently Used at Home none   Disability/Function   Hearing Difficulty or Deaf no   Wear Glasses or Blind yes   Vision Management glasses   Concentrating, Remembering or Making Decisions Difficulty no   Difficulty Communicating no   Difficulty Eating/Swallowing no   Walking or Climbing Stairs Difficulty no   Dressing/Bathing Difficulty no   Toileting issues no   Doing Errands Independently Difficulty (such as shopping) no   Fall history within last six months yes   Number of times patient has fallen within last six months 1  (reaching in bathtub led to fall, pt challenged to describe )   General Information   Onset of Illness/Injury or Date of Surgery 08/09/21   Referring Physician Marvin Benito MD   Patient/Family Therapy Goals Statement (PT) To feel well.   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 Reported to have had intraop hypotension with islet infusion, thought to be 2/2 anaphylaxis and treated with benadryl, steroids. resolved.     Existing Precautions/Restrictions abdominal;fall   General Observations activity: up with assist   Cognition   Orientation Status (Cognition) person;place  (1 day off for date; had reduced orientation to situation)   Cognitive Status Comments presenting with intermittent confusion; answers to questions at times not appropriate/within context. Pt suddenly talking off topic, sharing about enjoying a beautiful [place] view yesterday   Pain Assessment   Patient Currently in Pain Yes, see Vital Sign flowsheet   Posture    Posture Comments reduced upright posture   Range of Motion (ROM)   ROM Comment no major concerns   Strength   Strength Comments impaired functionally, see below   Bed Mobility   Comment (Bed Mobility) MODA x 2 to roll, MODA-MAXA x 2 supine>sit with significant pain, need for very slowed position change.   Transfers   Transfer Safety Comments MODA x 1-2 sit>stand, bed>chair   Gait/Stairs (Locomotion)   Comment (Gait/Stairs) able to take ~4 steps with MODA x 2 to step to chair   Balance   Balance Comments impaired, required staff assist to steady with above functional tasks. required ANTONIO for sitting trunk support due to reduced sitting balance   Sensory Examination   Sensory Perception Comments reported finger numbness B hands, report less clear but at least not impaired to L thumb   Clinical Impression   Criteria for Skilled Therapeutic Intervention yes, treatment indicated   PT Diagnosis (PT) impaired mobility   Influenced by the following impairments pain, reduced ROM, strength, balance, activity tolerance, safety   Functional limitations due to impairments below baseline bed mobility, transfers, gait   Clinical Presentation Evolving/Changing   Clinical Presentation Rationale clinical judgement, [facility]   Clinical Decision Making (Complexity) moderate complexity   Therapy Frequency (PT) Daily   Predicted Duration of Therapy Intervention (days/wks) 1 week   Planned Therapy Interventions (PT) bed mobility  training;balance training;gait training;home exercise program;neuromuscular re-education;ROM (range of motion);postural re-education;patient/family education;strengthening;stair training;stretching;risk factor education;home program guidelines;transfer training   Risk & Benefits of therapy have been explained evaluation/treatment results reviewed;care plan/treatment goals reviewed;risks/benefits reviewed;current/potential barriers reviewed;participants voiced agreement with care plan;participants included;patient;spouse/significant other   PT Discharge Planning    PT Discharge Recommendation (DC Rec) Transitional Care Facility   PT Rationale for DC Rec Tentatively recommend TCU but this is subject to change. Pt presently very impaired by pain, but at baseline is functionally IND. Pending LOS, assist available at home and functional recovery may be able to discharge home.    PT Brief overview of current status  Ax2 sit<>stand and to pivot.    Total Evaluation Time   Total Evaluation Time (Minutes) 8

## 2021-08-11 ENCOUNTER — APPOINTMENT (OUTPATIENT)
Dept: OCCUPATIONAL THERAPY | Facility: CLINIC | Age: 51
End: 2021-08-11
Attending: PHYSICIAN ASSISTANT
Payer: COMMERCIAL

## 2021-08-11 ENCOUNTER — APPOINTMENT (OUTPATIENT)
Dept: PHYSICAL THERAPY | Facility: CLINIC | Age: 51
End: 2021-08-11
Attending: TRANSPLANT SURGERY
Payer: COMMERCIAL

## 2021-08-11 LAB
ALBUMIN SERPL-MCNC: 2 G/DL (ref 3.4–5)
ALP SERPL-CCNC: 76 U/L (ref 40–150)
ALT SERPL W P-5'-P-CCNC: 159 U/L (ref 0–50)
AMYLASE SERPL-CCNC: 63 U/L (ref 30–110)
ANION GAP SERPL CALCULATED.3IONS-SCNC: 8 MMOL/L (ref 3–14)
APTT PPP: 48 SECONDS (ref 22–38)
AST SERPL W P-5'-P-CCNC: 76 U/L (ref 0–45)
BASOPHILS # BLD AUTO: 0 10E3/UL (ref 0–0.2)
BASOPHILS NFR BLD AUTO: 0 %
BILIRUB DIRECT SERPL-MCNC: <0.1 MG/DL (ref 0–0.2)
BILIRUB SERPL-MCNC: 0.3 MG/DL (ref 0.2–1.3)
BUN SERPL-MCNC: 5 MG/DL (ref 7–30)
CALCIUM SERPL-MCNC: 7.4 MG/DL (ref 8.5–10.1)
CHLORIDE BLD-SCNC: 105 MMOL/L (ref 94–109)
CO2 SERPL-SCNC: 25 MMOL/L (ref 20–32)
CREAT SERPL-MCNC: 0.52 MG/DL (ref 0.52–1.04)
EOSINOPHIL # BLD AUTO: 0.1 10E3/UL (ref 0–0.7)
EOSINOPHIL NFR BLD AUTO: 1 %
ERYTHROCYTE [DISTWIDTH] IN BLOOD BY AUTOMATED COUNT: 13.9 % (ref 10–15)
GFR SERPL CREATININE-BSD FRML MDRD: >90 ML/MIN/1.73M2
GLUCOSE BLD-MCNC: 152 MG/DL (ref 70–99)
GLUCOSE BLDC GLUCOMTR-MCNC: 105 MG/DL (ref 70–99)
GLUCOSE BLDC GLUCOMTR-MCNC: 106 MG/DL (ref 70–99)
GLUCOSE BLDC GLUCOMTR-MCNC: 107 MG/DL (ref 70–99)
GLUCOSE BLDC GLUCOMTR-MCNC: 109 MG/DL (ref 70–99)
GLUCOSE BLDC GLUCOMTR-MCNC: 112 MG/DL (ref 70–99)
GLUCOSE BLDC GLUCOMTR-MCNC: 116 MG/DL (ref 70–99)
GLUCOSE BLDC GLUCOMTR-MCNC: 126 MG/DL (ref 70–99)
GLUCOSE BLDC GLUCOMTR-MCNC: 126 MG/DL (ref 70–99)
GLUCOSE BLDC GLUCOMTR-MCNC: 128 MG/DL (ref 70–99)
GLUCOSE BLDC GLUCOMTR-MCNC: 128 MG/DL (ref 70–99)
GLUCOSE BLDC GLUCOMTR-MCNC: 130 MG/DL (ref 70–99)
GLUCOSE BLDC GLUCOMTR-MCNC: 133 MG/DL (ref 70–99)
GLUCOSE BLDC GLUCOMTR-MCNC: 133 MG/DL (ref 70–99)
GLUCOSE BLDC GLUCOMTR-MCNC: 140 MG/DL (ref 70–99)
GLUCOSE BLDC GLUCOMTR-MCNC: 140 MG/DL (ref 70–99)
GLUCOSE BLDC GLUCOMTR-MCNC: 147 MG/DL (ref 70–99)
GLUCOSE BLDC GLUCOMTR-MCNC: 154 MG/DL (ref 70–99)
GLUCOSE BLDC GLUCOMTR-MCNC: 77 MG/DL (ref 70–99)
GLUCOSE BLDC GLUCOMTR-MCNC: 80 MG/DL (ref 70–99)
GLUCOSE BLDC GLUCOMTR-MCNC: 81 MG/DL (ref 70–99)
GLUCOSE BLDC GLUCOMTR-MCNC: 84 MG/DL (ref 70–99)
GLUCOSE BLDC GLUCOMTR-MCNC: 84 MG/DL (ref 70–99)
GLUCOSE BLDC GLUCOMTR-MCNC: 92 MG/DL (ref 70–99)
GLUCOSE BLDC GLUCOMTR-MCNC: 93 MG/DL (ref 70–99)
GLUCOSE BLDC GLUCOMTR-MCNC: 98 MG/DL (ref 70–99)
HCT VFR BLD AUTO: 21.7 % (ref 35–47)
HGB BLD-MCNC: 7 G/DL (ref 11.7–15.7)
HOLD SPECIMEN: NORMAL
IMM GRANULOCYTES # BLD: 0.1 10E3/UL
IMM GRANULOCYTES NFR BLD: 1 %
LACTATE SERPL-SCNC: 0.9 MMOL/L (ref 0.7–2)
LIPASE SERPL-CCNC: 1174 U/L (ref 73–393)
LYMPHOCYTES # BLD AUTO: 0.5 10E3/UL (ref 0.8–5.3)
LYMPHOCYTES NFR BLD AUTO: 3 %
MAGNESIUM SERPL-MCNC: 1.7 MG/DL (ref 1.6–2.3)
MCH RBC QN AUTO: 28.6 PG (ref 26.5–33)
MCHC RBC AUTO-ENTMCNC: 32.3 G/DL (ref 31.5–36.5)
MCV RBC AUTO: 89 FL (ref 78–100)
MONOCYTES # BLD AUTO: 0.9 10E3/UL (ref 0–1.3)
MONOCYTES NFR BLD AUTO: 5 %
NEUTROPHILS # BLD AUTO: 15.7 10E3/UL (ref 1.6–8.3)
NEUTROPHILS NFR BLD AUTO: 90 %
NRBC # BLD AUTO: 0 10E3/UL
NRBC BLD AUTO-RTO: 0 /100
PHOSPHATE SERPL-MCNC: 1.7 MG/DL (ref 2.5–4.5)
PHOSPHATE SERPL-MCNC: 1.8 MG/DL (ref 2.5–4.5)
PHOSPHATE SERPL-MCNC: 2.7 MG/DL (ref 2.5–4.5)
PLATELET # BLD AUTO: 155 10E3/UL (ref 150–450)
POTASSIUM BLD-SCNC: 3.1 MMOL/L (ref 3.4–5.3)
POTASSIUM BLD-SCNC: 3.4 MMOL/L (ref 3.4–5.3)
PROT SERPL-MCNC: 4.5 G/DL (ref 6.8–8.8)
RBC # BLD AUTO: 2.45 10E6/UL (ref 3.8–5.2)
SODIUM SERPL-SCNC: 138 MMOL/L (ref 133–144)
UFH PPP CHRO-ACNC: <0.1 IU/ML
VANCOMYCIN SERPL-MCNC: 7.4 MG/L
WBC # BLD AUTO: 17.4 10E3/UL (ref 4–11)

## 2021-08-11 PROCEDURE — 36592 COLLECT BLOOD FROM PICC: CPT | Performed by: PHYSICIAN ASSISTANT

## 2021-08-11 PROCEDURE — 250N000013 HC RX MED GY IP 250 OP 250 PS 637: Performed by: PHYSICIAN ASSISTANT

## 2021-08-11 PROCEDURE — 120N000011 HC R&B TRANSPLANT UMMC

## 2021-08-11 PROCEDURE — 84100 ASSAY OF PHOSPHORUS: CPT | Performed by: TRANSPLANT SURGERY

## 2021-08-11 PROCEDURE — 83605 ASSAY OF LACTIC ACID: CPT | Performed by: TRANSPLANT SURGERY

## 2021-08-11 PROCEDURE — 99024 POSTOP FOLLOW-UP VISIT: CPT | Performed by: TRANSPLANT SURGERY

## 2021-08-11 PROCEDURE — 250N000011 HC RX IP 250 OP 636: Performed by: PHYSICIAN ASSISTANT

## 2021-08-11 PROCEDURE — 258N000003 HC RX IP 258 OP 636: Performed by: TRANSPLANT SURGERY

## 2021-08-11 PROCEDURE — 250N000009 HC RX 250: Performed by: TRANSPLANT SURGERY

## 2021-08-11 PROCEDURE — 80202 ASSAY OF VANCOMYCIN: CPT | Performed by: TRANSPLANT SURGERY

## 2021-08-11 PROCEDURE — 97116 GAIT TRAINING THERAPY: CPT | Mod: GP

## 2021-08-11 PROCEDURE — 97535 SELF CARE MNGMENT TRAINING: CPT | Mod: GO

## 2021-08-11 PROCEDURE — 82248 BILIRUBIN DIRECT: CPT | Performed by: PHYSICIAN ASSISTANT

## 2021-08-11 PROCEDURE — 97165 OT EVAL LOW COMPLEX 30 MIN: CPT | Mod: GO

## 2021-08-11 PROCEDURE — 80053 COMPREHEN METABOLIC PANEL: CPT | Performed by: PHYSICIAN ASSISTANT

## 2021-08-11 PROCEDURE — 82150 ASSAY OF AMYLASE: CPT | Performed by: PHYSICIAN ASSISTANT

## 2021-08-11 PROCEDURE — 85520 HEPARIN ASSAY: CPT | Performed by: PHYSICIAN ASSISTANT

## 2021-08-11 PROCEDURE — 85025 COMPLETE CBC W/AUTO DIFF WBC: CPT | Performed by: PHYSICIAN ASSISTANT

## 2021-08-11 PROCEDURE — 36592 COLLECT BLOOD FROM PICC: CPT | Performed by: TRANSPLANT SURGERY

## 2021-08-11 PROCEDURE — 250N000009 HC RX 250: Performed by: PHYSICIAN ASSISTANT

## 2021-08-11 PROCEDURE — 85730 THROMBOPLASTIN TIME PARTIAL: CPT | Performed by: PHYSICIAN ASSISTANT

## 2021-08-11 PROCEDURE — 84132 ASSAY OF SERUM POTASSIUM: CPT | Performed by: PHYSICIAN ASSISTANT

## 2021-08-11 PROCEDURE — 250N000011 HC RX IP 250 OP 636: Performed by: TRANSPLANT SURGERY

## 2021-08-11 PROCEDURE — 83735 ASSAY OF MAGNESIUM: CPT | Performed by: PHYSICIAN ASSISTANT

## 2021-08-11 PROCEDURE — 97530 THERAPEUTIC ACTIVITIES: CPT | Mod: GO

## 2021-08-11 PROCEDURE — 84100 ASSAY OF PHOSPHORUS: CPT | Performed by: PHYSICIAN ASSISTANT

## 2021-08-11 PROCEDURE — 97530 THERAPEUTIC ACTIVITIES: CPT | Mod: GP

## 2021-08-11 PROCEDURE — 82310 ASSAY OF CALCIUM: CPT | Performed by: PHYSICIAN ASSISTANT

## 2021-08-11 PROCEDURE — 83690 ASSAY OF LIPASE: CPT | Performed by: PHYSICIAN ASSISTANT

## 2021-08-11 PROCEDURE — 250N000013 HC RX MED GY IP 250 OP 250 PS 637: Performed by: TRANSPLANT SURGERY

## 2021-08-11 RX ORDER — KETOROLAC TROMETHAMINE 15 MG/ML
15 INJECTION, SOLUTION INTRAMUSCULAR; INTRAVENOUS EVERY 6 HOURS
Status: COMPLETED | OUTPATIENT
Start: 2021-08-11 | End: 2021-08-16

## 2021-08-11 RX ORDER — POTASSIUM CHLORIDE 29.8 MG/ML
20 INJECTION INTRAVENOUS
Status: COMPLETED | OUTPATIENT
Start: 2021-08-11 | End: 2021-08-11

## 2021-08-11 RX ORDER — BUPIVACAINE HYDROCHLORIDE 2.5 MG/ML
10 INJECTION, SOLUTION EPIDURAL; INFILTRATION; INTRACAUDAL ONCE
Status: COMPLETED | OUTPATIENT
Start: 2021-08-11 | End: 2021-08-11

## 2021-08-11 RX ORDER — FUROSEMIDE 10 MG/ML
5 INJECTION INTRAMUSCULAR; INTRAVENOUS ONCE
Status: COMPLETED | OUTPATIENT
Start: 2021-08-11 | End: 2021-08-11

## 2021-08-11 RX ADMIN — LORAZEPAM 0.5 MG: 2 INJECTION INTRAMUSCULAR; INTRAVENOUS at 20:00

## 2021-08-11 RX ADMIN — ACETAMINOPHEN 650 MG: 160 LIQUID ORAL at 11:23

## 2021-08-11 RX ADMIN — METHOCARBAMOL 500 MG: 100 INJECTION INTRAMUSCULAR; INTRAVENOUS at 16:26

## 2021-08-11 RX ADMIN — DIPHENHYDRAMINE HYDROCHLORIDE 25 MG: 50 INJECTION, SOLUTION INTRAMUSCULAR; INTRAVENOUS at 03:05

## 2021-08-11 RX ADMIN — LORAZEPAM 0.5 MG: 2 INJECTION INTRAMUSCULAR; INTRAVENOUS at 02:35

## 2021-08-11 RX ADMIN — Medication 40 MG: at 20:21

## 2021-08-11 RX ADMIN — POTASSIUM CHLORIDE 20 MEQ: 29.8 INJECTION, SOLUTION INTRAVENOUS at 08:40

## 2021-08-11 RX ADMIN — ACETAMINOPHEN 650 MG: 160 LIQUID ORAL at 17:18

## 2021-08-11 RX ADMIN — DOCUSATE SODIUM 50 MG: 50 LIQUID ORAL at 20:21

## 2021-08-11 RX ADMIN — ONDANSETRON 4 MG: 2 INJECTION INTRAMUSCULAR; INTRAVENOUS at 15:32

## 2021-08-11 RX ADMIN — KETOROLAC TROMETHAMINE 15 MG: 15 INJECTION, SOLUTION INTRAMUSCULAR; INTRAVENOUS at 20:00

## 2021-08-11 RX ADMIN — FLUCONAZOLE IN SODIUM CHLORIDE 200 MG: 2 INJECTION, SOLUTION INTRAVENOUS at 20:01

## 2021-08-11 RX ADMIN — FUROSEMIDE 5 MG: 10 INJECTION, SOLUTION INTRAVENOUS at 11:53

## 2021-08-11 RX ADMIN — METHOCARBAMOL 500 MG: 100 INJECTION INTRAMUSCULAR; INTRAVENOUS at 09:34

## 2021-08-11 RX ADMIN — KETOROLAC TROMETHAMINE 15 MG: 15 INJECTION, SOLUTION INTRAMUSCULAR; INTRAVENOUS at 15:20

## 2021-08-11 RX ADMIN — KETAMINE HYDROCHLORIDE 5 MG: 10 INJECTION INTRAMUSCULAR; INTRAVENOUS at 16:22

## 2021-08-11 RX ADMIN — LORAZEPAM 0.5 MG: 2 INJECTION INTRAMUSCULAR; INTRAVENOUS at 15:19

## 2021-08-11 RX ADMIN — Medication 15 ML: at 08:39

## 2021-08-11 RX ADMIN — TRAZODONE HYDROCHLORIDE 100 MG: 100 TABLET ORAL at 22:06

## 2021-08-11 RX ADMIN — BUPIVACAINE HYDROCHLORIDE 25 MG: 2.5 INJECTION, SOLUTION EPIDURAL; INFILTRATION; INTRACAUDAL at 09:57

## 2021-08-11 RX ADMIN — SERTRALINE HYDROCHLORIDE 150 MG: 20 SOLUTION ORAL at 22:06

## 2021-08-11 RX ADMIN — ACETAMINOPHEN 650 MG: 160 LIQUID ORAL at 05:09

## 2021-08-11 RX ADMIN — ONDANSETRON 4 MG: 2 INJECTION INTRAMUSCULAR; INTRAVENOUS at 08:30

## 2021-08-11 RX ADMIN — SENNOSIDES A AND B 5 ML: 415.36 LIQUID ORAL at 20:21

## 2021-08-11 RX ADMIN — VANCOMYCIN HYDROCHLORIDE 1000 MG: 1 INJECTION, SOLUTION INTRAVENOUS at 05:09

## 2021-08-11 RX ADMIN — Medication 40 MG: at 08:31

## 2021-08-11 RX ADMIN — METHOCARBAMOL 500 MG: 100 INJECTION INTRAMUSCULAR; INTRAVENOUS at 03:08

## 2021-08-11 RX ADMIN — HUMAN INSULIN 0.1 UNITS/HR: 100 INJECTION, SOLUTION SUBCUTANEOUS at 22:03

## 2021-08-11 RX ADMIN — VANCOMYCIN HYDROCHLORIDE 1000 MG: 1 INJECTION, SOLUTION INTRAVENOUS at 18:10

## 2021-08-11 RX ADMIN — ACETAMINOPHEN 650 MG: 160 LIQUID ORAL at 22:38

## 2021-08-11 RX ADMIN — GABAPENTIN 300 MG: 250 SUSPENSION ORAL at 10:31

## 2021-08-11 RX ADMIN — ERTAPENEM SODIUM 1 G: 1 INJECTION, POWDER, LYOPHILIZED, FOR SOLUTION INTRAMUSCULAR; INTRAVENOUS at 22:04

## 2021-08-11 RX ADMIN — SODIUM CHLORIDE, SODIUM LACTATE, POTASSIUM CHLORIDE, CALCIUM CHLORIDE AND DEXTROSE MONOHYDRATE: 5; 600; 310; 30; 20 INJECTION, SOLUTION INTRAVENOUS at 21:17

## 2021-08-11 RX ADMIN — GABAPENTIN 300 MG: 250 SUSPENSION ORAL at 20:01

## 2021-08-11 RX ADMIN — SODIUM PHOSPHATE, MONOBASIC, MONOHYDRATE 15 MMOL: 276; 142 INJECTION, SOLUTION INTRAVENOUS at 11:40

## 2021-08-11 RX ADMIN — KETAMINE HYDROCHLORIDE 10 MG: 10 INJECTION INTRAMUSCULAR; INTRAVENOUS at 09:36

## 2021-08-11 RX ADMIN — LORAZEPAM 0.5 MG: 2 INJECTION INTRAMUSCULAR; INTRAVENOUS at 08:39

## 2021-08-11 RX ADMIN — POTASSIUM CHLORIDE 20 MEQ: 29.8 INJECTION, SOLUTION INTRAVENOUS at 09:50

## 2021-08-11 RX ADMIN — SODIUM CHLORIDE, SODIUM LACTATE, POTASSIUM CHLORIDE, CALCIUM CHLORIDE AND DEXTROSE MONOHYDRATE: 5; 600; 310; 30; 20 INJECTION, SOLUTION INTRAVENOUS at 05:09

## 2021-08-11 RX ADMIN — KETOROLAC TROMETHAMINE 15 MG: 15 INJECTION, SOLUTION INTRAMUSCULAR; INTRAVENOUS at 08:23

## 2021-08-11 RX ADMIN — KETAMINE HYDROCHLORIDE 10 MG: 10 INJECTION INTRAMUSCULAR; INTRAVENOUS at 03:10

## 2021-08-11 RX ADMIN — POTASSIUM CHLORIDE 20 MEQ: 29.8 INJECTION, SOLUTION INTRAVENOUS at 22:38

## 2021-08-11 RX ADMIN — DIPHENHYDRAMINE HYDROCHLORIDE 25 MG: 50 INJECTION, SOLUTION INTRAMUSCULAR; INTRAVENOUS at 20:20

## 2021-08-11 RX ADMIN — METHOCARBAMOL 500 MG: 100 INJECTION INTRAMUSCULAR; INTRAVENOUS at 21:17

## 2021-08-11 RX ADMIN — POTASSIUM CHLORIDE 20 MEQ: 29.8 INJECTION, SOLUTION INTRAVENOUS at 21:18

## 2021-08-11 RX ADMIN — KETAMINE HYDROCHLORIDE 5 MG: 10 INJECTION INTRAMUSCULAR; INTRAVENOUS at 21:17

## 2021-08-11 ASSESSMENT — ACTIVITIES OF DAILY LIVING (ADL)
ADLS_ACUITY_SCORE: 17
ADLS_ACUITY_SCORE: 20
PREVIOUS_RESPONSIBILITIES: HOUSEKEEPING;LAUNDRY
ADLS_ACUITY_SCORE: 17

## 2021-08-11 ASSESSMENT — MIFFLIN-ST. JEOR: SCORE: 1288.1

## 2021-08-11 NOTE — PROGRESS NOTES
Admitted/transferred from: 4A  Time of arrival on unit 2038  2 RN full  skin assessment completed by Yunier VALENCIA & Brianna HAWKINS  Skin assessment finding: issues found Midline incision with liquid bandage, right ARTEMIO, G/T tube   Interventions/actions: skin interventions protective Mepilex over sacrum & turning q 2 hours.     Will continue to monitor.

## 2021-08-11 NOTE — SIGNIFICANT EVENT
SPIRITUAL HEALTH SERVICES Significant Event  [employer] Sacrament of ANOINTING  [facility] ([place] 7a    Pt anointed by Father Ezekiel Rock   Pager [phone]

## 2021-08-11 NOTE — PROGRESS NOTES
"[facility] Nerve Block Daily Progress Note  08/10/21    24 Hour Events  - Overnight no acute events  - NG/OG tube, Miller catheter in place  - has not been OOB yet.    - Diet: NPO    Subjective  - Patient reports generalized abdominal pain, describes as tolerable but hurts to take a deep breath  - Tolerating nerve block catheter infusion, and good relief with hand delivered local anesthetic bolus last evening.  Patient requests a bolus this morning.  History of chronic abdominal pain managed outpatient on oxymorphone 10 mg PO BID and Dilaudid 4 mg PO PRN, tolerating IV and enteral analgesia as ordered.    - Denies LE weakness, paresthesias, circumoral numbness, metallic taste, tinnitus    - Clinician hand-administered local anesthetic bolus:  0905   VSS.  PF bupivacaine 0.25%, total amount given 10 mL, 5 mL via each nerve block catheter, administered incrementally; negative aspirate before and during bolus.  No symptoms of local anesthetic systemic toxicity (LAST). Remained with and assessed patient for 10 min post-injection. BP, P and MAP stable. Bedside RN aware of need to continue BP, P and MAP monitoring Q 10 min for an additional 20 min. Contact RAPS (jobcode ID: [id number]) if experiencing any untoward effects or MAP less than 60    Objective  Exam  Vitals:   /76 (BP Location: Right arm)   Pulse 116   Temp 37.6  C (99.7  F) (Oral)   Resp 14   Ht 1.549 m (5' 1\")   Wt 74.1 kg (163 lb 4.8 oz)   SpO2 98%   BMI 30.86 kg/m        General: Alert, oriented, NAD  MSK/Neuro: Strength BLE 5/5 and overall symmetric.    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 Prior to surgery RAPS placed bilateral T6-7 erector spinae " (ES) catheters for analgesia. Pain well controlled with multimodal analgesia. Motor function intact and no evidence of adverse side effects related to local anesthetic continuous infusion.     Plan  - Catheter Day #1 bilateral T6-7 ES catheters/infusion:    Continue ropivacaine 0.2% via OnQ infusion at 7 mL/hr each catheter, total infusion 14 mL/hr   o Plan to maintain catheters max of 7 days  o On-Q device is due to be changed tomorrow    Patient can be evaluated to receive local anesthetic bolus Q 12 hr PRN pain, bedside RN should page RAPS to request bolus    - Antithrombotics/Thrombolytics ordered:     Okay to continue Heparin IV infusion at 400 units/hr as ordered by primary service  o Please contact RAPS jobcode pager [phone] before ordering or making any medication changes    RAPS will continue to follow and adjust as needed       Analgesic Medications ordered:  Medications related to Pain Management (From now, onward)    Start     Dose/Rate Route Frequency Ordered Stop    08/10/21 0800  gabapentin (NEURONTIN) solution 300 mg      300 mg Oral or J tube 2 TIMES DAILY 08/09/21 2022 08/09/21 2200  LORazepam (ATIVAN) injection 0.5 mg      0.5 mg Intravenous EVERY 6 HOURS 08/09/21 1824 08/09/21 2200  acetaminophen (TYLENOL) solution 650 mg      650 mg Oral EVERY 6 HOURS 08/09/21 1824 08/09/21 2030  HYDROmorphone (DILAUDID) PCA 0.2 mg/mL OPIOID TOLERANT       Intravenous CONTINUOUS 08/09/21 2022 08/09/21 2022  lidocaine 1 % 0.1-1 mL      0.1-1 mL Other EVERY 1 HOUR PRN 08/09/21 2022 08/09/21 2022  lidocaine (LMX4) cream       Topical EVERY 1 HOUR PRN 08/09/21 2022 08/09/21 2022  bisacodyl (DULCOLAX) Suppository 10 mg      10 mg Rectal DAILY PRN 08/09/21 2022 08/09/21 2000  dexmedetomidine (PRECEDEX) 400 mcg in 0.9% sodium chloride 100 mL      0.2-0.7 mcg/kg/hr × 55.2 kg  2.8-9.7 mL/hr  Intravenous CONTINUOUS 08/09/21 1932 08/09/21 1900  ketamine (KETALAR) injection 5-10 mg       5-10 mg  over 2-5 Minutes Intravenous EVERY 6 HOURS 08/09/21 1824 08/09/21 1900  methocarbamol (ROBAXIN) injection 500 mg      500 mg Intravenous EVERY 6 HOURS 08/09/21 1824 08/12/21 2059 08/09/21 0830  ropivacaine 0.2% (NAROPIN) 750 mL in ON-Q C-Bloc select flow ([id number] holds 600-750 mL) dual cath disposable pump      14 mL/hr  Irrigation CONTINUOUS 08/09/21 0811                Labs/Diagnostics  Heme/INR:  Recent Labs   Lab Test 08/10/21  0823 08/10/21  0414 08/10/21  0012 08/09/21  1842   WBC  --   --  11.1* 8.2   RBC  --   --  2.97* 3.60*   HGB 8.0* 9.5* 8.5* 10.2*   HCT  --   --  25.5* 30.8*   MCV  --   --  86 86   MCH  --   --  28.6 28.3   MCHC  --   --  33.3 33.1   RDW  --   --  13.2 13.2   PLT  --   --  149* 208       Lab Results   Component Value Date    INR 1.45 08/09/2021    INR 0.99 08/09/2021    INR 1.00 08/04/2021     ---------------------------  Parish Villalobos MD  [facility]  8/10/2021 9:10 AM    RAPS Contact Info (24 hour job code pager is the last 4 digits) For in-house use only:   Job code ID: [id number] [id number]   [facility] [place] [facility]  [facility] [address]  [employer] phone: dial * * * [phone], enter jobcode ID, then enter call-back number.    Text: Use [employer] on the Intranet <Paging/Directory> tab and enter Jobcode ID.   If no call back at any time, contact the hospital  and ask for RAPS attending or backup

## 2021-08-11 NOTE — PHARMACY-VANCOMYCIN DOSING SERVICE
Pharmacy Vancomycin Note  Date of Service 2021  Patient's  1970   51 year old, female    Indication: Surgical Prophylaxis  Day of Therapy: 3  Current vancomycin regimen:  1000 mg IV q12h  Current vancomycin monitoring method: AUC  Current vancomycin therapeutic monitoring goal: 400-600 mg*h/L    Current estimated CrCl = Estimated Creatinine Clearance: 116.5 mL/min (based on SCr of 0.52 mg/dL).    Creatinine for last 3 days  2021:  7:03 AM Creatinine 0.70 mg/dL;  6:42 PM Creatinine 0.56 mg/dL  8/10/2021:  4:14 AM Creatinine 0.62 mg/dL  2021:  6:35 AM Creatinine 0.52 mg/dL    Recent Vancomycin Levels (past 3 days)  2021:  5:11 PM Vancomycin 7.4 mg/L    Vancomycin IV Administrations (past 72 hours)                   vancomycin (VANCOCIN) 1000 mg in dextrose 5% 200 mL PREMIX (mg) 1,000 mg New Bag 21 1810     1,000 mg New Bag  0509     1,000 mg New Bag 08/10/21 1655     1,000 mg New Bag  0421    vancomycin (VANCOCIN) 1000 mg in dextrose 5% 200 mL PREMIX (mg) 1,000 mg Given 21 1710                Nephrotoxins and other renal medications (From now, onward)    Start     Dose/Rate Route Frequency Ordered Stop    21 0730  ketorolac (TORADOL) injection 15 mg      15 mg Intravenous EVERY 6 HOURS 21 0717 21 0829    08/10/21 0500  vancomycin (VANCOCIN) 1000 mg in dextrose 5% 200 mL PREMIX      1,000 mg  200 mL/hr over 1 Hours Intravenous EVERY 12 HOURS 21 2206               Contrast Orders - past 72 hours (72h ago, onward)    None          Interpretation of levels and current regimen:  Vancomycin level is reflective of AUC less than 400    Has serum creatinine changed greater than 50% in last 72 hours: No    Urine output:  good urine output    Renal Function: Stable    Current Regimen   Regimen: 1000 mg IV every 12 hours.  Start time: 06:10 on 2021  Exposure target: AUC24 (range)400-600 mg/L.hr   AUC24,ss: 378 mg/L.hr  Probability of AUC24 > 400: 38  %  Ctrough,ss: 9 mg/L  Probability of Ctrough,ss > 20: 0 %  Probability of nephrotoxicity (Lodise HOLLIS 2009): 5 %    New regimen   Regimen: 1250 mg IV every 12 hours.  Start time: 06:10 on 08/12/2021  Exposure target: AUC24 (range)400-600 mg/L.hr   AUC24,ss: 472 mg/L.hr  Probability of AUC24 > 400: 81 %  Ctrough,ss: 11.4 mg/L  Probability of Ctrough,ss > 20: 2 %  Probability of nephrotoxicity (Lodise HOLLIS 2009): 7 %    Plan:  1. Increase Dose to  vancomycin 1250mg IV q12h   2. Vancomycin monitoring method: AUC  3. Vancomycin therapeutic monitoring goal: 400-600 mg*h/L  4. Pharmacy will check vancomycin levels as appropriate in 1-3 Days.  5. Serum creatinine levels will be ordered daily for the first week of therapy and at least twice weekly for subsequent weeks.    Carleen Nuñez, [place]init[place], BCPS  Pager: [phone]

## 2021-08-11 NOTE — PLAN OF CARE
"/76 (BP Location: Right arm)   Pulse 116   Temp 99.7  F (37.6  C) (Oral)   Resp 14   Ht 1.549 m (5' 1\")   Wt 57.6 kg (127 lb)   SpO2 98%   BMI 24.00 kg/m        Reason for Admission: Pt. transferred from  last evening. Pt. oriented to call light & unit routines. 2 RN skin check complete. Call light in reach.   Neuro:  Pt. alert & Ox4  Behavior:  Pt. anxious at times & receiving sched. IV Ativan, cooperative with cares.   Activity: Pt. up with assist of 1-2.  Vital: Pt. Tachy 100's-110's,T-max: 99.9 oral, OVSS on 1-2L/NC. 88% on RA.   LDAs: Right chest port accessed & saline locked. Left internal jugular with D5LR at 100cc/hour,  NS at TKO w/ insulin gtt. Left PIV with Heparin gtt at 400units/hour, straight rate + Dilaudid PCA. Right PIV saline locked. GT to gravity with 150cc green output, JT for tube feeds. Right ARTEMIO with 40cc serosang. drainage.    Endocrine: BG range: , insulin gtt at   4units/hour per algorithm 3.  Cardiac: Tachycardia  Respiratory: LS clear bilat.   GI/: Miller with 650cc output. No stools since prior to surgery. Pt. not passing gas.  Last BM 8/8/21.   Skin: Midline incision with liquid bandage & c/d/I. Protective Mepilex over sacrum.  Pain: Abdominal pain controlled with Dilaudid PCA, On-q blocks x2, sched. Robaxin, Tylenol, Ketamine & Gabapentin. Next On-q bolus at 1045.  Diet: NPO. Continuous tube feeds at 10cc/hour via JT.  Labs/Imaging: Hgb 8.0 at 2158 & Dr. Benito notified & no orders to treat.  Plan:  Encourage IS & ambulation. Continue to follow POC & notify MD with change in status.    "

## 2021-08-11 NOTE — PROGRESS NOTES
[facility]IONAL ANESTHESIA PAIN SERVICE CONTINUOUS NERVE INFUSION NOTE  August 11, 2021    Meme Robins is a 51-year-old woman with chronic pancreatitis, who is s/p 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 Shirley. and placement of bilateral erector spinae (ES) T6-7 catheters by RAPS on 8/9/21 for analgesia.    Subjective and Interval History: Overnight events: no acute event.  Seen at 0920.  Patient reports 4/10 at rest and 6/10 with activity, moderate pain control with current nerve block infusion and analgesics (see below).  Sitting up in chair,   denies weakness, paresthesias, circumoral numbness, metallic taste or tinnitus. NPO, on TF,  denies nausea.    Antithrombotic/Thrombolytic Therapy ordered:  Heparin infusion 400 units/hour    Analgesic Medications:   Medications related to Pain Management (From now, onward)    Start     Dose/Rate Route Frequency Ordered Stop    08/11/21 0730  ketorolac (TORADOL) injection 15 mg      15 mg Intravenous EVERY 6 HOURS 08/11/21 0717 08/16/21 0729    08/10/21 2200  acetaminophen *SUGAR FREE* (TYLENOL) solution 650 mg      650 mg Per J Tube EVERY 6 HOURS 08/10/21 2144      08/10/21 2100  ketamine (KETALAR) injection 5-10 mg      5-10 mg  over 2-5 Minutes Intravenous EVERY 6 HOURS 08/10/21 2044      08/10/21 1310  diphenhydrAMINE (BENADRYL) injection 25 mg      25 mg Intravenous EVERY 6 HOURS PRN 08/10/21 1310      08/10/21 0800  gabapentin (NEURONTIN) solution 300 mg      300 mg Oral or J tube 2 TIMES DAILY 08/09/21 2022 08/09/21 2200  LORazepam (ATIVAN) injection 0.5 mg      0.5 mg Intravenous EVERY 6 HOURS 08/09/21 1824      08/09/21 2030  HYDROmorphone (DILAUDID) PCA 0.2 mg/mL OPIOID TOLERANT       Intravenous CONTINUOUS 08/09/21 2022 08/09/21 2022  lidocaine 1 % 0.1-1 mL      0.1-1 mL Other EVERY 1 HOUR PRN 08/09/21 2022 08/09/21 2022  lidocaine  (LMX4) cream       Topical EVERY 1 HOUR PRN 08/09/21 2022 08/09/21 2022  bisacodyl (DULCOLAX) Suppository 10 mg      10 mg Rectal DAILY PRN 08/09/21 2022 08/09/21 1900  methocarbamol (ROBAXIN) injection 500 mg      500 mg Intravenous EVERY 6 HOURS 08/09/21 1824 08/12/21 2059 08/09/21 0830  ropivacaine 0.2% (NAROPIN) 750 mL in ON-Q C-Bloc select flow ([id number] holds 600-750 mL) dual cath disposable pump      14 mL/hr  Irrigation CONTINUOUS 08/09/21 0811             Objective:  Lab results  Recent Labs   Lab Test 08/11/21  0635   WBC 17.4*   RBC 2.45*   HGB 7.0*   HCT 21.7*   MCV 89   MCH 28.6   MCHC 32.3   RDW 13.9          Lab Results   Component Value Date    INR 1.45 08/09/2021    INR 0.99 08/09/2021    INR 1.00 08/04/2021       Vitals:    Temp:  [98.6  F (37  C)-99.9  F (37.7  C)] 99.7  F (37.6  C)  Pulse:  [] 116  Resp:  [10-27] 14  BP: (119-145)/(66-97) 125/76  MAP:  [66 mmHg-119 mmHg] 118 mmHg  Arterial Line BP: ()/(47-85) 170/84  SpO2:  [88 %-100 %] 98 %    Exam:   GEN: alert and no distress  NEURO/MSK: Moving UE and LE independently  Strength LE 5/5  and overall symmetric  SKIN: bilateral erector spinae (ES) catheter sites with dressing c/d/i, no tenderness, erythema, heme, edema    Assessment and Plan:     ASSESSMENT  Patient is receiving moderate  analgesia with current multimodal therapy (has PCA Dilaudid continuous rate of 0.3mg and bumps of 0.2mg every 10 minutes)  including 0.2% ropivacaine On-Q continuous infusion at 14 mL/hr via 7ml/hour on left and right catheter.  Motor function intact and is meeting activity goals.  NO evidence of adverse side effects related to local anesthetic. H/o chronic opioid use. PTA was on OxyContin 10mg TID and Dilaudid 4mg every 4-6 hours PRN ( did stop short acting use PTA).    Bolus administered at 0940    MEDICATION: PF bupivacaine  0.25%, total bolus 10mL, via 5 ml each catheter.    PROCEDURE: Clinician bolus administered via left and  right  nerve block catheter, without complication, negative aspirate before and between each mL.  No symptoms of local anesthetic systemic toxicity (LAST). Remained with and assessed patient for 10 min post-injection. BP, P and MAP stable    POST-PROCEDURE: Bedside RN aware of need to continue BP, P and MAP monitoring Q 10 min for an additional 20 min. Contact RAPS (jobcode ID [id number]) if any of the following: patient experiencing any untoward effects, SBP< 90, P < 50 or > 120, MAP < 60     - patient can be evaluated to receive local anesthetic bolus Q 12 hr PRN pain not controlled with continuous infusion.  Bedside nurse must page RAPS to request bolus      PLAN  Catheter Day #2 bilateral erector spinae (ES) T6-7 catheters/ continuous on Q infusion:      Continue 0.2% ropivacaine continuous on Q infusion at 7 mL/hr each catheter, total infusion 14mL/hr, max of 7 days  o  Expected change of next On-Q pump is today    Patient can be evaluated to receive local anesthetic bolus Q 12 hr PRN, bedside nurse must page RAPS #: [id number] to request bolus    Antithrombotic/thrombolytic therapy:     Heparin infusion 400units/ hr as ordered per primary service.   o Please contact RAPS, jobcode ID: [id number] prior to any medication changes    Follow up:      RAPS will continue to follow and adjust as needed    - discussed plan with attending anesthesiologist    Shannon Barnett PA-C  [facility]  8/11/2021 7:42 AM    RAPS Contact Info (24 hour job code pager is the last 4 digits) For in-house use only:   Job code ID: [id number]   [place] 0599  [facility] 0602  [employer] phone: dial * * * [phone], enter jobcode ID, then enter call-back number.    Text: Use [email or web address] on the Intranet <Paging/Directory> tab and enter Jobcode ID.   If no call back at any time, contact the hospital  and ask for RAPS attending or backup

## 2021-08-11 NOTE — PROGRESS NOTES
REGIONAL ANESTHESIA PAIN SERVICE (RAPS) EVALUATION:    Summary: Bilateral ES Bolus   - Time: 22:45  - Subjective: Patient reports 6/10 pain intensity with current therapy of continuous infusion at 7mL/hr bilateral ES catheters (0.2% ropivacaine) with hydromorphone PCA before bolus given as described below.  - Objective:  Current vitals: /76, off of pressors  General: alert, appears sedated and itchy, comfortable, although endorsing 6/10 pain...  Skin and dressing CDI, no erythema or swelling, slight dry blood beneath clear dressing    - Assessment/Plan     INTERVENTION: Bilateral Bolus administered    MEDICATION: PF bupivacaine 0.125%  total bolus 20mL, 10 mL via each catheter and given slowly over 20 minutes at 22:45, administered without complication with negative aspirate before and between each mL.  No symptoms of local anesthetic systemic toxicity (LAST). Post bolus vitals stable for next 5min. Bedside RN aware of need to continue BP, P and MAP monitoring Q 10 min for an additional 20 min. Contact RAPS if any of the following: patient experiencing any untoward effects, SBP< 90, P < 50 or > 120, MAP < 60. Patient can be evaluated to receive local anesthetic bolus Q 12 hr PRN pain not controlled with continuous infusion.  Bedside nurse must page RAPS to request bolus    RAPS Contact Info (24 hour job code pager is the last 4 digits) For in-house use only:  [employer] phone: [place] [phone], [place] [phone], [facility] [phone], then enter call-back number.    Text: Use [employer] on the Intranet <Paging/Directory> tab and enter Jobcode ID.   If no call back at any time, contact the hospital  and ask for RAPS attending or backup     Stu Prescott MD   Anesthesia resident CA-2  08/10/2021   [facility] [phone]

## 2021-08-11 NOTE — PROGRESS NOTES
08/11/21 1346   Quick Adds   Type of Visit Initial Occupational Therapy Evaluation   Living Environment   People in home spouse;child(dveorah), dependent   Current Living Arrangements house   Home Accessibility stairs within home;stairs to enter home   Number of Stairs, Main Entrance greater than 10 stairs   Stair Railings, Main Entrance railings safe and in good condition   Number of Stairs, Within Home, Primary 2   Stair Railings, Within Home, Primary railings safe and in good condition   Transportation Anticipated family or friend will provide   Living Environment Comments Pt will be discharging to nearby [facility] with . Pt unsure of specifics, but reports she will likely have tub shower, no grab bars or shower chair. Bedroom/bathroom on top level up 1 flight   Self-Care   Usual Activity Tolerance good   Current Activity Tolerance fair   Regular Exercise Yes   Activity/Exercise Type walking   Exercise Amount/Frequency 45 mins;1 hr;3-5 times/wk   Equipment Currently Used at Home none   Activity/Exercise/Self-Care Comment IND at baseline w/ ADLs/IADLs   Instrumental Activities of Daily Living (IADL)   Previous Responsibilities housekeeping;laundry   IADL Comments  has been performing most IADLs recently, able to assist with all upon d/c   Disability/Function   Hearing Difficulty or Deaf no   Wear Glasses or Blind yes   Vision Management glasses   Concentrating, Remembering or Making Decisions Difficulty no   Difficulty Communicating no   Difficulty Eating/Swallowing no   Walking or Climbing Stairs Difficulty no   Dressing/Bathing Difficulty no   Toileting issues no   Doing Errands Independently Difficulty (such as shopping) no   Fall history within last six months yes   Number of times patient has fallen within last six months 1  (reaching in bathtub led to fall, pt challenged to describe )   Change in Functional Status Since Onset of Current Illness/Injury yes   General Information   Onset of  "Illness/Injury or Date of Surgery 08/09/21   Referring Physician Marycruz Guerra PA-C   Patient/Family Therapy Goal Statement (OT) Regain strength/IND, return to yoga practice   Additional Occupational Profile Info/Pertinent History of Current Problem Meme Robins is a 51-year-old woman with chronic pancreatitis, who is s/p 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 Shirley. 2300 IE/kg. Intraop hypotension during islet infusion, thought to be 2/2 anaphylaxis and treated with benadryl, steroids and resolved.    Existing Precautions/Restrictions abdominal;fall   Left Upper Extremity (Weight-bearing Status) other (see comments)  (10#)   Right Upper Extremity (Weight-bearing Status) other (see comments)  (10#)   Left Lower Extremity (Weight-bearing Status) full weight-bearing (FWB)   Right Lower Extremity (Weight-bearing Status) full weight-bearing (FWB)   General Observations and Info Activity: Up w/ A   Cognitive Status Examination   Orientation Status orientation to person, place and time   Cognitive Status Comments Pt slurring speech throughout, appearing somnolent likely d/t recent pain medication. Pt endorses feeling \"foggy\" prior to surgery d/t chronic pain medication use at home, looking forward to weaning off pain meds and feeling \"clearer\". Will continue to monitor   Visual Perception   Visual Impairment/Limitations corrective lenses full-time   Sensory   Sensory Quick Adds No deficits were identified   Pain Assessment   Patient Currently in Pain Yes, see Vital Sign flowsheet  (4/10)   Integumentary/Edema   Integumentary/Edema no deficits were identifed   Posture   Posture not impaired   Range of Motion Comprehensive   General Range of Motion bilateral upper extremity ROM WFL   Strength Comprehensive (MMT)   Comment, General Manual Muscle Testing (MMT) Assessment Defer formal MMT d/t " precautions, appears at least 3+/5 MMT   Coordination   Upper Extremity Coordination No deficits were identified   Clinical Impression   Criteria for Skilled Therapeutic Interventions Met (OT) yes;meets criteria;skilled treatment is necessary   OT Diagnosis Decreased ADL/IADL I   OT Problem List-Impairments impacting ADL problems related to;activity tolerance impaired;cognition;strength;pain;post-surgical precautions   Assessment of Occupational Performance 5 or more Performance Deficits   Identified Performance Deficits Dressing, bathing, toileting, g/h, home mgmt   Planned Therapy Interventions (OT) ADL retraining;IADL retraining;cognition;strengthening;transfer training;home program guidelines;progressive activity/exercise;risk factor education   Clinical Decision Making Complexity (OT) low complexity   Therapy Frequency (OT) Daily   Predicted Duration of Therapy 2 weeks   Anticipated Equipment Needs Upon Discharge (OT) shower chair   Risk & Benefits of therapy have been explained evaluation/treatment results reviewed;risks/benefits reviewed;care plan/treatment goals reviewed;current/potential barriers reviewed;participants voiced agreement with care plan;participants included;patient;spouse/significant other   Comment-Clinical Impression Pt will benefit from skilled OT services to progress ADL/IADL I and support safe discharge plan   OT Discharge Planning    OT Discharge Recommendation (DC Rec) Home with assist;home with home care occupational therapy   OT Rationale for DC Rec Pt presents significantly below baseline, limited by pain and deconditioning. Pending LOS and progression with therapies, anticipate pt will be appropriate to d/c home w/ A, may benefit from  OT to continue to progress ADL/IADL I in home setting   OT Brief overview of current status  CGA w/ FWW   Total Evaluation Time (Minutes)   Total Evaluation Time (Minutes) 5

## 2021-08-11 NOTE — PLAN OF CARE
"/66 (BP Location: Right arm)   Pulse 97   Temp 98.3  F (36.8  C) (Oral)   Resp 18   Ht 1.549 m (5' 1\")   Wt 74.1 kg (163 lb 4.8 oz)   SpO2 100%   BMI 30.86 kg/m      Shift: 1732-3171  VS: soft pressures (90/60s), tachycardic (100s) OVSS on 1L NC, afebrile  Neuro: AOx4  BG: insulin gtt alg 1 ()  Labs: K 3.2, IV replacement given (recheck 1930); Phos 1.7, IV replacement given (recheck 2200)  Respiratory: wdl, IS encouraged  Pain/Nausea/PRN: scheduled tylenol, toradol, ketamine, ativan given; OnQ @ 7x7; Dil PCA @ 0.3mg with 0.2mg q10min  Diet: NPO with ice chips; TF @ 20ml/h via J tube  LDA: LIJ with D5LR @ 75ml/h, TKO with gtts and SR heparin @ 400 units/h; ARTEMIO with small amount of serosanguinous output  GI/: shook with good UO (IV lasix given); negative for BM/gas, active BS  Skin: incision dermabonded, bruising present  Mobility: up with assist x1-2, up in chair and ambulated room  Plan: manage pain and monitor BG    Handoff given to following RN.    "

## 2021-08-12 LAB
ANION GAP SERPL CALCULATED.3IONS-SCNC: 4 MMOL/L (ref 3–14)
APTT PPP: 55 SECONDS (ref 22–38)
BASOPHILS # BLD AUTO: 0 10E3/UL (ref 0–0.2)
BASOPHILS NFR BLD AUTO: 0 %
BLD PROD TYP BPU: NORMAL
BLOOD COMPONENT TYPE: NORMAL
BUN SERPL-MCNC: 7 MG/DL (ref 7–30)
CALCIUM SERPL-MCNC: 7.4 MG/DL (ref 8.5–10.1)
CHLORIDE BLD-SCNC: 110 MMOL/L (ref 94–109)
CO2 SERPL-SCNC: 31 MMOL/L (ref 20–32)
CODING SYSTEM: NORMAL
CREAT SERPL-MCNC: 0.6 MG/DL (ref 0.52–1.04)
CROSSMATCH: NORMAL
EOSINOPHIL # BLD AUTO: 0.5 10E3/UL (ref 0–0.7)
EOSINOPHIL NFR BLD AUTO: 6 %
ERYTHROCYTE [DISTWIDTH] IN BLOOD BY AUTOMATED COUNT: 14.3 % (ref 10–15)
GFR SERPL CREATININE-BSD FRML MDRD: >90 ML/MIN/1.73M2
GLUCOSE BLD-MCNC: 106 MG/DL (ref 70–99)
GLUCOSE BLDC GLUCOMTR-MCNC: 101 MG/DL (ref 70–99)
GLUCOSE BLDC GLUCOMTR-MCNC: 103 MG/DL (ref 70–99)
GLUCOSE BLDC GLUCOMTR-MCNC: 104 MG/DL (ref 70–99)
GLUCOSE BLDC GLUCOMTR-MCNC: 109 MG/DL (ref 70–99)
GLUCOSE BLDC GLUCOMTR-MCNC: 110 MG/DL (ref 70–99)
GLUCOSE BLDC GLUCOMTR-MCNC: 112 MG/DL (ref 70–99)
GLUCOSE BLDC GLUCOMTR-MCNC: 113 MG/DL (ref 70–99)
GLUCOSE BLDC GLUCOMTR-MCNC: 114 MG/DL (ref 70–99)
GLUCOSE BLDC GLUCOMTR-MCNC: 116 MG/DL (ref 70–99)
GLUCOSE BLDC GLUCOMTR-MCNC: 117 MG/DL (ref 70–99)
GLUCOSE BLDC GLUCOMTR-MCNC: 117 MG/DL (ref 70–99)
GLUCOSE BLDC GLUCOMTR-MCNC: 119 MG/DL (ref 70–99)
GLUCOSE BLDC GLUCOMTR-MCNC: 125 MG/DL (ref 70–99)
GLUCOSE BLDC GLUCOMTR-MCNC: 126 MG/DL (ref 70–99)
GLUCOSE BLDC GLUCOMTR-MCNC: 134 MG/DL (ref 70–99)
GLUCOSE BLDC GLUCOMTR-MCNC: 143 MG/DL (ref 70–99)
GLUCOSE BLDC GLUCOMTR-MCNC: 146 MG/DL (ref 70–99)
GLUCOSE BLDC GLUCOMTR-MCNC: 149 MG/DL (ref 70–99)
GLUCOSE BLDC GLUCOMTR-MCNC: 55 MG/DL (ref 70–99)
GLUCOSE BLDC GLUCOMTR-MCNC: 71 MG/DL (ref 70–99)
GLUCOSE BLDC GLUCOMTR-MCNC: 72 MG/DL (ref 70–99)
GLUCOSE BLDC GLUCOMTR-MCNC: 74 MG/DL (ref 70–99)
GLUCOSE BLDC GLUCOMTR-MCNC: 88 MG/DL (ref 70–99)
GLUCOSE BLDC GLUCOMTR-MCNC: 98 MG/DL (ref 70–99)
HCT VFR BLD AUTO: 17.8 % (ref 35–47)
HGB BLD-MCNC: 5.6 G/DL (ref 11.7–15.7)
HGB BLD-MCNC: 7.5 G/DL (ref 11.7–15.7)
HGB BLD-MCNC: 8.3 G/DL (ref 11.7–15.7)
IMM GRANULOCYTES # BLD: 0.1 10E3/UL
IMM GRANULOCYTES NFR BLD: 1 %
ISSUE DATE AND TIME: NORMAL
LYMPHOCYTES # BLD AUTO: 1 10E3/UL (ref 0.8–5.3)
LYMPHOCYTES NFR BLD AUTO: 11 %
MAGNESIUM SERPL-MCNC: 2 MG/DL (ref 1.6–2.3)
MCH RBC QN AUTO: 28.1 PG (ref 26.5–33)
MCHC RBC AUTO-ENTMCNC: 31.5 G/DL (ref 31.5–36.5)
MCV RBC AUTO: 89 FL (ref 78–100)
MONOCYTES # BLD AUTO: 0.6 10E3/UL (ref 0–1.3)
MONOCYTES NFR BLD AUTO: 7 %
NEUTROPHILS # BLD AUTO: 6.7 10E3/UL (ref 1.6–8.3)
NEUTROPHILS NFR BLD AUTO: 75 %
NRBC # BLD AUTO: 0 10E3/UL
NRBC BLD AUTO-RTO: 0 /100
PATH REPORT.COMMENTS IMP SPEC: NORMAL
PATH REPORT.FINAL DX SPEC: NORMAL
PATH REPORT.GROSS SPEC: NORMAL
PATH REPORT.MICROSCOPIC SPEC OTHER STN: NORMAL
PATH REPORT.RELEVANT HX SPEC: NORMAL
PHOSPHATE SERPL-MCNC: 2.7 MG/DL (ref 2.5–4.5)
PHOTO IMAGE: NORMAL
PLATELET # BLD AUTO: 142 10E3/UL (ref 150–450)
POTASSIUM BLD-SCNC: 4 MMOL/L (ref 3.4–5.3)
RBC # BLD AUTO: 1.99 10E6/UL (ref 3.8–5.2)
SODIUM SERPL-SCNC: 145 MMOL/L (ref 133–144)
UFH PPP CHRO-ACNC: <0.1 IU/ML
UNIT ABO/RH: NORMAL
UNIT NUMBER: NORMAL
UNIT STATUS: NORMAL
UNIT TYPE ISBT: 6200
WBC # BLD AUTO: 9 10E3/UL (ref 4–11)

## 2021-08-12 PROCEDURE — 258N000001 HC RX 258: Performed by: PHYSICIAN ASSISTANT

## 2021-08-12 PROCEDURE — 80048 BASIC METABOLIC PNL TOTAL CA: CPT | Performed by: PHYSICIAN ASSISTANT

## 2021-08-12 PROCEDURE — 250N000009 HC RX 250: Performed by: TRANSPLANT SURGERY

## 2021-08-12 PROCEDURE — 36592 COLLECT BLOOD FROM PICC: CPT | Performed by: PHYSICIAN ASSISTANT

## 2021-08-12 PROCEDURE — 83735 ASSAY OF MAGNESIUM: CPT | Performed by: TRANSPLANT SURGERY

## 2021-08-12 PROCEDURE — 120N000011 HC R&B TRANSPLANT UMMC

## 2021-08-12 PROCEDURE — 88307 TISSUE EXAM BY PATHOLOGIST: CPT | Mod: 26 | Performed by: PATHOLOGY

## 2021-08-12 PROCEDURE — 88305 TISSUE EXAM BY PATHOLOGIST: CPT | Mod: 26 | Performed by: PATHOLOGY

## 2021-08-12 PROCEDURE — 250N000009 HC RX 250: Performed by: STUDENT IN AN ORGANIZED HEALTH CARE EDUCATION/TRAINING PROGRAM

## 2021-08-12 PROCEDURE — P9016 RBC LEUKOCYTES REDUCED: HCPCS | Performed by: PHYSICIAN ASSISTANT

## 2021-08-12 PROCEDURE — 250N000011 HC RX IP 250 OP 636: Performed by: PHYSICIAN ASSISTANT

## 2021-08-12 PROCEDURE — 250N000013 HC RX MED GY IP 250 OP 250 PS 637: Performed by: TRANSPLANT SURGERY

## 2021-08-12 PROCEDURE — 250N000011 HC RX IP 250 OP 636: Performed by: TRANSPLANT SURGERY

## 2021-08-12 PROCEDURE — 258N000003 HC RX IP 258 OP 636: Performed by: TRANSPLANT SURGERY

## 2021-08-12 PROCEDURE — 250N000013 HC RX MED GY IP 250 OP 250 PS 637: Performed by: PHYSICIAN ASSISTANT

## 2021-08-12 PROCEDURE — 85025 COMPLETE CBC W/AUTO DIFF WBC: CPT | Performed by: PHYSICIAN ASSISTANT

## 2021-08-12 PROCEDURE — 88302 TISSUE EXAM BY PATHOLOGIST: CPT | Mod: 26 | Performed by: PATHOLOGY

## 2021-08-12 PROCEDURE — 86923 COMPATIBILITY TEST ELECTRIC: CPT | Performed by: PHYSICIAN ASSISTANT

## 2021-08-12 PROCEDURE — 84100 ASSAY OF PHOSPHORUS: CPT | Performed by: PHYSICIAN ASSISTANT

## 2021-08-12 PROCEDURE — 271N000002 HC RX 271: Performed by: STUDENT IN AN ORGANIZED HEALTH CARE EDUCATION/TRAINING PROGRAM

## 2021-08-12 PROCEDURE — P9016 RBC LEUKOCYTES REDUCED: HCPCS | Performed by: TRANSPLANT SURGERY

## 2021-08-12 PROCEDURE — 85520 HEPARIN ASSAY: CPT | Performed by: PHYSICIAN ASSISTANT

## 2021-08-12 PROCEDURE — 85018 HEMOGLOBIN: CPT | Performed by: PHYSICIAN ASSISTANT

## 2021-08-12 PROCEDURE — 85730 THROMBOPLASTIN TIME PARTIAL: CPT | Performed by: PHYSICIAN ASSISTANT

## 2021-08-12 RX ORDER — BUPIVACAINE HYDROCHLORIDE 2.5 MG/ML
10 INJECTION, SOLUTION EPIDURAL; INFILTRATION; INTRACAUDAL ONCE
Status: COMPLETED | OUTPATIENT
Start: 2021-08-12 | End: 2021-08-12

## 2021-08-12 RX ORDER — FUROSEMIDE 10 MG/ML
10 INJECTION INTRAMUSCULAR; INTRAVENOUS ONCE
Status: DISCONTINUED | OUTPATIENT
Start: 2021-08-12 | End: 2021-08-12

## 2021-08-12 RX ORDER — BISACODYL 10 MG
10 SUPPOSITORY, RECTAL RECTAL DAILY
Status: DISCONTINUED | OUTPATIENT
Start: 2021-08-12 | End: 2021-08-18

## 2021-08-12 RX ORDER — FUROSEMIDE 10 MG/ML
10 INJECTION INTRAMUSCULAR; INTRAVENOUS ONCE
Status: COMPLETED | OUTPATIENT
Start: 2021-08-12 | End: 2021-08-12

## 2021-08-12 RX ADMIN — BUPIVACAINE HYDROCHLORIDE 25 MG: 2.5 INJECTION, SOLUTION EPIDURAL; INFILTRATION; INTRACAUDAL; PERINEURAL at 10:30

## 2021-08-12 RX ADMIN — ACETAMINOPHEN 650 MG: 160 LIQUID ORAL at 05:03

## 2021-08-12 RX ADMIN — KETAMINE HYDROCHLORIDE 5 MG: 10 INJECTION INTRAMUSCULAR; INTRAVENOUS at 22:00

## 2021-08-12 RX ADMIN — SENNOSIDES A AND B 5 ML: 415.36 LIQUID ORAL at 20:39

## 2021-08-12 RX ADMIN — Medication: at 03:52

## 2021-08-12 RX ADMIN — FUROSEMIDE 10 MG: 10 INJECTION, SOLUTION INTRAVENOUS at 13:07

## 2021-08-12 RX ADMIN — ERTAPENEM SODIUM 1 G: 1 INJECTION, POWDER, LYOPHILIZED, FOR SOLUTION INTRAMUSCULAR; INTRAVENOUS at 22:01

## 2021-08-12 RX ADMIN — DIPHENHYDRAMINE HYDROCHLORIDE 25 MG: 50 INJECTION, SOLUTION INTRAMUSCULAR; INTRAVENOUS at 03:21

## 2021-08-12 RX ADMIN — KETAMINE HYDROCHLORIDE 5 MG: 10 INJECTION INTRAMUSCULAR; INTRAVENOUS at 09:38

## 2021-08-12 RX ADMIN — ACETAMINOPHEN 650 MG: 160 LIQUID ORAL at 22:01

## 2021-08-12 RX ADMIN — LORAZEPAM 0.5 MG: 2 INJECTION INTRAMUSCULAR; INTRAVENOUS at 02:12

## 2021-08-12 RX ADMIN — ONDANSETRON 4 MG: 2 INJECTION INTRAMUSCULAR; INTRAVENOUS at 20:38

## 2021-08-12 RX ADMIN — GABAPENTIN 300 MG: 250 SUSPENSION ORAL at 08:44

## 2021-08-12 RX ADMIN — KETAMINE HYDROCHLORIDE 5 MG: 10 INJECTION INTRAMUSCULAR; INTRAVENOUS at 03:15

## 2021-08-12 RX ADMIN — Medication 40 MG: at 08:45

## 2021-08-12 RX ADMIN — DOCUSATE SODIUM 50 MG: 50 LIQUID ORAL at 20:38

## 2021-08-12 RX ADMIN — ACETAMINOPHEN 650 MG: 160 LIQUID ORAL at 11:11

## 2021-08-12 RX ADMIN — SENNOSIDES A AND B 5 ML: 415.36 LIQUID ORAL at 08:54

## 2021-08-12 RX ADMIN — SODIUM CHLORIDE, SODIUM LACTATE, POTASSIUM CHLORIDE, CALCIUM CHLORIDE AND DEXTROSE MONOHYDRATE: 5; 600; 310; 30; 20 INJECTION, SOLUTION INTRAVENOUS at 15:36

## 2021-08-12 RX ADMIN — GABAPENTIN 300 MG: 250 SUSPENSION ORAL at 20:38

## 2021-08-12 RX ADMIN — VANCOMYCIN HYDROCHLORIDE 1250 MG: 100 INJECTION, POWDER, LYOPHILIZED, FOR SOLUTION INTRAVENOUS at 05:06

## 2021-08-12 RX ADMIN — LORAZEPAM 0.5 MG: 2 INJECTION INTRAMUSCULAR; INTRAVENOUS at 20:38

## 2021-08-12 RX ADMIN — DIPHENHYDRAMINE HYDROCHLORIDE 25 MG: 50 INJECTION, SOLUTION INTRAMUSCULAR; INTRAVENOUS at 18:47

## 2021-08-12 RX ADMIN — MAGNESIUM HYDROXIDE 30 ML: 400 SUSPENSION ORAL at 20:38

## 2021-08-12 RX ADMIN — TRAZODONE HYDROCHLORIDE 100 MG: 100 TABLET ORAL at 22:01

## 2021-08-12 RX ADMIN — ACETAMINOPHEN 650 MG: 160 LIQUID ORAL at 17:01

## 2021-08-12 RX ADMIN — KETAMINE HYDROCHLORIDE 5 MG: 10 INJECTION INTRAMUSCULAR; INTRAVENOUS at 15:42

## 2021-08-12 RX ADMIN — HEPARIN SODIUM 400 UNITS/HR: 10000 INJECTION, SOLUTION INTRAVENOUS at 04:23

## 2021-08-12 RX ADMIN — SERTRALINE HYDROCHLORIDE 150 MG: 20 SOLUTION ORAL at 22:01

## 2021-08-12 RX ADMIN — KETOROLAC TROMETHAMINE 15 MG: 15 INJECTION, SOLUTION INTRAMUSCULAR; INTRAVENOUS at 09:01

## 2021-08-12 RX ADMIN — VANCOMYCIN HYDROCHLORIDE 1250 MG: 100 INJECTION, POWDER, LYOPHILIZED, FOR SOLUTION INTRAVENOUS at 18:10

## 2021-08-12 RX ADMIN — LORAZEPAM 0.5 MG: 2 INJECTION INTRAMUSCULAR; INTRAVENOUS at 14:37

## 2021-08-12 RX ADMIN — KETOROLAC TROMETHAMINE 15 MG: 15 INJECTION, SOLUTION INTRAMUSCULAR; INTRAVENOUS at 14:37

## 2021-08-12 RX ADMIN — Medication: at 06:19

## 2021-08-12 RX ADMIN — DOCUSATE SODIUM 50 MG: 50 LIQUID ORAL at 08:54

## 2021-08-12 RX ADMIN — FLUCONAZOLE IN SODIUM CHLORIDE 200 MG: 2 INJECTION, SOLUTION INTRAVENOUS at 20:37

## 2021-08-12 RX ADMIN — DIPHENHYDRAMINE HYDROCHLORIDE 25 MG: 50 INJECTION, SOLUTION INTRAMUSCULAR; INTRAVENOUS at 09:38

## 2021-08-12 RX ADMIN — Medication 40 MG: at 20:38

## 2021-08-12 RX ADMIN — METHOCARBAMOL 500 MG: 100 INJECTION INTRAMUSCULAR; INTRAVENOUS at 07:04

## 2021-08-12 RX ADMIN — KETOROLAC TROMETHAMINE 15 MG: 15 INJECTION, SOLUTION INTRAMUSCULAR; INTRAVENOUS at 20:38

## 2021-08-12 RX ADMIN — HEPARIN SODIUM 400 UNITS/HR: 10000 INJECTION, SOLUTION INTRAVENOUS at 15:47

## 2021-08-12 RX ADMIN — ONDANSETRON 4 MG: 2 INJECTION INTRAMUSCULAR; INTRAVENOUS at 02:27

## 2021-08-12 RX ADMIN — LORAZEPAM 0.5 MG: 2 INJECTION INTRAMUSCULAR; INTRAVENOUS at 09:01

## 2021-08-12 RX ADMIN — METHOCARBAMOL 500 MG: 100 INJECTION INTRAMUSCULAR; INTRAVENOUS at 13:08

## 2021-08-12 RX ADMIN — Medication 15 ML: at 08:44

## 2021-08-12 RX ADMIN — DEXTROSE MONOHYDRATE 1000 ML: 100 INJECTION, SOLUTION INTRAVENOUS at 11:15

## 2021-08-12 RX ADMIN — KETOROLAC TROMETHAMINE 15 MG: 15 INJECTION, SOLUTION INTRAMUSCULAR; INTRAVENOUS at 01:32

## 2021-08-12 ASSESSMENT — ACTIVITIES OF DAILY LIVING (ADL)
ADLS_ACUITY_SCORE: 19
ADLS_ACUITY_SCORE: 20
ADLS_ACUITY_SCORE: 19

## 2021-08-12 ASSESSMENT — MIFFLIN-ST. JEOR: SCORE: 1154.75

## 2021-08-12 NOTE — PROGRESS NOTES
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 Shirley. and placement of bilateral erector spinae (ES) T6-7 catheters by RAPS on 8/9/21 for analgesia.      Subjective and Interval History: Overnight events: no acute event.  hgb 5.6 thi morning, blood transfusion ordered. Seen at 1025.  Patient reports 6/10 at rest, moderate pain control with current nerve block infusion and analgesics (see below).  Ambulating with stand by assistance,  denies weakness, paresthesias, circumoral numbness, metallic taste or tinnitus. Patient has Miller in place,NPO, denies nausea.    Antithrombotic/Thrombolytic Therapy ordered:  Heparin infusion held on 8/12/21 morning.    Analgesic Medications:   Medications related to Pain Management (From now, onward)    Start     Dose/Rate Route Frequency Ordered Stop    08/11/21 2000  sennosides (SENOKOT) syrup 5 mL      5 mL Per J Tube 2 TIMES DAILY 08/11/21 1543      08/11/21 2000  docusate (COLACE) 50 MG/5ML liquid 50 mg      50 mg Per J Tube 2 TIMES DAILY 08/11/21 1545      08/11/21 0730  ketorolac (TORADOL) injection 15 mg      15 mg Intravenous EVERY 6 HOURS 08/11/21 0717 08/16/21 0829    08/10/21 2200  acetaminophen *SUGAR FREE* (TYLENOL) solution 650 mg      650 mg Per J Tube EVERY 6 HOURS 08/10/21 2144      08/10/21 2100  ketamine (KETALAR) injection 5-10 mg      5-10 mg  over 2-5 Minutes Intravenous EVERY 6 HOURS 08/10/21 2044      08/10/21 1310  diphenhydrAMINE (BENADRYL) injection 25 mg      25 mg Intravenous EVERY 6 HOURS PRN 08/10/21 1310      08/10/21 0800  gabapentin (NEURONTIN) solution 300 mg      300 mg Oral or J tube 2 TIMES DAILY 08/09/21 2022      08/09/21 2200  LORazepam (ATIVAN) injection 0.5 mg      0.5  mg Intravenous EVERY 6 HOURS 08/09/21 1824 08/09/21 2030  HYDROmorphone (DILAUDID) PCA 0.2 mg/mL OPIOID TOLERANT       Intravenous CONTINUOUS 08/09/21 2022 08/09/21 2022  lidocaine 1 % 0.1-1 mL      0.1-1 mL Other EVERY 1 HOUR PRN 08/09/21 2022 08/09/21 2022  lidocaine (LMX4) cream       Topical EVERY 1 HOUR PRN 08/09/21 2022 08/09/21 2022  bisacodyl (DULCOLAX) Suppository 10 mg      10 mg Rectal DAILY PRN 08/09/21 2022 08/09/21 1900  methocarbamol (ROBAXIN) injection 500 mg      500 mg Intravenous EVERY 6 HOURS 08/09/21 1824 08/12/21 1729 08/09/21 0830  ropivacaine 0.2% (NAROPIN) 750 mL in ON-Q C-Bloc select flow ([id number] holds 600-750 mL) dual cath disposable pump      14 mL/hr  Irrigation CONTINUOUS 08/09/21 0811             Objective:  Lab results  Recent Labs   Lab Test 08/12/21  0655   WBC 9.0   RBC 1.99*   HGB 5.6*   HCT 17.8*   MCV 89   MCH 28.1   MCHC 31.5   RDW 14.3   *       Lab Results   Component Value Date    INR 1.45 08/09/2021    INR 0.99 08/09/2021    INR 1.00 08/04/2021       Vitals:    Temp:  [98.1  F (36.7  C)-98.7  F (37.1  C)] 98.7  F (37.1  C)  Pulse:  [] 107  Resp:  [11-20] 20  BP: ()/(54-66) 104/61  SpO2:  [91 %-100 %] 95 %    Exam:   GEN: alert and no distress  NEURO/MSK: moving all extemities  Strength LE 5/5  and overall symmetric  SKIN: bilateral erector spinae (ES) catheter sites with dressing c/d/i, no tenderness, erythema, heme, edema    Assessment and Plan:       ASSESSMENT  Patient is receiving moderate  analgesia with current multimodal therapy (has PCA Dilaudid continuous rate of 0.3mg and bumps of 0.2mg every 10 minutes)  including 0.2% ropivacaine On-Q continuous infusion at 14 mL/hr via 7ml/hour on left and right catheter.  Motor function intact and is meeting activity goals.  NO evidence of adverse side effects related to local anesthetic. H/o chronic opioid use. PTA was on OxyContin 10mg TID and Dilaudid 4mg every 4-6 hours PRN (  did stop short acting use PTA).     Bolus administered at 1030    MEDICATION: PF bupivacaine  0.25%, total bolus 10mL, via 5 ml each catheter.    PROCEDURE: Clinician bolus administered via left and right  nerve block catheter, without complication, negative aspirate before and between each mL.  No symptoms of local anesthetic systemic toxicity (LAST). Remained with and assessed patient for 10 min post-injection. BP, P and MAP stable    POST-PROCEDURE: Bedside RN aware of need to continue BP, P and MAP monitoring Q 10 min for an additional 20 min. Contact RAPS (jobcode ID [id number]) if any of the following: patient experiencing any untoward effects, SBP< 90, P < 50 or > 120, MAP < 60                - patient can be evaluated to receive local anesthetic bolus Q 12 hr PRN pain not controlled with continuous infusion.  Bedside nurse must page RAPS to request bolus        PLAN  Catheter Day #3 bilateral erector spinae (ES) T6-7 catheters/ continuous on Q infusion:       Continue 0.2% ropivacaine continuous on Q infusion at 7 mL/hr each catheter, total infusion 14mL/hr, max of 7 days      Patient can be evaluated to receive local anesthetic bolus Q 12 hr PRN, bedside nurse must page RAPS #: [id number] to request bolus     Antithrombotic/thrombolytic therapy:     Held Heparin infusion 400units/ hr as ordered per primary service. Hg b at 5.6-getting a unit of blood  ? Please contact [facility], jobcode ID: [id number] prior to any medication changes     Follow up:       RAPS will continue to follow and adjust as needed     - discussed plan with attending anesthesiologist     Shannon Barnett PA-C  [facility]  8/12/2021 8:34 AM    RAPS Contact Info (24 hour job code pager is the last 4 digits) For in-house use only:   Job code ID: [email or web address] [id number]   [place] 0599  [facility] 0602  [employer] phone: dial * * * [phone], enter jobcode ID, then enter call-back number.    Text: Use [employer] on the Intranet <Paging/Directory> tab and enter Jobcode ID.    If no call back at any time, contact the hospital  and ask for RAPS attending or backup

## 2021-08-12 NOTE — PLAN OF CARE
"/73 (BP Location: Left arm)   Pulse 94   Temp 98.5  F (36.9  C) (Oral)   Resp 16   Ht 1.549 m (5' 1\")   Wt 60.7 kg (133 lb 14.4 oz)   SpO2 98%   BMI 25.30 kg/m      Shift: 7578-8302  VS: soft pressures (100/60s) OVSS on 1L NC, afebrile  Neuro: AOx4  BG: insulin gtt on alg 1 (; D10 infused when BG >80)  Labs: hgb 5.6, 2 units of RBC infused (tolerated well)  Respiratory: wdl, IS used  Pain/Nausea/PRN: scheduled toradol, ketamine, tylenol, ativan given; PCA with 0.3mg/h with 0.2mg q10min  Diet: TF @ 30ml/h via J tube; NPO with ice chips  LDA: ARTEMIO with small serosanguinous output; G tube to gravity with green output; LIJ with D5LR @ 50ml/h, TKOs with gtts and SR heparin @ 400 units/h; Port SL  GI/: shook with adequate UO (x1 dose of lasix); passing small amounts of gas, refused suppository  Skin: incision liquid bandaged with bruising present  Mobility: assist x1-2 with GB and walker  Plan: manage pain, monitor BG, encourage ambulation    Handoff given to following RN.  "

## 2021-08-12 NOTE — PLAN OF CARE
"BP (!) 88/54 (BP Location: Right arm)   Pulse 89   Temp 98.1  F (36.7  C) (Axillary)   Resp 11   Ht 1.549 m (5' 1\")   Wt 74.1 kg (163 lb 4.8 oz)   SpO2 97%   BMI 30.86 kg/m        VS: Hypotensive. 1L O2  BG: insulin gtt alg #1  Algorithm 1, Currently at 0.5U/hr  Labs: pending. Day team to follow up with recheck hgb  Pain/Nausea/PRN: Scheduled toradol, robaxin. , ketamine, tylenol, on-q pumps at 7 and 7 - bolused at 2030, Dilaudid PCA at 0.3 mg continuous with 0.2 mg q10min bumps - adequately controlling pain. PRN benadryl x 1 for generalized itching.     Diet: NPO w/ ice chips, TF at 20 mL/hr  LDA: G tube open to gravity - 175 mL thick, green output; J tube to continuous TF; I.J. with D5LR at 75 mL/hr, TKO for insulin gtt, heparin gtt with dilaudid PCA; On-Q; R ARTEMIO with 15 mL output; and PORT saline locked  GI: No BM  : Miller 500 mL clear, yellow urine  Skin: incision closed with liquid bandage, open to air  Mobility: Assist x 1-2  Plan: Continue to monitor  "

## 2021-08-12 NOTE — CONSULTS
Care Management Initial Consult    General Information  Assessment completed with: Patient, Spouse or significant other,    Type of CM/SW Visit: Initial Assessment    Primary Care Provider verified and updated as needed: Yes   Readmission within the last 30 days: no previous admission in last 30 days      Reason for Consult: discharge planning  Advance Care Planning: Advance Care Planning Reviewed: present on chart          Communication Assessment  Patient's communication style: spoken language (English or Bilingual)    Hearing Difficulty or Deaf: no   Wear Glasses or Blind: yes    Cognitive  Cognitive/Neuro/Behavioral: WDL     Arousal Level: opens eyes spontaneously  Orientation: oriented x 4     Best Language: 0 - No aphasia       Living Environment:   People in home: spouse, child(devorah), dependent  Brendan, high-school son  Current living Arrangements: house      Able to return to prior arrangements: yes (Pt aware she needs to remain local for 8 weeks)       Family/Social Support:  Care provided by: spouse/significant other, self  Provides care for: child(devorah)  Marital Status:    Brendan, Children, friends, [employer] community   Description of Support System: Supportive, Involved    Support Assessment: Adequate family and caregiver support, Adequate social supports    Current Resources:   Patient receiving home care services: No     Community Resources: None  Equipment currently used at home: none  Supplies currently used at home: None    Employment/Financial:  Employment Status: disabled        Financial Concerns: No concerns identified   Referral to Financial Counselor: No       Lifestyle & Psychosocial Needs:  Social Determinants of Health     Tobacco Use: Low Risk      Smoking Tobacco Use: Never Smoker     Smokeless Tobacco Use: Never Used   Alcohol Use:      Frequency of Alcohol Consumption:      Average Number of Drinks:      Frequency of Binge Drinking:    Financial Resource Strain:      Difficulty of  Paying Living Expenses:    Food Insecurity:      Worried About Running Out of Food in the Last Year:      Ran Out of Food in the Last Year:    Transportation Needs:      Lack of Transportation (Medical):      Lack of Transportation (Non-Medical):    Physical Activity:      Days of Exercise per Week:      Minutes of Exercise per Session:    Stress:      Feeling of Stress :    Social Connections:      Frequency of Communication with Friends and Family:      Frequency of Social Gatherings with Friends and Family:      Attends Sikhism Services:      Active Member of Clubs or Organizations:      Attends Club or Organization Meetings:      Marital Status:    Intimate Partner Violence:      Fear of Current or Ex-Partner:      Emotionally Abused:      Physically Abused:      Sexually Abused:    Depression: Not at risk     PHQ-2 Score: 2   Housing Stability:      Unable to Pay for Housing in the Last Year:      Number of Places Lived in the Last Year:      Unstable Housing in the Last Year:        Functional Status:  Prior to admission patient needed assistance:   Dependent ADLs:: Independent  Dependent IADLs:: Shopping, Meal Preparation, Laundry, Cooking, Transportation  Assesssment of Functional Status: Not at baseline with ADL Functioning (Currently working with PT and OT, goal to return to ind)    Mental Health Status:  Mental Health Status: No Current Concerns       Chemical Dependency Status:  Chemical Dependency Status: No Current Concerns             Values/Beliefs:  Spiritual, Cultural Beliefs, Sikhism Practices, Values that affect care: no               Additional Information:  Pt admitted with chronic pancreatitis, s/p open total pancreatectomy with autologous islet cell transplantation, splenectomy, and incidental appendectomy with GJ tube placement.    Met with pt and  Brendan today to introduce care coordinator role and discuss discharge planning.  Per pt, she and  will be staying locally at [facility] after discharge.  Address is [address] [place] [address].   is in town until August 25th, at which point a member of family or close friends will be coming for one week intervals to care for pt with no anticipated gaps in help.  Per PT assessment, pt is recommended to discharge to TCU pending improvement in pain tolerance and functional recovery.  Pt was independent with ADLs prior to admission.  Pt anticipates discharge home with [facility] for continuous tube feedings.  Pt reports previous experience with tube feeds but would like refresher education.  Writer spoke with bedside nurse who confirmed that pt will tentatively be ready for [facility] tube feeding class by Sunday.  PLC request sent.  Discharge home infusion orders initiated.  E-mail update sent to [facility] liaisons.      RNCC will continue to follow pt progress and update discharge plan as needed.        Sharon Cruz   Student Nurse

## 2021-08-12 NOTE — PLAN OF CARE
"1900 - 2300    BP (!) 87/61 (BP Location: Right arm)   Pulse 84   Temp 98.5  F (36.9  C) (Oral)   Resp 12   Ht 1.549 m (5' 1\")   Wt 74.1 kg (163 lb 4.8 oz)   SpO2 99%   BMI 30.86 kg/m      VS: Hypotensive after On-Q bolus and intermittently tachycardic, OVSS on 1 LPM  BG: insulin gtt alg #1   Labs: K recheck 3.4, ordered IV replacement - recheck scheduled for 0040, Phos recheck 2.7, no further intervention needed - recheck in AM  Pain/Nausea/PRN: Scheduled toradol, robaxin, ketamine, tylenol, on-q pumps at 7 and 7 - bolused at 2030, Dilaudid PCA at 0.3 mg continuous with 0.2 mg q10min bumps - adequately controlling pain. PRN benadryl x 1 for generalized itching. Of note anesthesia was concerned that patient's PCA needs to be titrated down since patient has been drowsy after scheduled meds have been given.    Diet: NPO w/ ice chips, TF at 20 mL/hr  LDA: G tube open to gravity - 175 mL thick, green output; J tube to continuous TF; I.J. with D5LR at 75 mL/hr, TKO for insulin gtt, heparin gtt with dilaudid PCA; On-Q; R ARTEMIO with 15 mL output; and PORT saline locked  GI: No BM  : Miller 325 mL clear, yellow urine  Skin: incision closed with liquid bandage, open to air  Mobility: Assist x 1-2  Plan: Continue to monitor    "

## 2021-08-12 NOTE — PROGRESS NOTES
REGIONAL ANESTHESIA PAIN SERVICE (RAPS) EVALUATION:    Summary: Bilateral ES Bolus   - Time: 20:30  - Subjective: Patient reports 5/10 pain intensity with current therapy of continuous infusion at 7mL/hr bilateral ES catheters (0.2% ropivacaine) with hydromorphone PCA before bolus given as described below.  - Objective:  Current vitals: /54, off of pressors  General: drowsy, itchy, comfortable, pain with movement  Skin and dressing CDI, no erythema or swelling, slight dry blood beneath clear dressing    - Assessment/Plan     INTERVENTION: Bilateral Bolus administered    MEDICATION: PF bupivacaine 0.25%  total bolus 10mL, 5 mL via each catheter and given slowly over 15 minutes at 22:30, administered without complication with negative aspirate before and between each mL.  No symptoms of local anesthetic systemic toxicity (LAST). Post bolus vitals stable for next 5min. Bedside RN aware of need to continue BP, P and MAP monitoring Q 10 min for an additional 20 min. Contact RAPS if any of the following: patient experiencing any untoward effects, SBP< 90, P < 50 or > 120, MAP < 60. Patient can be evaluated to receive local anesthetic bolus Q 12 hr PRN pain not controlled with continuous infusion.  Bedside nurse must page RAPS to request bolus    RAPS Contact Info (24 hour job code pager is the last 4 digits) For in-house use only:  [employer] phone: [place] [phone], [place] [employer] [phone], [facility] [phone], then enter call-back number.    Text: Use [employer] on the Intranet <Paging/Directory> tab and enter Jobcode ID.   If no call back at any time, contact the hospital  and ask for RAPS attending or backup     Stu Prescott MD   Anesthesia resident CA-2  08/11/2021   [facility] [phone]

## 2021-08-12 NOTE — PROGRESS NOTES
Pancreatitis Service - Daily Progress Note  08/12/2021    Assessment & Plan: Meme Robins is a 51-year-old woman with chronic pancreatitis, who is s/p 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 Shirley. 2300 IE/kg. Intraop hypotension during islet infusion, thought to be 2/2 anaphylaxis and treated with benadryl, steroids and resolved.     Cardiorespiratory: Hypotensive overnight. Patient states her systolic blood pressures are usually upper 90s to 100s at home. Hypotensive to the 80s overnight. Hgb low this AM, will transfuse.  Post op anemia: Likely dilutional vs some oozing post op. Hgb 5.6, will transfuse 1 unit pRBCs.  GI/Nutrition: G tube to gravity, increase TFs to 30 ml/hr. Goal tube feed rate 55 mL/hr  Fluid/Electrolytes: D5LR decrease to 50 ml/hr  : Miller to remain due to block  Post-pancreatectomy diabetes: Continue insulin drip at this time, will plan to transition to subcutaneous insulin after tube feeds at goal, appreciate Endocrine consult.  Infection: Growing gram negative bacilli (lactose fermenting and non lactose fermenting), gram positive cocci in pairs and chain. Continue vanc/ertapenem/ and fluconazole  Prophylaxis: PPI, Anticoagulation: Heparin 400 U/hr- will HOLD right now until recheck hemoglobin  Pain control: Dilaudid PCA, On Q, ketamine, ativan, neurontin, toradol  Activity: Up with assist  Anticipated LOS/Discharge: 7-10 days     Medical Decision Making: Medium  Subsequent visit 96673 (moderate level decision making)    JOSE ALBERTO/Fellow/Resident Provider: Marycruz Guerra PASunilC     Faculty: Clyde Shirley MD/Dr. Zaldivar  __________________________________________________________________  Transplant History: Admitted 8/9/2021 for TP-AIT  8/9/2021 (Islet), Postoperative day: 3     Interval History: History is obtained from the patient  Overnight events: Gets good relief  "from the boluses from the block. Hypotensive overnight.    ROS:   A 10-point review of systems was negative except as noted above.    Curent Meds:   acetaminophen *SUGAR FREE*  650 mg Per J Tube Q6H    docusate  50 mg Per J Tube BID    ertapenem (INVanz) IV  1 g Intravenous Q24H    fluconazole  200 mg Intravenous Q24H    gabapentin  300 mg Oral or J tube BID    ketamine  5-10 mg Intravenous Q6H    ketorolac  15 mg Intravenous Q6H    LORazepam  0.5 mg Intravenous Q6H    methocarbamol  500 mg Intravenous Q6H    multivitamins w/minerals  15 mL Per Feeding Tube Daily    pantoprazole  40 mg Oral or J tube BID    sennosides  5 mL Per J Tube BID    sertraline  150 mg Per J Tube At Bedtime    sodium chloride (PF)  3 mL Intracatheter Q8H    traZODone  100 mg Per J Tube At Bedtime    vancomycin  1,250 mg Intravenous Q12H       Physical Exam:     Admit Weight: 55.2 kg (121 lb 11.1 oz)    Current Vitals:   /67   Pulse 109   Temp 98.4  F (36.9  C) (Oral)   Resp 20   Ht 1.549 m (5' 1\")   Wt 74.1 kg (163 lb 4.8 oz)   SpO2 96%   BMI 30.86 kg/m      Vital sign ranges:    Temp:  [98.1  F (36.7  C)-98.7  F (37.1  C)] 98.4  F (36.9  C)  Pulse:  [] 109  Resp:  [11-20] 20  BP: ()/(54-67) 102/67  SpO2:  [91 %-100 %] 96 %  Patient Vitals for the past 24 hrs:   BP Temp Temp src Pulse Resp SpO2   08/12/21 0900 102/67 98.4  F (36.9  C) Oral 109 20 96 %   08/12/21 0830 104/61 98.7  F (37.1  C) -- 107 20 --   08/12/21 0732 101/63 98.5  F (36.9  C) Oral 102 12 95 %   08/12/21 0319 (!) 88/54 98.1  F (36.7  C) Axillary 89 11 97 %   08/11/21 2355 93/56 98.1  F (36.7  C) Oral 82 12 99 %   08/11/21 2216 -- -- -- 84 12 99 %   08/11/21 2133 (!) 87/61 -- -- -- -- --   08/11/21 1935 92/54 -- -- -- -- --   08/11/21 1931 (!) 83/60 98.5  F (36.9  C) Oral 103 18 95 %   08/11/21 1551 105/66 98.3  F (36.8  C) Oral 97 18 100 %   08/11/21 1411 91/58 98.3  F (36.8  C) Oral 102 18 91 %     General Appearance: in no apparent distress. "   Skin: normal, warm  Heart: regular rate and rhythm  Lungs: Nonlabored resps on RA  Abdomen: The abdomen is flat, and non-tender to light palpation. The wound is dermabonded, healing well. Tube/Drain sites are: G tube to gravity drainage, brown output. J tube with tube feeds infusing. ARTEMIO: serosanguinous  : shook is present, clear yellow urine  Extremities: edema: trace throughout     Data:   CMP  Recent Labs   Lab 08/12/21  1004 08/12/21  0911 08/12/21  0555 08/11/21  2146 08/11/21  1926 08/11/21  0700 08/11/21  0635 08/10/21  0414 08/09/21  1840 08/09/21  1744 08/09/21  1715 08/09/21  1715   NA  --   --  145*  --   --   --  138 140   < > 139  --  140   POTASSIUM  --   --  4.0  --  3.4  --  3.1* 3.8   < > 3.4*  --  3.5   CHLORIDE  --   --  110*  --   --   --  105 108   < >  --   --   --    CO2  --   --  31  --   --   --  25 23   < >  --   --   --    * 149* 106*  --   --   --  152* 168*   < > 135*   < > 106*   BUN  --   --  7  --   --   --  5* 9   < >  --   --   --    CR  --   --  0.60  --   --   --  0.52 0.62   < >  --   --   --    GFRESTIMATED  --   --  >90  --   --   --  >90 >90   < >  --   --   --    VIKAS  --   --  7.4*  --   --   --  7.4* 7.8*   < >  --   --   --    ICAW  --   --   --   --   --   --   --   --   --  4.8  --  4.8   MAG  --   --  2.0  --   --   --  1.7 1.4*   < >  --   --   --    PHOS  --   --  2.7 2.7  --    < > 1.8* 3.8   < >  --   --   --    AMYLASE  --   --   --   --   --   --  63 166*  --   --   --   --    LIPASE  --   --   --   --   --   --  1,174* 1,875*  --   --   --   --    ALBUMIN  --   --   --   --   --   --  2.0* 2.6*   < >  --   --   --    BILITOTAL  --   --   --   --   --   --  0.3 0.5   < >  --   --   --    ALKPHOS  --   --   --   --   --   --  76 66   < >  --   --   --    AST  --   --   --   --   --   --  76* 331*   < >  --   --   --    ALT  --   --   --   --   --   --  159* 363*   < >  --   --   --     < > = values in this interval not displayed.     CBC  Recent Labs   Lab  08/12/21  0655 08/11/21  0635   HGB 5.6* 7.0*   WBC 9.0 17.4*   * 155     Coags  Recent Labs   Lab 08/12/21  0555 08/11/21  0635 08/09/21  1842 08/09/21  0703   INR  --   --  1.45* 0.99   PTT 55* 48* 77*  --       Urinalysis  Recent Labs   Lab Test 08/04/21  0837   COLOR Yellow   APPEARANCE Slightly Cloudy*   URINEGLC Negative   URINEBILI Negative   URINEKETONE Negative   SG 1.018   UBLD Negative   URINEPH 5.0   PROTEIN Negative   NITRITE Negative   LEUKEST Trace*   RBCU 1   WBCU 12*     Attestation:  Patient has been seen and evaluated by me with transplant team.   Vital signs, labs, medications and orders were reviewed.   When obtained, diagnostic images were reviewed by me and interpreted as above.    The care plan was discussed with the multidisciplinary team and I agree with the findings and plan in this note, with any differences recorded in blue.    .

## 2021-08-13 ENCOUNTER — APPOINTMENT (OUTPATIENT)
Dept: PHYSICAL THERAPY | Facility: CLINIC | Age: 51
End: 2021-08-13
Attending: TRANSPLANT SURGERY
Payer: COMMERCIAL

## 2021-08-13 LAB
ANION GAP SERPL CALCULATED.3IONS-SCNC: 1 MMOL/L (ref 3–14)
APTT PPP: 43 SECONDS (ref 22–38)
BASOPHILS # BLD AUTO: 0 10E3/UL (ref 0–0.2)
BASOPHILS NFR BLD AUTO: 0 %
BUN SERPL-MCNC: 5 MG/DL (ref 7–30)
CALCIUM SERPL-MCNC: 7.6 MG/DL (ref 8.5–10.1)
CHLORIDE BLD-SCNC: 109 MMOL/L (ref 94–109)
CO2 SERPL-SCNC: 34 MMOL/L (ref 20–32)
CREAT SERPL-MCNC: 0.54 MG/DL (ref 0.52–1.04)
EOSINOPHIL # BLD AUTO: 0.5 10E3/UL (ref 0–0.7)
EOSINOPHIL NFR BLD AUTO: 6 %
ERYTHROCYTE [DISTWIDTH] IN BLOOD BY AUTOMATED COUNT: 14.6 % (ref 10–15)
GFR SERPL CREATININE-BSD FRML MDRD: >90 ML/MIN/1.73M2
GLUCOSE BLD-MCNC: 133 MG/DL (ref 70–99)
GLUCOSE BLDC GLUCOMTR-MCNC: 103 MG/DL (ref 70–99)
GLUCOSE BLDC GLUCOMTR-MCNC: 106 MG/DL (ref 70–99)
GLUCOSE BLDC GLUCOMTR-MCNC: 107 MG/DL (ref 70–99)
GLUCOSE BLDC GLUCOMTR-MCNC: 108 MG/DL (ref 70–99)
GLUCOSE BLDC GLUCOMTR-MCNC: 111 MG/DL (ref 70–99)
GLUCOSE BLDC GLUCOMTR-MCNC: 113 MG/DL (ref 70–99)
GLUCOSE BLDC GLUCOMTR-MCNC: 115 MG/DL (ref 70–99)
GLUCOSE BLDC GLUCOMTR-MCNC: 117 MG/DL (ref 70–99)
GLUCOSE BLDC GLUCOMTR-MCNC: 118 MG/DL (ref 70–99)
GLUCOSE BLDC GLUCOMTR-MCNC: 119 MG/DL (ref 70–99)
GLUCOSE BLDC GLUCOMTR-MCNC: 120 MG/DL (ref 70–99)
GLUCOSE BLDC GLUCOMTR-MCNC: 124 MG/DL (ref 70–99)
GLUCOSE BLDC GLUCOMTR-MCNC: 125 MG/DL (ref 70–99)
GLUCOSE BLDC GLUCOMTR-MCNC: 126 MG/DL (ref 70–99)
GLUCOSE BLDC GLUCOMTR-MCNC: 127 MG/DL (ref 70–99)
GLUCOSE BLDC GLUCOMTR-MCNC: 128 MG/DL (ref 70–99)
GLUCOSE BLDC GLUCOMTR-MCNC: 132 MG/DL (ref 70–99)
GLUCOSE BLDC GLUCOMTR-MCNC: 133 MG/DL (ref 70–99)
GLUCOSE BLDC GLUCOMTR-MCNC: 135 MG/DL (ref 70–99)
GLUCOSE BLDC GLUCOMTR-MCNC: 144 MG/DL (ref 70–99)
GLUCOSE BLDC GLUCOMTR-MCNC: 93 MG/DL (ref 70–99)
GLUCOSE BLDC GLUCOMTR-MCNC: 94 MG/DL (ref 70–99)
HCT VFR BLD AUTO: 28.1 % (ref 35–47)
HGB BLD-MCNC: 8.3 G/DL (ref 11.7–15.7)
HGB BLD-MCNC: 8.8 G/DL (ref 11.7–15.7)
HGB BLD-MCNC: 8.9 G/DL (ref 11.7–15.7)
IMM GRANULOCYTES # BLD: 0.1 10E3/UL
IMM GRANULOCYTES NFR BLD: 1 %
LYMPHOCYTES # BLD AUTO: 0.9 10E3/UL (ref 0.8–5.3)
LYMPHOCYTES NFR BLD AUTO: 11 %
MCH RBC QN AUTO: 28 PG (ref 26.5–33)
MCHC RBC AUTO-ENTMCNC: 31.3 G/DL (ref 31.5–36.5)
MCV RBC AUTO: 90 FL (ref 78–100)
MONOCYTES # BLD AUTO: 1.1 10E3/UL (ref 0–1.3)
MONOCYTES NFR BLD AUTO: 13 %
NEUTROPHILS # BLD AUTO: 5.7 10E3/UL (ref 1.6–8.3)
NEUTROPHILS NFR BLD AUTO: 69 %
NRBC # BLD AUTO: 0 10E3/UL
NRBC BLD AUTO-RTO: 0 /100
PHOSPHATE SERPL-MCNC: 2.8 MG/DL (ref 2.5–4.5)
PLATELET # BLD AUTO: 176 10E3/UL (ref 150–450)
POTASSIUM BLD-SCNC: 3.6 MMOL/L (ref 3.4–5.3)
RBC # BLD AUTO: 3.14 10E6/UL (ref 3.8–5.2)
SODIUM SERPL-SCNC: 144 MMOL/L (ref 133–144)
WBC # BLD AUTO: 8.2 10E3/UL (ref 4–11)

## 2021-08-13 PROCEDURE — 250N000013 HC RX MED GY IP 250 OP 250 PS 637: Performed by: PHYSICIAN ASSISTANT

## 2021-08-13 PROCEDURE — 80048 BASIC METABOLIC PNL TOTAL CA: CPT | Performed by: PHYSICIAN ASSISTANT

## 2021-08-13 PROCEDURE — 250N000011 HC RX IP 250 OP 636: Performed by: TRANSPLANT SURGERY

## 2021-08-13 PROCEDURE — 97530 THERAPEUTIC ACTIVITIES: CPT | Mod: GP

## 2021-08-13 PROCEDURE — 84100 ASSAY OF PHOSPHORUS: CPT | Performed by: PHYSICIAN ASSISTANT

## 2021-08-13 PROCEDURE — 36592 COLLECT BLOOD FROM PICC: CPT | Performed by: PHYSICIAN ASSISTANT

## 2021-08-13 PROCEDURE — 85018 HEMOGLOBIN: CPT | Performed by: PHYSICIAN ASSISTANT

## 2021-08-13 PROCEDURE — 250N000011 HC RX IP 250 OP 636: Performed by: PHYSICIAN ASSISTANT

## 2021-08-13 PROCEDURE — 97116 GAIT TRAINING THERAPY: CPT | Mod: GP

## 2021-08-13 PROCEDURE — 258N000003 HC RX IP 258 OP 636

## 2021-08-13 PROCEDURE — 99024 POSTOP FOLLOW-UP VISIT: CPT | Performed by: TRANSPLANT SURGERY

## 2021-08-13 PROCEDURE — 85025 COMPLETE CBC W/AUTO DIFF WBC: CPT | Performed by: PHYSICIAN ASSISTANT

## 2021-08-13 PROCEDURE — 250N000013 HC RX MED GY IP 250 OP 250 PS 637: Performed by: TRANSPLANT SURGERY

## 2021-08-13 PROCEDURE — 85730 THROMBOPLASTIN TIME PARTIAL: CPT | Performed by: PHYSICIAN ASSISTANT

## 2021-08-13 PROCEDURE — 258N000003 HC RX IP 258 OP 636: Performed by: TRANSPLANT SURGERY

## 2021-08-13 PROCEDURE — 250N000009 HC RX 250: Performed by: NURSE PRACTITIONER

## 2021-08-13 PROCEDURE — 120N000011 HC R&B TRANSPLANT UMMC

## 2021-08-13 PROCEDURE — 250N000011 HC RX IP 250 OP 636

## 2021-08-13 PROCEDURE — 250N000011 HC RX IP 250 OP 636: Performed by: NURSE PRACTITIONER

## 2021-08-13 PROCEDURE — 250N000009 HC RX 250: Performed by: TRANSPLANT SURGERY

## 2021-08-13 RX ORDER — TRANSGALACTOOLIGOSACCHARIDES 2.75 G
1 POWDER IN PACKET (EA) ORAL 3 TIMES DAILY
Status: DISCONTINUED | OUTPATIENT
Start: 2021-08-13 | End: 2021-08-17 | Stop reason: CLARIF

## 2021-08-13 RX ORDER — BUPIVACAINE HYDROCHLORIDE 2.5 MG/ML
5 INJECTION, SOLUTION EPIDURAL; INFILTRATION; INTRACAUDAL ONCE
Status: COMPLETED | OUTPATIENT
Start: 2021-08-13 | End: 2021-08-13

## 2021-08-13 RX ORDER — HEPARIN SODIUM 10000 [USP'U]/100ML
200 INJECTION, SOLUTION INTRAVENOUS CONTINUOUS
Status: DISCONTINUED | OUTPATIENT
Start: 2021-08-13 | End: 2021-08-15

## 2021-08-13 RX ORDER — HEPARIN SODIUM (PORCINE) LOCK FLUSH IV SOLN 100 UNIT/ML 100 UNIT/ML
5-10 SOLUTION INTRAVENOUS
Status: DISCONTINUED | OUTPATIENT
Start: 2021-08-13 | End: 2021-08-20 | Stop reason: HOSPADM

## 2021-08-13 RX ORDER — KETAMINE HYDROCHLORIDE 10 MG/ML
2.5 INJECTION, SOLUTION INTRAMUSCULAR; INTRAVENOUS EVERY 6 HOURS
Status: DISCONTINUED | OUTPATIENT
Start: 2021-08-13 | End: 2021-08-15

## 2021-08-13 RX ORDER — LORAZEPAM 2 MG/ML
0.25 INJECTION INTRAMUSCULAR EVERY 6 HOURS
Status: DISCONTINUED | OUTPATIENT
Start: 2021-08-13 | End: 2021-08-17

## 2021-08-13 RX ORDER — SODIUM CHLORIDE 9 MG/ML
INJECTION, SOLUTION INTRAVENOUS
Status: COMPLETED
Start: 2021-08-13 | End: 2021-08-13

## 2021-08-13 RX ORDER — HEPARIN SODIUM,PORCINE 10 UNIT/ML
5-10 VIAL (ML) INTRAVENOUS
Status: DISCONTINUED | OUTPATIENT
Start: 2021-08-13 | End: 2021-08-20 | Stop reason: HOSPADM

## 2021-08-13 RX ORDER — HEPARIN SODIUM,PORCINE 10 UNIT/ML
5-10 VIAL (ML) INTRAVENOUS EVERY 24 HOURS
Status: DISCONTINUED | OUTPATIENT
Start: 2021-08-13 | End: 2021-08-20 | Stop reason: HOSPADM

## 2021-08-13 RX ADMIN — Medication 1 PACKET: at 13:23

## 2021-08-13 RX ADMIN — TRAZODONE HYDROCHLORIDE 100 MG: 100 TABLET ORAL at 21:55

## 2021-08-13 RX ADMIN — KETAMINE HYDROCHLORIDE 2.5 MG: 10 INJECTION INTRAMUSCULAR; INTRAVENOUS at 20:00

## 2021-08-13 RX ADMIN — DIPHENHYDRAMINE HYDROCHLORIDE 25 MG: 50 INJECTION, SOLUTION INTRAMUSCULAR; INTRAVENOUS at 01:53

## 2021-08-13 RX ADMIN — ACETAMINOPHEN 650 MG: 160 LIQUID ORAL at 04:48

## 2021-08-13 RX ADMIN — VANCOMYCIN HYDROCHLORIDE 1250 MG: 100 INJECTION, POWDER, LYOPHILIZED, FOR SOLUTION INTRAVENOUS at 18:08

## 2021-08-13 RX ADMIN — GABAPENTIN 300 MG: 250 SUSPENSION ORAL at 20:04

## 2021-08-13 RX ADMIN — DIPHENHYDRAMINE HYDROCHLORIDE 25 MG: 50 INJECTION, SOLUTION INTRAMUSCULAR; INTRAVENOUS at 19:05

## 2021-08-13 RX ADMIN — Medication: at 09:07

## 2021-08-13 RX ADMIN — Medication 5 ML: at 03:15

## 2021-08-13 RX ADMIN — MAGNESIUM HYDROXIDE 30 ML: 400 SUSPENSION ORAL at 19:59

## 2021-08-13 RX ADMIN — KETOROLAC TROMETHAMINE 15 MG: 15 INJECTION, SOLUTION INTRAMUSCULAR; INTRAVENOUS at 03:15

## 2021-08-13 RX ADMIN — KETOROLAC TROMETHAMINE 15 MG: 15 INJECTION, SOLUTION INTRAMUSCULAR; INTRAVENOUS at 12:05

## 2021-08-13 RX ADMIN — SENNOSIDES A AND B 5 ML: 415.36 LIQUID ORAL at 08:31

## 2021-08-13 RX ADMIN — KETAMINE HYDROCHLORIDE 5 MG: 10 INJECTION INTRAMUSCULAR; INTRAVENOUS at 03:15

## 2021-08-13 RX ADMIN — Medication 5 ML: at 21:17

## 2021-08-13 RX ADMIN — LORAZEPAM 0.25 MG: 2 INJECTION INTRAMUSCULAR; INTRAVENOUS at 14:06

## 2021-08-13 RX ADMIN — FLUCONAZOLE IN SODIUM CHLORIDE 200 MG: 2 INJECTION, SOLUTION INTRAVENOUS at 20:34

## 2021-08-13 RX ADMIN — Medication 1 PACKET: at 20:25

## 2021-08-13 RX ADMIN — BUPIVACAINE HYDROCHLORIDE 12.5 MG: 2.5 INJECTION, SOLUTION EPIDURAL; INFILTRATION; INTRACAUDAL; PERINEURAL at 10:13

## 2021-08-13 RX ADMIN — LORAZEPAM 0.25 MG: 2 INJECTION INTRAMUSCULAR; INTRAVENOUS at 20:02

## 2021-08-13 RX ADMIN — ONDANSETRON 4 MG: 2 INJECTION INTRAMUSCULAR; INTRAVENOUS at 04:48

## 2021-08-13 RX ADMIN — SODIUM CHLORIDE: 9 INJECTION, SOLUTION INTRAVENOUS at 14:16

## 2021-08-13 RX ADMIN — ONDANSETRON 4 MG: 2 INJECTION INTRAMUSCULAR; INTRAVENOUS at 13:13

## 2021-08-13 RX ADMIN — LORAZEPAM 0.5 MG: 2 INJECTION INTRAMUSCULAR; INTRAVENOUS at 01:47

## 2021-08-13 RX ADMIN — DIPHENHYDRAMINE HYDROCHLORIDE 25 MG: 50 INJECTION, SOLUTION INTRAMUSCULAR; INTRAVENOUS at 13:16

## 2021-08-13 RX ADMIN — LORAZEPAM 0.5 MG: 2 INJECTION INTRAMUSCULAR; INTRAVENOUS at 08:34

## 2021-08-13 RX ADMIN — Medication 40 MG: at 08:30

## 2021-08-13 RX ADMIN — ACETAMINOPHEN 650 MG: 160 LIQUID ORAL at 12:05

## 2021-08-13 RX ADMIN — KETAMINE HYDROCHLORIDE 2.5 MG: 10 INJECTION INTRAMUSCULAR; INTRAVENOUS at 14:09

## 2021-08-13 RX ADMIN — DOCUSATE SODIUM 50 MG: 50 LIQUID ORAL at 08:31

## 2021-08-13 RX ADMIN — ACETAMINOPHEN 650 MG: 160 LIQUID ORAL at 18:08

## 2021-08-13 RX ADMIN — GABAPENTIN 300 MG: 250 SUSPENSION ORAL at 08:31

## 2021-08-13 RX ADMIN — BISACODYL 10 MG: 10 SUPPOSITORY RECTAL at 08:36

## 2021-08-13 RX ADMIN — SERTRALINE HYDROCHLORIDE 150 MG: 20 SOLUTION ORAL at 21:55

## 2021-08-13 RX ADMIN — DOCUSATE SODIUM 100 MG: 50 LIQUID ORAL at 19:59

## 2021-08-13 RX ADMIN — KETAMINE HYDROCHLORIDE 5 MG: 10 INJECTION INTRAMUSCULAR; INTRAVENOUS at 08:36

## 2021-08-13 RX ADMIN — SENNOSIDES A AND B 5 ML: 415.36 LIQUID ORAL at 19:59

## 2021-08-13 RX ADMIN — KETOROLAC TROMETHAMINE 15 MG: 15 INJECTION, SOLUTION INTRAMUSCULAR; INTRAVENOUS at 18:08

## 2021-08-13 RX ADMIN — Medication 40 MG: at 20:04

## 2021-08-13 RX ADMIN — HEPARIN SODIUM 200 UNITS/HR: 10000 INJECTION, SOLUTION INTRAVENOUS at 13:24

## 2021-08-13 RX ADMIN — SODIUM CHLORIDE, PRESERVATIVE FREE 5 ML: 5 INJECTION INTRAVENOUS at 08:28

## 2021-08-13 RX ADMIN — VANCOMYCIN HYDROCHLORIDE 1250 MG: 100 INJECTION, POWDER, LYOPHILIZED, FOR SOLUTION INTRAVENOUS at 04:48

## 2021-08-13 RX ADMIN — MAGNESIUM HYDROXIDE 30 ML: 400 SUSPENSION ORAL at 09:58

## 2021-08-13 RX ADMIN — ERTAPENEM SODIUM 1 G: 1 INJECTION, POWDER, LYOPHILIZED, FOR SOLUTION INTRAMUSCULAR; INTRAVENOUS at 21:55

## 2021-08-13 RX ADMIN — Medication 15 ML: at 08:30

## 2021-08-13 ASSESSMENT — MIFFLIN-ST. JEOR: SCORE: 1155.2

## 2021-08-13 ASSESSMENT — ACTIVITIES OF DAILY LIVING (ADL)
ADLS_ACUITY_SCORE: 19
ADLS_ACUITY_SCORE: 20
ADLS_ACUITY_SCORE: 19

## 2021-08-13 NOTE — PROGRESS NOTES
REGIONAL ANESTHESIA PAIN SERVICE (RAPS) EVALUATION:    Summary: Bilateral ES Bolus   - Time: 22:00  - Subjective: Patient reports 5/10 pain intensity with current therapy of continuous infusion at 7mL/hr bilateral ES catheters (0.2% ropivacaine) with hydromorphone PCA before bolus given as described below.  - Objective:  Current vitals: /68, off of pressors  General: drowsy, itchy, comfortable, pain with movement  Skin and dressing CDI, no erythema or swelling, slight dry blood beneath clear dressing    - Assessment/Plan     INTERVENTION: Bilateral Bolus administered    MEDICATION: PF bupivacaine 0.125% total bolus 20mL, 10 mL via each catheter and given slowly over 15 minutes at 22:00, administered without complication with negative aspirate before and between each mL.  No symptoms of local anesthetic systemic toxicity (LAST). Post bolus vitals stable for next 5min. Bedside RN aware of need to continue BP, P and MAP monitoring Q 10 min for an additional 20 min. Contact RAPS if any of the following: patient experiencing any untoward effects, SBP< 90, P < 50 or > 120, MAP < 60. Patient can be evaluated to receive local anesthetic bolus Q 12 hr PRN pain not controlled with continuous infusion.  Bedside nurse must page RAPS to request bolus    RAPS Contact Info (24 hour job code pager is the last 4 digits) For in-house use only:  [employer] phone: [place] [phone], [place] [employer] [phone], [facility] [phone], then enter call-back number.    Text: Use [employer] on the Intranet <Paging/Directory> tab and enter Jobcode ID.   If no call back at any time, contact the hospital  and ask for RAPS attending or backup     Stu Prescott MD   Anesthesia resident CA-2  08/13/2021   [facility] [phone]

## 2021-08-13 NOTE — PLAN OF CARE
[id number]- Meme has been afebrile w stable VS this shift on 1L nasal canula. On dilaudid PCA, scheduled toradol, tylenol, ketamine & ativan. Ropivicaine bolus given then blocks removed. Heparin gtt started at 200units/hr. All tubing changed except insulin tubing as not found anywhere in the hospital. Given zofran once for nausea. IV benadryl once for itching. Pt up in the chair and brushed her teeth in the bathroom, did a couple walks to the door. Needs another walk/chair this evening. Incision c/d/I w dermabond. ARTEMIO with milky output. Tube feeds changed to fat-free formula, increased to 40cc/hr. No relizorb needed. Insulin gtt between 0.2-1units/hr on algorithm 1. Miller with great UOP, removed at 1100, due to void this afternoon/evening. MIVF decreased to 10cc/hr. Still NPO w ice chips. Up with stand by assist.  at bedside and supportive. Will continue to monitor and notify team w concerns.

## 2021-08-13 NOTE — PROGRESS NOTES
CLINICAL NUTRITION SERVICES - BRIEF NOTE     Nutrition Prescription    RECOMMENDATIONS FOR MDs/PROVIDERS TO ORDER:  Recommend transitioning back to standard formula within ~2 weeks if able to avoid essential fatty acid deficiency.  If unable to do so, would consider IV lipids 2x/week.      Recommendations already ordered by Registered Dietitian (RD):  Change to Vivonex TEN formula @ 40 ml/hr  Add 3 pkts Prosource TF daily d/t very low protein content of new formula    Goal regimen = Vivonex TEN @ 60 ml/hr (1440 ml/day) + 3 pkts Prosource TF daily to provide 1560 kcals (28 kcal/kg/day), 88 g PRO (1.6 g/kg/day), 4.3 g fat, 297 g CHO and 1188 ml free H2O    No need for relizorb with minimal fat in new TF formula.     Future/Additional Recommendations:  Standard TF recommendations: Vital 1.2 @ 55 ml/hr with relizorb 1 cartridge changed q 12 hours     EVALUATION OF THE PROGRESS TOWARD GOALS       NEW FINDINGS   Team requesting very low fat TF formula d/t chyle leak (milky ARTEMIO output)     INTERVENTIONS  Discussed changes above with team.     Monitoring/Evaluation  Progress toward goals will be monitored and evaluated per protocol.     Effie Ramon MS, RD, LD  Pager [phone]

## 2021-08-13 NOTE — PROGRESS NOTES
Pancreatitis Service - Daily Progress Note  08/13/2021    Assessment & Plan: Meme Robins is a 51-year-old woman with chronic pancreatitis, who is s/p 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 Shirley. 2300 IE/kg. Intraop hypotension during islet infusion, thought to be 2/2 anaphylaxis and treated with benadryl, steroids and resolved.     Cardiorespiratory:   Hypotension: Resolved. BP now stable.   Hematology:  Post op anemia: Likely dilutional vs some oozing post op. Hgb 5.6 on 8/12, transfused 2 unit pRBCs. Hgb 8.8 today.  GI/Nutrition:   Diet: G tube to gravity, increase TFs to 40 ml/hr. Goal tube feed rate 60 mL/hr. Relizorb when not on elemental TF.  Bowel regimen: Senna, supp today.  Chylous leak: Change TF to elemental formula today.  Fluid/Electrolytes:   Hypervolemia: TKO IVF.  : Remove shook.  Post-pancreatectomy diabetes: Continue insulin drip at this time, will plan to transition to subcutaneous insulin after tube feeds at goal, appreciate Endocrine consult.  Infection: Afebrile  Islet cultures positive: Growing enterococcus faecalis, morganella m, E. Coli, klebsiella oxy ESBL. Continue vanc/ertapenem/ and fluconazole.  Prophylaxis: PPI, Anticoagulation: resstart Heparin 200 U/hr  Pain control: Good.   -Dilaudid PCA, no change  -On Q block, no change  -ketamine, decrease to 2.5mg   -ativan, decrease to 0.25mg  -neurontin, no change   -toradol 15mg q6h, no change  Activity: Up with assist  Anticipated LOS/Discharge: 7-10 days     Medical Decision Making: Medium  Subsequent visit 89989 (moderate level decision making)    JOSE ALBERTO/Fellow/Resident Provider: Mayte Sumner NP     Faculty: Clyde Shirley MD  __________________________________________________________________  Transplant History: Admitted 8/9/2021 for TP-AIT  8/9/2021 (Islet), Postoperative day: 4     Interval History: History is  "obtained from the patient  Overnight events: Pt very comfortable, awake, appeared narcotized. No nausea.     ROS:   A 10-point review of systems was negative except as noted above.    Curent Meds:    acetaminophen *SUGAR FREE*  650 mg Per J Tube Q6H     bisacodyl  10 mg Rectal Daily     docusate  100 mg Per J Tube BID     ertapenem (INVanz) IV  1 g Intravenous Q24H     fluconazole  200 mg Intravenous Q24H     gabapentin  300 mg Oral or J tube BID     heparin  5-10 mL Intracatheter Q28 Days     heparin lock flush  5-10 mL Intracatheter Q24H     ketamine  2.5 mg Intravenous Q6H     ketorolac  15 mg Intravenous Q6H     LORazepam  0.25 mg Intravenous Q6H     magnesium hydroxide  30 mL Per J Tube BID     multivitamins w/minerals  15 mL Per Feeding Tube Daily     pantoprazole  40 mg Oral or J tube BID     protein modular  1 packet Per Feeding Tube TID     sennosides  5 mL Per J Tube BID     sertraline  150 mg Per J Tube At Bedtime     sodium chloride (PF)  10-20 mL Intracatheter Q28 Days     sodium chloride (PF)  3 mL Intracatheter Q8H     sodium chloride (PF)  5 mL Perineural Once     sodium chloride         traZODone  100 mg Per J Tube At Bedtime     vancomycin  1,250 mg Intravenous Q12H       Physical Exam:     Admit Weight: 55.2 kg (121 lb 11.1 oz)    Current Vitals:   /79 (BP Location: Left arm)   Pulse 100   Temp 99.5  F (37.5  C) (Oral)   Resp 16   Ht 1.549 m (5' 1\")   Wt 60.8 kg (134 lb)   SpO2 98%   BMI 25.32 kg/m      Vital sign ranges:    Temp:  [98.3  F (36.8  C)-99.5  F (37.5  C)] 99.5  F (37.5  C)  Pulse:  [] 100  Resp:  [12-18] 16  BP: (105-127)/(67-79) 120/79  SpO2:  [95 %-99 %] 98 %  Patient Vitals for the past 24 hrs:   BP Temp Temp src Pulse Resp SpO2 Weight   08/13/21 1222 120/79 99.5  F (37.5  C) Oral 100 16 98 % --   08/13/21 0815 125/77 -- Oral 97 15 99 % --   08/13/21 0500 -- -- -- -- -- -- 60.8 kg (134 lb)   08/13/21 0353 127/79 98.5  F (36.9  C) Oral 98 18 95 % --   08/12/21 " 2205 106/67 -- -- 91 16 95 % --   08/12/21 1954 118/70 98.3  F (36.8  C) Oral 103 16 98 % --   08/12/21 1735 108/73 98.5  F (36.9  C) Oral 94 16 98 % --   08/12/21 1552 116/73 98.4  F (36.9  C) Oral 101 12 97 % --   08/12/21 1427 105/67 98.5  F (36.9  C) -- 104 12 -- --     General Appearance: in no apparent distress.   Skin: normal, warm  Heart: perfused  Lungs: Nonlabored resps on RA  Abdomen: The abdomen is flat, and non-tender to light palpation. The wound is dermabonded, healing well. Tube/Drain sites are: G tube to gravity drainage, green/brown output. J tube with tube feeds infusing. ARTEMIO: milky serosanguinous  : shook is present (remove today) clear yellow urine  Extremities: edema: trace throughout   Neuro: A&Ox4, appears narcotized    Data:   CMP  Recent Labs   Lab 08/13/21  1408 08/13/21  1311 08/13/21  0502 08/12/21  0555 08/11/21  0700 08/11/21  0635 08/10/21  0414 08/09/21  1840 08/09/21  1744 08/09/21  1715 08/09/21  1715   NA  --   --  144 145*  --  138 140   < > 139  --  140   POTASSIUM  --   --  3.6 4.0   < > 3.1* 3.8   < > 3.4*  --  3.5   CHLORIDE  --   --  109 110*  --  105 108   < >  --   --   --    CO2  --   --  34* 31  --  25 23   < >  --   --   --    * 120* 133* 106*  --  152* 168*   < > 135*   < > 106*   BUN  --   --  5* 7  --  5* 9   < >  --   --   --    CR  --   --  0.54 0.60  --  0.52 0.62   < >  --   --   --    GFRESTIMATED  --   --  >90 >90  --  >90 >90   < >  --   --   --    VIKAS  --   --  7.6* 7.4*  --  7.4* 7.8*   < >  --   --   --    ICAW  --   --   --   --   --   --   --   --  4.8  --  4.8   MAG  --   --   --  2.0  --  1.7 1.4*   < >  --   --   --    PHOS  --   --  2.8 2.7   < > 1.8* 3.8   < >  --   --   --    AMYLASE  --   --   --   --   --  63 166*  --   --   --   --    LIPASE  --   --   --   --   --  1,174* 1,875*  --   --   --   --    ALBUMIN  --   --   --   --   --  2.0* 2.6*   < >  --   --   --    BILITOTAL  --   --   --   --   --  0.3 0.5   < >  --   --   --    ALKPHOS   --   --   --   --   --  76 66   < >  --   --   --    AST  --   --   --   --   --  76* 331*   < >  --   --   --    ALT  --   --   --   --   --  159* 363*   < >  --   --   --     < > = values in this interval not displayed.     CBC  Recent Labs   Lab 08/13/21  1333 08/13/21  0502 08/12/21  0655   HGB 8.9* 8.8* 5.6*   WBC  --  8.2 9.0   PLT  --  176 142*     Coags  Recent Labs   Lab 08/13/21  0502 08/12/21  0555 08/09/21  1842 08/09/21  0703   INR  --   --  1.45* 0.99   PTT 43* 55* 77*  --       Urinalysis  Recent Labs   Lab Test 08/04/21  0837   COLOR Yellow   APPEARANCE Slightly Cloudy*   URINEGLC Negative   URINEBILI Negative   URINEKETONE Negative   SG 1.018   UBLD Negative   URINEPH 5.0   PROTEIN Negative   NITRITE Negative   LEUKEST Trace*   RBCU 1   WBCU 12*     Attestation:  Patient has been seen and evaluated by me with transplant team.   Vital signs, labs, medications and orders were reviewed.   When obtained, diagnostic images were reviewed by me and interpreted as above.    The care plan was discussed with the multidisciplinary team and I agree with the findings and plan in this note, with any differences recorded in blue.    .

## 2021-08-13 NOTE — PLAN OF CARE
"1900 - 0730    /79 (BP Location: Left arm)   Pulse 98   Temp 98.5  F (36.9  C) (Oral)   Resp 18   Ht 1.549 m (5' 1\")   Wt 60.7 kg (133 lb 14.4 oz)   SpO2 95%   BMI 25.30 kg/m      VSS on 1 LPM  BG: insulin gtt 108 - 133  Labs: Hgb recheck 8.3, +BC this AM - team aware (placed in contact iso)  Pain/Nausea/PRN: Dilaudid PCA 0.3 mg continuous, 0.2 mg q10min, On-Q bolused at 2200, IV zofran x 2, IV benadryl x 2 with scheduled meds controlling pain and nausea  Diet: NPO with ice chips, TF @ 30 mL/hr  LDA: I.J. with TKO and MIVF, On-Q, ARTEMIO putting out milky output - team aware, G tube 175 mL, J tube to continuous TF, Port heparin locked.  GI: No BM  : Miller output 1475 mL  Skin: incision closed with liquid bandage open to air  Mobility: Assist x 1-2  Plan: continue to monitor     "

## 2021-08-13 NOTE — PROGRESS NOTES
"VS: /88 (BP Location: Left arm)   Pulse 101   Temp 98.2  F (36.8  C) (Oral)   Resp 17   Ht 1.549 m (5' 1\")   Wt 60.8 kg (134 lb)   SpO2 98%   BMI 25.32 kg/m      Cares:    Current condition:   Neuro: A&O x2. New onset confusion noted around 2200 related to time and situation.   Cardio: WNL.  Respiratory: On Supplemental oxygen at  2L via nasal canula. Wean as tolerated to keep above 90%. Unable to wean this shift.   GI/: Miller removed. Voiding well.  Bladder scan post void was 44cc.  Large loose BM.   Skin: Medial abdominal incision open to air. Scant amount of drainage cleaned with normal saline.  GJ tube and ARTEMIO drain dressing changed during shift.   Diet: NPO with ice chips  Labs: Hgb 8.3.   BG:Range . Insulin drip verified and adjusted as needed q1 hour.   LDA: Left quadruple lumen internal jugular CDI. Blue and brown line saline locked. White and Grey line infusing.   Port- Heparin locked.  Right ARTEMIO drain with 80cc milky output.   G tube to gravity 275cc output.   Tube feed (fat free) running at 40ml/hr.   Mobility: Assist x1.   Pain: Abdominal pain.  PRN medications: On Dilaudid PA pump. Scheduled toradol, tylenol, ketamine and ativan.   PRN IV benadryl q6 hours.   Changes: New onset confusion. Fidgeting with internal jugular dressing and oxygen tubing.   Plan of Care: Pain management. Transplant education. Monitor output from drains. Encourage toileting.     "

## 2021-08-13 NOTE — PROVIDER NOTIFICATION
"Paged surgical cross cover, \"Can you order heparin flush/dwell for pt's PORT? Line is flushing but not drawing back.\"  "

## 2021-08-13 NOTE — PROGRESS NOTES
REGIONAL ANESTHESIA PAIN SERVICE CONTINUOUS NERVE INFUSION NOTE  August 13, 2021       Meme Robins is a 51-year-old woman with chronic pancreatitis, who is s/p 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 Shirley. and placement of bilateral erector spinae (ES) T6-7 catheters by RAPS on 8/9/21 for analgesia.      Subjective and Interval History: Overnight events: no acute event.  Seen at 1005.  Patient reports 5/10 at rest and 7/10 with activity, moderate pain control with current nerve block infusion and analgesics (see below).  Sitting up in a chair,  denies weakness, paresthesias, circumoral numbness, metallic taste or tinnitus. NPO.  denies nausea.    Antithrombotic/Thrombolytic Therapy ordered:   Heparin infusion held since 8/12/21 morning.     Analgesic Medications:   Medications related to Pain Management (From now, onward)    Start     Dose/Rate Route Frequency Ordered Stop    08/12/21 2000  magnesium hydroxide (MILK OF MAGNESIA) suspension 30 mL      30 mL Per J Tube 2 TIMES DAILY 08/12/21 1226      08/12/21 1230  bisacodyl (DULCOLAX) Suppository 10 mg      10 mg Rectal DAILY 08/12/21 1226      08/11/21 2000  sennosides (SENOKOT) syrup 5 mL      5 mL Per J Tube 2 TIMES DAILY 08/11/21 1543      08/11/21 2000  docusate (COLACE) 50 MG/5ML liquid 50 mg      50 mg Per J Tube 2 TIMES DAILY 08/11/21 1545      08/11/21 0730  ketorolac (TORADOL) injection 15 mg      15 mg Intravenous EVERY 6 HOURS 08/11/21 0717 08/16/21 0859    08/10/21 2200  acetaminophen *SUGAR FREE* (TYLENOL) solution 650 mg      650 mg Per J Tube EVERY 6 HOURS 08/10/21 2144      08/10/21 2100  ketamine (KETALAR) injection 5-10 mg      5-10 mg  over 2-5 Minutes Intravenous EVERY 6 HOURS 08/10/21 2044      08/10/21 1310  diphenhydrAMINE (BENADRYL) injection 25 mg      25 mg Intravenous EVERY 6 HOURS PRN 08/10/21 1310      08/10/21  0800  gabapentin (NEURONTIN) solution 300 mg      300 mg Oral or J tube 2 TIMES DAILY 08/09/21 2022 08/09/21 2200  LORazepam (ATIVAN) injection 0.5 mg      0.5 mg Intravenous EVERY 6 HOURS 08/09/21 1824 08/09/21 2030  HYDROmorphone (DILAUDID) PCA 0.2 mg/mL OPIOID TOLERANT       Intravenous CONTINUOUS 08/09/21 2022 08/09/21 2022  lidocaine 1 % 0.1-1 mL      0.1-1 mL Other EVERY 1 HOUR PRN 08/09/21 2022 08/09/21 2022  lidocaine (LMX4) cream       Topical EVERY 1 HOUR PRN 08/09/21 2022 08/09/21 0830  ropivacaine 0.2% (NAROPIN) 750 mL in ON-Q C-Bloc select flow ([id number] holds 600-750 mL) dual cath disposable pump      14 mL/hr  Irrigation CONTINUOUS 08/09/21 0811             Objective:  Lab results  Recent Labs   Lab Test 08/13/21  0502   WBC 8.2   RBC 3.14*   HGB 8.8*   HCT 28.1*   MCV 90   MCH 28.0   MCHC 31.3*   RDW 14.6          Lab Results   Component Value Date    INR 1.45 08/09/2021    INR 0.99 08/09/2021    INR 1.00 08/04/2021       Vitals:    Temp:  [98.3  F (36.8  C)-99.1  F (37.3  C)] 98.5  F (36.9  C)  Pulse:  [] 98  Resp:  [12-20] 18  BP: ()/(59-79) 127/79  SpO2:  [95 %-98 %] 95 %    Exam:   GEN: alert and no distress  NEURO/MSK: moving all extemities  Strength LE 5/5  and overall symmetric  SKIN: bilateral erector spinae (ES) catheter sites with dressing c/d/i, no tenderness, erythema, heme, edema.right catheter is dislodged from on Q ball.       Assessment and Plan:     ASSESSMENT  Patient is receiving moderate  analgesia with current multimodal therapy (has PCA Dilaudid continuous rate of 0.3mg and bumps of 0.2mg every 10 minutes)  including 0.2% ropivacaine On-Q continuous infusion at 14 mL/hr via 7ml/hour on left and right catheter.  Motor function intact and is meeting activity goals.  NO evidence of adverse side effects related to local anesthetic. H/o chronic opioid use. PTA was on OxyContin 10mg TID and Dilaudid 4mg every 4-6 hours PRN ( did stop short  acting use PTA).     At 1010 RN reports an ES catheter became dislodged. Examined pt--dislodged on the right side from on Q ball. Uncertain of time that catheter was dislodged.     Bolus administered at 1015    MEDICATION: PF bupivacaine  0.125%, total bolus 10mL, via 10 ml on left catheter.    PROCEDURE: Clinician bolus administered via left and right  nerve block catheter, without complication, negative aspirate before and between each mL.  No symptoms of local anesthetic systemic toxicity (LAST). Remained with and assessed patient for 10 min post-injection. BP, P and MAP stable    POST-PROCEDURE: Bedside RN aware of need to continue BP, P and MAP monitoring Q 10 min for an additional 20 min. Contact [facility] (jobcode ID [id number]) if any of the following: patient experiencing any untoward effects, SBP< 90, P < 50 or > 120, MAP < 60                        PLAN  Catheter Day #4 bilateral erector spinae (ES) T6-7 catheters/ continuous on Q infusion:         Received results that blood culture is positive for ESBL infection, the decision was made to remove the ES catheters.    At 1130 am, catheters were removed without complications, dark tips intact, sites d,c,i. Small band aids applied.         Antithrombotic/thrombolytic therapy:     Held Heparin infusion 400units/ hr as ordered per primary service since 8/12/21 but may resume 1 hour after catheter removal if needed. RN made aware.  ? Please contact ASHWIN, jobcode ID: [id number] prior to any medication changes        - discussed plan with attending anesthesiologist  Shannon Barnett PA-C  [facility]  8/13/2021 7:52 AM    [facility] Contact Info (24 hour job code pager is the last 4 digits) For in-house use only:   Job code ID: [id number]   [facility] 0599  Peds [phone]  [employer] phone: dial * * * [phone], enter jobcode ID, then enter call-back number.    Text: Use [employer] on the Intranet <Paging/Directory> tab and enter Jobcode ID.   If no call back at any time, contact the  hospital  and ask for RAPS attending or backup

## 2021-08-13 NOTE — PROVIDER NOTIFICATION
Paged Dr. Benito, Hgb recheck at 2145 was 8.3. Her ARTEMIO output is now looking milky which is new.    MD will assess change in output in AM

## 2021-08-14 ENCOUNTER — APPOINTMENT (OUTPATIENT)
Dept: OCCUPATIONAL THERAPY | Facility: CLINIC | Age: 51
End: 2021-08-14
Attending: TRANSPLANT SURGERY
Payer: COMMERCIAL

## 2021-08-14 LAB
ANION GAP SERPL CALCULATED.3IONS-SCNC: 1 MMOL/L (ref 3–14)
APTT PPP: 50 SECONDS (ref 22–38)
BASOPHILS # BLD AUTO: 0 10E3/UL (ref 0–0.2)
BASOPHILS NFR BLD AUTO: 0 %
BUN SERPL-MCNC: 5 MG/DL (ref 7–30)
CALCIUM SERPL-MCNC: 8 MG/DL (ref 8.5–10.1)
CHLORIDE BLD-SCNC: 104 MMOL/L (ref 94–109)
CO2 SERPL-SCNC: 35 MMOL/L (ref 20–32)
CREAT SERPL-MCNC: 0.49 MG/DL (ref 0.52–1.04)
EOSINOPHIL # BLD AUTO: 0.3 10E3/UL (ref 0–0.7)
EOSINOPHIL NFR BLD AUTO: 5 %
ERYTHROCYTE [DISTWIDTH] IN BLOOD BY AUTOMATED COUNT: 14.6 % (ref 10–15)
GFR SERPL CREATININE-BSD FRML MDRD: >90 ML/MIN/1.73M2
GLUCOSE BLD-MCNC: 121 MG/DL (ref 70–99)
GLUCOSE BLDC GLUCOMTR-MCNC: 101 MG/DL (ref 70–99)
GLUCOSE BLDC GLUCOMTR-MCNC: 101 MG/DL (ref 70–99)
GLUCOSE BLDC GLUCOMTR-MCNC: 106 MG/DL (ref 70–99)
GLUCOSE BLDC GLUCOMTR-MCNC: 108 MG/DL (ref 70–99)
GLUCOSE BLDC GLUCOMTR-MCNC: 111 MG/DL (ref 70–99)
GLUCOSE BLDC GLUCOMTR-MCNC: 113 MG/DL (ref 70–99)
GLUCOSE BLDC GLUCOMTR-MCNC: 113 MG/DL (ref 70–99)
GLUCOSE BLDC GLUCOMTR-MCNC: 114 MG/DL (ref 70–99)
GLUCOSE BLDC GLUCOMTR-MCNC: 119 MG/DL (ref 70–99)
GLUCOSE BLDC GLUCOMTR-MCNC: 121 MG/DL (ref 70–99)
GLUCOSE BLDC GLUCOMTR-MCNC: 129 MG/DL (ref 70–99)
GLUCOSE BLDC GLUCOMTR-MCNC: 130 MG/DL (ref 70–99)
GLUCOSE BLDC GLUCOMTR-MCNC: 137 MG/DL (ref 70–99)
GLUCOSE BLDC GLUCOMTR-MCNC: 141 MG/DL (ref 70–99)
GLUCOSE BLDC GLUCOMTR-MCNC: 143 MG/DL (ref 70–99)
GLUCOSE BLDC GLUCOMTR-MCNC: 143 MG/DL (ref 70–99)
GLUCOSE BLDC GLUCOMTR-MCNC: 147 MG/DL (ref 70–99)
GLUCOSE BLDC GLUCOMTR-MCNC: 147 MG/DL (ref 70–99)
GLUCOSE BLDC GLUCOMTR-MCNC: 151 MG/DL (ref 70–99)
GLUCOSE BLDC GLUCOMTR-MCNC: 156 MG/DL (ref 70–99)
GLUCOSE BLDC GLUCOMTR-MCNC: 79 MG/DL (ref 70–99)
GLUCOSE BLDC GLUCOMTR-MCNC: 83 MG/DL (ref 70–99)
GLUCOSE BLDC GLUCOMTR-MCNC: 95 MG/DL (ref 70–99)
GLUCOSE BLDC GLUCOMTR-MCNC: 95 MG/DL (ref 70–99)
HCT VFR BLD AUTO: 27.9 % (ref 35–47)
HGB BLD-MCNC: 8.5 G/DL (ref 11.7–15.7)
HGB BLD-MCNC: 8.9 G/DL (ref 11.7–15.7)
HGB BLD-MCNC: 8.9 G/DL (ref 11.7–15.7)
HGB BLD-MCNC: 9.4 G/DL (ref 11.7–15.7)
IMM GRANULOCYTES # BLD: 0 10E3/UL
IMM GRANULOCYTES NFR BLD: 0 %
LYMPHOCYTES # BLD AUTO: 1.2 10E3/UL (ref 0.8–5.3)
LYMPHOCYTES NFR BLD AUTO: 16 %
MAGNESIUM SERPL-MCNC: 2 MG/DL (ref 1.6–2.3)
MCH RBC QN AUTO: 28.3 PG (ref 26.5–33)
MCHC RBC AUTO-ENTMCNC: 31.9 G/DL (ref 31.5–36.5)
MCV RBC AUTO: 89 FL (ref 78–100)
MONOCYTES # BLD AUTO: 1.4 10E3/UL (ref 0–1.3)
MONOCYTES NFR BLD AUTO: 19 %
NEUTROPHILS # BLD AUTO: 4.6 10E3/UL (ref 1.6–8.3)
NEUTROPHILS NFR BLD AUTO: 60 %
NRBC # BLD AUTO: 0 10E3/UL
NRBC BLD AUTO-RTO: 0 /100
PHOSPHATE SERPL-MCNC: 2.8 MG/DL (ref 2.5–4.5)
PLATELET # BLD AUTO: 215 10E3/UL (ref 150–450)
POTASSIUM BLD-SCNC: 3.3 MMOL/L (ref 3.4–5.3)
RBC # BLD AUTO: 3.14 10E6/UL (ref 3.8–5.2)
SODIUM SERPL-SCNC: 140 MMOL/L (ref 133–144)
UFH PPP CHRO-ACNC: <0.1 IU/ML
VANCOMYCIN SERPL-MCNC: 9 MG/L
WBC # BLD AUTO: 7.6 10E3/UL (ref 4–11)

## 2021-08-14 PROCEDURE — 250N000011 HC RX IP 250 OP 636: Performed by: TRANSPLANT SURGERY

## 2021-08-14 PROCEDURE — 250N000011 HC RX IP 250 OP 636

## 2021-08-14 PROCEDURE — 85730 THROMBOPLASTIN TIME PARTIAL: CPT | Performed by: PHYSICIAN ASSISTANT

## 2021-08-14 PROCEDURE — 250N000013 HC RX MED GY IP 250 OP 250 PS 637: Performed by: PHYSICIAN ASSISTANT

## 2021-08-14 PROCEDURE — 85018 HEMOGLOBIN: CPT | Performed by: PHYSICIAN ASSISTANT

## 2021-08-14 PROCEDURE — 36592 COLLECT BLOOD FROM PICC: CPT | Performed by: TRANSPLANT SURGERY

## 2021-08-14 PROCEDURE — 84100 ASSAY OF PHOSPHORUS: CPT | Performed by: PHYSICIAN ASSISTANT

## 2021-08-14 PROCEDURE — 250N000011 HC RX IP 250 OP 636: Performed by: NURSE PRACTITIONER

## 2021-08-14 PROCEDURE — 85520 HEPARIN ASSAY: CPT | Performed by: NURSE PRACTITIONER

## 2021-08-14 PROCEDURE — 97530 THERAPEUTIC ACTIVITIES: CPT | Mod: GO

## 2021-08-14 PROCEDURE — 36592 COLLECT BLOOD FROM PICC: CPT | Performed by: PHYSICIAN ASSISTANT

## 2021-08-14 PROCEDURE — 120N000011 HC R&B TRANSPLANT UMMC

## 2021-08-14 PROCEDURE — 250N000013 HC RX MED GY IP 250 OP 250 PS 637: Performed by: TRANSPLANT SURGERY

## 2021-08-14 PROCEDURE — 258N000003 HC RX IP 258 OP 636: Performed by: TRANSPLANT SURGERY

## 2021-08-14 PROCEDURE — 250N000013 HC RX MED GY IP 250 OP 250 PS 637: Performed by: NURSE PRACTITIONER

## 2021-08-14 PROCEDURE — 83735 ASSAY OF MAGNESIUM: CPT | Performed by: TRANSPLANT SURGERY

## 2021-08-14 PROCEDURE — 250N000011 HC RX IP 250 OP 636: Performed by: PHYSICIAN ASSISTANT

## 2021-08-14 PROCEDURE — 80202 ASSAY OF VANCOMYCIN: CPT | Performed by: TRANSPLANT SURGERY

## 2021-08-14 PROCEDURE — 80048 BASIC METABOLIC PNL TOTAL CA: CPT | Performed by: PHYSICIAN ASSISTANT

## 2021-08-14 PROCEDURE — 85025 COMPLETE CBC W/AUTO DIFF WBC: CPT | Performed by: PHYSICIAN ASSISTANT

## 2021-08-14 PROCEDURE — 250N000009 HC RX 250: Performed by: NURSE PRACTITIONER

## 2021-08-14 RX ORDER — POTASSIUM CHLORIDE 29.8 MG/ML
20 INJECTION INTRAVENOUS
Status: COMPLETED | OUTPATIENT
Start: 2021-08-14 | End: 2021-08-14

## 2021-08-14 RX ORDER — METHOCARBAMOL 100 MG/ML
1000 INJECTION, SOLUTION INTRAMUSCULAR; INTRAVENOUS EVERY 8 HOURS
Status: COMPLETED | OUTPATIENT
Start: 2021-08-14 | End: 2021-08-16

## 2021-08-14 RX ORDER — FENTANYL 50 UG/1
50 PATCH TRANSDERMAL
Status: DISCONTINUED | OUTPATIENT
Start: 2021-08-14 | End: 2021-08-17

## 2021-08-14 RX ORDER — POTASSIUM CHLORIDE 750 MG/1
20 TABLET, EXTENDED RELEASE ORAL ONCE
Status: DISCONTINUED | OUTPATIENT
Start: 2021-08-14 | End: 2021-08-14

## 2021-08-14 RX ADMIN — DIPHENHYDRAMINE HYDROCHLORIDE 25 MG: 50 INJECTION, SOLUTION INTRAMUSCULAR; INTRAVENOUS at 09:59

## 2021-08-14 RX ADMIN — DIPHENHYDRAMINE HYDROCHLORIDE 25 MG: 50 INJECTION, SOLUTION INTRAMUSCULAR; INTRAVENOUS at 17:34

## 2021-08-14 RX ADMIN — LORAZEPAM 0.25 MG: 2 INJECTION INTRAMUSCULAR; INTRAVENOUS at 08:59

## 2021-08-14 RX ADMIN — LORAZEPAM 0.25 MG: 2 INJECTION INTRAMUSCULAR; INTRAVENOUS at 03:05

## 2021-08-14 RX ADMIN — KETAMINE HYDROCHLORIDE 2.5 MG: 10 INJECTION INTRAMUSCULAR; INTRAVENOUS at 22:26

## 2021-08-14 RX ADMIN — SERTRALINE HYDROCHLORIDE 150 MG: 20 SOLUTION ORAL at 22:30

## 2021-08-14 RX ADMIN — FLUCONAZOLE IN SODIUM CHLORIDE 200 MG: 2 INJECTION, SOLUTION INTRAVENOUS at 21:22

## 2021-08-14 RX ADMIN — GABAPENTIN 300 MG: 250 SUSPENSION ORAL at 19:56

## 2021-08-14 RX ADMIN — ACETAMINOPHEN 650 MG: 160 LIQUID ORAL at 17:34

## 2021-08-14 RX ADMIN — ONDANSETRON 4 MG: 2 INJECTION INTRAMUSCULAR; INTRAVENOUS at 21:05

## 2021-08-14 RX ADMIN — ONDANSETRON 4 MG: 2 INJECTION INTRAMUSCULAR; INTRAVENOUS at 09:03

## 2021-08-14 RX ADMIN — KETAMINE HYDROCHLORIDE 2.5 MG: 10 INJECTION INTRAMUSCULAR; INTRAVENOUS at 03:04

## 2021-08-14 RX ADMIN — Medication 15 ML: at 09:05

## 2021-08-14 RX ADMIN — DOCUSATE SODIUM 100 MG: 50 LIQUID ORAL at 19:56

## 2021-08-14 RX ADMIN — Medication 40 MG: at 09:05

## 2021-08-14 RX ADMIN — FENTANYL 1 PATCH: 50 PATCH TRANSDERMAL at 16:04

## 2021-08-14 RX ADMIN — Medication 1 PACKET: at 20:10

## 2021-08-14 RX ADMIN — KETOROLAC TROMETHAMINE 15 MG: 15 INJECTION, SOLUTION INTRAMUSCULAR; INTRAVENOUS at 06:25

## 2021-08-14 RX ADMIN — Medication 1 PACKET: at 09:14

## 2021-08-14 RX ADMIN — KETOROLAC TROMETHAMINE 15 MG: 15 INJECTION, SOLUTION INTRAMUSCULAR; INTRAVENOUS at 00:09

## 2021-08-14 RX ADMIN — ACETAMINOPHEN 650 MG: 160 LIQUID ORAL at 11:58

## 2021-08-14 RX ADMIN — POTASSIUM CHLORIDE 20 MEQ: 29.8 INJECTION, SOLUTION INTRAVENOUS at 11:26

## 2021-08-14 RX ADMIN — GABAPENTIN 300 MG: 250 SUSPENSION ORAL at 09:05

## 2021-08-14 RX ADMIN — Medication 40 MG: at 19:56

## 2021-08-14 RX ADMIN — ACETAMINOPHEN 650 MG: 160 LIQUID ORAL at 06:24

## 2021-08-14 RX ADMIN — ACETAMINOPHEN 650 MG: 160 LIQUID ORAL at 00:09

## 2021-08-14 RX ADMIN — POTASSIUM CHLORIDE 20 MEQ: 29.8 INJECTION, SOLUTION INTRAVENOUS at 09:28

## 2021-08-14 RX ADMIN — KETAMINE HYDROCHLORIDE 2.5 MG: 10 INJECTION INTRAMUSCULAR; INTRAVENOUS at 09:01

## 2021-08-14 RX ADMIN — MAGNESIUM HYDROXIDE 30 ML: 400 SUSPENSION ORAL at 19:56

## 2021-08-14 RX ADMIN — VANCOMYCIN HYDROCHLORIDE 1250 MG: 100 INJECTION, POWDER, LYOPHILIZED, FOR SOLUTION INTRAVENOUS at 05:03

## 2021-08-14 RX ADMIN — VANCOMYCIN HYDROCHLORIDE 1500 MG: 10 INJECTION, POWDER, LYOPHILIZED, FOR SOLUTION INTRAVENOUS at 17:35

## 2021-08-14 RX ADMIN — KETOROLAC TROMETHAMINE 15 MG: 15 INJECTION, SOLUTION INTRAMUSCULAR; INTRAVENOUS at 11:58

## 2021-08-14 RX ADMIN — SENNOSIDES A AND B 5 ML: 415.36 LIQUID ORAL at 12:12

## 2021-08-14 RX ADMIN — DOCUSATE SODIUM 100 MG: 50 LIQUID ORAL at 12:10

## 2021-08-14 RX ADMIN — METHOCARBAMOL 1000 MG: 100 INJECTION INTRAMUSCULAR; INTRAVENOUS at 22:29

## 2021-08-14 RX ADMIN — LORAZEPAM 0.25 MG: 2 INJECTION INTRAMUSCULAR; INTRAVENOUS at 21:05

## 2021-08-14 RX ADMIN — DIPHENHYDRAMINE HYDROCHLORIDE 25 MG: 50 INJECTION, SOLUTION INTRAMUSCULAR; INTRAVENOUS at 04:19

## 2021-08-14 RX ADMIN — Medication: at 11:00

## 2021-08-14 RX ADMIN — ONDANSETRON 4 MG: 2 INJECTION INTRAMUSCULAR; INTRAVENOUS at 14:51

## 2021-08-14 RX ADMIN — LORAZEPAM 0.25 MG: 2 INJECTION INTRAMUSCULAR; INTRAVENOUS at 14:45

## 2021-08-14 RX ADMIN — ERTAPENEM SODIUM 1 G: 1 INJECTION, POWDER, LYOPHILIZED, FOR SOLUTION INTRAMUSCULAR; INTRAVENOUS at 22:29

## 2021-08-14 RX ADMIN — MAGNESIUM HYDROXIDE 30 ML: 400 SUSPENSION ORAL at 12:11

## 2021-08-14 RX ADMIN — KETAMINE HYDROCHLORIDE 2.5 MG: 10 INJECTION INTRAMUSCULAR; INTRAVENOUS at 14:51

## 2021-08-14 RX ADMIN — TRAZODONE HYDROCHLORIDE 100 MG: 100 TABLET ORAL at 22:29

## 2021-08-14 RX ADMIN — KETOROLAC TROMETHAMINE 15 MG: 15 INJECTION, SOLUTION INTRAMUSCULAR; INTRAVENOUS at 17:34

## 2021-08-14 RX ADMIN — METHOCARBAMOL 1000 MG: 100 INJECTION INTRAMUSCULAR; INTRAVENOUS at 17:33

## 2021-08-14 RX ADMIN — SODIUM CHLORIDE, PRESERVATIVE FREE 5 ML: 5 INJECTION INTRAVENOUS at 09:06

## 2021-08-14 RX ADMIN — SENNOSIDES A AND B 5 ML: 415.36 LIQUID ORAL at 19:56

## 2021-08-14 ASSESSMENT — ACTIVITIES OF DAILY LIVING (ADL)
ADLS_ACUITY_SCORE: 20

## 2021-08-14 ASSESSMENT — MIFFLIN-ST. JEOR: SCORE: 1130.71

## 2021-08-14 NOTE — PLAN OF CARE
"Vitals: /80 (BP Location: Left arm)   Pulse 105   Temp 99.6  F (37.6  C) (Oral)   Resp 18   Ht 1.549 m (5' 1\")   Wt 60.8 kg (134 lb)   SpO2 96%   BMI 25.32 kg/m     Need 1-2 L O2 per NC when sleeping, sats 89-91% on room air.    Endocrine: Insulin drip , off-1U/hr, algorithm 2.  Labs: Potassium 3.3, KCL 20 meq IV X2.  Pain: Moderate RLQ abdominal pain.  PRN's: Scheduled, PRN Ketamine, Dilaudid PCA, Benadryl, Zofran, Ativan.  Diet: NPO, tube feeds at 40cc/hr, has order to increase to 60cc/hr, on hold until bowels improved.  LDA: G tube to gravity, yellow, tube feeding backing up in her stomach, JT, ARTEMIO with milky serosanguinous drainage,  central line, raymon cath.  GI: Had large loose stool in nights, abdomen distended, did get laxatives this afternoon.  : Voids spontaneously, good output.  Skin: Abdominal incision intact.  Neuro: Mumbles and is confused after Ketamine, oriented in between.  Mobility: Up with minimal stand by assist to the bathroom and walked in the halls with therapy,   Education: n/a  Plan: Fentanyl patch when available,  decrease in PCA, IV Robaxin.    "

## 2021-08-14 NOTE — PROVIDER NOTIFICATION
Provider notified for new onset of confusion.  Per provider assessment, likely due to new orders for Ketamine and Ativan started today. Continue to monitor.

## 2021-08-14 NOTE — PROGRESS NOTES
Solid-Organ Transplant Service - Daily Progress Note  08/14/2021    Assessment & Plan: Meme Robins is a 51-year-old woman with chronic pancreatitis, who is s/p 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 Shirley. 2300 IE/kg. Intraop hypotension during islet infusion, thought to be 2/2 anaphylaxis and treated with benadryl, steroids and resolved.     Cardiorespiratory:   Hypotension: Resolved. BP now stable.   Hematology:  Post op anemia: Likely dilutional vs some oozing post op. Hgb 5.6 on 8/12, transfused 2 unit pRBCs. Hgb 8.8 today.    GI/Nutrition:   Diet: G tube to gravity, increase TFs at 40 ml/hr. Goal tube feed rate 60 mL/hr. Relizorb when not on elemental TF.  Bowel regimen: Tube feeds are backing up out of feeding tube, continued bowel-movements are imperative.  Senna, supp today.     -Add milk of magnesia (magnesium hydroxide)   -Add docusate 100mg BID   Chylous leak: Change TF to elemental formula today.  Fluid/Electrolytes:   Hypervolemia: TKO IVF.  : Miller removed; patient voiding well.  Post-pancreatectomy diabetes: Continue insulin drip at this time, will plan to transition to subcutaneous insulin after tube feeds at goal, appreciate Endocrine consult.  BG max of 151 overnight, min of 79      Infection: Afebrile  Islet cultures positive: Growing enterococcus faecalis, morganella m, E. Coli, klebsiella oxy ESBL. Continue vanc/ertapenem/ and fluconazole.  Prophylaxis: PPI, Anticoagulation: resstart Heparin 200 U/hr  Pain control: Good. Plan for transition to fentanyl patches WITHOUT basal rate hydromorphone.  Q block catheters have fallen out and are no longer available.    -Dilaudid PCA eliminate basal rate  -Add methocarbamol 1000mg IV Q8H  -ketamine, 2.5mg   -ativan, 0.25mg  -neurontin, no change   -toradol 15mg q6h, no change  Activity: Up to bathroom without  assistance  Anticipated LOS/Discharge: 7-10 days     Medical Decision Making: Medium  Subsequent visit 06815 (moderate level decision making)    JOSE ALBERTO/Fellow/Resident Provider: Mayte Sumner NP/Marvin Benito MD (Fellow)/Mj Delgado MD (PGY1)     Faculty: Clyde Shirley MD  __________________________________________________________________  Transplant History: Admitted 8/9/2021 for TP-AIT  8/9/2021 (Islet), Postoperative day: 5     Interval History: History is obtained from the patient  Overnight events: Pt experiencing significant pain without nausea/fevers/SOB.  Awake, ambulating without difficulty.  Had significant, large-volume bowel movement.  Still complains of discomfort in RLQ.  Tube feeds appear to be backing out of feeding tube, likely due to constipation.      ROS:   A 10-point review of systems was negative except as noted above.    Curent Meds:    acetaminophen *SUGAR FREE*  650 mg Per J Tube Q6H     bisacodyl  10 mg Rectal Daily     docusate  100 mg Per J Tube BID     ertapenem (INVanz) IV  1 g Intravenous Q24H     fluconazole  200 mg Intravenous Q24H     gabapentin  300 mg Oral or J tube BID     heparin  5-10 mL Intracatheter Q28 Days     heparin lock flush  5-10 mL Intracatheter Q24H     ketamine  2.5 mg Intravenous Q6H     ketorolac  15 mg Intravenous Q6H     LORazepam  0.25 mg Intravenous Q6H     magnesium hydroxide  30 mL Per J Tube BID     multivitamins w/minerals  15 mL Per Feeding Tube Daily     pantoprazole  40 mg Oral or J tube BID     potassium chloride  20 mEq Intravenous Q1H     protein modular  1 packet Per Feeding Tube TID     sennosides  5 mL Per J Tube BID     sertraline  150 mg Per J Tube At Bedtime     sodium chloride (PF)  10-20 mL Intracatheter Q28 Days     sodium chloride (PF)  3 mL Intracatheter Q8H     sodium chloride (PF)  5 mL Perineural Once     traZODone  100 mg Per J Tube At Bedtime     vancomycin  1,250 mg Intravenous Q12H       Physical Exam:     Admit Weight: 55.2 kg  "(121 lb 11.1 oz)    Current Vitals:   BP (!) 124/90 (BP Location: Left arm)   Pulse 102   Temp 99.2  F (37.3  C) (Oral)   Resp 18   Ht 1.549 m (5' 1\")   Wt 60.8 kg (134 lb)   SpO2 98%   BMI 25.32 kg/m      Vital sign ranges:    Temp:  [98.2  F (36.8  C)-99.5  F (37.5  C)] 99.2  F (37.3  C)  Pulse:  [] 102  Resp:  [16-18] 18  BP: (113-145)/(78-92) 124/90  SpO2:  [95 %-98 %] 98 %  Patient Vitals for the past 24 hrs:   BP Temp Temp src Pulse Resp SpO2   08/14/21 0727 (!) 124/90 99.2  F (37.3  C) Oral 102 18 98 %   08/14/21 0416 (!) 145/92 98.6  F (37  C) Oral 103 18 96 %   08/13/21 2344 125/82 99.5  F (37.5  C) Oral 89 18 96 %   08/13/21 2013 119/78 99.2  F (37.3  C) Oral 96 18 95 %   08/13/21 1622 113/88 98.2  F (36.8  C) Oral 101 17 98 %   08/13/21 1222 120/79 99.5  F (37.5  C) Oral 100 16 98 %     General Appearance: in no apparent distress.   Skin: normal, warm  Heart: perfused  Lungs: Nonlabored resps on RA  Abdomen: The abdomen is flat, and non-tender to light palpation. The wound is dermabonded, healing well. Tube/Drain sites are: G tube to gravity drainage, green/brown output. J tube with tube feeds infusing. ARTEMIO: milky serosanguinous  : shook is present (remove today) clear yellow urine  Extremities: edema: trace throughout   Neuro: A&Ox4, appears narcotized    Data:   CMP  Recent Labs   Lab 08/14/21  0758 08/14/21  0649 08/14/21  0426 08/14/21  0118 08/13/21  0502 08/12/21  0555 08/11/21  0700 08/11/21  0635 08/10/21  0414 08/09/21  1840 08/09/21  1744 08/09/21  1715 08/09/21  1715   NA  --   --  140  --  144 145*  --  138 140   < > 139  --  140   POTASSIUM  --   --  3.3*  --  3.6 4.0   < > 3.1* 3.8   < > 3.4*  --  3.5   CHLORIDE  --   --  104  --  109 110*  --  105 108   < >  --   --   --    CO2  --   --  35*  --  34* 31  --  25 23   < >  --   --   --    * 130* 121*  --  133* 106*  --  152* 168*   < > 135*   < > 106*   BUN  --   --  5*  --  5* 7  --  5* 9   < >  --   --   --    CR  --   " --  0.49*  --  0.54 0.60  --  0.52 0.62   < >  --   --   --    GFRESTIMATED  --   --  >90  --  >90 >90  --  >90 >90   < >  --   --   --    VIKAS  --   --  8.0*  --  7.6* 7.4*  --  7.4* 7.8*   < >  --   --   --    ICAW  --   --   --   --   --   --   --   --   --   --  4.8  --  4.8   MAG  --   --   --  2.0  --  2.0  --  1.7 1.4*   < >  --   --   --    PHOS  --   --  2.8  --  2.8 2.7   < > 1.8* 3.8   < >  --   --   --    AMYLASE  --   --   --   --   --   --   --  63 166*  --   --   --   --    LIPASE  --   --   --   --   --   --   --  1,174* 1,875*  --   --   --   --    ALBUMIN  --   --   --   --   --   --   --  2.0* 2.6*   < >  --   --   --    BILITOTAL  --   --   --   --   --   --   --  0.3 0.5   < >  --   --   --    ALKPHOS  --   --   --   --   --   --   --  76 66   < >  --   --   --    AST  --   --   --   --   --   --   --  76* 331*   < >  --   --   --    ALT  --   --   --   --   --   --   --  159* 363*   < >  --   --   --     < > = values in this interval not displayed.     CBC  Recent Labs   Lab 08/14/21 0426 08/13/21 2118 08/13/21  0502   HGB 8.9*  8.9* 8.3* 8.8*   WBC 7.6  --  8.2     --  176     Coags  Recent Labs   Lab 08/14/21 0426 08/13/21  0502 08/09/21  1842 08/09/21  0703   INR  --   --  1.45* 0.99   PTT 50* 43* 77*  --       Urinalysis  Recent Labs   Lab Test 08/04/21  0837   COLOR Yellow   APPEARANCE Slightly Cloudy*   URINEGLC Negative   URINEBILI Negative   URINEKETONE Negative   SG 1.018   UBLD Negative   URINEPH 5.0   PROTEIN Negative   NITRITE Negative   LEUKEST Trace*   RBCU 1   WBCU 12*     Attestation:    I saw this patient alongside the fellow, Dr. Marvin Benito, who discussed the case with the attending physician, Dr. Elfego Shirley.    Mj Delgado MD, ANN  PGY1 General Surgery  [phone]

## 2021-08-14 NOTE — PLAN OF CARE
Vitals: stable, tachy, on 2 L NC.  Neuro : oriented x 4, off remarks and statements, team aware and assessed, order is to monitor, bed alarm on.   Blood glucose: insulin drip Alg 1, BG between 114-151.   Pain/nausea: c/o abdominal incision pain, managed with scheduled pain meds-ketamine, toradol, tylenol. Ativan. C/o of itching given PRN bernadryl.   Diet: NPO, ice chips.TF at GR 40 continuous.   Lines: port a cath hep locked. QLIJ infusing insulin, PCA dilaidid (0.3 basal, 0.2 Q 10). Hep SR at 200 U.   : voiding well in hat. 800 ml.   GI: last BM yesterday.   Drains: G tube flushed. 100 ml thick yellow OP, CDI. ARTEMIO OP 50 ml serous. CDI.   Skin: incision dermabonded ALONDRA, covered with abdominal binder and ice pack on.   Mobility: up with SBA. Calls appropriately.   Education : needs transplant once mentation clearer.

## 2021-08-14 NOTE — PHARMACY-VANCOMYCIN DOSING SERVICE
Pharmacy Vancomycin Note  Date of Service 2021  Patient's  1970   51 year old, female    Indication: Surgical Prophylaxis  Day of Therapy: 6  Current vancomycin regimen:  1250 mg IV q12h  Current vancomycin monitoring method: AUC  Current vancomycin therapeutic monitoring goal: 400-600 mg*h/L    Current estimated CrCl = Estimated Creatinine Clearance: 110.2 mL/min (A) (based on SCr of 0.49 mg/dL (L)).    Creatinine for last 3 days  2021:  5:55 AM Creatinine 0.60 mg/dL  2021:  5:02 AM Creatinine 0.54 mg/dL  2021:  4:26 AM Creatinine 0.49 mg/dL    Recent Vancomycin Levels (past 3 days)  2021:  5:11 PM Vancomycin 7.4 mg/L  2021:  2:32 PM Vancomycin 9.0 mg/L (9.5 hour level)    Vancomycin IV Administrations (past 72 hours)                   vancomycin 1250 mg in 0.9% NaCl 250 mL intermittent infusion 1,250 mg (mg) 1,250 mg New Bag 21 0503     1,250 mg New Bag 21 1808     1,250 mg New Bag  0448     1,250 mg New Bag 21 1810     1,250 mg New Bag  0506    vancomycin (VANCOCIN) 1000 mg in dextrose 5% 200 mL PREMIX (mg) 1,000 mg New Bag 21 1810                Nephrotoxins and other renal medications (From now, onward)    Start     Dose/Rate Route Frequency Ordered Stop    21 0500  vancomycin 1250 mg in 0.9% NaCl 250 mL intermittent infusion 1,250 mg      1,250 mg  over 90 Minutes Intravenous EVERY 12 HOURS 21 1958      21 0730  ketorolac (TORADOL) injection 15 mg      15 mg Intravenous EVERY 6 HOURS 21 0717 21 1159             Contrast Orders - past 72 hours (72h ago, onward)    None          Interpretation of levels and current regimen:  Vancomycin level is reflective of -600    Has serum creatinine changed greater than 50% in last 72 hours: Yes    Urine output:  unable to determine    Renal Function: Improving    Old Regimen:  Loading dose: N/A  Regimen: 1250 mg IV every 12 hours.  Exposure target: AUC24 (range)400-600  mg/L.hr   AUC24,ss: 401 mg/L.hr  Probability of AUC24 > 400: 50 %  Ctrough,ss: 8.3 mg/L  Probability of Ctrough,ss > 20: 0 %  Probability of nephrotoxicity (Lodise HOLLIS 2009): 5 %    New Regimen:   Loading dose: N/A  Regimen: 1500 mg IV every 12 hours.  Start time: 17:03 on 08/14/2021  Exposure target: AUC24 (range)400-600 mg/L.hr   AUC24,ss: 481 mg/L.hr  Probability of AUC24 > 400: 88 %  Ctrough,ss: 10.2 mg/L  Probability of Ctrough,ss > 20: 0 %  Probability of nephrotoxicity (Lodise HOLLIS 2009): 6 %      Plan:  1. Increase Dose to Vancomycin 1500mg q12h  2. Vancomycin monitoring method: AUC  3. Vancomycin therapeutic monitoring goal: 400-600 mg*h/L  4. Pharmacy will check vancomycin levels as appropriate in 1-3 Days.  5. Serum creatinine levels will be ordered a minimum of twice weekly.    Dusty Zhao, [facility]

## 2021-08-15 ENCOUNTER — APPOINTMENT (OUTPATIENT)
Dept: PHYSICAL THERAPY | Facility: CLINIC | Age: 51
End: 2021-08-15
Attending: TRANSPLANT SURGERY
Payer: COMMERCIAL

## 2021-08-15 ENCOUNTER — APPOINTMENT (OUTPATIENT)
Dept: EDUCATION SERVICES | Facility: CLINIC | Age: 51
End: 2021-08-15
Attending: TRANSPLANT SURGERY
Payer: COMMERCIAL

## 2021-08-15 LAB
ANION GAP SERPL CALCULATED.3IONS-SCNC: 4 MMOL/L (ref 3–14)
APTT PPP: 34 SECONDS (ref 22–38)
BUN SERPL-MCNC: 6 MG/DL (ref 7–30)
CALCIUM SERPL-MCNC: 8 MG/DL (ref 8.5–10.1)
CHLORIDE BLD-SCNC: 104 MMOL/L (ref 94–109)
CO2 SERPL-SCNC: 33 MMOL/L (ref 20–32)
CREAT SERPL-MCNC: 0.48 MG/DL (ref 0.52–1.04)
ERYTHROCYTE [DISTWIDTH] IN BLOOD BY AUTOMATED COUNT: 14.2 % (ref 10–15)
GFR SERPL CREATININE-BSD FRML MDRD: >90 ML/MIN/1.73M2
GLUCOSE BLD-MCNC: 92 MG/DL (ref 70–99)
GLUCOSE BLDC GLUCOMTR-MCNC: 100 MG/DL (ref 70–99)
GLUCOSE BLDC GLUCOMTR-MCNC: 103 MG/DL (ref 70–99)
GLUCOSE BLDC GLUCOMTR-MCNC: 106 MG/DL (ref 70–99)
GLUCOSE BLDC GLUCOMTR-MCNC: 107 MG/DL (ref 70–99)
GLUCOSE BLDC GLUCOMTR-MCNC: 108 MG/DL (ref 70–99)
GLUCOSE BLDC GLUCOMTR-MCNC: 120 MG/DL (ref 70–99)
GLUCOSE BLDC GLUCOMTR-MCNC: 123 MG/DL (ref 70–99)
GLUCOSE BLDC GLUCOMTR-MCNC: 125 MG/DL (ref 70–99)
GLUCOSE BLDC GLUCOMTR-MCNC: 126 MG/DL (ref 70–99)
GLUCOSE BLDC GLUCOMTR-MCNC: 129 MG/DL (ref 70–99)
GLUCOSE BLDC GLUCOMTR-MCNC: 129 MG/DL (ref 70–99)
GLUCOSE BLDC GLUCOMTR-MCNC: 130 MG/DL (ref 70–99)
GLUCOSE BLDC GLUCOMTR-MCNC: 130 MG/DL (ref 70–99)
GLUCOSE BLDC GLUCOMTR-MCNC: 131 MG/DL (ref 70–99)
GLUCOSE BLDC GLUCOMTR-MCNC: 132 MG/DL (ref 70–99)
GLUCOSE BLDC GLUCOMTR-MCNC: 136 MG/DL (ref 70–99)
GLUCOSE BLDC GLUCOMTR-MCNC: 139 MG/DL (ref 70–99)
GLUCOSE BLDC GLUCOMTR-MCNC: 143 MG/DL (ref 70–99)
GLUCOSE BLDC GLUCOMTR-MCNC: 147 MG/DL (ref 70–99)
GLUCOSE BLDC GLUCOMTR-MCNC: 147 MG/DL (ref 70–99)
GLUCOSE BLDC GLUCOMTR-MCNC: 173 MG/DL (ref 70–99)
GLUCOSE BLDC GLUCOMTR-MCNC: 176 MG/DL (ref 70–99)
GLUCOSE BLDC GLUCOMTR-MCNC: 78 MG/DL (ref 70–99)
GLUCOSE BLDC GLUCOMTR-MCNC: 80 MG/DL (ref 70–99)
GLUCOSE BLDC GLUCOMTR-MCNC: 88 MG/DL (ref 70–99)
HCT VFR BLD AUTO: 30.5 % (ref 35–47)
HGB BLD-MCNC: 9.7 G/DL (ref 11.7–15.7)
HGB BLD-MCNC: 9.7 G/DL (ref 11.7–15.7)
MAGNESIUM SERPL-MCNC: 2.2 MG/DL (ref 1.6–2.3)
MCH RBC QN AUTO: 28.3 PG (ref 26.5–33)
MCHC RBC AUTO-ENTMCNC: 31.8 G/DL (ref 31.5–36.5)
MCV RBC AUTO: 89 FL (ref 78–100)
PHOSPHATE SERPL-MCNC: 3.5 MG/DL (ref 2.5–4.5)
PLATELET # BLD AUTO: 327 10E3/UL (ref 150–450)
POTASSIUM BLD-SCNC: 3.6 MMOL/L (ref 3.4–5.3)
RBC # BLD AUTO: 3.43 10E6/UL (ref 3.8–5.2)
SODIUM SERPL-SCNC: 141 MMOL/L (ref 133–144)
UFH PPP CHRO-ACNC: <0.1 IU/ML
WBC # BLD AUTO: 11 10E3/UL (ref 4–11)

## 2021-08-15 PROCEDURE — 85520 HEPARIN ASSAY: CPT | Performed by: NURSE PRACTITIONER

## 2021-08-15 PROCEDURE — 84100 ASSAY OF PHOSPHORUS: CPT | Performed by: PHYSICIAN ASSISTANT

## 2021-08-15 PROCEDURE — 250N000013 HC RX MED GY IP 250 OP 250 PS 637: Performed by: PHYSICIAN ASSISTANT

## 2021-08-15 PROCEDURE — 83735 ASSAY OF MAGNESIUM: CPT | Performed by: TRANSPLANT SURGERY

## 2021-08-15 PROCEDURE — 97530 THERAPEUTIC ACTIVITIES: CPT | Mod: GP

## 2021-08-15 PROCEDURE — 250N000011 HC RX IP 250 OP 636

## 2021-08-15 PROCEDURE — 250N000011 HC RX IP 250 OP 636: Performed by: PHYSICIAN ASSISTANT

## 2021-08-15 PROCEDURE — 250N000011 HC RX IP 250 OP 636: Performed by: TRANSPLANT SURGERY

## 2021-08-15 PROCEDURE — 120N000011 HC R&B TRANSPLANT UMMC

## 2021-08-15 PROCEDURE — 250N000009 HC RX 250: Performed by: NURSE PRACTITIONER

## 2021-08-15 PROCEDURE — 250N000009 HC RX 250: Performed by: PHYSICIAN ASSISTANT

## 2021-08-15 PROCEDURE — 250N000011 HC RX IP 250 OP 636: Performed by: NURSE PRACTITIONER

## 2021-08-15 PROCEDURE — 258N000003 HC RX IP 258 OP 636: Performed by: TRANSPLANT SURGERY

## 2021-08-15 PROCEDURE — 258N000001 HC RX 258: Performed by: PHYSICIAN ASSISTANT

## 2021-08-15 PROCEDURE — 36592 COLLECT BLOOD FROM PICC: CPT | Performed by: NURSE PRACTITIONER

## 2021-08-15 PROCEDURE — 250N000013 HC RX MED GY IP 250 OP 250 PS 637: Performed by: TRANSPLANT SURGERY

## 2021-08-15 PROCEDURE — 85014 HEMATOCRIT: CPT | Performed by: PHYSICIAN ASSISTANT

## 2021-08-15 PROCEDURE — 99024 POSTOP FOLLOW-UP VISIT: CPT | Performed by: TRANSPLANT SURGERY

## 2021-08-15 PROCEDURE — 250N000013 HC RX MED GY IP 250 OP 250 PS 637: Performed by: NURSE PRACTITIONER

## 2021-08-15 PROCEDURE — 85730 THROMBOPLASTIN TIME PARTIAL: CPT | Performed by: PHYSICIAN ASSISTANT

## 2021-08-15 PROCEDURE — 97116 GAIT TRAINING THERAPY: CPT | Mod: GP

## 2021-08-15 PROCEDURE — 80048 BASIC METABOLIC PNL TOTAL CA: CPT | Performed by: NURSE PRACTITIONER

## 2021-08-15 RX ORDER — LIDOCAINE 4 G/G
1-2 PATCH TOPICAL
Status: DISCONTINUED | OUTPATIENT
Start: 2021-08-16 | End: 2021-08-15

## 2021-08-15 RX ORDER — HYDROMORPHONE HYDROCHLORIDE 5 MG/5ML
4 SOLUTION ORAL EVERY 4 HOURS
Status: DISCONTINUED | OUTPATIENT
Start: 2021-08-15 | End: 2021-08-15

## 2021-08-15 RX ORDER — HYDROMORPHONE HYDROCHLORIDE 5 MG/5ML
4 SOLUTION ORAL EVERY 4 HOURS
Status: DISCONTINUED | OUTPATIENT
Start: 2021-08-15 | End: 2021-08-16

## 2021-08-15 RX ORDER — LIDOCAINE 4 G/G
1-2 PATCH TOPICAL
Status: DISCONTINUED | OUTPATIENT
Start: 2021-08-15 | End: 2021-08-20 | Stop reason: HOSPADM

## 2021-08-15 RX ADMIN — DEXTROSE MONOHYDRATE 1000 ML: 100 INJECTION, SOLUTION INTRAVENOUS at 05:10

## 2021-08-15 RX ADMIN — Medication 40 MG: at 08:18

## 2021-08-15 RX ADMIN — KETOROLAC TROMETHAMINE 15 MG: 15 INJECTION, SOLUTION INTRAMUSCULAR; INTRAVENOUS at 00:15

## 2021-08-15 RX ADMIN — ACETAMINOPHEN 650 MG: 160 LIQUID ORAL at 11:44

## 2021-08-15 RX ADMIN — HYDROMORPHONE HYDROCHLORIDE 4 MG: 1 SOLUTION ORAL at 18:15

## 2021-08-15 RX ADMIN — LORAZEPAM 0.25 MG: 2 INJECTION INTRAMUSCULAR; INTRAVENOUS at 20:19

## 2021-08-15 RX ADMIN — DIPHENHYDRAMINE HYDROCHLORIDE 25 MG: 50 INJECTION, SOLUTION INTRAMUSCULAR; INTRAVENOUS at 14:58

## 2021-08-15 RX ADMIN — KETOROLAC TROMETHAMINE 15 MG: 15 INJECTION, SOLUTION INTRAMUSCULAR; INTRAVENOUS at 23:42

## 2021-08-15 RX ADMIN — SODIUM CHLORIDE, PRESERVATIVE FREE 5 ML: 5 INJECTION INTRAVENOUS at 08:21

## 2021-08-15 RX ADMIN — ACETAMINOPHEN 650 MG: 160 LIQUID ORAL at 06:04

## 2021-08-15 RX ADMIN — METHOCARBAMOL 1000 MG: 100 INJECTION INTRAMUSCULAR; INTRAVENOUS at 13:51

## 2021-08-15 RX ADMIN — VANCOMYCIN HYDROCHLORIDE 1500 MG: 10 INJECTION, POWDER, LYOPHILIZED, FOR SOLUTION INTRAVENOUS at 18:15

## 2021-08-15 RX ADMIN — METHOCARBAMOL 1000 MG: 100 INJECTION INTRAMUSCULAR; INTRAVENOUS at 06:03

## 2021-08-15 RX ADMIN — GABAPENTIN 300 MG: 250 SUSPENSION ORAL at 20:20

## 2021-08-15 RX ADMIN — ACETAMINOPHEN 650 MG: 160 LIQUID ORAL at 00:15

## 2021-08-15 RX ADMIN — ERTAPENEM SODIUM 1 G: 1 INJECTION, POWDER, LYOPHILIZED, FOR SOLUTION INTRAMUSCULAR; INTRAVENOUS at 23:42

## 2021-08-15 RX ADMIN — ACETAMINOPHEN 650 MG: 160 LIQUID ORAL at 23:42

## 2021-08-15 RX ADMIN — Medication 40 MG: at 20:20

## 2021-08-15 RX ADMIN — VANCOMYCIN HYDROCHLORIDE 1500 MG: 10 INJECTION, POWDER, LYOPHILIZED, FOR SOLUTION INTRAVENOUS at 06:12

## 2021-08-15 RX ADMIN — KETOROLAC TROMETHAMINE 15 MG: 15 INJECTION, SOLUTION INTRAMUSCULAR; INTRAVENOUS at 06:03

## 2021-08-15 RX ADMIN — DIPHENHYDRAMINE HYDROCHLORIDE 25 MG: 50 INJECTION, SOLUTION INTRAMUSCULAR; INTRAVENOUS at 03:11

## 2021-08-15 RX ADMIN — MAGNESIUM HYDROXIDE 30 ML: 400 SUSPENSION ORAL at 20:20

## 2021-08-15 RX ADMIN — GABAPENTIN 300 MG: 250 SUSPENSION ORAL at 08:19

## 2021-08-15 RX ADMIN — KETOROLAC TROMETHAMINE 15 MG: 15 INJECTION, SOLUTION INTRAMUSCULAR; INTRAVENOUS at 11:43

## 2021-08-15 RX ADMIN — TRAZODONE HYDROCHLORIDE 100 MG: 100 TABLET ORAL at 22:07

## 2021-08-15 RX ADMIN — LORAZEPAM 0.25 MG: 2 INJECTION INTRAMUSCULAR; INTRAVENOUS at 03:04

## 2021-08-15 RX ADMIN — Medication 15 ML: at 08:21

## 2021-08-15 RX ADMIN — METHOCARBAMOL 1000 MG: 100 INJECTION INTRAMUSCULAR; INTRAVENOUS at 22:07

## 2021-08-15 RX ADMIN — Medication: at 17:12

## 2021-08-15 RX ADMIN — HYDROMORPHONE HYDROCHLORIDE 4 MG: 1 SOLUTION ORAL at 14:13

## 2021-08-15 RX ADMIN — ONDANSETRON 4 MG: 2 INJECTION INTRAMUSCULAR; INTRAVENOUS at 09:02

## 2021-08-15 RX ADMIN — ACETAMINOPHEN 650 MG: 160 LIQUID ORAL at 18:15

## 2021-08-15 RX ADMIN — ONDANSETRON 4 MG: 2 INJECTION INTRAMUSCULAR; INTRAVENOUS at 03:11

## 2021-08-15 RX ADMIN — FLUCONAZOLE IN SODIUM CHLORIDE 200 MG: 2 INJECTION, SOLUTION INTRAVENOUS at 21:14

## 2021-08-15 RX ADMIN — DOCUSATE SODIUM 100 MG: 50 LIQUID ORAL at 20:20

## 2021-08-15 RX ADMIN — Medication 1 PACKET: at 21:24

## 2021-08-15 RX ADMIN — LORAZEPAM 0.25 MG: 2 INJECTION INTRAMUSCULAR; INTRAVENOUS at 14:54

## 2021-08-15 RX ADMIN — ONDANSETRON 4 MG: 2 INJECTION INTRAMUSCULAR; INTRAVENOUS at 14:52

## 2021-08-15 RX ADMIN — Medication 1 PACKET: at 14:00

## 2021-08-15 RX ADMIN — SODIUM CHLORIDE, SODIUM LACTATE, POTASSIUM CHLORIDE, CALCIUM CHLORIDE AND DEXTROSE MONOHYDRATE: 5; 600; 310; 30; 20 INJECTION, SOLUTION INTRAVENOUS at 06:12

## 2021-08-15 RX ADMIN — SERTRALINE HYDROCHLORIDE 150 MG: 20 SOLUTION ORAL at 22:07

## 2021-08-15 RX ADMIN — DIPHENHYDRAMINE HYDROCHLORIDE 25 MG: 50 INJECTION, SOLUTION INTRAMUSCULAR; INTRAVENOUS at 09:05

## 2021-08-15 RX ADMIN — KETOROLAC TROMETHAMINE 15 MG: 15 INJECTION, SOLUTION INTRAMUSCULAR; INTRAVENOUS at 18:16

## 2021-08-15 RX ADMIN — HUMAN INSULIN 0.1 UNITS/HR: 100 INJECTION, SOLUTION SUBCUTANEOUS at 04:23

## 2021-08-15 RX ADMIN — KETAMINE HYDROCHLORIDE 2.5 MG: 10 INJECTION INTRAMUSCULAR; INTRAVENOUS at 04:22

## 2021-08-15 RX ADMIN — SENNOSIDES A AND B 5 ML: 415.36 LIQUID ORAL at 20:20

## 2021-08-15 RX ADMIN — DIPHENHYDRAMINE HYDROCHLORIDE 25 MG: 50 INJECTION, SOLUTION INTRAMUSCULAR; INTRAVENOUS at 21:14

## 2021-08-15 RX ADMIN — LORAZEPAM 0.25 MG: 2 INJECTION INTRAMUSCULAR; INTRAVENOUS at 08:58

## 2021-08-15 RX ADMIN — HYDROMORPHONE HYDROCHLORIDE 4 MG: 1 SOLUTION ORAL at 21:14

## 2021-08-15 ASSESSMENT — ACTIVITIES OF DAILY LIVING (ADL)
ADLS_ACUITY_SCORE: 20
ADLS_ACUITY_SCORE: 21
ADLS_ACUITY_SCORE: 18
ADLS_ACUITY_SCORE: 20
ADLS_ACUITY_SCORE: 21
ADLS_ACUITY_SCORE: 20

## 2021-08-15 ASSESSMENT — MIFFLIN-ST. JEOR: SCORE: 1108.48

## 2021-08-15 NOTE — PROGRESS NOTES
Solid-Organ Transplant Service - Daily Progress Note  08/15/2021    Assessment & Plan: Meme Robins is a 51-year-old woman with chronic pancreatitis, who is s/p 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 Shirley. 2300 IE/kg. Intraop hypotension during islet infusion, thought to be 2/2 anaphylaxis and treated with benadryl, steroids and resolved.     Cardiorespiratory:   Hypotension: Resolved. BP now stable.     Hematology:  Post op anemia: Likely dilutional vs some oozing post op. Hgb 5.6 on 8/12, transfused 2 unit pRBCs. Hgb 9.7 today.    GI/Nutrition:   Diet: G tube to gravity, TFs at goal 40 ml/hr, increase to goal 60 mL/hr today. Relizorb when not on elemental TF.  Bowel regimen:      -Milk of magnesia BID   -Docusate 100mg BID    -Senna BID   -Dulcolax suppository daily  Chylous leak: Changed TF to elemental formula. ARTEMIO clearing.    Fluid/Electrolytes:   Hypervolemia: TKO IVF. Up 1 kg from admission.    : no acute issues    Post-pancreatectomy diabetes: Continue insulin drip at this time, will plan to transition to subcutaneous insulin after tube feeds at goal, appreciate Endocrine consult.      Infection: Afebrile  Islet cultures positive: Growing enterococcus faecalis, morganella m, E. Coli, klebsiella oxy ESBL. Continue vanc/ertapenem/fluconazole.    Prophylaxis: PPI, Anticoagulation: resstart Heparin 200 U/hr    Pain control: Continues to report poor pain control  -Tylenol 650 mg Q6H  -Fentanyl 50 mcg Patch  -Dilaudid PCA 0.2 mg Q15min  -Dilaudid 4 mg Q4H  -Gabapentin 300 mg BID  -Lidoderm patches  -Methocarbamol 1000mg IV Q8H x 3 days  -Ketamine, 2.5mg-stop today  -Ativan, 0.25mg  -Toradol 15mg q6h-complete today    Activity: PT/OT; Up to bathroom without assistance    Anticipated LOS/Discharge: 7-10 days; PT/OT recommending home with assist     Medical Decision Making:  "Medium  Subsequent visit 83911 (moderate level decision making)    JOSE ALBERTO/Fellow/Resident Provider: Effie Gutierrez NP / Marvin Benito MD (Fellow)/Mj Delgado MD (PGY1)     Faculty: Clyde Shirley MD  __________________________________________________________________  Transplant History: Admitted 8/9/2021 for TP-AIT  8/9/2021 (Islet), Postoperative day: 6     Interval History: History is obtained from the patient  Overnight events: no acute events overnight. Continues to report uncontrolled pain. Having BMs.    ROS:   A 10-point review of systems was negative except as noted above.    Curent Meds:    acetaminophen *SUGAR FREE*  650 mg Per J Tube Q6H     bisacodyl  10 mg Rectal Daily     docusate  100 mg Per J Tube BID     ertapenem (INVanz) IV  1 g Intravenous Q24H     fentaNYL  50 mcg Transdermal Q72H     fentaNYL   Transdermal Q8H     fluconazole  200 mg Intravenous Q24H     gabapentin  300 mg Oral or J tube BID     heparin  5-10 mL Intracatheter Q28 Days     heparin lock flush  5-10 mL Intracatheter Q24H     ketamine  2.5 mg Intravenous Q6H     ketorolac  15 mg Intravenous Q6H     LORazepam  0.25 mg Intravenous Q6H     magnesium hydroxide  30 mL Per J Tube BID     methocarbamol  1,000 mg Intravenous Q8H     multivitamins w/minerals  15 mL Per Feeding Tube Daily     pantoprazole  40 mg Oral or J tube BID     protein modular  1 packet Per Feeding Tube TID     sennosides  5 mL Per J Tube BID     sertraline  150 mg Per J Tube At Bedtime     sodium chloride (PF)  10-20 mL Intracatheter Q28 Days     sodium chloride (PF)  3 mL Intracatheter Q8H     sodium chloride (PF)  5 mL Perineural Once     traZODone  100 mg Per J Tube At Bedtime     vancomycin  1,500 mg Intravenous Q12H       Physical Exam:     Admit Weight: 55.2 kg (121 lb 11.1 oz)    Current Vitals:   /86 (BP Location: Left arm)   Pulse 93   Temp 98.6  F (37  C) (Oral)   Resp 16   Ht 1.549 m (5' 1\")   Wt 56.1 kg (123 lb 11.2 oz)   SpO2 97%   BMI " 23.37 kg/m      Vital sign ranges:    Temp:  [98.3  F (36.8  C)-99.8  F (37.7  C)] 98.6  F (37  C)  Pulse:  [] 93  Resp:  [15-18] 16  BP: (123-133)/(74-96) 133/86  SpO2:  [86 %-98 %] 97 %  Patient Vitals for the past 24 hrs:   BP Temp Temp src Pulse Resp SpO2 Weight   08/15/21 0741 133/86 98.6  F (37  C) Oral 93 16 97 % --   08/15/21 0405 124/85 98.3  F (36.8  C) Oral 93 16 95 % 56.1 kg (123 lb 11.2 oz)   08/14/21 2313 (!) 123/96 99.8  F (37.7  C) Oral 89 16 96 % --   08/14/21 1609 124/74 -- -- 99 15 98 % --   08/14/21 1604 -- -- -- -- -- (!) 86 % --   08/14/21 1412 -- -- -- -- -- -- 58.3 kg (128 lb 9.6 oz)   08/14/21 1218 130/80 99.6  F (37.6  C) Oral 105 18 96 % --   08/14/21 0900 -- -- -- -- -- 91 % --     General Appearance: in no apparent distress.   Skin: normal, warm  Heart: perfused  Lungs: Nonlabored resps on RA  Abdomen: The abdomen is flat, and non-tender to light palpation. The wound is dermabonded, healing well. Tube/Drain sites are: G tube to gravity drainage, green/brown output. J tube with tube feeds infusing. ARTEMIO: serosanguinous  : shook removed  Extremities: edema: trace throughout   Neuro: A&Ox4    Data:   CMP  Recent Labs   Lab 08/15/21  0752 08/15/21  0743 08/15/21  0703 08/14/21  0426 08/14/21  0118 08/13/21  0502 08/11/21  0700 08/11/21  0635 08/10/21  0414 08/09/21  1840 08/09/21  1744 08/09/21  1715 08/09/21  1715   NA  --   --   --  140  --  144   < > 138 140   < > 139  --  140   POTASSIUM  --   --   --  3.3*  --  3.6   < > 3.1* 3.8   < > 3.4*  --  3.5   CHLORIDE  --   --   --  104  --  109   < > 105 108   < >  --   --   --    CO2  --   --   --  35*  --  34*   < > 25 23   < >  --   --   --    GLC 88  --  129* 121*  --  133*   < > 152* 168*   < > 135*   < > 106*   BUN  --   --   --  5*  --  5*   < > 5* 9   < >  --   --   --    CR  --   --   --  0.49*  --  0.54   < > 0.52 0.62   < >  --   --   --    GFRESTIMATED  --   --   --  >90  --  >90   < > >90 >90   < >  --   --   --    VIKAS  --    --   --  8.0*  --  7.6*   < > 7.4* 7.8*   < >  --   --   --    ICAW  --   --   --   --   --   --   --   --   --   --  4.8  --  4.8   MAG  --  2.2  --   --  2.0  --    < > 1.7 1.4*   < >  --   --   --    PHOS  --   --   --  2.8  --  2.8   < > 1.8* 3.8   < >  --   --   --    AMYLASE  --   --   --   --   --   --   --  63 166*  --   --   --   --    LIPASE  --   --   --   --   --   --   --  1,174* 1,875*  --   --   --   --    ALBUMIN  --   --   --   --   --   --   --  2.0* 2.6*   < >  --   --   --    BILITOTAL  --   --   --   --   --   --   --  0.3 0.5   < >  --   --   --    ALKPHOS  --   --   --   --   --   --   --  76 66   < >  --   --   --    AST  --   --   --   --   --   --   --  76* 331*   < >  --   --   --    ALT  --   --   --   --   --   --   --  159* 363*   < >  --   --   --     < > = values in this interval not displayed.     CBC  Recent Labs   Lab 08/15/21  0743 08/14/21  2201 08/14/21  0426   HGB 9.7*  9.7* 8.5* 8.9*  8.9*   WBC 11.0  --  7.6     --  215     Coags  Recent Labs   Lab 08/15/21  0743 08/14/21  0426 08/09/21  1842 08/09/21  0703   INR  --   --  1.45* 0.99   PTT 34 50* 77*  --       Urinalysis  Recent Labs   Lab Test 08/04/21  0837   COLOR Yellow   APPEARANCE Slightly Cloudy*   URINEGLC Negative   URINEBILI Negative   URINEKETONE Negative   SG 1.018   UBLD Negative   URINEPH 5.0   PROTEIN Negative   NITRITE Negative   LEUKEST Trace*   RBCU 1   WBCU 12*     Stu Meyers, MS4

## 2021-08-15 NOTE — PLAN OF CARE
"/85 (BP Location: Left arm)   Pulse 93   Temp 98.3  F (36.8  C) (Oral)   Resp 16   Ht 1.549 m (5' 1\")   Wt 56.1 kg (123 lb 11.2 oz)   SpO2 95%   BMI 23.37 kg/m      [id number]  Neuro: Pt. ntermittently confused to time & situation. Bed alarm on.  Behavior: Pt. calm & cooperative with cares.   Activity: Pt. up with SBA.  Vital: T-max: 99.8 oral, OVSS on 2L/NC.  Endocrine: BG range: , insulin gtt at 3 units/hour per algorithm 2.  LDAs: Left internal jugular with NS at TKO with insulin gtt, D5LR at 10cc/hour, Heparin gtt straight rated at 200units/hour. Davie-cath.- Heparin locked. G tube to gravity with 150cc output. J tube for tube feeds.  Right ARTEMIO with 80cc serosang. drainage.    Cardiac: WNL  Respiratory: LS clear bilat.  GI/: Pt. voiding spontaneously. 2 large loose/watery stools this shift.   Skin: Incision stapled & ALONDRA  Pain: Abdominal pain moderately controlled with Dilaudid PCA, sched. Tylenol, Toradol, Robaxin, Ketamine & Fentanyl patch.  Nausea: Pt. given prn IV Zofran x1. No emesis.  Diet: NPO with ice chips. Continuous tube feeds at 40cc/hour into JT.  Labs/Imaging: Morning labs pending.  Plan: Continue to follow POC & notify MD with change in status.    "

## 2021-08-15 NOTE — PROGRESS NOTES
"VS: /74 (BP Location: Left arm)   Pulse 99   Temp 99.6  F (37.6  C) (Oral)   Resp 15   Ht 1.549 m (5' 1\")   Wt 58.3 kg (128 lb 9.6 oz)   SpO2 98%   BMI 24.30 kg/m      Cares: [id number]    Current condition:   Neuro: a&O x4. Occasional confusion noted about time and situation. Forgetful and needing frequent reorientation.   Cardio: WNL.   Respiratory: supplemental oxygen at 2L NC. Wean as tolerated to keep O2 sats above 90%. Not able to tolerate wean.   GI/: voids spontaneously.   No BM at this time. Abdominal discomfort. Scheduled bowel medications administered.     Skin: abdominal bruising noted.  reports itching around incisional site.   Diet: NPO with ice. Tube feeds at 40ml/hr.   Labs:   BG: Range 101-156. Insulin drip. Blood glucose checks every hour.   LDA: Left quadruple internal jugular. Grey and white infusing. Blue and brown saline locked.   ARTEMIO drain with 60ml milky output.   G tube to gravity, yellow green in color. Output 375ml.  J tube running continuous feeds and flushes.    Mobility: up with assist.     Pain:Patient endorsing constant abdominal pain. Treating with multiple pharmacological agents, and ice packs.   New Fentanyl patch on right deltoid  PRN medications: PCA pump no longer continuous-only bumps at 0.2mg q10 minutes up to 1.2mg.   IV benadryl administered for internal jugular and abdominal itching at 5:30pm.   Zofran  Changes: PCA pump orders.    Plan of Care: Continue with education. Pain management. Encourage activity. Patient refusing ambulation and sitting in chair during this shift.       "

## 2021-08-15 NOTE — PLAN OF CARE
"Vitals: /86 (BP Location: Left arm)   Pulse 93   Temp 98.6  F (37  C) (Oral)   Resp 16   Ht 1.549 m (5' 1\")   Wt 56.1 kg (123 lb 11.2 oz)   SpO2 97%   BMI 23.37 kg/m        Endocrine: Insulin drip, 0.1-2U/hr, glucose checks Q1 hour, ranges from , difficult to get consistency with coverage.  Labs: Stable labs.  Pain: Abdominal pain, lethargic between medications, uses PRN's. Ketamine dose held this afternoon. Patient reports not like feeling good after getting it. Dilaudid PCA bumps only, Fentanyl patch in place.   PRN's: Zofran, Benadryl  Diet: NPO, Vivonex Ten at 40cc/hr, increased to 50cc/hr at 1400, increase to 60cc/hr as tolerated.  LDA: G/JT, GT to gravity, ARTEMIO, central line and raymon cath.  GI: Loose BM X1, patient declined laxatives.  : Voids spontaneously.  Skin: Abdominal incision with dermabond.  Neuro: Lethargic this morning, more alert this afternoon, oriented.  Mobility: Up with stand by assist, worked with therapy walking in the halls.  Education: Tube feeding review per [facility].  Plan: Pain control, PCA dose decreased, started on Dilaudid elixir.     "

## 2021-08-15 NOTE — CONSULTS
08/15/21 9552 Sharon Beauchamp, RN     Patient and spouse seen at bedside for GJ-tube education. Both observed me change patients bandage and anchor tube. Site clean and dry.  Spouse RD on a model with flushing and use of Enteralite feeding pump.  Discussed medication administration and when to call care team. Both asked relevant questions. Answered all teach back questions appropriately. State they understand all information presented.   Literature given: Handwashing and Skin Care,  Using the Enteralite Infinity Pump for Tube Feeding, and Caring for Your Tube at Home.

## 2021-08-16 ENCOUNTER — APPOINTMENT (OUTPATIENT)
Dept: PHYSICAL THERAPY | Facility: CLINIC | Age: 51
End: 2021-08-16
Attending: TRANSPLANT SURGERY
Payer: COMMERCIAL

## 2021-08-16 ENCOUNTER — APPOINTMENT (OUTPATIENT)
Dept: GENERAL RADIOLOGY | Facility: CLINIC | Age: 51
End: 2021-08-16
Attending: NURSE PRACTITIONER
Payer: COMMERCIAL

## 2021-08-16 LAB
ANION GAP SERPL CALCULATED.3IONS-SCNC: 2 MMOL/L (ref 3–14)
BASOPHILS # BLD AUTO: 0.1 10E3/UL (ref 0–0.2)
BASOPHILS NFR BLD AUTO: 0 %
BUN SERPL-MCNC: 7 MG/DL (ref 7–30)
CALCIUM SERPL-MCNC: 7.8 MG/DL (ref 8.5–10.1)
CHLORIDE BLD-SCNC: 105 MMOL/L (ref 94–109)
CO2 SERPL-SCNC: 34 MMOL/L (ref 20–32)
CREAT SERPL-MCNC: 0.49 MG/DL (ref 0.52–1.04)
EOSINOPHIL # BLD AUTO: 0.6 10E3/UL (ref 0–0.7)
EOSINOPHIL NFR BLD AUTO: 5 %
ERYTHROCYTE [DISTWIDTH] IN BLOOD BY AUTOMATED COUNT: 14.6 % (ref 10–15)
GFR SERPL CREATININE-BSD FRML MDRD: >90 ML/MIN/1.73M2
GLUCOSE BLD-MCNC: 98 MG/DL (ref 70–99)
GLUCOSE BLDC GLUCOMTR-MCNC: 111 MG/DL (ref 70–99)
GLUCOSE BLDC GLUCOMTR-MCNC: 113 MG/DL (ref 70–99)
GLUCOSE BLDC GLUCOMTR-MCNC: 115 MG/DL (ref 70–99)
GLUCOSE BLDC GLUCOMTR-MCNC: 119 MG/DL (ref 70–99)
GLUCOSE BLDC GLUCOMTR-MCNC: 120 MG/DL (ref 70–99)
GLUCOSE BLDC GLUCOMTR-MCNC: 125 MG/DL (ref 70–99)
GLUCOSE BLDC GLUCOMTR-MCNC: 126 MG/DL (ref 70–99)
GLUCOSE BLDC GLUCOMTR-MCNC: 128 MG/DL (ref 70–99)
GLUCOSE BLDC GLUCOMTR-MCNC: 132 MG/DL (ref 70–99)
GLUCOSE BLDC GLUCOMTR-MCNC: 133 MG/DL (ref 70–99)
GLUCOSE BLDC GLUCOMTR-MCNC: 137 MG/DL (ref 70–99)
GLUCOSE BLDC GLUCOMTR-MCNC: 142 MG/DL (ref 70–99)
GLUCOSE BLDC GLUCOMTR-MCNC: 146 MG/DL (ref 70–99)
GLUCOSE BLDC GLUCOMTR-MCNC: 152 MG/DL (ref 70–99)
GLUCOSE BLDC GLUCOMTR-MCNC: 159 MG/DL (ref 70–99)
GLUCOSE BLDC GLUCOMTR-MCNC: 169 MG/DL (ref 70–99)
GLUCOSE BLDC GLUCOMTR-MCNC: 171 MG/DL (ref 70–99)
GLUCOSE BLDC GLUCOMTR-MCNC: 173 MG/DL (ref 70–99)
GLUCOSE BLDC GLUCOMTR-MCNC: 174 MG/DL (ref 70–99)
GLUCOSE BLDC GLUCOMTR-MCNC: 71 MG/DL (ref 70–99)
GLUCOSE BLDC GLUCOMTR-MCNC: 73 MG/DL (ref 70–99)
GLUCOSE BLDC GLUCOMTR-MCNC: 84 MG/DL (ref 70–99)
GLUCOSE BLDC GLUCOMTR-MCNC: 87 MG/DL (ref 70–99)
GLUCOSE BLDC GLUCOMTR-MCNC: 93 MG/DL (ref 70–99)
HCT VFR BLD AUTO: 26 % (ref 35–47)
HGB BLD-MCNC: 8.1 G/DL (ref 11.7–15.7)
IMM GRANULOCYTES # BLD: 0.2 10E3/UL
IMM GRANULOCYTES NFR BLD: 1 %
LACTATE SERPL-SCNC: 0.7 MMOL/L (ref 0.7–2)
LYMPHOCYTES # BLD AUTO: 1.6 10E3/UL (ref 0.8–5.3)
LYMPHOCYTES NFR BLD AUTO: 12 %
MAGNESIUM SERPL-MCNC: 2.2 MG/DL (ref 1.6–2.3)
MCH RBC QN AUTO: 28.2 PG (ref 26.5–33)
MCHC RBC AUTO-ENTMCNC: 31.2 G/DL (ref 31.5–36.5)
MCV RBC AUTO: 91 FL (ref 78–100)
MONOCYTES # BLD AUTO: 2 10E3/UL (ref 0–1.3)
MONOCYTES NFR BLD AUTO: 15 %
NEUTROPHILS # BLD AUTO: 9.1 10E3/UL (ref 1.6–8.3)
NEUTROPHILS NFR BLD AUTO: 67 %
NRBC # BLD AUTO: 0.2 10E3/UL
NRBC BLD AUTO-RTO: 2 /100
PHOSPHATE SERPL-MCNC: 3.7 MG/DL (ref 2.5–4.5)
PLATELET # BLD AUTO: 371 10E3/UL (ref 150–450)
POTASSIUM BLD-SCNC: 3.2 MMOL/L (ref 3.4–5.3)
RBC # BLD AUTO: 2.87 10E6/UL (ref 3.8–5.2)
SODIUM SERPL-SCNC: 141 MMOL/L (ref 133–144)
WBC # BLD AUTO: 13.5 10E3/UL (ref 4–11)

## 2021-08-16 PROCEDURE — 250N000011 HC RX IP 250 OP 636

## 2021-08-16 PROCEDURE — 250N000013 HC RX MED GY IP 250 OP 250 PS 637: Performed by: PHYSICIAN ASSISTANT

## 2021-08-16 PROCEDURE — 83735 ASSAY OF MAGNESIUM: CPT | Performed by: NURSE PRACTITIONER

## 2021-08-16 PROCEDURE — 74018 RADEX ABDOMEN 1 VIEW: CPT

## 2021-08-16 PROCEDURE — 258N000001 HC RX 258: Performed by: PHYSICIAN ASSISTANT

## 2021-08-16 PROCEDURE — 93005 ELECTROCARDIOGRAM TRACING: CPT

## 2021-08-16 PROCEDURE — 97530 THERAPEUTIC ACTIVITIES: CPT | Mod: GP

## 2021-08-16 PROCEDURE — 36592 COLLECT BLOOD FROM PICC: CPT | Performed by: TRANSPLANT SURGERY

## 2021-08-16 PROCEDURE — 250N000013 HC RX MED GY IP 250 OP 250 PS 637: Performed by: NURSE PRACTITIONER

## 2021-08-16 PROCEDURE — 84100 ASSAY OF PHOSPHORUS: CPT | Performed by: PHYSICIAN ASSISTANT

## 2021-08-16 PROCEDURE — 83605 ASSAY OF LACTIC ACID: CPT | Performed by: TRANSPLANT SURGERY

## 2021-08-16 PROCEDURE — 85025 COMPLETE CBC W/AUTO DIFF WBC: CPT | Performed by: NURSE PRACTITIONER

## 2021-08-16 PROCEDURE — 80048 BASIC METABOLIC PNL TOTAL CA: CPT | Performed by: NURSE PRACTITIONER

## 2021-08-16 PROCEDURE — 99024 POSTOP FOLLOW-UP VISIT: CPT | Performed by: TRANSPLANT SURGERY

## 2021-08-16 PROCEDURE — 250N000013 HC RX MED GY IP 250 OP 250 PS 637: Performed by: STUDENT IN AN ORGANIZED HEALTH CARE EDUCATION/TRAINING PROGRAM

## 2021-08-16 PROCEDURE — 93010 ELECTROCARDIOGRAM REPORT: CPT | Performed by: INTERNAL MEDICINE

## 2021-08-16 PROCEDURE — 250N000011 HC RX IP 250 OP 636: Performed by: NURSE PRACTITIONER

## 2021-08-16 PROCEDURE — 250N000011 HC RX IP 250 OP 636: Performed by: PHYSICIAN ASSISTANT

## 2021-08-16 PROCEDURE — 120N000011 HC R&B TRANSPLANT UMMC

## 2021-08-16 PROCEDURE — 36592 COLLECT BLOOD FROM PICC: CPT | Performed by: NURSE PRACTITIONER

## 2021-08-16 PROCEDURE — 250N000011 HC RX IP 250 OP 636: Performed by: TRANSPLANT SURGERY

## 2021-08-16 PROCEDURE — 250N000013 HC RX MED GY IP 250 OP 250 PS 637: Performed by: TRANSPLANT SURGERY

## 2021-08-16 PROCEDURE — 74018 RADEX ABDOMEN 1 VIEW: CPT | Mod: 26 | Performed by: RADIOLOGY

## 2021-08-16 PROCEDURE — 258N000003 HC RX IP 258 OP 636: Performed by: TRANSPLANT SURGERY

## 2021-08-16 RX ORDER — HYDROMORPHONE HYDROCHLORIDE 5 MG/5ML
3-6 SOLUTION ORAL EVERY 4 HOURS
Status: DISCONTINUED | OUTPATIENT
Start: 2021-08-16 | End: 2021-08-20

## 2021-08-16 RX ORDER — POTASSIUM CHLORIDE 29.8 MG/ML
20 INJECTION INTRAVENOUS ONCE
Status: COMPLETED | OUTPATIENT
Start: 2021-08-16 | End: 2021-08-16

## 2021-08-16 RX ORDER — HYDROMORPHONE HYDROCHLORIDE 1 MG/ML
0.3 INJECTION, SOLUTION INTRAMUSCULAR; INTRAVENOUS; SUBCUTANEOUS EVERY 6 HOURS PRN
Status: DISCONTINUED | OUTPATIENT
Start: 2021-08-16 | End: 2021-08-17

## 2021-08-16 RX ORDER — HYDROXYZINE HCL 10 MG/5 ML
25 SOLUTION, ORAL ORAL EVERY 4 HOURS PRN
Status: DISCONTINUED | OUTPATIENT
Start: 2021-08-16 | End: 2021-08-16

## 2021-08-16 RX ORDER — AMINO AC/PROTEIN HYDR/WHEY PRO 10G-100/30
1 LIQUID (ML) ORAL 3 TIMES DAILY
Status: DISCONTINUED | OUTPATIENT
Start: 2021-08-16 | End: 2021-08-20 | Stop reason: HOSPADM

## 2021-08-16 RX ORDER — METHOCARBAMOL 500 MG/1
1000 TABLET, FILM COATED ORAL 3 TIMES DAILY
Status: DISCONTINUED | OUTPATIENT
Start: 2021-08-17 | End: 2021-08-20 | Stop reason: HOSPADM

## 2021-08-16 RX ORDER — HYDROMORPHONE HYDROCHLORIDE 1 MG/ML
0.5 INJECTION, SOLUTION INTRAMUSCULAR; INTRAVENOUS; SUBCUTANEOUS ONCE
Status: COMPLETED | OUTPATIENT
Start: 2021-08-16 | End: 2021-08-17

## 2021-08-16 RX ADMIN — TRAZODONE HYDROCHLORIDE 100 MG: 100 TABLET ORAL at 22:12

## 2021-08-16 RX ADMIN — Medication 1 PACKET: at 14:14

## 2021-08-16 RX ADMIN — Medication 40 MG: at 20:29

## 2021-08-16 RX ADMIN — DEXTROSE MONOHYDRATE 1000 ML: 100 INJECTION, SOLUTION INTRAVENOUS at 20:56

## 2021-08-16 RX ADMIN — KETOROLAC TROMETHAMINE 15 MG: 15 INJECTION, SOLUTION INTRAMUSCULAR; INTRAVENOUS at 06:27

## 2021-08-16 RX ADMIN — GABAPENTIN 300 MG: 250 SUSPENSION ORAL at 08:13

## 2021-08-16 RX ADMIN — METHOCARBAMOL 1000 MG: 100 INJECTION INTRAMUSCULAR; INTRAVENOUS at 06:13

## 2021-08-16 RX ADMIN — HYDROMORPHONE HYDROCHLORIDE 4 MG: 1 SOLUTION ORAL at 02:13

## 2021-08-16 RX ADMIN — SENNOSIDES A AND B 5 ML: 415.36 LIQUID ORAL at 08:13

## 2021-08-16 RX ADMIN — ONDANSETRON 4 MG: 2 INJECTION INTRAMUSCULAR; INTRAVENOUS at 18:16

## 2021-08-16 RX ADMIN — LORAZEPAM 0.25 MG: 2 INJECTION INTRAMUSCULAR; INTRAVENOUS at 15:39

## 2021-08-16 RX ADMIN — HYDROMORPHONE HYDROCHLORIDE 6 MG: 1 SOLUTION ORAL at 22:12

## 2021-08-16 RX ADMIN — SODIUM CHLORIDE, PRESERVATIVE FREE 5 ML: 5 INJECTION INTRAVENOUS at 08:11

## 2021-08-16 RX ADMIN — DIPHENHYDRAMINE HYDROCHLORIDE 25 MG: 50 INJECTION, SOLUTION INTRAMUSCULAR; INTRAVENOUS at 18:15

## 2021-08-16 RX ADMIN — Medication 5 ML: at 12:11

## 2021-08-16 RX ADMIN — DOCUSATE SODIUM 100 MG: 50 LIQUID ORAL at 08:13

## 2021-08-16 RX ADMIN — Medication 40 MG: at 08:04

## 2021-08-16 RX ADMIN — ONDANSETRON 4 MG: 2 INJECTION INTRAMUSCULAR; INTRAVENOUS at 02:23

## 2021-08-16 RX ADMIN — METHOCARBAMOL 1000 MG: 100 INJECTION INTRAMUSCULAR; INTRAVENOUS at 14:09

## 2021-08-16 RX ADMIN — FLUCONAZOLE IN SODIUM CHLORIDE 200 MG: 2 INJECTION, SOLUTION INTRAVENOUS at 20:49

## 2021-08-16 RX ADMIN — Medication 1 PACKET: at 20:59

## 2021-08-16 RX ADMIN — LIDOCAINE 1 PATCH: 560 PATCH PERCUTANEOUS; TOPICAL; TRANSDERMAL at 08:14

## 2021-08-16 RX ADMIN — GABAPENTIN 300 MG: 250 SUSPENSION ORAL at 20:29

## 2021-08-16 RX ADMIN — HYDROMORPHONE HYDROCHLORIDE 4 MG: 1 SOLUTION ORAL at 10:19

## 2021-08-16 RX ADMIN — SERTRALINE HYDROCHLORIDE 150 MG: 20 SOLUTION ORAL at 22:12

## 2021-08-16 RX ADMIN — Medication 15 ML: at 08:13

## 2021-08-16 RX ADMIN — METHOCARBAMOL 1000 MG: 100 INJECTION INTRAMUSCULAR; INTRAVENOUS at 20:38

## 2021-08-16 RX ADMIN — HYDROXYZINE HYDROCHLORIDE 25 MG: 10 SOLUTION ORAL at 20:29

## 2021-08-16 RX ADMIN — DEXTROSE MONOHYDRATE 56 ML: 100 INJECTION, SOLUTION INTRAVENOUS at 03:09

## 2021-08-16 RX ADMIN — ACETAMINOPHEN 650 MG: 160 LIQUID ORAL at 17:22

## 2021-08-16 RX ADMIN — DIPHENHYDRAMINE HYDROCHLORIDE 25 MG: 50 INJECTION, SOLUTION INTRAMUSCULAR; INTRAVENOUS at 03:04

## 2021-08-16 RX ADMIN — ACETAMINOPHEN 650 MG: 160 LIQUID ORAL at 06:12

## 2021-08-16 RX ADMIN — HYDROMORPHONE HYDROCHLORIDE 0.3 MG: 1 INJECTION, SOLUTION INTRAMUSCULAR; INTRAVENOUS; SUBCUTANEOUS at 21:03

## 2021-08-16 RX ADMIN — LORAZEPAM 0.25 MG: 2 INJECTION INTRAMUSCULAR; INTRAVENOUS at 21:46

## 2021-08-16 RX ADMIN — VANCOMYCIN HYDROCHLORIDE 1500 MG: 10 INJECTION, POWDER, LYOPHILIZED, FOR SOLUTION INTRAVENOUS at 06:11

## 2021-08-16 RX ADMIN — VANCOMYCIN HYDROCHLORIDE 1500 MG: 10 INJECTION, POWDER, LYOPHILIZED, FOR SOLUTION INTRAVENOUS at 17:30

## 2021-08-16 RX ADMIN — HYDROMORPHONE HYDROCHLORIDE 6 MG: 1 SOLUTION ORAL at 18:17

## 2021-08-16 RX ADMIN — LORAZEPAM 0.25 MG: 2 INJECTION INTRAMUSCULAR; INTRAVENOUS at 09:47

## 2021-08-16 RX ADMIN — POTASSIUM CHLORIDE 20 MEQ: 29.8 INJECTION, SOLUTION INTRAVENOUS at 10:19

## 2021-08-16 RX ADMIN — DIPHENHYDRAMINE HYDROCHLORIDE 25 MG: 50 INJECTION, SOLUTION INTRAMUSCULAR; INTRAVENOUS at 11:59

## 2021-08-16 RX ADMIN — LORAZEPAM 0.25 MG: 2 INJECTION INTRAMUSCULAR; INTRAVENOUS at 03:04

## 2021-08-16 RX ADMIN — HYDROMORPHONE HYDROCHLORIDE 6 MG: 1 SOLUTION ORAL at 14:14

## 2021-08-16 RX ADMIN — ACETAMINOPHEN 650 MG: 160 LIQUID ORAL at 12:16

## 2021-08-16 RX ADMIN — HYDROMORPHONE HYDROCHLORIDE 4 MG: 1 SOLUTION ORAL at 06:27

## 2021-08-16 RX ADMIN — ONDANSETRON 4 MG: 2 INJECTION INTRAMUSCULAR; INTRAVENOUS at 12:00

## 2021-08-16 RX ADMIN — ERTAPENEM SODIUM 1 G: 1 INJECTION, POWDER, LYOPHILIZED, FOR SOLUTION INTRAMUSCULAR; INTRAVENOUS at 22:43

## 2021-08-16 RX ADMIN — Medication 1 PACKET: at 10:20

## 2021-08-16 RX ADMIN — HYDROMORPHONE HYDROCHLORIDE 0.3 MG: 1 INJECTION, SOLUTION INTRAMUSCULAR; INTRAVENOUS; SUBCUTANEOUS at 16:04

## 2021-08-16 ASSESSMENT — ACTIVITIES OF DAILY LIVING (ADL)
ADLS_ACUITY_SCORE: 17
ADLS_ACUITY_SCORE: 18

## 2021-08-16 NOTE — PROGRESS NOTES
Solid-Organ Transplant Service - Daily Progress Note  08/16/2021    Assessment & Plan: Meme Robins is a 51-year-old woman with chronic pancreatitis, who is s/p 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 Shirley. 2300 IE/kg. Intraop hypotension during islet infusion, thought to be 2/2 anaphylaxis and treated with benadryl, steroids and resolved.     Cardiorespiratory:   Hypotension: -130s. BP now stable.     Hematology:  Post op anemia: Likely dilutional vs some oozing post op. Hgb 5.6 on 8/12, transfused 2 unit pRBCs. Hgb 8.1 (9.7).    GI/Nutrition:   Diet: G tube to gravity, TFs at goal 50 ml/hr, increase to goal 60 mL/hr today. Relizorb when not on elemental TF.  Bowel regimen:      -Milk of magnesia BID   -Docusate 100mg BID    -Senna BID   -Dulcolax suppository daily  Chylous leak: Changed TF to elemental formula. ARTEMIO clearing.    Fluid/Electrolytes:   Hypervolemia: TKO IVF. Up 1 kg from admission.    : no acute issues    Post-pancreatectomy diabetes: Continue insulin drip at this time, will plan to transition to subcutaneous insulin after tube feeds at goal, appreciate Endocrine consult.      Infection: Afebrile  Islet cultures positive: Growing enterococcus faecalis, morganella m, E. Coli, klebsiella oxy ESBL. Continue vanc/ertapenem/fluconazole.    Prophylaxis: PPI, Anticoagulation: Heparin gtt complete    Pain control:Moderate pain control  -Tylenol 650 mg Q6H  -Fentanyl 50 mcg Patch  -Dilaudid PCA 0.2 mg Q15min-stop, IVP 0.3 Q6Hrs PRN  -Dilaudid 4 mg M1O-gjdpom to 3-6 Q6 PRN  -Gabapentin 300 mg BID  -Lidoderm patches  -Methocarbamol 1000mg IV Q8H x 3 days, then start po   -Ativan, 0.25mg  -Toradol 15mg q6h-complete today  -Ketamine, 2.5mg-stop today    Activity: PT/OT; Up to bathroom without assistance    Anticipated LOS/Discharge: 7-10 days; PT/OT recommending home with assist      Medical Decision Making: Medium  Subsequent visit [id number] (moderate level decision making)    JOSE ALBERTO/Fellow/Resident Provider: Aliza Ridley NP, #[id number]     Faculty: Clyde Shirley MD  __________________________________________________________________  Transplant History: Admitted 8/9/2021 for TP-AIT  8/9/2021 (Islet), Postoperative day: 7     Interval History: History is obtained from the patient  Overnight events: c/o increased abdominal bloating, discomfort. Ambulating. Pain moderately controlled. Tolerating EN @ 50cc/hr. Minimal nausea without vomiting. Gtube to gravity.    ROS:   A 10-point review of systems was negative except as noted above.    Curent Meds:    acetaminophen *SUGAR FREE*  650 mg Per J Tube Q6H     bisacodyl  10 mg Rectal Daily     docusate  100 mg Per J Tube BID     ertapenem (INVanz) IV  1 g Intravenous Q24H     fentaNYL  50 mcg Transdermal Q72H     fentaNYL   Transdermal Q8H     fluconazole  200 mg Intravenous Q24H     gabapentin  300 mg Oral or J tube BID     heparin  5-10 mL Intracatheter Q28 Days     heparin lock flush  5-10 mL Intracatheter Q24H     HYDROmorphone (STANDARD CONC)  4 mg Per J Tube Q4H     lidocaine  1-2 patch Transdermal Q24H     lidocaine   Transdermal Q8H     LORazepam  0.25 mg Intravenous Q6H     magnesium hydroxide  30 mL Per J Tube BID     methocarbamol  1,000 mg Intravenous Q8H     multivitamins w/minerals  15 mL Per Feeding Tube Daily     pantoprazole  40 mg Oral or J tube BID     potassium chloride  20 mEq Intravenous Once     protein modular  1 packet Per Feeding Tube TID     protein modular  1 packet Per Feeding Tube TID     sennosides  5 mL Per J Tube BID     sertraline  150 mg Per J Tube At Bedtime     sodium chloride (PF)  10-20 mL Intracatheter Q28 Days     sodium chloride (PF)  3 mL Intracatheter Q8H     sodium chloride (PF)  5 mL Perineural Once     traZODone  100 mg Per J Tube At Bedtime     vancomycin  1,500 mg Intravenous Q12H       Physical Exam:  "    Admit Weight: 55.2 kg (121 lb 11.1 oz)    Current Vitals:   /80 (BP Location: Left arm)   Pulse 99   Temp 98.4  F (36.9  C) (Oral)   Resp 18   Ht 1.549 m (5' 1\")   Wt 56.1 kg (123 lb 11.2 oz)   SpO2 94%   BMI 23.37 kg/m      Vital sign ranges:    Temp:  [98.4  F (36.9  C)-98.9  F (37.2  C)] 98.4  F (36.9  C)  Pulse:  [89-99] 99  Resp:  [16-18] 18  BP: (107-138)/(63-80) 124/80  SpO2:  [92 %-94 %] 94 %  Patient Vitals for the past 24 hrs:   BP Temp Temp src Pulse Resp SpO2   08/16/21 0837 124/80 98.4  F (36.9  C) Oral 99 18 94 %   08/16/21 0323 107/63 98.6  F (37  C) Oral 94 18 92 %   08/15/21 2347 138/80 98.6  F (37  C) Oral 99 18 92 %   08/15/21 2024 123/73 98.7  F (37.1  C) Oral 98 18 94 %   08/15/21 1702 114/71 98.7  F (37.1  C) Oral 95 16 93 %   08/15/21 1242 130/72 98.9  F (37.2  C) Oral 89 16 93 %     General Appearance: in no apparent distress.   Skin: normal, warm,dry  Heart: perfused  Lungs: Nonlabored resps on RA  Abdomen: The abdomen is rounded, tympanic, and mildly tender to palpation. The wound is dermabonded, healing well. Tube/Drain sites are: G tube to gravity drainage, lt pale brown output. J tube with tube feeds infusing. ARTEMIO: serosanguinous  : voiding  Extremities: edema: trace throughout   Neuro: A&Ox4    Data:   CMP  Recent Labs   Lab 08/16/21  1002 08/16/21  0950 08/16/21  0638 08/15/21  0743 08/11/21  0700 08/11/21  0635 08/10/21  0414 08/09/21  1840 08/09/21  1744 08/09/21  1715 08/09/21  1715   NA  --   --  141 141   < > 138 140   < > 139  --  140   POTASSIUM  --   --  3.2* 3.6   < > 3.1* 3.8   < > 3.4*  --  3.5   CHLORIDE  --   --  105 104   < > 105 108   < >  --   --   --    CO2  --   --  34* 33*   < > 25 23   < >  --   --   --    * 115* 98 92   < > 152* 168*   < > 135*   < > 106*   BUN  --   --  7 6*   < > 5* 9   < >  --   --   --    CR  --   --  0.49* 0.48*   < > 0.52 0.62   < >  --   --   --    GFRESTIMATED  --   --  >90 >90   < > >90 >90   < >  --   --   --  "   VIKAS  --   --  7.8* 8.0*   < > 7.4* 7.8*   < >  --   --   --    ICAW  --   --   --   --   --   --   --   --  4.8  --  4.8   MAG  --   --  2.2 2.2   < > 1.7 1.4*   < >  --   --   --    PHOS  --   --  3.7 3.5   < > 1.8* 3.8   < >  --   --   --    AMYLASE  --   --   --   --   --  63 166*  --   --   --   --    LIPASE  --   --   --   --   --  1,174* 1,875*  --   --   --   --    ALBUMIN  --   --   --   --   --  2.0* 2.6*   < >  --   --   --    BILITOTAL  --   --   --   --   --  0.3 0.5   < >  --   --   --    ALKPHOS  --   --   --   --   --  76 66   < >  --   --   --    AST  --   --   --   --   --  76* 331*   < >  --   --   --    ALT  --   --   --   --   --  159* 363*   < >  --   --   --     < > = values in this interval not displayed.     CBC  Recent Labs   Lab 08/16/21  0638 08/15/21  0743   HGB 8.1* 9.7*  9.7*   WBC 13.5* 11.0    327     Coags  Recent Labs   Lab 08/15/21  0743 08/14/21  0426 08/09/21  1842   INR  --   --  1.45*   PTT 34 50* 77*      Urinalysis  Recent Labs   Lab Test 08/04/21  0837   COLOR Yellow   APPEARANCE Slightly Cloudy*   URINEGLC Negative   URINEBILI Negative   URINEKETONE Negative   SG 1.018   UBLD Negative   URINEPH 5.0   PROTEIN Negative   NITRITE Negative   LEUKEST Trace*   RBCU 1   WBCU 12*

## 2021-08-16 NOTE — PLAN OF CARE
"/83 (BP Location: Left arm)   Pulse 103   Temp 98.6  F (37  C) (Oral)   Resp 18   Ht 1.549 m (5' 1\")   Wt 56.1 kg (123 lb 11.2 oz)   SpO2 90%   BMI 23.37 kg/m      Assumed cares [id number]  Neuro: A/Ox4, anxious  Pain/Nausea: Dilaudid PCA discontinued, see MAR for scheduled and PRN meds; Zofran given per pt request  Cardiac: rate and rhythm regular  Resp: lung sounds clear, equal bilaterally  GI/: voiding spontaneously, loose BM this am, yang urine  Diet/Appetite: NPO + ice chips  BG: q1hr, algorithm 1;   Skin: abdominal incision- dermabond; bruising  Access: internal jugular- TKO + insulin; saline locked; port-a-cath - hep locked  Drains: G- orders to trial clamped, after 1hr pt felt nauseous, increased pain- returned to gravity; J- TF @60  Activity: up ad rona in room  Procedures: Abdominal X-ray today for distention/bloating  Plan:   Will continue with plan of care and notify team of any changes.?    Juliette Dickson RN on 8/16/2021 at 4:00 PM    "

## 2021-08-16 NOTE — PROVIDER NOTIFICATION
"  @1745 paged MD Colón that pt c/o increased pain and also had temp of 100.6-100.8; HR 110s-122.  and bedside and supportive thru shift. Also notified that pt unable to tolerate Gtube clamping d/t nausea; Gtube put back to gravity after about 1.5-2hrs of clamping. Awaiting orders from MD.     @1816--Paged MD to come to bedside to assess pt--pt tearful and feeling \"chest heaviness\" and increased pain; STAT EKG ordered and pt assessed by MD. EKG showed sinus tachycardia--prn Atarax ordered for pain and MD gave verbal order for continuous pulsox. No further orders at this time except to monitor. Orders to page if pt further symptomatic (increased shortness of breath, increased pain, etc.). Pt seemed to calm after meeting with MD; both pt and her  agreeable with POC.    @2045 pt tearful and anxious again and stating that \"pain is getting worse, we need to do something.\" MD paged again for one time dose of IV dilaudid. PRN dose of IV dilaudid given d/t pt's severe pain (crying, grimacing) and one time PRN dose held off for later. Pt very responsive to offers of emotional support but still intermittently anxious. PRN dose of dilaudid as well as scheduled ativan effective in managing pain/anxiety. Will continue to monitor.     @2230 pt resting more comfortably but HR still 110s, O2 saturations 95-97% on RA; pt still visibly (intermittently) anxious but seeming to rest more comfortably. Pt expressing that \"I need the PCA again.\" Sticky note left for team to address pain regimen. Pt otherwise resting quietly, will continue to monitor.   "

## 2021-08-16 NOTE — PLAN OF CARE
[id number]  Neuro: Pt. alert & Ox4  Behavior: Pt. calm & cooperative with cares.   Activity: Pt. up with SBA.  Vital: T-max: 99.8 oral, OVSS on RA  Endocrine: BG range: , insulin gtt at 3 units/hour per algorithm 1-2.  LDAs: Left internal jugular with NS at TKO with insulin gtt, D5LR at 10cc/hour, Heparin gtt straight rated at 200units/hour. Davie-cath.- Heparin locked. G tube to gravity with cc output. J tube for tube feeds.  Right ARTEMIO with cc serosang. drainage.    Cardiac: WNL  Respiratory: LS clear bilat.  GI/: Pt. voiding spontaneously. 2 loose/watery stools this shift.   Skin: Incision stapled & ALONDRA  Pain: Abdominal pain moderately controlled with Dilaudid PCA, sched. Tylenol, Toradol, Robaxin& Fentanyl patch.  Nausea: Pt. given prn IV Zofran x1. No emesis.  Diet: NPO with ice chips. Continuous tube feeds at 50cc/hour into JT.  Labs/Imaging: Morning labs pending.  Plan: Continue to follow POC & notify MD with change in status.

## 2021-08-16 NOTE — PROGRESS NOTES
"VS: /71 (BP Location: Left arm)   Pulse 95   Temp 98.7  F (37.1  C) (Oral)   Resp 16   Ht 1.549 m (5' 1\")   Wt 56.1 kg (123 lb 11.2 oz)   SpO2 93%   BMI 23.37 kg/m      Cares:    Current condition:   Neuro: A&O x4. Reorientation to time and situation needed at times.   Cardio: WNL.  Respiratory: WNL. On RA.   GI/: Voiding well without issue.   No bowel movement during the shift. Continues to complain of RLQ pain. Distended, nontender.   Skin: abdominal incision open to air. No drainage.  Diet: NPO with Ice. Continuous tube feeds running.     Labs: K 3.6.  BG: check q1 hour. Range 120-140. Insulin drip adjusted according to alg 1.   LDA: Right ARTEMIO drain- 50ml out milky.   G tube to gravity-green output 400cc. J tube- TF running at 50ml/hr.   Left internal jugular with 4 lumens. Blue and brown saline locked. Grey and white infusing.   Mobility: up with assist x1.   Pain: RLQ pain, stabbing and cramping.   PRN medications: Dilaudid PCA pump.  IV benadryl for itching.    Changes: New complaint of indigestion and hiccups.   Plan of Care: Pain management. Education.  Monitor outputs. Encourage toileting and ambulation.       "

## 2021-08-16 NOTE — PROGRESS NOTES
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 Shirley. and placement of bilateral erector spinae (ES) T6-7 catheters by RAPS on 8/9/21 for analgesia.      Subjective and Interval History: Overnight events: no acute event.  hgb 5.6 thi morning, blood transfusion ordered. Seen at 1025.  Patient reports 6/10 at rest, moderate pain control with current nerve block infusion and analgesics (see below).  Ambulating with stand by assistance,  denies weakness, paresthesias, circumoral numbness, metallic taste or tinnitus. Patient has Miller in place,NPO, denies nausea.    Antithrombotic/Thrombolytic Therapy ordered:  Heparin infusion held on 8/12/21 morning.    Analgesic Medications:   Medications related to Pain Management (From now, onward)    Start     Dose/Rate Route Frequency Ordered Stop    08/11/21 2000  sennosides (SENOKOT) syrup 5 mL      5 mL Per J Tube 2 TIMES DAILY 08/11/21 1543      08/11/21 2000  docusate (COLACE) 50 MG/5ML liquid 50 mg      50 mg Per J Tube 2 TIMES DAILY 08/11/21 1545      08/11/21 0730  ketorolac (TORADOL) injection 15 mg      15 mg Intravenous EVERY 6 HOURS 08/11/21 0717 08/16/21 0829    08/10/21 2200  acetaminophen *SUGAR FREE* (TYLENOL) solution 650 mg      650 mg Per J Tube EVERY 6 HOURS 08/10/21 2144      08/10/21 2100  ketamine (KETALAR) injection 5-10 mg      5-10 mg  over 2-5 Minutes Intravenous EVERY 6 HOURS 08/10/21 2044      08/10/21 1310  diphenhydrAMINE (BENADRYL) injection 25 mg      25 mg Intravenous EVERY 6 HOURS PRN 08/10/21 1310      08/10/21 0800  gabapentin (NEURONTIN) solution 300 mg      300 mg Oral or J tube 2 TIMES DAILY 08/09/21 2022      08/09/21 2200  LORazepam (ATIVAN) injection 0.5 mg      0.5  mg Intravenous EVERY 6 HOURS 08/09/21 1824 08/09/21 2030  HYDROmorphone (DILAUDID) PCA 0.2 mg/mL OPIOID TOLERANT       Intravenous CONTINUOUS 08/09/21 2022 08/09/21 2022  lidocaine 1 % 0.1-1 mL      0.1-1 mL Other EVERY 1 HOUR PRN 08/09/21 2022 08/09/21 2022  lidocaine (LMX4) cream       Topical EVERY 1 HOUR PRN 08/09/21 2022 08/09/21 2022  bisacodyl (DULCOLAX) Suppository 10 mg      10 mg Rectal DAILY PRN 08/09/21 2022 08/09/21 1900  methocarbamol (ROBAXIN) injection 500 mg      500 mg Intravenous EVERY 6 HOURS 08/09/21 1824 08/12/21 1729 08/09/21 0830  ropivacaine 0.2% (NAROPIN) 750 mL in ON-Q C-Bloc select flow (JV2420 holds 600-750 mL) dual cath disposable pump      14 mL/hr  Irrigation CONTINUOUS 08/09/21 0811             Objective:  Lab results  Recent Labs   Lab Test 08/12/21  0655   WBC 9.0   RBC 1.99*   HGB 5.6*   HCT 17.8*   MCV 89   MCH 28.1   MCHC 31.5   RDW 14.3   *       Lab Results   Component Value Date    INR 1.45 08/09/2021    INR 0.99 08/09/2021    INR 1.00 08/04/2021       Vitals:    Temp:  [36.9  C (98.4  F)-37.1  C (98.7  F)] 37  C (98.6  F)  Pulse:  [] 103  Resp:  [16-18] 18  BP: (107-138)/(63-83) 124/83  SpO2:  [90 %-94 %] 90 %    Exam:   GEN: alert and no distress  NEURO/MSK: moving all extemities  Strength LE 5/5  and overall symmetric  SKIN: bilateral erector spinae (ES) catheter sites with dressing c/d/i, no tenderness, erythema, heme, edema    Assessment and Plan:       ASSESSMENT  Patient is receiving moderate  analgesia with current multimodal therapy (has PCA Dilaudid continuous rate of 0.3mg and bumps of 0.2mg every 10 minutes)  including 0.2% ropivacaine On-Q continuous infusion at 14 mL/hr via 7ml/hour on left and right catheter.  Motor function intact and is meeting activity goals.  NO evidence of adverse side effects related to local anesthetic. H/o chronic opioid use. PTA was on OxyContin 10mg TID and Dilaudid 4mg every 4-6 hours PRN (  did stop short acting use PTA).     Bolus administered at 1030    MEDICATION: PF bupivacaine  0.25%, total bolus 10mL, via 5 ml each catheter.    PROCEDURE: Clinician bolus administered via left and right  nerve block catheter, without complication, negative aspirate before and between each mL.  No symptoms of local anesthetic systemic toxicity (LAST). Remained with and assessed patient for 10 min post-injection. BP, P and MAP stable    POST-PROCEDURE: Bedside RN aware of need to continue BP, P and MAP monitoring Q 10 min for an additional 20 min. Contact [facility] (jobcode ID [id number]) if any of the following: patient experiencing any untoward effects, SBP< 90, P < 50 or > 120, MAP < 60                - patient can be evaluated to receive local anesthetic bolus Q 12 hr PRN pain not controlled with continuous infusion.  Bedside nurse must page RAPS to request bolus        PLAN  Catheter Day #3 bilateral erector spinae (ES) T6-7 catheters/ continuous on Q infusion:       Continue 0.2% ropivacaine continuous on Q infusion at 7 mL/hr each catheter, total infusion 14mL/hr, max of 7 days      Patient can be evaluated to receive local anesthetic bolus Q 12 hr PRN, bedside nurse must page RAPS #: [id number] to request bolus     Antithrombotic/thrombolytic therapy:     Held Heparin infusion 400units/ hr as ordered per primary service. Hg b at 5.6-getting a unit of blood  ? Please contact [facility], jobcode ID: [id number] prior to any medication changes     Follow up:       [facility] will continue to follow and adjust as needed          Parish Villalobos MD  [facility]  8/12/2021 8:34 AM    R[facility] Contact Info (24 hour job code pager is the last 4 digits) For in-house use only:   Job code ID: [email or web address] [id number]   [facility] [place], [place] [address]  Peds [phone]  Tomorrow phone: dial * * * [phone], enter jobcode ID, then enter call-back number.    Text: Use brotips on the Intranet <Paging/Directory> tab and enter Jobcode ID.   If no call back at any time, contact the  hospital  and ask for RAPS attending or backup

## 2021-08-16 NOTE — OP NOTE
Transplant Surgery  Operative Note        PREOPERATIVE DIAGNOSES: Chronic pancreatitis secondary to Idiopathic pancreatitis s/p John's procedure X 2  POSTOPERATIVE DIAGNOSES: Same  PROCEDURE:   1. Open total pancreatectomy with splenectomy  2. Adhesiolysis for more than 120 minutes  3. Open gastroojejunostomy tube placement (Sergio's technique)  4. Bowel & Biliary reconstruction using Bonilla-en Y choledochojejunostomy (retrocolic) over 8-Fr pediatric feeding tube stent and Duodenojejunostomy (retrocolic)  5. Interval Appendectomy  6. Infusion of islet cells in portal circulation through splenic vein  7. Wedge biopsy of Liver    SURGEON: Elfego Shirley MD  ASSISTANT:  Marvin Benito, fellow. There was no qualified general surgery resident available to assist during this procedure.   ANESTHESIA:  General endotracheal with paravertebral block  SPECIMENS: pancreas to the islet lab, pancreatic biopsies, spleen, duodenum and liver biopsy  DRAINS: 19 F Davide drain   EBL: 300 ml  UO: 1625 ml  FLUIDS: 3500 cc crystalloid, 1000 cc colloid   COMPLICATIONS:  Patient became hypotensive as soon as the islet infusion was started through the splenic vein possibly anaphylactic reaction. Infusion was stopped and steroid with antihistaminic were administered. Once blood pressure was stable, rest of the islets were infused into portal circulation with no further events.     FINDINGS: Dense adhesions noted between liver, duodenum and stomach, omental adhesions noted with liver which were taken down, Bonilla limb anastomosis with the pancreatic duct noted anteriorly, adhesions noted in lesser sac. Multiple tortuous short gastric arteries present.     Autotransplant data:   Patient weight: 55.2 kg  Tissue mass: 9 ml  Total Islet number: 130,900  Total Islet number/k  Islet equivalents: 131,600  Islet equivalents/kilogram: 2386  Pre-infusion portal pressure: 1 cm/H2O  Peak portal pressure: 9 cm/H2O  Post-rinse (final) portal pressure:  9 cm/H2O     INDICATIONS OF THE PROCEDURE: Meme Robins diagnosed with idiopathic chronic pancreatitis underwent John's procedure in 2011 followed by a revision in 2014. Symptoms continued to persist. She was referred for evaluation for total pancreatectomy and autoislet transplantation.   DESCRIPTION OF THE PROCEDURE: After obtaining informed consent, the patient was brought to the operative room and placed in a supine position. General anesthesia was administered and endotracheal intubation was performed. An arterial line, central line and Millre catheter were placed. Bilateral lower extremity sequential compression stockings were applied. The abdomen was prepped and draped in the standard fashion.  The patient received preoperative IV antibiotics and time-out was performed.      DISSECTION OF PANCREAS  An upper midline incision was made above the umbilicus along the previous scar and peritoneal cavity was entered. The falciform ligament was divided using Harmonic scalpel and adhesions between liver, stomach and anterior abdominal wall were excised. Adhesions between stomach, duodenum and liver as mentioned above were noted and adhesiolysis was performed.  We entered the lesser sac and started dividing the greater omentum towards the spleen exposing the pancreas and the nuris limb and then extending superiorly to divide the short gastric branches. Adhesions in the lesser sac were lysed with caution exposing the superior mesenteric vein and the nuris limb was divided using a LORRIE stapler at the retrocolic window. All the ligaments around the spleen were taken down and distal pancreatic tail, splenic artery and vein were identified. Pancreas was lifted off the retroperitoneum starting from the tail progressing proximally towards the body and neck. Hemostasis was secured. Christensen retractor system was positioned. The abdominal wall retractors were placed and head of pancreas along with duodenum and distal stomach were  adequately visualized. We started with the division of stomach after pylorus using an EndoGIA stapler blue load. The duodenum was mobilized all along and Kocherization maneuver was performed followed by lifting off the pancreas including the uncinate process from the retroperitoneal bed. The gastroduodenal artery and bile duct were identified and divided. SMA, portal vein and SMV were identified and pancreas was cautiously lifted off these vessels. The duodenum was divided distally. The splenic artery was ligated and splenic vein divided between ties leaving an adequate length for auto islet infusion later in the procedure. The entire pancreas, along with duodenum, spleen, part of distal stomach and proximal jejunum were taken out and taken to the back table for preparation.     BACK TABLE PREPARATION  The en-bloc specimen was taken to the back table and placed in cold preservation solution. The spleen was  from the specimen and handed over to be sent for pathology. The entire duodenum was taken off the pancreas and sent for pathological examination. Multiple pancreatic biopsies were obtained from tail, body, neck, head and uncinate process and sent for pathology. The pancreas was placed in the preservation solution, packed and sent to the Islet Lab for further processing.     INTERVAL APPENDECTOMY  The ileocecal junction was localized and appendix was identified. The mesoappendix was divided using Harmonic scalpel and appendix was divided at the base using EndoGIA stapler. The mucosa was cauterized and stump was inverted into cecal wall using 4-0 Prolene suture.     RECONSTRUCTION   Hemostasis was secured and abdominal cavity was irrigated with copious amount of warm saline.  The ligament of Treitz defect was closed.   A gastro-jejunostomy tube was placed through the body of the stomach using Sergio's technique in left upper quadrant. A Bonilla limb was created approximately 40 cm distal to the resected end of  the duodenum.  A retocolic duodenojejunotomy using distal jejunal end was performed in an end to side fashion using two-layer hand sewn anastomosis technique. The distal end of the Bonilla limb was anastomosis to jejunum 40 cm distal using a LORRIE stapler, common ostomy was closed using 4-0 PDS suture in a continuous fashion and re-enforced using 4-0 Prolene. All the staple lines were re-enforced with continuous 4-0 Prolene sutures. A Brolin stitch was placed at the distal end of the staple line. The previously placed G-J tube was advanced through both the anastomoses into distal jejunum. The mesenteric J-J defect was closed using continuous 4-0 PDS sutures. The proximal end of the Bnoilla limb was brought through another retrocolic defect close to the bile duct and end to side choledochojejunal anastomosis was performed in a running fashion using 6-0 PDS suture. Hemostasis was secured. Both retrocolic defects were closed.    INFUSION OF ISLETS  The splenic vein stump was prepared for infusion by passing a 8F infant feeding tube through it and apparatus including the manometer was setup. Patient was heparinized. One bag of islets was received which were infused through the catheter into portal circulation. Portal pressure measurements details above. The catheter was removed and the venotomy site was closed and splenic vein stump was tied proximally. A wedge biopsy of the liver was performed. The     CLOSURE  Hemostasis was secured, abdominal cavity was irrigated well. A 19F Davide drain was placed behind the biliary anastomosis and fixed to the skin. The gastrojejunostomy tube was fixed to the anterior abdominal wall. The fascia was closed using #1 looped PDS. Subcutaneous tissue was closed using interrupted Vicryl 3-0 and skin using staples. Sterile dressing was applied and previous gastrostomy site was packed with betadine dressing. The gastrotomy end of the GJ tube was attached to bag for gravity drainage.     At the end  of the case, all the sponge and needle counts were correct. The patient tolerated the procedure well, was extubated and  transferred to PACU in stable and satisfactory condition.   The attending was present for the entire duration of the procedure.     Attestation:  I was present for the entire procedure.        Elfego Shirley MD

## 2021-08-16 NOTE — PROGRESS NOTES
[facility]AL ANESTHESIA PAIN SERVICE CONTINUOUS NERVE INFUSION NOTE  August 11, 2021    Meme Robins is a 51-year-old woman with chronic pancreatitis, who is s/p 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 Shirley. and placement of bilateral erector spinae (ES) T6-7 catheters by R[facility] on 8/9/21 for analgesia.    Subjective and Interval History: Overnight events: no acute event.  Seen at 0920.  Patient reports 4/10 at rest and 6/10 with activity, moderate pain control with current nerve block infusion and analgesics (see below).  Sitting up in chair,   denies weakness, paresthesias, circumoral numbness, metallic taste or tinnitus. NPO, on TF,  denies nausea.    Antithrombotic/Thrombolytic Therapy ordered:  Heparin infusion 400 units/hour    Analgesic Medications:   Medications related to Pain Management (From now, onward)    Start     Dose/Rate Route Frequency Ordered Stop    08/11/21 0730  ketorolac (TORADOL) injection 15 mg      15 mg Intravenous EVERY 6 HOURS 08/11/21 0717 08/16/21 0729    08/10/21 2200  acetaminophen *SUGAR FREE* (TYLENOL) solution 650 mg      650 mg Per J Tube EVERY 6 HOURS 08/10/21 2144      08/10/21 2100  ketamine (KETALAR) injection 5-10 mg      5-10 mg  over 2-5 Minutes Intravenous EVERY 6 HOURS 08/10/21 2044      08/10/21 1310  diphenhydrAMINE (BENADRYL) injection 25 mg      25 mg Intravenous EVERY 6 HOURS PRN 08/10/21 1310      08/10/21 0800  gabapentin (NEURONTIN) solution 300 mg      300 mg Oral or J tube 2 TIMES DAILY 08/09/21 2022 08/09/21 2200  LORazepam (ATIVAN) injection 0.5 mg      0.5 mg Intravenous EVERY 6 HOURS 08/09/21 1824      08/09/21 2030  HYDROmorphone (DILAUDID) PCA 0.2 mg/mL OPIOID TOLERANT       Intravenous CONTINUOUS 08/09/21 2022 08/09/21 2022  lidocaine 1 % 0.1-1 mL      0.1-1 mL Other EVERY 1 HOUR PRN 08/09/21 2022 08/09/21 2022  lidocaine  (LMX4) cream       Topical EVERY 1 HOUR PRN 08/09/21 2022 08/09/21 2022  bisacodyl (DULCOLAX) Suppository 10 mg      10 mg Rectal DAILY PRN 08/09/21 2022 08/09/21 1900  methocarbamol (ROBAXIN) injection 500 mg      500 mg Intravenous EVERY 6 HOURS 08/09/21 1824 08/12/21 2059 08/09/21 0830  ropivacaine 0.2% (NAROPIN) 750 mL in ON-Q C-Bloc select flow ([id number] holds 600-750 mL) dual cath disposable pump      14 mL/hr  Irrigation CONTINUOUS 08/09/21 0811             Objective:  Lab results  Recent Labs   Lab Test 08/11/21  0635   WBC 17.4*   RBC 2.45*   HGB 7.0*   HCT 21.7*   MCV 89   MCH 28.6   MCHC 32.3   RDW 13.9          Lab Results   Component Value Date    INR 1.45 08/09/2021    INR 0.99 08/09/2021    INR 1.00 08/04/2021       Vitals:    Temp:  [36.9  C (98.4  F)-37.1  C (98.7  F)] 37  C (98.6  F)  Pulse:  [] 103  Resp:  [16-18] 18  BP: (107-138)/(63-83) 124/83  SpO2:  [90 %-94 %] 90 %    Exam:   GEN: alert and no distress  NEURO/MSK: Moving UE and LE independently  Strength LE 5/5  and overall symmetric  SKIN: bilateral erector spinae (ES) catheter sites with dressing c/d/i, no tenderness, erythema, heme, edema    Assessment and Plan:     ASSESSMENT  Patient is receiving moderate  analgesia with current multimodal therapy (has PCA Dilaudid continuous rate of 0.3mg and bumps of 0.2mg every 10 minutes)  including 0.2% ropivacaine On-Q continuous infusion at 14 mL/hr via 7ml/hour on left and right catheter.  Motor function intact and is meeting activity goals.  NO evidence of adverse side effects related to local anesthetic. H/o chronic opioid use. PTA was on OxyContin 10mg TID and Dilaudid 4mg every 4-6 hours PRN ( did stop short acting use PTA).    Bolus administered at 0940    MEDICATION: PF bupivacaine  0.25%, total bolus 10mL, via 5 ml each catheter.    PROCEDURE: Clinician bolus administered via left and right  nerve block catheter, without complication, negative aspirate before and  between each mL.  No symptoms of local anesthetic systemic toxicity (LAST). Remained with and assessed patient for 10 min post-injection. BP, P and MAP stable    POST-PROCEDURE: Bedside RN aware of need to continue BP, P and MAP monitoring Q 10 min for an additional 20 min. Contact [facility] (jobcode ID [id number]) if any of the following: patient experiencing any untoward effects, SBP< 90, P < 50 or > 120, MAP < 60     - patient can be evaluated to receive local anesthetic bolus Q 12 hr PRN pain not controlled with continuous infusion.  Bedside nurse must page RAPS to request bolus      PLAN  Catheter Day #2 bilateral erector spinae (ES) T6-7 catheters/ continuous on Q infusion:      Continue 0.2% ropivacaine continuous on Q infusion at 7 mL/hr each catheter, total infusion 14mL/hr, max of 7 days  o  Expected change of next On-Q pump is today    Patient can be evaluated to receive local anesthetic bolus Q 12 hr PRN, bedside nurse must page RAPS #: [id number] to request bolus    Antithrombotic/thrombolytic therapy:     Heparin infusion 400units/ hr as ordered per primary service.   o Please contact [facility], jobcode ID: [id number] prior to any medication changes    Follow up:      [facility] will continue to follow and adjust as needed        Parish Villalobos MD  [facility]  8/11/2021 7:42 AM    [facility] Contact Info (24 hour job code pager is the last 4 digits) For in-house use only:   Job code ID: [id number]   [facility] - [place], [place] [address]  Peds [phone]  Re-vinyl phone: dial * [phone] * [phone], enter jobcode ID, then enter call-back number.    Text: Use [employer] on the Intranet <Paging/Directory> tab and enter Jobcode ID.   If no call back at any time, contact the hospital  and ask for RAPS attending or backup

## 2021-08-17 ENCOUNTER — APPOINTMENT (OUTPATIENT)
Dept: PHYSICAL THERAPY | Facility: CLINIC | Age: 51
End: 2021-08-17
Attending: TRANSPLANT SURGERY
Payer: COMMERCIAL

## 2021-08-17 ENCOUNTER — APPOINTMENT (OUTPATIENT)
Dept: GENERAL RADIOLOGY | Facility: CLINIC | Age: 51
End: 2021-08-17
Attending: STUDENT IN AN ORGANIZED HEALTH CARE EDUCATION/TRAINING PROGRAM
Payer: COMMERCIAL

## 2021-08-17 ENCOUNTER — APPOINTMENT (OUTPATIENT)
Dept: OCCUPATIONAL THERAPY | Facility: CLINIC | Age: 51
End: 2021-08-17
Attending: TRANSPLANT SURGERY
Payer: COMMERCIAL

## 2021-08-17 LAB
ALBUMIN UR-MCNC: NEGATIVE MG/DL
ANION GAP SERPL CALCULATED.3IONS-SCNC: 6 MMOL/L (ref 3–14)
APPEARANCE UR: CLEAR
ATRIAL RATE - MUSE: 114 BPM
BASOPHILS # BLD AUTO: 0.1 10E3/UL (ref 0–0.2)
BASOPHILS NFR BLD AUTO: 0 %
BILIRUB UR QL STRIP: NEGATIVE
BUN SERPL-MCNC: 6 MG/DL (ref 7–30)
CALCIUM SERPL-MCNC: 8.2 MG/DL (ref 8.5–10.1)
CHLORIDE BLD-SCNC: 102 MMOL/L (ref 94–109)
CO2 SERPL-SCNC: 32 MMOL/L (ref 20–32)
COLOR UR AUTO: ABNORMAL
CREAT SERPL-MCNC: 0.45 MG/DL (ref 0.52–1.04)
DIASTOLIC BLOOD PRESSURE - MUSE: NORMAL MMHG
EOSINOPHIL # BLD AUTO: 0.3 10E3/UL (ref 0–0.7)
EOSINOPHIL NFR BLD AUTO: 2 %
ERYTHROCYTE [DISTWIDTH] IN BLOOD BY AUTOMATED COUNT: 14.5 % (ref 10–15)
GFR SERPL CREATININE-BSD FRML MDRD: >90 ML/MIN/1.73M2
GLUCOSE BLD-MCNC: 173 MG/DL (ref 70–99)
GLUCOSE BLDC GLUCOMTR-MCNC: 100 MG/DL (ref 70–99)
GLUCOSE BLDC GLUCOMTR-MCNC: 100 MG/DL (ref 70–99)
GLUCOSE BLDC GLUCOMTR-MCNC: 101 MG/DL (ref 70–99)
GLUCOSE BLDC GLUCOMTR-MCNC: 101 MG/DL (ref 70–99)
GLUCOSE BLDC GLUCOMTR-MCNC: 115 MG/DL (ref 70–99)
GLUCOSE BLDC GLUCOMTR-MCNC: 116 MG/DL (ref 70–99)
GLUCOSE BLDC GLUCOMTR-MCNC: 134 MG/DL (ref 70–99)
GLUCOSE BLDC GLUCOMTR-MCNC: 140 MG/DL (ref 70–99)
GLUCOSE BLDC GLUCOMTR-MCNC: 142 MG/DL (ref 70–99)
GLUCOSE BLDC GLUCOMTR-MCNC: 143 MG/DL (ref 70–99)
GLUCOSE BLDC GLUCOMTR-MCNC: 147 MG/DL (ref 70–99)
GLUCOSE BLDC GLUCOMTR-MCNC: 147 MG/DL (ref 70–99)
GLUCOSE BLDC GLUCOMTR-MCNC: 151 MG/DL (ref 70–99)
GLUCOSE BLDC GLUCOMTR-MCNC: 152 MG/DL (ref 70–99)
GLUCOSE BLDC GLUCOMTR-MCNC: 156 MG/DL (ref 70–99)
GLUCOSE BLDC GLUCOMTR-MCNC: 159 MG/DL (ref 70–99)
GLUCOSE BLDC GLUCOMTR-MCNC: 160 MG/DL (ref 70–99)
GLUCOSE BLDC GLUCOMTR-MCNC: 76 MG/DL (ref 70–99)
GLUCOSE BLDC GLUCOMTR-MCNC: 94 MG/DL (ref 70–99)
GLUCOSE BLDC GLUCOMTR-MCNC: 95 MG/DL (ref 70–99)
GLUCOSE UR STRIP-MCNC: NEGATIVE MG/DL
HCT VFR BLD AUTO: 28.5 % (ref 35–47)
HGB BLD-MCNC: 9 G/DL (ref 11.7–15.7)
HGB UR QL STRIP: NEGATIVE
IMM GRANULOCYTES # BLD: 0.2 10E3/UL
IMM GRANULOCYTES NFR BLD: 1 %
INTERPRETATION ECG - MUSE: NORMAL
KETONES UR STRIP-MCNC: NEGATIVE MG/DL
LACTATE SERPL-SCNC: 1 MMOL/L (ref 0.7–2)
LEUKOCYTE ESTERASE UR QL STRIP: NEGATIVE
LYMPHOCYTES # BLD AUTO: 1.6 10E3/UL (ref 0.8–5.3)
LYMPHOCYTES NFR BLD AUTO: 9 %
MAGNESIUM SERPL-MCNC: 2.1 MG/DL (ref 1.6–2.3)
MCH RBC QN AUTO: 28.5 PG (ref 26.5–33)
MCHC RBC AUTO-ENTMCNC: 31.6 G/DL (ref 31.5–36.5)
MCV RBC AUTO: 90 FL (ref 78–100)
MONOCYTES # BLD AUTO: 2 10E3/UL (ref 0–1.3)
MONOCYTES NFR BLD AUTO: 11 %
MUCOUS THREADS #/AREA URNS LPF: PRESENT /LPF
NEUTROPHILS # BLD AUTO: 14.6 10E3/UL (ref 1.6–8.3)
NEUTROPHILS NFR BLD AUTO: 77 %
NITRATE UR QL: NEGATIVE
NRBC # BLD AUTO: 0.2 10E3/UL
NRBC BLD AUTO-RTO: 1 /100
P AXIS - MUSE: 80 DEGREES
PH UR STRIP: 7.5 [PH] (ref 5–7)
PHOSPHATE SERPL-MCNC: 3.3 MG/DL (ref 2.5–4.5)
PLATELET # BLD AUTO: 525 10E3/UL (ref 150–450)
POTASSIUM BLD-SCNC: 3.4 MMOL/L (ref 3.4–5.3)
PR INTERVAL - MUSE: 138 MS
QRS DURATION - MUSE: 68 MS
QT - MUSE: 332 MS
QTC - MUSE: 457 MS
R AXIS - MUSE: 46 DEGREES
RBC # BLD AUTO: 3.16 10E6/UL (ref 3.8–5.2)
RBC URINE: 3 /HPF
SARS-COV-2 RNA RESP QL NAA+PROBE: NEGATIVE
SODIUM SERPL-SCNC: 140 MMOL/L (ref 133–144)
SP GR UR STRIP: 1.02 (ref 1–1.03)
SQUAMOUS EPITHELIAL: <1 /HPF
SYSTOLIC BLOOD PRESSURE - MUSE: NORMAL MMHG
T AXIS - MUSE: 56 DEGREES
UROBILINOGEN UR STRIP-MCNC: NORMAL MG/DL
VANCOMYCIN SERPL-MCNC: 7.6 MG/L
VENTRICULAR RATE- MUSE: 114 BPM
WBC # BLD AUTO: 18.9 10E3/UL (ref 4–11)
WBC URINE: 3 /HPF

## 2021-08-17 PROCEDURE — 36592 COLLECT BLOOD FROM PICC: CPT | Performed by: TRANSPLANT SURGERY

## 2021-08-17 PROCEDURE — 250N000013 HC RX MED GY IP 250 OP 250 PS 637: Performed by: NURSE PRACTITIONER

## 2021-08-17 PROCEDURE — 36415 COLL VENOUS BLD VENIPUNCTURE: CPT | Performed by: STUDENT IN AN ORGANIZED HEALTH CARE EDUCATION/TRAINING PROGRAM

## 2021-08-17 PROCEDURE — 87040 BLOOD CULTURE FOR BACTERIA: CPT | Performed by: STUDENT IN AN ORGANIZED HEALTH CARE EDUCATION/TRAINING PROGRAM

## 2021-08-17 PROCEDURE — 250N000013 HC RX MED GY IP 250 OP 250 PS 637: Performed by: PHYSICIAN ASSISTANT

## 2021-08-17 PROCEDURE — 250N000011 HC RX IP 250 OP 636: Performed by: NURSE PRACTITIONER

## 2021-08-17 PROCEDURE — 80048 BASIC METABOLIC PNL TOTAL CA: CPT | Performed by: NURSE PRACTITIONER

## 2021-08-17 PROCEDURE — 250N000009 HC RX 250: Performed by: PHYSICIAN ASSISTANT

## 2021-08-17 PROCEDURE — 250N000009 HC RX 250: Performed by: NURSE PRACTITIONER

## 2021-08-17 PROCEDURE — 36592 COLLECT BLOOD FROM PICC: CPT | Performed by: STUDENT IN AN ORGANIZED HEALTH CARE EDUCATION/TRAINING PROGRAM

## 2021-08-17 PROCEDURE — 97530 THERAPEUTIC ACTIVITIES: CPT | Mod: GP

## 2021-08-17 PROCEDURE — 71045 X-RAY EXAM CHEST 1 VIEW: CPT | Mod: 26 | Performed by: RADIOLOGY

## 2021-08-17 PROCEDURE — 99024 POSTOP FOLLOW-UP VISIT: CPT | Performed by: TRANSPLANT SURGERY

## 2021-08-17 PROCEDURE — 87086 URINE CULTURE/COLONY COUNT: CPT | Performed by: STUDENT IN AN ORGANIZED HEALTH CARE EDUCATION/TRAINING PROGRAM

## 2021-08-17 PROCEDURE — 36415 COLL VENOUS BLD VENIPUNCTURE: CPT | Performed by: NURSE PRACTITIONER

## 2021-08-17 PROCEDURE — 999N000157 HC STATISTIC RCP TIME EA 10 MIN

## 2021-08-17 PROCEDURE — 258N000003 HC RX IP 258 OP 636: Performed by: TRANSPLANT SURGERY

## 2021-08-17 PROCEDURE — 250N000011 HC RX IP 250 OP 636: Performed by: STUDENT IN AN ORGANIZED HEALTH CARE EDUCATION/TRAINING PROGRAM

## 2021-08-17 PROCEDURE — 99222 1ST HOSP IP/OBS MODERATE 55: CPT | Mod: 24 | Performed by: CLINICAL NURSE SPECIALIST

## 2021-08-17 PROCEDURE — 71045 X-RAY EXAM CHEST 1 VIEW: CPT

## 2021-08-17 PROCEDURE — 250N000011 HC RX IP 250 OP 636: Performed by: TRANSPLANT SURGERY

## 2021-08-17 PROCEDURE — 250N000011 HC RX IP 250 OP 636: Performed by: PHYSICIAN ASSISTANT

## 2021-08-17 PROCEDURE — 83605 ASSAY OF LACTIC ACID: CPT | Performed by: TRANSPLANT SURGERY

## 2021-08-17 PROCEDURE — 85025 COMPLETE CBC W/AUTO DIFF WBC: CPT | Performed by: NURSE PRACTITIONER

## 2021-08-17 PROCEDURE — 120N000011 HC R&B TRANSPLANT UMMC

## 2021-08-17 PROCEDURE — 97116 GAIT TRAINING THERAPY: CPT | Mod: GP

## 2021-08-17 PROCEDURE — 81001 URINALYSIS AUTO W/SCOPE: CPT | Performed by: STUDENT IN AN ORGANIZED HEALTH CARE EDUCATION/TRAINING PROGRAM

## 2021-08-17 PROCEDURE — U0003 INFECTIOUS AGENT DETECTION BY NUCLEIC ACID (DNA OR RNA); SEVERE ACUTE RESPIRATORY SYNDROME CORONAVIRUS 2 (SARS-COV-2) (CORONAVIRUS DISEASE [COVID-19]), AMPLIFIED PROBE TECHNIQUE, MAKING USE OF HIGH THROUGHPUT TECHNOLOGIES AS DESCRIBED BY CMS-2020-01-R: HCPCS | Performed by: NURSE PRACTITIONER

## 2021-08-17 PROCEDURE — 250N000012 HC RX MED GY IP 250 OP 636 PS 637: Performed by: CLINICAL NURSE SPECIALIST

## 2021-08-17 PROCEDURE — 83735 ASSAY OF MAGNESIUM: CPT | Performed by: NURSE PRACTITIONER

## 2021-08-17 PROCEDURE — 80202 ASSAY OF VANCOMYCIN: CPT | Performed by: TRANSPLANT SURGERY

## 2021-08-17 PROCEDURE — 97535 SELF CARE MNGMENT TRAINING: CPT | Mod: GO

## 2021-08-17 PROCEDURE — 250N000013 HC RX MED GY IP 250 OP 250 PS 637: Performed by: TRANSPLANT SURGERY

## 2021-08-17 PROCEDURE — 84100 ASSAY OF PHOSPHORUS: CPT | Performed by: PHYSICIAN ASSISTANT

## 2021-08-17 RX ORDER — DEXTROSE MONOHYDRATE 25 G/50ML
25-50 INJECTION, SOLUTION INTRAVENOUS
Status: DISCONTINUED | OUTPATIENT
Start: 2021-08-17 | End: 2021-08-20 | Stop reason: HOSPADM

## 2021-08-17 RX ORDER — LORAZEPAM 2 MG/ML
0.5 CONCENTRATE ORAL EVERY 6 HOURS
Status: DISCONTINUED | OUTPATIENT
Start: 2021-08-17 | End: 2021-08-19

## 2021-08-17 RX ORDER — FENTANYL 75 UG/1
75 PATCH TRANSDERMAL
Status: DISCONTINUED | OUTPATIENT
Start: 2021-08-17 | End: 2021-08-19

## 2021-08-17 RX ORDER — NICOTINE POLACRILEX 4 MG
15-30 LOZENGE BUCCAL
Status: DISCONTINUED | OUTPATIENT
Start: 2021-08-17 | End: 2021-08-20 | Stop reason: HOSPADM

## 2021-08-17 RX ORDER — MEROPENEM 1 G/1
1 INJECTION, POWDER, FOR SOLUTION INTRAVENOUS EVERY 8 HOURS
Status: DISCONTINUED | OUTPATIENT
Start: 2021-08-17 | End: 2021-08-19

## 2021-08-17 RX ADMIN — HYDROMORPHONE HYDROCHLORIDE 6 MG: 1 SOLUTION ORAL at 06:37

## 2021-08-17 RX ADMIN — HYDROMORPHONE HYDROCHLORIDE 0.5 MG: 1 INJECTION, SOLUTION INTRAMUSCULAR; INTRAVENOUS; SUBCUTANEOUS at 01:21

## 2021-08-17 RX ADMIN — LORAZEPAM 0.25 MG: 2 INJECTION INTRAMUSCULAR; INTRAVENOUS at 04:12

## 2021-08-17 RX ADMIN — LORAZEPAM 0.25 MG: 2 INJECTION INTRAMUSCULAR; INTRAVENOUS at 10:09

## 2021-08-17 RX ADMIN — SERTRALINE HYDROCHLORIDE 150 MG: 20 SOLUTION ORAL at 21:33

## 2021-08-17 RX ADMIN — Medication 40 MG: at 20:38

## 2021-08-17 RX ADMIN — Medication 1 PACKET: at 08:24

## 2021-08-17 RX ADMIN — Medication 15 ML: at 08:23

## 2021-08-17 RX ADMIN — Medication 1 PACKET: at 20:43

## 2021-08-17 RX ADMIN — FLUCONAZOLE IN SODIUM CHLORIDE 200 MG: 2 INJECTION, SOLUTION INTRAVENOUS at 20:47

## 2021-08-17 RX ADMIN — VANCOMYCIN HYDROCHLORIDE 1500 MG: 10 INJECTION, POWDER, LYOPHILIZED, FOR SOLUTION INTRAVENOUS at 18:30

## 2021-08-17 RX ADMIN — Medication 325 MG: at 12:27

## 2021-08-17 RX ADMIN — Medication 40 MG: at 08:23

## 2021-08-17 RX ADMIN — SODIUM CHLORIDE, SODIUM LACTATE, POTASSIUM CHLORIDE, CALCIUM CHLORIDE AND DEXTROSE MONOHYDRATE: 5; 600; 310; 30; 20 INJECTION, SOLUTION INTRAVENOUS at 01:04

## 2021-08-17 RX ADMIN — MEROPENEM 1 G: 1 INJECTION, POWDER, FOR SOLUTION INTRAVENOUS at 12:27

## 2021-08-17 RX ADMIN — TRAZODONE HYDROCHLORIDE 100 MG: 100 TABLET ORAL at 21:33

## 2021-08-17 RX ADMIN — HUMAN INSULIN 3 UNITS/HR: 100 INJECTION, SOLUTION SUBCUTANEOUS at 12:40

## 2021-08-17 RX ADMIN — SODIUM CHLORIDE, SODIUM LACTATE, POTASSIUM CHLORIDE, CALCIUM CHLORIDE AND DEXTROSE MONOHYDRATE: 5; 600; 310; 30; 20 INJECTION, SOLUTION INTRAVENOUS at 22:37

## 2021-08-17 RX ADMIN — VANCOMYCIN HYDROCHLORIDE 1500 MG: 10 INJECTION, POWDER, LYOPHILIZED, FOR SOLUTION INTRAVENOUS at 05:42

## 2021-08-17 RX ADMIN — ACETAMINOPHEN 650 MG: 160 LIQUID ORAL at 11:22

## 2021-08-17 RX ADMIN — HYDROMORPHONE HYDROCHLORIDE 3 MG: 1 SOLUTION ORAL at 10:19

## 2021-08-17 RX ADMIN — ONDANSETRON 4 MG: 2 INJECTION INTRAMUSCULAR; INTRAVENOUS at 14:55

## 2021-08-17 RX ADMIN — INSULIN GLARGINE 15 UNITS: 100 INJECTION, SOLUTION SUBCUTANEOUS at 12:42

## 2021-08-17 RX ADMIN — INSULIN GLARGINE 15 UNITS: 100 INJECTION, SOLUTION SUBCUTANEOUS at 20:17

## 2021-08-17 RX ADMIN — LIDOCAINE 2 PATCH: 560 PATCH PERCUTANEOUS; TOPICAL; TRANSDERMAL at 08:37

## 2021-08-17 RX ADMIN — METHOCARBAMOL 1000 MG: 500 TABLET, FILM COATED ORAL at 14:15

## 2021-08-17 RX ADMIN — Medication 1 PACKET: at 14:15

## 2021-08-17 RX ADMIN — HYDROMORPHONE HYDROCHLORIDE 6 MG: 1 SOLUTION ORAL at 02:34

## 2021-08-17 RX ADMIN — Medication: at 21:46

## 2021-08-17 RX ADMIN — GABAPENTIN 300 MG: 250 SUSPENSION ORAL at 08:25

## 2021-08-17 RX ADMIN — ONDANSETRON 4 MG: 2 INJECTION INTRAMUSCULAR; INTRAVENOUS at 01:08

## 2021-08-17 RX ADMIN — HYDROMORPHONE HYDROCHLORIDE 6 MG: 1 SOLUTION ORAL at 18:24

## 2021-08-17 RX ADMIN — ACETAMINOPHEN 650 MG: 160 LIQUID ORAL at 18:24

## 2021-08-17 RX ADMIN — METHOCARBAMOL 1000 MG: 500 TABLET, FILM COATED ORAL at 20:37

## 2021-08-17 RX ADMIN — ONDANSETRON 4 MG: 2 INJECTION INTRAMUSCULAR; INTRAVENOUS at 08:52

## 2021-08-17 RX ADMIN — DIPHENHYDRAMINE HYDROCHLORIDE 25 MG: 50 INJECTION, SOLUTION INTRAMUSCULAR; INTRAVENOUS at 20:53

## 2021-08-17 RX ADMIN — GABAPENTIN 300 MG: 250 SUSPENSION ORAL at 20:21

## 2021-08-17 RX ADMIN — HYDROMORPHONE HYDROCHLORIDE 6 MG: 1 SOLUTION ORAL at 14:15

## 2021-08-17 RX ADMIN — DIPHENHYDRAMINE HYDROCHLORIDE 25 MG: 50 INJECTION, SOLUTION INTRAMUSCULAR; INTRAVENOUS at 08:53

## 2021-08-17 RX ADMIN — ACETAMINOPHEN 650 MG: 160 LIQUID ORAL at 05:42

## 2021-08-17 RX ADMIN — HYDROMORPHONE HYDROCHLORIDE 0.3 MG: 1 INJECTION, SOLUTION INTRAMUSCULAR; INTRAVENOUS; SUBCUTANEOUS at 05:47

## 2021-08-17 RX ADMIN — METHOCARBAMOL 1000 MG: 500 TABLET, FILM COATED ORAL at 08:24

## 2021-08-17 RX ADMIN — DIPHENHYDRAMINE HYDROCHLORIDE 25 MG: 50 INJECTION, SOLUTION INTRAMUSCULAR; INTRAVENOUS at 01:09

## 2021-08-17 RX ADMIN — MEROPENEM 1 G: 1 INJECTION, POWDER, FOR SOLUTION INTRAVENOUS at 20:15

## 2021-08-17 RX ADMIN — ACETAMINOPHEN 650 MG: 160 LIQUID ORAL at 00:04

## 2021-08-17 RX ADMIN — FENTANYL 1 PATCH: 75 PATCH, EXTENDED RELEASE TRANSDERMAL at 11:22

## 2021-08-17 RX ADMIN — LORAZEPAM 0.5 MG: 2 LIQUID ORAL at 21:33

## 2021-08-17 RX ADMIN — HYDROMORPHONE HYDROCHLORIDE 6 MG: 1 SOLUTION ORAL at 22:22

## 2021-08-17 RX ADMIN — ONDANSETRON 4 MG: 2 INJECTION INTRAMUSCULAR; INTRAVENOUS at 20:54

## 2021-08-17 RX ADMIN — DIPHENHYDRAMINE HYDROCHLORIDE 25 MG: 50 INJECTION, SOLUTION INTRAMUSCULAR; INTRAVENOUS at 14:55

## 2021-08-17 RX ADMIN — LORAZEPAM 0.5 MG: 2 LIQUID ORAL at 16:40

## 2021-08-17 ASSESSMENT — ACTIVITIES OF DAILY LIVING (ADL)
ADLS_ACUITY_SCORE: 17

## 2021-08-17 ASSESSMENT — MIFFLIN-ST. JEOR: SCORE: 1117.38

## 2021-08-17 NOTE — PLAN OF CARE
"BP (!) 146/92   Pulse 112   Temp 99.3  F (37.4  C) (Oral)   Resp 19   Ht 1.549 m (5' 1\")   Wt 56.1 kg (123 lb 11.2 oz)   SpO2 92%   BMI 23.37 kg/m      Shift: 4976-4330  VS: hypertensive, tachycardic on RA,highest temp 100.8 F; temp down to 99.3 after scheduled tylenol; MD paged and aware  Neuro: AOx4 , anxious, tearful  BG: q1h, algorithm 1 most of shift however at 2100 BG 73; drip turned off for an hour and D10 initiated--BG back up to 143 and gtt restarted on algorithm 1  Respiratory: wdl  Pain/Nausea/PRN:PRN IV benadryl, IV dilaudid, IV zofran, paged MD for additional pain meds, MD came to bedside, IV atarax given; one time dose of IV dilaudid still available for pt  Diet: NPO + ice chips  LDA: internal jugular SL. NS TKO w/insulin gtt connected to Port per pt request. G tube to gravity--patent with 300ml clear,brown output. J tube patent with TF @60ml/hr. ARTEMIO drain patent with serous/milky output  GI/: voiding spontaneously, passing gas, BM x 3   Skin: abdominal incision - dermabond; bruising  Mobility: UAL/SBA  Plan: continue to monitor pain and be in contact with team to reassess patient needs as needed.     Handoff given to following RN.    Loli Urbina RN on 8/16/2021 at 2140    "

## 2021-08-17 NOTE — CONSULTS
Diabetes/Hyperglycemia Management Consult    Chief Complaint [id number]  Consult requested by: Aliza Ridley NP  History of Present Illness   Meme Robins is a 51-year-old woman with chronic pancreatitis secondary to Idiopathic pancreatitis s/p John's procedure X 2 who is s/p open total pancreatectomy with autologous islet cell transplantation (Islet equivalents/kilogram: 2386), splenectomy, and incidental appendectomy with GJ tube placement on August 9, 2021 with Dr. Shirley. She had Intraop hypotension during islet infusion, thought to be 2/2 anaphylaxis and treated with benadryl, steroids and resolved.     Her history is notable for pre-diabetes ( by both fasting glucose and A1c) as well as hypoglycemia (lowest to 20s).  She started using a Freestyle Nneka in the past and more recently started a Dexcom CGMS.  She has additional sensor here with her in the Hospital.    Vivonex TEN (very low fat TF formula d/t chyle leak---297 g CHO ) reached goal rate 60 ml/h yesterday at noon.  D5LR at 10  Ml/h. Last stool watery brown.  Feels she's tolerating the TF okay.  Has tolerated clamping G-tube.  She is also allowed to start trying full liquid diet.  She really only wants water at this point.  Happy she seems to be diuresing-- weight was down and less puffy feeling in her hands.  Had a difficult night.  WBC to 18.9.  Feeling achey with fever.  To have central line pulled.  Already on abs for +islet cultures.  Prior, had felt like activity and strength were improving.  Discharge could be in a few days.          Recent Labs   Lab 08/17/21  1426 08/17/21  1332 08/17/21  1237 08/17/21  1131 08/17/21  0949 08/17/21  0816   GLC 94 147* 159* 140* 156* 142*         Diabetes Type: pre-diabetes, now post-pancreatectomy  Ability to [place] Prescribed Regimen: likely good, has been BG monitoring  Diabetes Control:   Lab Results   Component Value Date    A1C 5.5 08/04/2021    A1C 6.0 05/19/2021           Review of Systems  10 point  ROS completed with pertinent positives and negatives noted in the HPI    Past medical, family and social histories are reviewed and updated.    Past Medical History  Past Medical History:   Diagnosis Date     Adrenal insufficiency (H)     iatrogenic, around 2013, off treatment for several years as of 2021 visit     Anemia      Depression      Esophageal reflux      Idiopathic chronic pancreatitis (H)      Pre-diabetes      Pulmonary embolism (H)        Family History  Family History   Problem Relation Age of Onset     Breast Cancer Mother      Diabetes No family hx of      Pancreatitis No family hx of        Social History  Social History     Socioeconomic History     Marital status:      Spouse name: Brendan     Number of children: None     Years of education: None     Highest education level: None   Occupational History     None   Tobacco Use     Smoking status: Never Smoker     Smokeless tobacco: Never Used   Substance and Sexual Activity     Alcohol use: Not Currently     Drug use: Never     Sexual activity: None   Other Topics Concern     None   Social History Narrative    Meme is  and has three children.       Social Determinants of Health     Financial Resource Strain:      Difficulty of Paying Living Expenses:    Food Insecurity:      Worried About Running Out of Food in the Last Year:      Ran Out of Food in the Last Year:    Transportation Needs:      Lack of Transportation (Medical):      Lack of Transportation (Non-Medical):    Physical Activity:      Days of Exercise per Week:      Minutes of Exercise per Session:    Stress:      Feeling of Stress :    Social Connections:      Frequency of Communication with Friends and Family:      Frequency of Social Gatherings with Friends and Family:      Attends Yarsanism Services:      Active Member of Clubs or Organizations:      Attends Club or Organization Meetings:      Marital Status:    Intimate Partner Violence:      Fear of Current or  Ex-Partner:      Emotionally Abused:      Physically Abused:      Sexually Abused:    Lives in [place]      Physical Exam  Temp: 99.7  F (37.6  C) Temp src: Oral BP: 139/84 Pulse: 98   Resp: 20 SpO2: 96 % O2 Device: Nasal cannula Oxygen Delivery: 1 LPM  Body mass index is 23.74 kg/m .  Wt Readings from Last 4 Encounters:   08/17/21 57 kg (125 lb 10.6 oz)   08/05/21 56.4 kg (124 lb 4.8 oz)   08/05/21 56.7 kg (125 lb)   08/04/21 55.3 kg (122 lb)       General:  pleasant woman resting in recliner in no distress.  Brendan at bedside and supportive   HEENT: NC/AT, PER and anicteric, non-injected, oral mucous membranes moist.   Lungs: unlabored respiration, no cough  ABD: rounded, peg/J.  G is clamped  Skin:  dry, no obvious lesions  MSK:  fluid movement of all extremities  Lymp:  no LE edema   Mental status:  alert, oriented x3, communicating clearly  Psych:  calm, even mood    Laboratory        Component      Latest Ref Rng & Units 5/19/2021 5/19/2021 5/19/2021           9:03 AM 10:38 AM 11:44 AM   Glucose      70 - 99 mg/dL  103 (H) 124 (H)   Patient Fasting > 8hrs?            C-Peptide      0.9 - 6.9 ng/mL 1.6 2.5 1.8   Insulin Antibodies      0.0 - 0.4 U/mL <0.4     Islet Cell Antibody IgG      <1:4 <1:4     Glutamic Acid Decarboxylase Antibody      0.0 - 5.0 IU/mL <5.0       Component      Latest Ref Rng & Units 8/4/2021 8/4/2021 8/4/2021           8:29 AM  9:59 AM 11:04 AM   Glucose      70 - 99 mg/dL 88 89 109 (H)   Patient Fasting > 8hrs?       Yes No No   C-Peptide      0.9 - 6.9 ng/mL 1.1 3.4 3.0   Insulin Antibodies      0.0 - 0.4 U/mL      Islet Cell Antibody IgG      <1:4      Glutamic Acid Decarboxylase Antibody      0.0 - 5.0 IU/mL          Recent Labs   Lab Test 08/17/21  1426 08/17/21  1332 08/17/21  0618 08/16/21  0638   NA  --   --  140 141   POTASSIUM  --   --  3.4 3.2*   CHLORIDE  --   --  102 105   CO2  --   --  32 34*   ANIONGAP  --   --  6 2*   GLC 94 147* 173* 98   BUN  --   --  6* 7   CR  --    --  0.45* 0.49*   VIKAS  --   --  8.2* 7.8*     CBC RESULTS:   Recent Labs   Lab Test 08/17/21  0618   WBC 18.9*   RBC 3.16*   HGB 9.0*   HCT 28.5*   MCV 90   MCH 28.5   MCHC 31.6   RDW 14.5   *       Liver Function Studies -   Recent Labs   Lab Test 08/11/21  0635   PROTTOTAL 4.5*   ALBUMIN 2.0*   BILITOTAL 0.3   ALKPHOS 76   AST 76*   *       Active Medications  Current Facility-Administered Medications   Medication     acetaminophen *SUGAR FREE* (TYLENOL) solution 650 mg     amylase-lipase-protease (ZENPEP) 72143-15882 units delayed release capsule 1-2 capsule     amylase-lipase-protease (ZENPEP) 02111-27285 units delayed release capsule 3 capsule     aspirin suspension 325 mg     bisacodyl (DULCOLAX) Suppository 10 mg     dextrose 10% infusion     dextrose 10% infusion     dextrose 5% in lactated ringers infusion     glucose gel 15-30 g    Or     dextrose 50 % injection 25-50 mL    Or     glucagon injection 1 mg     diphenhydrAMINE (BENADRYL) injection 25 mg     docusate (COLACE) 50 MG/5ML liquid 100 mg     fentaNYL (DURAGESIC) 75 mcg/hr 72 hr patch 1 patch     fentaNYL (DURAGESIC) Patch in Place     fluconazole (DIFLUCAN) intermittent infusion 200 mg     gabapentin (NEURONTIN) solution 300 mg     heparin 100 UNIT/ML injection 5-10 mL     heparin lock flush 10 UNIT/ML injection 5-10 mL     heparin lock flush 10 UNIT/ML injection 5-10 mL     HYDROmorphone (STANDARD CONC) (DILAUDID) liquid 3-6 mg     insulin 1 unit/1mL in saline (NovoLIN-Regular) infusion- SOT TPIAT algorithm     [START ON 8/18/2021] insulin aspart (NovoLOG) injection (RAPID ACTING)     insulin aspart (NovoLOG) injection (RAPID ACTING)     insulin aspart (NovoLOG) injection (RAPID ACTING)     insulin glargine (LANTUS PEN) injection 15 Units     Lidocaine (LIDOCARE) 4 % Patch 1-2 patch     lidocaine (LMX4) cream     lidocaine 1 % 0.1-1 mL     lidocaine patch in PLACE     LORazepam (ATIVAN) 2 MG/ML (HIGH CONC) solution 0.5 mg      magnesium hydroxide (MILK OF MAGNESIA) suspension 30 mL     Med Instruction - Transition from IV Insulin Infusion to Sub-Q Insulin     meropenem (MERREM) 1 g vial to attach to  mL bag     methocarbamol (ROBAXIN) tablet 1,000 mg     multivitamins w/minerals (CERTAVITE) liquid 15 mL     naloxone (NARCAN) injection 0.2 mg    Or     naloxone (NARCAN) injection 0.4 mg    Or     naloxone (NARCAN) injection 0.2 mg    Or     naloxone (NARCAN) injection 0.4 mg     ondansetron (ZOFRAN-ODT) ODT tab 4 mg    Or     ondansetron (ZOFRAN) injection 4 mg     pantoprazole (PROTONIX) 2 mg/mL suspension 40 mg     protein modular (PROSOURCE TF) 1 packet     sennosides (SENOKOT) syrup 5 mL     sertraline (ZOLOFT) 20 MG/ML (HIGH CONC) solution 150 mg     sodium chloride (PF) 0.9% PF flush 10-20 mL     sodium chloride (PF) 0.9% PF flush 10-20 mL     sodium chloride (PF) 0.9% PF flush 10-20 mL     sodium chloride (PF) 0.9% PF flush 3 mL     sodium chloride (PF) 0.9% PF flush 3 mL     sodium chloride (PF) 0.9% PF flush 5 mL     traZODone (DESYREL) suspension 100 mg     vancomycin 1500 mg in 0.9% NaCl 250 ml intermittent infusion 1,500 mg     No current outpatient medications on file.       Current Diet  Orders Placed This Encounter      Combination Diet Full Liquid; No Fat Diet        Assessment  Meme Robins is a 51-year-old woman with chronic pancreatitis secondary to Idiopathic pancreatitis s/p John's procedure X 2 who is s/p open total pancreatectomy with autologous islet cell transplantation (Islet equivalents/kilogram: 2386), splenectomy, and incidental appendectomy with GJ tube placement on August 9, 2021 with Dr. Shirley. Now tolerating goal rate TF, so will work towards subcutaneous insulin regimen, though monitor for change in need in setting of infection investigation.      Plan    - start glargine 15 units BID, stopping IV insulin infusion two hours after the second dose  - aspart 1 per 50 mg/dL cof BG>120 Q4h, starting  midnight (once off insulin infusion)  - aspart 1 unit per 12 grams carb QID meals and PRN snacks  - hypoglycemia protocol  - PRN D10W 85 ml/h for hypoglycemia prevention in case of TF interruption once on full dose glargine  - will need insulin adjustments for nutrition changes, particularly if changes back to standard formula (Vital 1.2 @ 55 ml/hr --247 g CHO)  - diabetes education review planned for tomorrow  - outpatient diabetes plans to be reviewed    Effiedenver Motta APRN -[id number]    Diabetes Management Team job code: [id number]  I spent a total of 85 minutes bedside and on the inpatient unit managing the glycemic care of Meme Robins. Over 50% of my time on the unit was spent counseling the patient and  and/or coordinating care regarding acute hyperglycemia mgmnt and discharge planning.  See note for details.

## 2021-08-17 NOTE — PLAN OF CARE
"    /73 (BP Location: Right arm)   Pulse 100   Temp 99.5  F (37.5  C) (Oral)   Resp 20   Ht 1.549 m (5' 1\")   Wt 57 kg (125 lb 10.6 oz)   SpO2 96%   BMI 23.74 kg/m        Neuro: Pt. alert & Ox4  Behavior: Pt. anxious at times & cooperative with cares.   Activity: Pt. up with SBA.  Vital: T-max: 99.6 oral, HR: 110's-130's, BPs: 140's-150's, OVSS on 1L/NC. 89% on RA. Surgery on-call notified of HR: 136 at 0111 & no orders to treat now that HR came down to 112 & pt. resting in bed, see provider note.  Endocrine: BG range: 100-160 , insulin gtt at 1units/hour per algorithm 1-2.  LDAs: Left internal jugular with D5LR at 10cc/hour. Davie-cath. With NS at TKO with insulin gtt. G tube to gravity with 250cc output. J tube for tube feeds.  Right ARTEMIO with 30cc serosang. drainage.    Cardiac:  Tachycardia & hypertension  Respiratory: LS clear bilat.  GI/: Pt. voiding spontaneously.  1 loose/watery stools this shift.   Skin: Incision stapled & ALONDRA  Pain: Abdominal pain moderately controlled with sched. Tylenol, Dilaudid elixir,[place], IV Dilaudid 0.3mg x1, & Fentanyl patch. Pt. also received 1 time IV Dilaudid 0.5mg per order.  Nausea: Pt. given prn IV Zofran x1. No emesis.  Diet: NPO with ice chips. Continuous tube feeds at 60cc/hour into JT.  Labs/Imaging: Morning labs pending.  Plan: Continue to follow POC & notify MD with change in status.          "

## 2021-08-17 NOTE — PROGRESS NOTES
"CLINICAL NUTRITION SERVICES - REASSESSMENT NOTE     Nutrition Prescription    RECOMMENDATIONS FOR MDs/PROVIDERS TO ORDER:  Recommend transitioning back to standard TF formula as soon as appropriate (and prior to 8/27, ~2 weeks total) if able.  If unable to safely do so, recommend starting IV lipids 2x/week to prevent essential fatty acid deficiency.     Recommend outpatient appointment for CHO counting education after discharge.     Malnutrition Status:    Non-severe malnutrition in the context of acute on chronic illness    Recommendations already ordered by Registered Dietitian (RD):  Update diet to \"Full Liquid, No Fat\"    Ordered Zenpep 95026 3 with meals, 1-2 with snacks/small meals given diet advancement    Future/Additional Recommendations:  Standard TF recommendations: Vital AF 1.2 Ryan @ goal of  55ml/hr  -No need for ongoing Prosource (protein packets) with change to this formula.  -Will require 1 relizorb cartridge every 12 hours with change to this formula.        EVALUATION OF THE PROGRESS TOWARD GOALS   Diet: Full Liquid  Nutrition Support: Vivonex TEN @ 60 ml/hr + 3 pkts Prosource TF daily (goal rate met yesterday ~12pm)    Intake: Switched to very low fat TF on 8/13. Patient received a 7 day average of 778 kcal (57% low end kcal needs) and 48 grams protein (66% low end pro needs) daily.     Patient without much interest in taking anything more than water at this time.      NEW FINDINGS   Weight: Current weight up ~4# from admission weight.     Meds: Lantus, custom sliding scale, colace BID, senokot BID, protonix BID, Certavite 15 ml daily    Labs: BUN 6 (low), BG  within the last 24 hours, 8/4/21: Vit A 0.60 (normal), Vit E 13.2 (normal), vitamin D 78 (elevated)    GI: Having ~3 BM per day since 8/15    MALNUTRITION  % Intake: < 75% for > 7 days (non-severe)  % Weight Loss: None noted  Subcutaneous Fat Loss: Facial region: mild  Muscle Loss: Facial & jaw region:  mild  Fluid Accumulation/Edema: " Does not meet criteria  Malnutrition Diagnosis: Non-severe malnutrition in the context of acute on chronic illness    Previous Goals   1.  Tolerate adv of TF to goal infusion without sx of refeeding within the next 5-7 days. - MET  2.  Once TF at goal, total ave EN intakes provide minimum of 25 kcal/kg/day and 1.3 g/kg/day (per 55 kg)  - NOT MET    Previous Nutrition Diagnosis  Inadequate oral intake   Evaluation: No longer applicable, nutrition diagnosis changed below    CURRENT NUTRITION DIAGNOSIS  Inadequate protein-energy intake related to slow/delayed TF advancement as evidenced by patient meeting 57%/66% of assessed needs.      INTERVENTIONS  Implementation  Collaboration with other providers - Discussed oral diet with team  Discussed enzyme cost with pharmacy liason.    Nutrition education for recommended modifications - discussed current TF formula/regimen/how to administer.  Discussed diet and options for ordering.  Reviewed enzymes/signs of malabsorption.  Discussed enzyme costs/copay program.  Provided recommendations on food choices upon diet advancement.     Goals  Total avg nutritional intake to meet a minimum of 25 kcal/kg and 1.2 g PRO/kg daily (per dosing wt 55 kg).    Monitoring/Evaluation  Progress toward goals will be monitored and evaluated per protocol.    Effie Ramon MS, RD, LD  Pager [phone]

## 2021-08-17 NOTE — DISCHARGE INSTRUCTIONS
Tube Feeding Regimen:  Vivonex TEN @ 60 ml/hr + 3  pkts of Prosource TF + 2 pkts Nutrisource Fiber daily    Tube Feeding Administration:  Mix 5 packets of Vivonex TEN formula + 1250 ml water.  This will yield 1500 ml of formula.  Mix well and store in the refrigerator in an empty milk jug container or pitcher.    Every 4 hours:    1. Remove formula from fridge and shake/mix well.    2. Pour 240 ml tube feeding to graduated cylinder.    3. Add mixture to the TF bag.   4. 3 times per day, provide Prosource (protein packet)- administer via syringe into J-tube  5. 2 times per day, provide Nutrisource Fiber (fiber packet to help bulk stool) - mix with 60 ml water until dissolved, then administer via syringe into J-tube    Vitamin/Mineral Recommendations:  Multivitamin with minerals (Certavite) through feeding tube daily  When okay to switch to oral medications -> okay to take oral forms of these medications.     Recommend checking vitamin A, D, E, K and B12 every 1-2 years following surgery due to risk for deficiencies.  Recommend looking into the [employer] copay program which will lower the cost of your enzymes: https://www.[employer] [place]/savings-programs        Diabetes PLAN  This is specific to your current tube feed plan and will need adjustment when your tube feed formula, schedule, or rate changes. Healing, activity, food intake and islet cells will also impact changes in insulin need.    Lantus 18 units two times per day,  12 hours apart (9 AM and 9 PM for example)  Novolog 1 unit per 12 grams carbohydrate  Novolog correction every 4 hours  Correction Scale - custom DOSING      Do Not give Correction Insulin if BG less than 121.   For  - 169 give 1 unit.    For  - 219 give 2 units.    For  - 269 give 3 units.    For  - 319 give 4 units.    For BG = or > 320 give 5 units.       You will have an outpatient diabetes follow up appt-- to be scheduled.  You will communicate with Dr. Bermeo or study  coordinator.  Your transplant coordinator Maddi can usually facilitate any communication needs.  If you have troubleshooting needs for diabetes after hours, you can call the hospital  at [phone], and ask to be connected with on-call endocrinologist.

## 2021-08-17 NOTE — PROGRESS NOTES
Notified Resident at 0140 AM regarding tachycardia.      Spoke with:  ERAN Brown MD    Orders were not obtained.    Comments: MD notified of pt's HR: 136 at 0111 likely d/t pt. being anxious & in pain. Pt. given 1 time IV Dilaudid per order. No orders to treat now that  Pt. now resting & HR: 112.  Will continue to monitor pt. closely.

## 2021-08-17 NOTE — PROGRESS NOTES
Solid-Organ Transplant Service - Daily Progress Note  08/17/2021    Assessment & Plan: Meme Robins is a 51-year-old woman with chronic pancreatitis, who is s/p 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 Shirley. 2300 IE/kg. Intraop hypotension during islet infusion, thought to be 2/2 anaphylaxis and treated with benadryl, steroids and resolved.     Cardiorespiratory:   Hypotension: -130s. BP now hypertensive, 140-150s.  Tachycardia: HR 130s, EKG ST     Hematology:  Post op anemia: Likely dilutional vs some oozing post op. Hgb 5.6 on 8/12, transfused 2 unit pRBCs. Hgb stable 9.    GI/Nutrition:   Diet: G tube to gravity-continue with clamping trials during day and vent in the evening. TFs at goal 60 ml/hr. Relizorb when not on elemental TF.  Bowel regimen:      -Milk of magnesia BID   -Docusate 100mg BID    -Senna BID   -Dulcolax suppository daily  Chylous leak: Changed TF to elemental formula. ARTEMIO clear.    Fluid/Electrolytes:   Hypervolemia: TKO IVF. Up 2 kg from admission.    : no acute issues     Post-pancreatectomy diabetes: Continue insulin drip, Endocrine following,  will plan to transition to subcutaneous insulin today with tube feeds at goal.    Has not yet had diabetes education.    Infection: Low grade fever, .8 with chills. TC 99.5  Islet cultures positive: Growing enterococcus faecalis, morganella m, E. Coli, klebsiella oxy ESBL (MDR). Continue vanc/fluconazole. Will transition  Ertapenem to Meropenem today.   Repeat Pan cx today. Remove cvl.     Prophylaxis: PPI, Anticoagulation: Heparin gtt complete    Pain control:Moderate pain control  -Tylenol 650 mg Q6H  -Fentanyl 50 mcg Patch  -Dilaudid PCA stopped 8/16 , IVP 0.3 Q6Hrs PRN  -Dilaudid soln. 3-6mg Q6 PRN  -Gabapentin 300 mg BID  -Lidoderm patches  -Methocarbamol 1000mg IV Q8H x 3 days, nowon 1G TID po   -Ativan,  0.25mg  -Toradol 15mg q6h-complete   -Ketamine, 2.5mg-complete    Activity: PT/OT; Up to bathroom without assistance. Encourage ambulation/OOB with fever    Anticipated LOS/Discharge: 7-10 days; PT/OT recommending home with assist. Possible discharge in next 2-3 days.     Medical Decision Making: Medium  Subsequent visit 61249 (moderate level decision making)    JOSE ALBERTO/Fellow/Resident Provider: Aliza Ridley NP, #[id number]     Faculty: Clyde Shirley MD  __________________________________________________________________  Transplant History: Admitted 8/9/2021 for TP-AIT  8/9/2021 (Islet), Postoperative day: 8     Interval History: History is obtained from the patient  Overnight events: c/o increased pain overnight. C/o more ache with fevers. Tachy and febrile overnight. Not using IS. Ambulating within room. Pain moderately controlled. Tolerating EN @ goal. C/o some nausea without vomiting with gtube clamped.     ROS:   A 10-point review of systems was negative except as noted above.    Curent Meds:    acetaminophen *SUGAR FREE*  650 mg Per J Tube Q6H     bisacodyl  10 mg Rectal Daily     docusate  100 mg Per J Tube BID     ertapenem (INVanz) IV  1 g Intravenous Q24H     fentaNYL  50 mcg Transdermal Q72H     fentaNYL   Transdermal Q8H     fluconazole  200 mg Intravenous Q24H     gabapentin  300 mg Oral or J tube BID     heparin  5-10 mL Intracatheter Q28 Days     heparin lock flush  5-10 mL Intracatheter Q24H     HYDROmorphone (STANDARD CONC)  3-6 mg Per J Tube Q4H     lidocaine  1-2 patch Transdermal Q24H     lidocaine   Transdermal Q8H     LORazepam  0.25 mg Intravenous Q6H     magnesium hydroxide  30 mL Per J Tube BID     methocarbamol  1,000 mg Oral TID     multivitamins w/minerals  15 mL Per Feeding Tube Daily     pantoprazole  40 mg Oral or J tube BID     protein modular  1 packet Per Feeding Tube TID     sennosides  5 mL Per J Tube BID     sertraline  150 mg Per J Tube At Bedtime     sodium chloride (PF)  10-20 mL  "Intracatheter Q28 Days     sodium chloride (PF)  3 mL Intracatheter Q8H     sodium chloride (PF)  5 mL Perineural Once     traZODone  100 mg Per J Tube At Bedtime     vancomycin  1,500 mg Intravenous Q12H       Physical Exam:     Admit Weight: 55.2 kg (121 lb 11.1 oz)    Current Vitals:   /73 (BP Location: Right arm)   Pulse 100   Temp 99.5  F (37.5  C) (Oral)   Resp 20   Ht 1.549 m (5' 1\")   Wt 57 kg (125 lb 10.6 oz)   SpO2 96%   BMI 23.74 kg/m      Vital sign ranges:    Temp:  [98.5  F (36.9  C)-100.8  F (38.2  C)] 99.5  F (37.5  C)  Pulse:  [100-136] 100  Resp:  [16-20] 20  BP: (124-151)/(73-92) 124/73  SpO2:  [89 %-96 %] 96 %  Patient Vitals for the past 24 hrs:   BP Temp Temp src Pulse Resp SpO2 Weight   08/17/21 0715 124/73 99.5  F (37.5  C) Oral 100 20 96 % --   08/17/21 0407 (!) 142/83 99.6  F (37.6  C) Oral 102 20 96 % --   08/17/21 0237 -- -- -- 100 -- -- --   08/17/21 0137 -- -- -- 112 -- -- --   08/17/21 0113 -- -- -- -- -- 94 % --   08/17/21 0111 (!) 141/81 99.1  F (37.3  C) Oral (!) 136 20 (!) 89 % --   08/17/21 0016 -- -- -- -- -- -- 57 kg (125 lb 10.6 oz)   08/17/21 0000 (!) 151/88 99.6  F (37.6  C) Oral 113 18 92 % --   08/16/21 2106 (!) 146/92 99.3  F (37.4  C) Oral -- 19 -- --   08/16/21 1851 -- 99.7  F (37.6  C) Oral -- -- -- --   08/16/21 1827 (!) 144/90 (!) 100.8  F (38.2  C) Oral 112 20 -- --   08/16/21 1743 (!) 147/90 (!) 100.6  F (38.1  C) Oral 120 19 92 % --   08/16/21 1604 131/85 98.5  F (36.9  C) Oral 109 16 94 % --   08/16/21 1110 124/83 98.6  F (37  C) Oral 103 18 90 % --     General Appearance: in no apparent distress.   Skin: normal, warm,dry  Heart: perfused  Lungs: Nonlabored resps on 1L  Abdomen: The abdomen is soft, slightly distended, and mildly tender to palpation. The wound is dermabonded, healing well with resolving ecchymosis. Tube/Drain sites are: G tube to gravity drainage, lt pale brown output. J tube with tube feeds infusing. ARTEMIO: serosanguinous  : " voiding  Extremities: edema: trace throughout   Neuro: A&Ox4    Data:   CMP  Recent Labs   Lab 08/17/21  0816 08/17/21  0710 08/17/21  0618 08/16/21  0638 08/11/21  0700 08/11/21  0635   NA  --   --  140 141   < > 138   POTASSIUM  --   --  3.4 3.2*   < > 3.1*   CHLORIDE  --   --  102 105   < > 105   CO2  --   --  32 34*   < > 25   * 147* 173* 98   < > 152*   BUN  --   --  6* 7   < > 5*   CR  --   --  0.45* 0.49*   < > 0.52   GFRESTIMATED  --   --  >90 >90   < > >90   VIKAS  --   --  8.2* 7.8*   < > 7.4*   MAG  --   --  2.1 2.2   < > 1.7   PHOS  --   --  3.3 3.7   < > 1.8*   AMYLASE  --   --   --   --   --  63   LIPASE  --   --   --   --   --  1,174*   ALBUMIN  --   --   --   --   --  2.0*   BILITOTAL  --   --   --   --   --  0.3   ALKPHOS  --   --   --   --   --  76   AST  --   --   --   --   --  76*   ALT  --   --   --   --   --  159*    < > = values in this interval not displayed.     CBC  Recent Labs   Lab 08/17/21  0618 08/16/21  0638   HGB 9.0* 8.1*   WBC 18.9* 13.5*   * 371     Coags  Recent Labs   Lab 08/15/21  0743 08/14/21  0426   PTT 34 50*      Urinalysis  Recent Labs   Lab Test 08/04/21  0837   COLOR Yellow   APPEARANCE Slightly Cloudy*   URINEGLC Negative   URINEBILI Negative   URINEKETONE Negative   SG 1.018   UBLD Negative   URINEPH 5.0   PROTEIN Negative   NITRITE Negative   LEUKEST Trace*   RBCU 1   WBCU 12*

## 2021-08-18 ENCOUNTER — HOME INFUSION (PRE-WILLOW HOME INFUSION) (OUTPATIENT)
Dept: PHARMACY | Facility: CLINIC | Age: 51
End: 2021-08-18

## 2021-08-18 ENCOUNTER — APPOINTMENT (OUTPATIENT)
Dept: PHYSICAL THERAPY | Facility: CLINIC | Age: 51
End: 2021-08-18
Attending: TRANSPLANT SURGERY
Payer: COMMERCIAL

## 2021-08-18 ENCOUNTER — APPOINTMENT (OUTPATIENT)
Dept: GENERAL RADIOLOGY | Facility: CLINIC | Age: 51
End: 2021-08-18
Attending: PHYSICIAN ASSISTANT
Payer: COMMERCIAL

## 2021-08-18 LAB
ANION GAP SERPL CALCULATED.3IONS-SCNC: 2 MMOL/L (ref 3–14)
BASOPHILS # BLD AUTO: 0.1 10E3/UL (ref 0–0.2)
BASOPHILS NFR BLD AUTO: 0 %
BUN SERPL-MCNC: 8 MG/DL (ref 7–30)
C DIFF TOX B STL QL: NEGATIVE
CALCIUM SERPL-MCNC: 8 MG/DL (ref 8.5–10.1)
CHLORIDE BLD-SCNC: 103 MMOL/L (ref 94–109)
CO2 SERPL-SCNC: 34 MMOL/L (ref 20–32)
CREAT SERPL-MCNC: 0.48 MG/DL (ref 0.52–1.04)
EOSINOPHIL # BLD AUTO: 0.6 10E3/UL (ref 0–0.7)
EOSINOPHIL NFR BLD AUTO: 3 %
ERYTHROCYTE [DISTWIDTH] IN BLOOD BY AUTOMATED COUNT: 14.6 % (ref 10–15)
GFR SERPL CREATININE-BSD FRML MDRD: >90 ML/MIN/1.73M2
GLUCOSE BLD-MCNC: 134 MG/DL (ref 70–99)
GLUCOSE BLDC GLUCOMTR-MCNC: 102 MG/DL (ref 70–99)
GLUCOSE BLDC GLUCOMTR-MCNC: 106 MG/DL (ref 70–99)
GLUCOSE BLDC GLUCOMTR-MCNC: 115 MG/DL (ref 70–99)
GLUCOSE BLDC GLUCOMTR-MCNC: 133 MG/DL (ref 70–99)
GLUCOSE BLDC GLUCOMTR-MCNC: 147 MG/DL (ref 70–99)
GLUCOSE BLDC GLUCOMTR-MCNC: 174 MG/DL (ref 70–99)
GLUCOSE BLDC GLUCOMTR-MCNC: 92 MG/DL (ref 70–99)
HCT VFR BLD AUTO: 26.8 % (ref 35–47)
HGB BLD-MCNC: 8.4 G/DL (ref 11.7–15.7)
IMM GRANULOCYTES # BLD: 0.1 10E3/UL
IMM GRANULOCYTES NFR BLD: 1 %
LACTATE SERPL-SCNC: 2.1 MMOL/L (ref 0.7–2)
LYMPHOCYTES # BLD AUTO: 1.4 10E3/UL (ref 0.8–5.3)
LYMPHOCYTES NFR BLD AUTO: 8 %
MAGNESIUM SERPL-MCNC: 1.9 MG/DL (ref 1.6–2.3)
MCH RBC QN AUTO: 28 PG (ref 26.5–33)
MCHC RBC AUTO-ENTMCNC: 31.3 G/DL (ref 31.5–36.5)
MCV RBC AUTO: 89 FL (ref 78–100)
MONOCYTES # BLD AUTO: 1.7 10E3/UL (ref 0–1.3)
MONOCYTES NFR BLD AUTO: 9 %
NEUTROPHILS # BLD AUTO: 14.4 10E3/UL (ref 1.6–8.3)
NEUTROPHILS NFR BLD AUTO: 79 %
NRBC # BLD AUTO: 0.2 10E3/UL
NRBC BLD AUTO-RTO: 1 /100
PHOSPHATE SERPL-MCNC: 3.2 MG/DL (ref 2.5–4.5)
PLATELET # BLD AUTO: 641 10E3/UL (ref 150–450)
POTASSIUM BLD-SCNC: 3.2 MMOL/L (ref 3.4–5.3)
RBC # BLD AUTO: 3 10E6/UL (ref 3.8–5.2)
SODIUM SERPL-SCNC: 139 MMOL/L (ref 133–144)
WBC # BLD AUTO: 18.3 10E3/UL (ref 4–11)

## 2021-08-18 PROCEDURE — 99233 SBSQ HOSP IP/OBS HIGH 50: CPT | Mod: 24 | Performed by: CLINICAL NURSE SPECIALIST

## 2021-08-18 PROCEDURE — 258N000003 HC RX IP 258 OP 636: Performed by: PHYSICIAN ASSISTANT

## 2021-08-18 PROCEDURE — 87493 C DIFF AMPLIFIED PROBE: CPT | Performed by: PHYSICIAN ASSISTANT

## 2021-08-18 PROCEDURE — 84100 ASSAY OF PHOSPHORUS: CPT | Performed by: PHYSICIAN ASSISTANT

## 2021-08-18 PROCEDURE — 74018 RADEX ABDOMEN 1 VIEW: CPT | Mod: 26 | Performed by: RADIOLOGY

## 2021-08-18 PROCEDURE — 83735 ASSAY OF MAGNESIUM: CPT | Performed by: NURSE PRACTITIONER

## 2021-08-18 PROCEDURE — 97530 THERAPEUTIC ACTIVITIES: CPT | Mod: GP

## 2021-08-18 PROCEDURE — 250N000011 HC RX IP 250 OP 636: Performed by: NURSE PRACTITIONER

## 2021-08-18 PROCEDURE — 250N000013 HC RX MED GY IP 250 OP 250 PS 637: Performed by: TRANSPLANT SURGERY

## 2021-08-18 PROCEDURE — 36591 DRAW BLOOD OFF VENOUS DEVICE: CPT | Performed by: NURSE PRACTITIONER

## 2021-08-18 PROCEDURE — 250N000013 HC RX MED GY IP 250 OP 250 PS 637

## 2021-08-18 PROCEDURE — 250N000012 HC RX MED GY IP 250 OP 636 PS 637: Performed by: CLINICAL NURSE SPECIALIST

## 2021-08-18 PROCEDURE — 250N000011 HC RX IP 250 OP 636: Performed by: TRANSPLANT SURGERY

## 2021-08-18 PROCEDURE — 85025 COMPLETE CBC W/AUTO DIFF WBC: CPT | Performed by: NURSE PRACTITIONER

## 2021-08-18 PROCEDURE — 258N000003 HC RX IP 258 OP 636: Performed by: TRANSPLANT SURGERY

## 2021-08-18 PROCEDURE — 250N000011 HC RX IP 250 OP 636: Performed by: PHYSICIAN ASSISTANT

## 2021-08-18 PROCEDURE — 250N000013 HC RX MED GY IP 250 OP 250 PS 637: Performed by: NURSE PRACTITIONER

## 2021-08-18 PROCEDURE — 83605 ASSAY OF LACTIC ACID: CPT | Performed by: TRANSPLANT SURGERY

## 2021-08-18 PROCEDURE — 250N000013 HC RX MED GY IP 250 OP 250 PS 637: Performed by: PHYSICIAN ASSISTANT

## 2021-08-18 PROCEDURE — 80048 BASIC METABOLIC PNL TOTAL CA: CPT | Performed by: NURSE PRACTITIONER

## 2021-08-18 PROCEDURE — 250N000009 HC RX 250: Performed by: NURSE PRACTITIONER

## 2021-08-18 PROCEDURE — 74018 RADEX ABDOMEN 1 VIEW: CPT

## 2021-08-18 PROCEDURE — 36415 COLL VENOUS BLD VENIPUNCTURE: CPT | Performed by: TRANSPLANT SURGERY

## 2021-08-18 PROCEDURE — 99024 POSTOP FOLLOW-UP VISIT: CPT | Performed by: TRANSPLANT SURGERY

## 2021-08-18 PROCEDURE — 120N000011 HC R&B TRANSPLANT UMMC

## 2021-08-18 PROCEDURE — 97116 GAIT TRAINING THERAPY: CPT | Mod: GP

## 2021-08-18 RX ORDER — DIPHENHYDRAMINE HCL 12.5MG/5ML
25 LIQUID (ML) ORAL EVERY 6 HOURS PRN
Status: DISCONTINUED | OUTPATIENT
Start: 2021-08-18 | End: 2021-08-20

## 2021-08-18 RX ORDER — FLUCONAZOLE 40 MG/ML
200 POWDER, FOR SUSPENSION ORAL DAILY
Status: DISCONTINUED | OUTPATIENT
Start: 2021-08-18 | End: 2021-08-19

## 2021-08-18 RX ORDER — BLOOD PRESSURE TEST KIT
KIT MISCELLANEOUS
Qty: 100 EACH | Refills: 0 | Status: SHIPPED | OUTPATIENT
Start: 2021-08-18 | End: 2022-03-06

## 2021-08-18 RX ORDER — BISACODYL 10 MG
10 SUPPOSITORY, RECTAL RECTAL DAILY PRN
Status: DISCONTINUED | OUTPATIENT
Start: 2021-08-18 | End: 2021-08-20 | Stop reason: HOSPADM

## 2021-08-18 RX ORDER — POTASSIUM CHLORIDE 20MEQ/15ML
40 LIQUID (ML) ORAL ONCE
Status: COMPLETED | OUTPATIENT
Start: 2021-08-18 | End: 2021-08-18

## 2021-08-18 RX ORDER — CONTAINER,EMPTY
EACH MISCELLANEOUS
Qty: 1 EACH | Refills: 0 | Status: SHIPPED | OUTPATIENT
Start: 2021-08-18 | End: 2022-03-06

## 2021-08-18 RX ORDER — LINEZOLID 100 MG/5ML
600 GRANULE, FOR SUSPENSION ORAL EVERY 12 HOURS SCHEDULED
Status: DISCONTINUED | OUTPATIENT
Start: 2021-08-18 | End: 2021-08-19

## 2021-08-18 RX ORDER — LIDOCAINE 40 MG/G
CREAM TOPICAL
Status: DISCONTINUED | OUTPATIENT
Start: 2021-08-18 | End: 2021-08-20 | Stop reason: HOSPADM

## 2021-08-18 RX ORDER — POTASSIUM CHLORIDE 7.45 MG/ML
10 INJECTION INTRAVENOUS
Status: COMPLETED | OUTPATIENT
Start: 2021-08-18 | End: 2021-08-18

## 2021-08-18 RX ADMIN — INSULIN ASPART 2 UNITS: 100 INJECTION, SOLUTION INTRAVENOUS; SUBCUTANEOUS at 12:21

## 2021-08-18 RX ADMIN — ACETAMINOPHEN 650 MG: 160 LIQUID ORAL at 17:57

## 2021-08-18 RX ADMIN — Medication 1 PACKET: at 08:02

## 2021-08-18 RX ADMIN — LIDOCAINE 1 PATCH: 560 PATCH PERCUTANEOUS; TOPICAL; TRANSDERMAL at 07:56

## 2021-08-18 RX ADMIN — HYDROMORPHONE HYDROCHLORIDE 6 MG: 1 SOLUTION ORAL at 06:39

## 2021-08-18 RX ADMIN — MEROPENEM 1 G: 1 INJECTION, POWDER, FOR SOLUTION INTRAVENOUS at 03:18

## 2021-08-18 RX ADMIN — POTASSIUM CHLORIDE 40 MEQ: 40 SOLUTION ORAL at 11:09

## 2021-08-18 RX ADMIN — HYDROMORPHONE HYDROCHLORIDE 6 MG: 1 SOLUTION ORAL at 17:56

## 2021-08-18 RX ADMIN — HYDROMORPHONE HYDROCHLORIDE 6 MG: 1 SOLUTION ORAL at 10:18

## 2021-08-18 RX ADMIN — Medication 40 MG: at 21:16

## 2021-08-18 RX ADMIN — POTASSIUM CHLORIDE 10 MEQ: 7.46 INJECTION, SOLUTION INTRAVENOUS at 13:13

## 2021-08-18 RX ADMIN — LORAZEPAM 0.5 MG: 2 LIQUID ORAL at 03:40

## 2021-08-18 RX ADMIN — VANCOMYCIN HYDROCHLORIDE 1250 MG: 10 INJECTION, POWDER, LYOPHILIZED, FOR SOLUTION INTRAVENOUS at 12:15

## 2021-08-18 RX ADMIN — TRAZODONE HYDROCHLORIDE 100 MG: 100 TABLET ORAL at 22:14

## 2021-08-18 RX ADMIN — POTASSIUM CHLORIDE 10 MEQ: 7.46 INJECTION, SOLUTION INTRAVENOUS at 14:29

## 2021-08-18 RX ADMIN — MENTHOL 1 PATCH: 205.5 PATCH TOPICAL at 18:44

## 2021-08-18 RX ADMIN — ACETAMINOPHEN 650 MG: 160 LIQUID ORAL at 00:10

## 2021-08-18 RX ADMIN — LINEZOLID 600 MG: 100 POWDER, FOR SUSPENSION ORAL at 21:15

## 2021-08-18 RX ADMIN — LORAZEPAM 0.5 MG: 2 LIQUID ORAL at 22:13

## 2021-08-18 RX ADMIN — ONDANSETRON 4 MG: 4 TABLET, ORALLY DISINTEGRATING ORAL at 22:32

## 2021-08-18 RX ADMIN — Medication 40 MG: at 07:57

## 2021-08-18 RX ADMIN — METHOCARBAMOL 1000 MG: 500 TABLET, FILM COATED ORAL at 18:28

## 2021-08-18 RX ADMIN — ACETAMINOPHEN 650 MG: 160 LIQUID ORAL at 06:39

## 2021-08-18 RX ADMIN — Medication 1 PACKET: at 14:29

## 2021-08-18 RX ADMIN — VANCOMYCIN HYDROCHLORIDE 1250 MG: 10 INJECTION, POWDER, LYOPHILIZED, FOR SOLUTION INTRAVENOUS at 03:39

## 2021-08-18 RX ADMIN — Medication 15 ML: at 07:57

## 2021-08-18 RX ADMIN — FLUCONAZOLE 200 MG: 40 POWDER, FOR SUSPENSION ORAL at 14:28

## 2021-08-18 RX ADMIN — MEROPENEM 1 G: 1 INJECTION, POWDER, FOR SOLUTION INTRAVENOUS at 11:38

## 2021-08-18 RX ADMIN — Medication 325 MG: at 07:57

## 2021-08-18 RX ADMIN — HYDROMORPHONE HYDROCHLORIDE 6 MG: 1 SOLUTION ORAL at 22:13

## 2021-08-18 RX ADMIN — Medication 1 PACKET: at 21:18

## 2021-08-18 RX ADMIN — INSULIN ASPART 1 UNITS: 100 INJECTION, SOLUTION INTRAVENOUS; SUBCUTANEOUS at 09:08

## 2021-08-18 RX ADMIN — HYDROMORPHONE HYDROCHLORIDE 3 MG: 1 SOLUTION ORAL at 03:10

## 2021-08-18 RX ADMIN — LORAZEPAM 0.5 MG: 2 LIQUID ORAL at 10:18

## 2021-08-18 RX ADMIN — MEROPENEM 1 G: 1 INJECTION, POWDER, FOR SOLUTION INTRAVENOUS at 21:16

## 2021-08-18 RX ADMIN — ONDANSETRON 4 MG: 2 INJECTION INTRAMUSCULAR; INTRAVENOUS at 03:40

## 2021-08-18 RX ADMIN — ACETAMINOPHEN 650 MG: 160 LIQUID ORAL at 12:15

## 2021-08-18 RX ADMIN — METHOCARBAMOL 1000 MG: 500 TABLET, FILM COATED ORAL at 14:28

## 2021-08-18 RX ADMIN — LORAZEPAM 0.5 MG: 2 LIQUID ORAL at 15:59

## 2021-08-18 RX ADMIN — POTASSIUM CHLORIDE 10 MEQ: 7.46 INJECTION, SOLUTION INTRAVENOUS at 15:32

## 2021-08-18 RX ADMIN — SODIUM CHLORIDE, POTASSIUM CHLORIDE, SODIUM LACTATE AND CALCIUM CHLORIDE 1000 ML: 600; 310; 30; 20 INJECTION, SOLUTION INTRAVENOUS at 23:56

## 2021-08-18 RX ADMIN — INSULIN GLARGINE 15 UNITS: 100 INJECTION, SOLUTION SUBCUTANEOUS at 07:57

## 2021-08-18 RX ADMIN — HYDROMORPHONE HYDROCHLORIDE 6 MG: 1 SOLUTION ORAL at 14:28

## 2021-08-18 RX ADMIN — METHOCARBAMOL 1000 MG: 500 TABLET, FILM COATED ORAL at 08:01

## 2021-08-18 RX ADMIN — GABAPENTIN 300 MG: 250 SUSPENSION ORAL at 21:16

## 2021-08-18 RX ADMIN — INSULIN GLARGINE 15 UNITS: 100 INJECTION, SOLUTION SUBCUTANEOUS at 21:49

## 2021-08-18 RX ADMIN — GABAPENTIN 300 MG: 250 SUSPENSION ORAL at 07:57

## 2021-08-18 RX ADMIN — POTASSIUM CHLORIDE 10 MEQ: 7.46 INJECTION, SOLUTION INTRAVENOUS at 10:18

## 2021-08-18 RX ADMIN — SERTRALINE HYDROCHLORIDE 150 MG: 20 SOLUTION ORAL at 22:14

## 2021-08-18 ASSESSMENT — MIFFLIN-ST. JEOR: SCORE: 1113.47

## 2021-08-18 ASSESSMENT — ACTIVITIES OF DAILY LIVING (ADL)
ADLS_ACUITY_SCORE: 17

## 2021-08-18 NOTE — PLAN OF CARE
"/88 (BP Location: Right arm)   Pulse 101   Temp 99  F (37.2  C) (Oral)   Resp 20   Ht 1.549 m (5' 1\")   Wt 56.6 kg (124 lb 12.8 oz)   SpO2 94%   BMI 23.58 kg/m      Shift: 2210-6413  VS: VSS on RA, Tmax 99.2  Neuro: AOx4  BG: Insulin gtt discontinued. q4h checks  Labs: WBC 18.9. Triggered sepsis; lactic came back at 1.0. UA & covid swab sent. Covid negative.  Respiratory: WDL  Pain/Nausea/PRN: Pt c/o pain, treated with scheduled pain meds. C/o of nausea treated with PRN zofran x2. PRN benadryl given at bedtime   Diet: Full liquid diet. Tube feeds @ goal rate of 60 ml/hr via J-tube. PEG-J.  LDA: G tube unclamped with 250 mL out  GI/: Multiple loose stoools. Voiding spontaneously  Skin: Incision stapled ALONDRA  Mobility: SBA  Plan: Continue with plan of care and update team with any changes     Handoff given to following RN.    "

## 2021-08-18 NOTE — PROGRESS NOTES
Solid-Organ Transplant Service - Daily Progress Note  08/18/2021    Assessment & Plan: Meme Robins is a 51-year-old woman with chronic pancreatitis, who is s/p 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 Shirley. 2300 IE/kg. Intraop hypotension during islet infusion, thought to be 2/2 anaphylaxis and treated with benadryl, steroids and resolved.     Cardiorespiratory:   Hypotension: -130s initially. Now -150s.  Tachycardia: HR 130s, EKG ST. Improved. HR 90-100s.    Hematology:  Post op anemia: Likely dilutional vs some oozing post op. Hgb 5.6 on 8/12, transfused 2 unit pRBCs. Hgb stable 8-9.    GI/Nutrition:   Diet: G tube to gravity-continue with clamping trials during day and vent in the evening. TFs at goal 60 ml/hr. Relizorb when not on elemental TF.  Bowel regimen:  Multiple watery stools. Add fiber 1pkt BID to TF. Continue scheduled bowel regimen, hold if stools loose.   -Milk of magnesia BID   -Docusate 100mg BID    -Senna BID   -Dulcolax suppository daily  Chylous leak: Changed TF to elemental formula. ARTEMIO clear.    Fluid/Electrolytes:   Hypervolemia: TKO IVF. Up 1.4 kg from admission. Continue self diuresis.     : no acute issues     Post-pancreatectomy diabetes: Endocrine following consulting. Lantus 15 units BID, sliding scale correction every 4 hours PRN, carb coverage with full liquid intake, 1 unit per 12 grams of carbohydrate.    Diabetes education requested. DM supplies ordered.    Infection: Low grade fever, .8 with chills. TC 99.5  Islet cultures positive: Growing enterococcus faecalis, morganella m, E. Coli, klebsiella oxy ESBL (MDR). Continue meropenem today. Change diflucan to susp. Stop vanco and start linezolid susp. Discuss with pharmacy regarding oral antibiotic options to Klebsiella ESBL.       Prophylaxis: PPI, Anticoagulation: Heparin gtt  complete    Pain control:Moderate pain control  -Tylenol 650 mg Q6H  -Fentanyl 75 mcg Patch  -Dilaudid 3-6mg Q6   -Gabapentin 300 mg BID  -Lidoderm patches  -Methocarbamol 1G TID tablet   -Ativan, 0.25mg susp BID  -Toradol 15mg q6h-complete   -Ketamine, 2.5mg-complete  -Benadryl 25 mg susp every 6 hours PRN pruritus    Activity: PT/OT; Up to bathroom without assistance. Encourage ambulation/OOB with fever    Anticipated LOS/Discharge: 7-10 days; PT/OT recommending home with assist. Possible discharge on Friday.     Medical Decision Making: Medium  Subsequent visit 38346 (moderate level decision making)    JOSE ALBERTO/Fellow/Resident Provider: Lyn Norman NP, #[id number]     Faculty: Clyde Shirley MD  __________________________________________________________________  Transplant History: Admitted 8/9/2021 for TP-AIT  8/9/2021 (Islet), Postoperative day: 9     Interval History: History is obtained from the patient  Overnight events: C/o intermittent nausea without nauesa, body aches. Multiple watery stools. Clear fluids yesterday. Tolerated g tube clamped for 7 hours yesterday. Asked for venting of g tube due to abdominal distension and discomfort. Ambulated in marcus.. Pain moderately controlled. Tolerating EN @ goal.      ROS:   A 10-point review of systems was negative except as noted above.    Curent Meds:    acetaminophen *SUGAR FREE*  650 mg Per J Tube Q6H     amylase-lipase-protease  3 capsule Oral TID w/meals     aspirin  325 mg Oral or Feeding Tube Daily     docusate  100 mg Per J Tube BID     fentaNYL  75 mcg Transdermal Q72H     fentaNYL   Transdermal Q8H     fluconazole  200 mg Intravenous Q24H     gabapentin  300 mg Oral or J tube BID     heparin  5-10 mL Intracatheter Q28 Days     heparin lock flush  5-10 mL Intracatheter Q24H     HYDROmorphone (STANDARD CONC)  3-6 mg Per J Tube Q4H     insulin aspart  1-5 Units Subcutaneous Q4H     insulin aspart   Subcutaneous 4x Daily     insulin glargine  15 Units Subcutaneous  "BID     lidocaine  1-2 patch Transdermal Q24H     lidocaine   Transdermal Q8H     LORazepam  0.5 mg Oral Q6H     magnesium hydroxide  30 mL Per J Tube BID     meropenem  1 g Intravenous Q8H     methocarbamol  1,000 mg Oral TID     multivitamins w/minerals  15 mL Per Feeding Tube Daily     pantoprazole  40 mg Oral or J tube BID     potassium chloride  40 mEq Oral Once     potassium chloride  10 mEq Intravenous Q1H     protein modular  1 packet Per Feeding Tube TID     sennosides  5 mL Per J Tube BID     sertraline  150 mg Per J Tube At Bedtime     sodium chloride (PF)  10-20 mL Intracatheter Q28 Days     sodium chloride (PF)  3 mL Intracatheter Q8H     sodium chloride (PF)  5 mL Perineural Once     traZODone  100 mg Per J Tube At Bedtime     vancomycin  1,250 mg Intravenous Q8H       Physical Exam:     Admit Weight: 55.2 kg (121 lb 11.1 oz)    Current Vitals:   BP (!) 133/98 (BP Location: Left arm)   Pulse 82   Temp 99.9  F (37.7  C) (Oral)   Resp 16   Ht 1.549 m (5' 1\")   Wt 56.6 kg (124 lb 12.8 oz)   SpO2 98%   BMI 23.58 kg/m      Vital sign ranges:    Temp:  [98.5  F (36.9  C)-99.9  F (37.7  C)] 99.9  F (37.7  C)  Pulse:  [] 82  Resp:  [16-20] 16  BP: (130-150)/(75-98) 133/98  SpO2:  [94 %-98 %] 98 %  Patient Vitals for the past 24 hrs:   BP Temp Temp src Pulse Resp SpO2 Weight   08/18/21 0707 (!) 133/98 99.9  F (37.7  C) Oral 82 16 98 % --   08/18/21 0333 132/88 99  F (37.2  C) Oral 101 20 94 % 56.6 kg (124 lb 12.8 oz)   08/17/21 2351 131/78 98.5  F (36.9  C) Oral 93 20 96 % --   08/17/21 2113 (!) 150/91 99.2  F (37.3  C) Oral 103 20 94 % --   08/17/21 1917 130/82 98.9  F (37.2  C) Oral 103 20 94 % --   08/17/21 1539 130/75 99.2  F (37.3  C) Oral 98 20 94 % --   08/17/21 1108 139/84 99.7  F (37.6  C) Oral 98 20 96 % --     General Appearance: in no apparent distress.   Skin: normal, warm,dry  Heart: well perfused  Lungs: Nonlabored resps on RA  Abdomen: The abdomen is soft, slightly distended, and " mildly tender to palpation. The wound is dermabonded, healing well with resolving ecchymosis. Tube/Drain sites are: G tube to clamped. Output in bag clear, ~ 250 ml after unclamped yesterday. J tube with tube feeds infusing. ARTEMIO: serosanguinous  : voiding  Extremities: edema: trace throughout   Neuro: A&Ox4    Data:   CMP  Recent Labs   Lab 08/18/21  0729 08/18/21  0635 08/17/21  0618     --  140   POTASSIUM 3.2*  --  3.4   CHLORIDE 103  --  102   CO2 34*  --  32   * 106* 173*   BUN 8  --  6*   CR 0.48*  --  0.45*   GFRESTIMATED >90  --  >90   VIKAS 8.0*  --  8.2*   MAG 1.9  --  2.1   PHOS 3.2  --  3.3     CBC  Recent Labs   Lab 08/18/21  0729 08/17/21  0618   HGB 8.4* 9.0*   WBC 18.3* 18.9*   * 525*     Coags  Recent Labs   Lab 08/15/21  0743 08/14/21  0426   PTT 34 50*      Urinalysis  Recent Labs   Lab Test 08/17/21  2151 08/04/21  0837   COLOR Light Yellow Yellow   APPEARANCE Clear Slightly Cloudy*   URINEGLC Negative Negative   URINEBILI Negative Negative   URINEKETONE Negative Negative   SG 1.019 1.018   UBLD Negative Negative   URINEPH 7.5* 5.0   PROTEIN Negative Negative   NITRITE Negative Negative   LEUKEST Negative Trace*   RBCU 3* 1   WBCU 3 12*

## 2021-08-18 NOTE — PROGRESS NOTES
[facility]     Received referral from [facility] for Enteral feedings.  Benefits verified.  Pt has BCBS plan with 80/20 coverage up to max OOP $2000(met $1767).  Spoke with patient and spouse to review home infusion services, review benefits and offer choice of providers.  Patient would like to use [facility] for home infusion.  Meme is expected to discharge soon and will be going home on continuous enteral feeds s/p TPAIT.   Patient and spouse have been to the [facility] and have received teaching on enteral feeds.  Per patient she has also had feeding tubes in the past and is familiar with enteral feedings.  Patient is from [place] but will be staying locally at [address] [place] [address] after discharge.  Met with patient and spouse at bedside and informed her about [facility] services and plan for discharge.   Explained about home enteral pump and backpack for mobility while on continuous feeds.   Informed her that formula and supplies will be delivered to the hospital on day of discharge and Liaison will assist with hook up of home enteral feed and provide some additional teaching and information.  Explained about plan for appt in clinic day after discharge and first home nurse visit the day following clinic appt.  Patient currently has recs for home care PT and OT.  [facility] will make referral to a local home care agency to provide home RN, PT, and OT if ordered at discharge.  Talked about supplies and supply deliveries as well as 24/7 availability of [facility] staff while on enteral therapy.  Meme verbalized understanding of all information given.   She and spouse are willing and able to learn and manage home enteral therapy.      [facility] Liaison will follow along to assist with discharge home with infusion needs.      Thank you for the referral.     KATT Nagy  [facility] Nurse Liaison   [email or web address]  Cell: [phone] M-F  [facility] Main: [phone]

## 2021-08-18 NOTE — PLAN OF CARE
"/82 (BP Location: Right arm)   Pulse 103   Temp 98.9  F (37.2  C) (Oral)   Resp 20   Ht 1.549 m (5' 1\")   Wt 57 kg (125 lb 10.6 oz)   SpO2 94%   BMI 23.74 kg/m       Patient alert and oriented. Tmax of 99.7. Intermittent tachycardia. All other VS stable. Patient on room air. Patient complains of pain.Scheduled dilaudid and Tylenol given (See MAR). Fentanyl patch on left shoulder. Patient complains of intermittent nausea. PRN zofran given (See MAR). BG - insulin gtt in use (Alg. 2). First dose of Lantus given. Second dose of Lantus due at 2000. Insulin gtt to be stopped 2 hours after second dose of Lantus administered. Urine Output - voiding adequately. Bowel Function - patient reports multiple loose, watery stools. Nutrition - Full liquid diet. Tube feeds at goal rate of 60 ml/hr via J-tube. PEG-J. G-tube clamped from 1000 to 1700. Unclamped at 1700 due to patient no longer be able to tolerate clamping. Port-a-cath - insulin gtt. PIV - infusing. ARTEMIO drain - serous output. Activity - SBA. Education - consult for diabetic education placed. Plan of Care - Will continue to monitor and notify care team of any changes.    "

## 2021-08-18 NOTE — PROGRESS NOTES
Transplant Admission Psychosocial Assessment    Patient Name: Meme Robins  : 1970  Age: 51 year old  MRN: [id number]  Date of Initial Social Work Evaluation: 2021    Patient underwent a TPAIT transplant on 2021.  Met with patient and her  Brendan to update psychosocial assessment and provide education about SW role while inpatient, and to begin discussion of expectations/requirements, caregiver needs and follow up needs post-transplant.     Presenting Information   Living Situation: Patient lives with her  Brendan and 3 son's in [place], [place]  If not local, plans for short term stay:  Staying locally at [address] [place] [address]   Previous Functional Status: Independent with ADLs, did need some help with medication management which she received from her  Brendan  Cultural/Language/Spiritual Considerations: Patient is Am[employer] and has been seen by a  during her hospital stay    Support System  Primary Support Person:  Brendan  Other support:  Family and Friends  Plan for support in immediate post-transplant period:  Brendan    Health Care Directive  Decision Maker: Patient  Alternate Decision Maker: Legal NOK would be  Brendan  Health Care Directive: Provided education and Declined completing    Mental Health/Coping:   History of Mental Health: Patient is diagnosed with anxiety and depression. She currently takes medication for her mental health, which is prescribed by her primary care provider. She also sees a psychologist every other week  History of Chemical Health: None indicated  Current status: Appropriate and oriented  Coping: Patient reports coping well and feels her mental health has been good post-transplant  Services Needed/Recommended: Continue with outpatient mental health services once she is home    Financial   Income: SSDI, Spouse's Income  Impact of transplant on income: None indicated  Insurance and medication coverage: Patient has BCBS  through her 's employer and Medicare Part A  Financial concerns: None indicated  Resources needed: None indicated    Education provided by SW: Social Work role inpatient setting, cost of medications, and outpatient social work contact information    Assessment and recommendations and plan:   Patient is a 51-year-old, female, who underwent a TPAIT. Patient was lying in bed and willing to talk with . Patient's  Brendan was also present and supportive. Patient has adequate insurance coverage and social support. No coping issues or psychosocial concerns. Patient was well informed of post-transplant expectations/follow through.    JAEL Ritter, CEDRICK  7A   Ph: [phone]  Pager: [phone]

## 2021-08-18 NOTE — PHARMACY-VANCOMYCIN DOSING SERVICE
Pharmacy Vancomycin Note  Date of Service 2021  Patient's  1970   51 year old, female    Indication: Surgical Prophylaxis  Day of Therapy: 9  Current vancomycin regimen:  1500 mg IV q12h  Current vancomycin monitoring method: AUC  Current vancomycin therapeutic monitoring goal: 400-600 mg*h/L    Current estimated CrCl = Estimated Creatinine Clearance: 131.6 mL/min (A) (based on SCr of 0.45 mg/dL (L)).    Creatinine for last 3 days  8/15/2021:  7:43 AM Creatinine 0.48 mg/dL  2021:  6:38 AM Creatinine 0.49 mg/dL  2021:  6:18 AM Creatinine 0.45 mg/dL    Recent Vancomycin Levels (past 3 days)  2021:  5:49 PM Vancomycin 7.6 mg/L    Vancomycin IV Administrations (past 72 hours)                   vancomycin 1500 mg in 0.9% NaCl 250 ml intermittent infusion 1,500 mg (mg) 1,500 mg New Bag 21 1830     1,500 mg New Bag  0542     1,500 mg New Bag 21 1730     1,500 mg New Bag  0611     1,500 mg New Bag 08/15/21 1815     1,500 mg New Bag  0612                Nephrotoxins and other renal medications (From now, onward)    Start     Dose/Rate Route Frequency Ordered Stop    21 1800  vancomycin 1500 mg in 0.9% NaCl 250 ml intermittent infusion 1,500 mg      1,500 mg  over 90 Minutes Intravenous EVERY 12 HOURS 21 1659               Contrast Orders - past 72 hours (72h ago, onward)    None          Interpretation of levels and current regimen:  Vancomycin level is reflective of -600  However on low end with a lower Pauc than desired     Has serum creatinine changed greater than 50% in last 72 hours: No    Urine output:  unable to determine / not all voids being measured     Renal Function: Stable    Current regimen  Loading dose: N/A  Regimen: 1500 mg IV every 12 hours.  Start time: 06:30 on 2021  Exposure target: AUC24 (range)400-600 mg/L.hr   AUC24,ss: 434 mg/L.hr  Probability of AUC24 > 400: 71 %  Ctrough,ss: 8.3 mg/L  Probability of Ctrough,ss > 20: 0  %  Probability of nephrotoxicity (Lodise HOLLIS 2009): 5 %    New regimen   Regimen: 1250 mg IV every 8 hours.  Start time: 04:00 on 08/18/2021  Exposure target: AUC24 (range)400-600 mg/L.hr   AUC24,ss: 543 mg/L.hr  Probability of AUC24 > 400: 98 %  Ctrough,ss: 12.7 mg/L  Probability of Ctrough,ss > 20: 1 %  Probability of nephrotoxicity (Lodise HOLLIS 2009): 8 %    Plan:  1. Increase Dose to  vancomycin 1250mg IV q8h   2. Vancomycin monitoring method: AUC  3. Vancomycin therapeutic monitoring goal: 400-600 mg*h/L  4. Pharmacy will check vancomycin levels as appropriate in 1-3 Days.  5. Serum creatinine levels will be ordered daily for the first week of therapy and at least twice weekly for subsequent weeks.    Carleen Nuñez, PharmD, BCPS  Pager: [phone]

## 2021-08-18 NOTE — PLAN OF CARE
"/84 (BP Location: Left arm)   Pulse 105   Temp 98.8  F (37.1  C) (Oral)   Resp 16   Ht 1.549 m (5' 1\")   Wt 56.6 kg (124 lb 12.8 oz)   SpO2 95%   BMI 23.58 kg/m      [id number]. TMAX 99.9, OVSS on RA.  & 115. K 3.2, replacement given both oral and IV. Pain well controlled with scheduled meds. Pt reported mild nausea this afternoon, relieved with venting of g-tube. Tolerating a full liquid diet, eating small amounts. J-tube with continuous TF @ goal. G-tube remains clamped, pt vented for 30 minutes with minimal output (25 ml) and resolution of nausea this afternoon. Voiding adequate amounts, not saving. Pt continues to have loose stools, sample collected and sent. Midline incision w/ dermabond, ALONDRA. R ARTEMIO with moderate amounts of serosanguinous output. PLC education consults placed (diabetic + IV med admin) and scheduled for tomorrow at 1300. Will continue to monitor and update with any changes.   "

## 2021-08-19 ENCOUNTER — APPOINTMENT (OUTPATIENT)
Dept: EDUCATION SERVICES | Facility: CLINIC | Age: 51
End: 2021-08-19
Attending: TRANSPLANT SURGERY
Payer: COMMERCIAL

## 2021-08-19 ENCOUNTER — APPOINTMENT (OUTPATIENT)
Dept: PHYSICAL THERAPY | Facility: CLINIC | Age: 51
End: 2021-08-19
Attending: TRANSPLANT SURGERY
Payer: COMMERCIAL

## 2021-08-19 ENCOUNTER — APPOINTMENT (OUTPATIENT)
Dept: OCCUPATIONAL THERAPY | Facility: CLINIC | Age: 51
End: 2021-08-19
Attending: TRANSPLANT SURGERY
Payer: COMMERCIAL

## 2021-08-19 LAB
ALBUMIN UR-MCNC: NEGATIVE MG/DL
ANION GAP SERPL CALCULATED.3IONS-SCNC: 3 MMOL/L (ref 3–14)
APPEARANCE UR: CLEAR
BACTERIA UR CULT: NO GROWTH
BASOPHILS # BLD AUTO: 0.1 10E3/UL (ref 0–0.2)
BASOPHILS NFR BLD AUTO: 0 %
BILIRUB UR QL STRIP: NEGATIVE
BUN SERPL-MCNC: 6 MG/DL (ref 7–30)
CALCIUM SERPL-MCNC: 8.5 MG/DL (ref 8.5–10.1)
CHLORIDE BLD-SCNC: 103 MMOL/L (ref 94–109)
CO2 SERPL-SCNC: 34 MMOL/L (ref 20–32)
COLOR UR AUTO: ABNORMAL
CREAT SERPL-MCNC: 0.42 MG/DL (ref 0.52–1.04)
EOSINOPHIL # BLD AUTO: 0.3 10E3/UL (ref 0–0.7)
EOSINOPHIL NFR BLD AUTO: 2 %
ERYTHROCYTE [DISTWIDTH] IN BLOOD BY AUTOMATED COUNT: 14.6 % (ref 10–15)
GFR SERPL CREATININE-BSD FRML MDRD: >90 ML/MIN/1.73M2
GLUCOSE BLD-MCNC: 112 MG/DL (ref 70–99)
GLUCOSE BLDC GLUCOMTR-MCNC: 121 MG/DL (ref 70–99)
GLUCOSE BLDC GLUCOMTR-MCNC: 124 MG/DL (ref 70–99)
GLUCOSE BLDC GLUCOMTR-MCNC: 128 MG/DL (ref 70–99)
GLUCOSE BLDC GLUCOMTR-MCNC: 135 MG/DL (ref 70–99)
GLUCOSE BLDC GLUCOMTR-MCNC: 139 MG/DL (ref 70–99)
GLUCOSE BLDC GLUCOMTR-MCNC: 144 MG/DL (ref 70–99)
GLUCOSE BLDC GLUCOMTR-MCNC: 189 MG/DL (ref 70–99)
GLUCOSE UR STRIP-MCNC: NEGATIVE MG/DL
HCT VFR BLD AUTO: 26.1 % (ref 35–47)
HGB BLD-MCNC: 8.1 G/DL (ref 11.7–15.7)
HGB UR QL STRIP: NEGATIVE
IMM GRANULOCYTES # BLD: 0.2 10E3/UL
IMM GRANULOCYTES NFR BLD: 1 %
KETONES UR STRIP-MCNC: NEGATIVE MG/DL
LACTATE SERPL-SCNC: 0.9 MMOL/L (ref 0.7–2)
LEUKOCYTE ESTERASE UR QL STRIP: NEGATIVE
LYMPHOCYTES # BLD AUTO: 1.7 10E3/UL (ref 0.8–5.3)
LYMPHOCYTES NFR BLD AUTO: 8 %
MAGNESIUM SERPL-MCNC: 2.2 MG/DL (ref 1.6–2.3)
MCH RBC QN AUTO: 28 PG (ref 26.5–33)
MCHC RBC AUTO-ENTMCNC: 31 G/DL (ref 31.5–36.5)
MCV RBC AUTO: 90 FL (ref 78–100)
MONOCYTES # BLD AUTO: 1.7 10E3/UL (ref 0–1.3)
MONOCYTES NFR BLD AUTO: 8 %
MUCOUS THREADS #/AREA URNS LPF: PRESENT /LPF
NEUTROPHILS # BLD AUTO: 16.4 10E3/UL (ref 1.6–8.3)
NEUTROPHILS NFR BLD AUTO: 81 %
NITRATE UR QL: NEGATIVE
NRBC # BLD AUTO: 0.1 10E3/UL
NRBC BLD AUTO-RTO: 1 /100
PH UR STRIP: 7 [PH] (ref 5–7)
PHOSPHATE SERPL-MCNC: 3.4 MG/DL (ref 2.5–4.5)
PLATELET # BLD AUTO: 774 10E3/UL (ref 150–450)
POTASSIUM BLD-SCNC: 3.9 MMOL/L (ref 3.4–5.3)
PROCALCITONIN SERPL-MCNC: 0.07 NG/ML
RBC # BLD AUTO: 2.89 10E6/UL (ref 3.8–5.2)
RBC URINE: 1 /HPF
SODIUM SERPL-SCNC: 140 MMOL/L (ref 133–144)
SP GR UR STRIP: 1.01 (ref 1–1.03)
UROBILINOGEN UR STRIP-MCNC: NORMAL MG/DL
WBC # BLD AUTO: 20.3 10E3/UL (ref 4–11)
WBC URINE: <1 /HPF

## 2021-08-19 PROCEDURE — 87040 BLOOD CULTURE FOR BACTERIA: CPT | Performed by: PHYSICIAN ASSISTANT

## 2021-08-19 PROCEDURE — 83735 ASSAY OF MAGNESIUM: CPT | Performed by: NURSE PRACTITIONER

## 2021-08-19 PROCEDURE — 250N000011 HC RX IP 250 OP 636: Performed by: NURSE PRACTITIONER

## 2021-08-19 PROCEDURE — 85025 COMPLETE CBC W/AUTO DIFF WBC: CPT | Performed by: NURSE PRACTITIONER

## 2021-08-19 PROCEDURE — 36415 COLL VENOUS BLD VENIPUNCTURE: CPT | Performed by: PHYSICIAN ASSISTANT

## 2021-08-19 PROCEDURE — 99233 SBSQ HOSP IP/OBS HIGH 50: CPT | Mod: 24 | Performed by: CLINICAL NURSE SPECIALIST

## 2021-08-19 PROCEDURE — 250N000013 HC RX MED GY IP 250 OP 250 PS 637: Performed by: TRANSPLANT SURGERY

## 2021-08-19 PROCEDURE — 83605 ASSAY OF LACTIC ACID: CPT | Performed by: TRANSPLANT SURGERY

## 2021-08-19 PROCEDURE — 81001 URINALYSIS AUTO W/SCOPE: CPT | Performed by: PHYSICIAN ASSISTANT

## 2021-08-19 PROCEDURE — 250N000013 HC RX MED GY IP 250 OP 250 PS 637: Performed by: STUDENT IN AN ORGANIZED HEALTH CARE EDUCATION/TRAINING PROGRAM

## 2021-08-19 PROCEDURE — 97535 SELF CARE MNGMENT TRAINING: CPT | Mod: GO

## 2021-08-19 PROCEDURE — 250N000013 HC RX MED GY IP 250 OP 250 PS 637: Performed by: PHYSICIAN ASSISTANT

## 2021-08-19 PROCEDURE — 120N000011 HC R&B TRANSPLANT UMMC

## 2021-08-19 PROCEDURE — 84145 PROCALCITONIN (PCT): CPT | Performed by: PHYSICIAN ASSISTANT

## 2021-08-19 PROCEDURE — 97116 GAIT TRAINING THERAPY: CPT | Mod: GP

## 2021-08-19 PROCEDURE — 84100 ASSAY OF PHOSPHORUS: CPT | Performed by: PHYSICIAN ASSISTANT

## 2021-08-19 PROCEDURE — 250N000009 HC RX 250: Performed by: NURSE PRACTITIONER

## 2021-08-19 PROCEDURE — 250N000009 HC RX 250: Performed by: PHYSICIAN ASSISTANT

## 2021-08-19 PROCEDURE — 999N000127 HC STATISTIC PERIPHERAL IV START W US GUIDANCE

## 2021-08-19 PROCEDURE — 80048 BASIC METABOLIC PNL TOTAL CA: CPT | Performed by: NURSE PRACTITIONER

## 2021-08-19 PROCEDURE — 97110 THERAPEUTIC EXERCISES: CPT | Mod: GP

## 2021-08-19 PROCEDURE — 36592 COLLECT BLOOD FROM PICC: CPT | Performed by: NURSE PRACTITIONER

## 2021-08-19 PROCEDURE — 250N000013 HC RX MED GY IP 250 OP 250 PS 637: Performed by: NURSE PRACTITIONER

## 2021-08-19 RX ORDER — FENTANYL 75 UG/1
75 PATCH TRANSDERMAL
Status: DISCONTINUED | OUTPATIENT
Start: 2021-08-22 | End: 2021-08-19

## 2021-08-19 RX ORDER — ACETAMINOPHEN 325 MG/1
650 TABLET ORAL EVERY 6 HOURS
Status: DISCONTINUED | OUTPATIENT
Start: 2021-08-19 | End: 2021-08-20 | Stop reason: HOSPADM

## 2021-08-19 RX ORDER — GUAR GUM
1 PACKET (EA) ORAL 2 TIMES DAILY
Status: DISCONTINUED | OUTPATIENT
Start: 2021-08-19 | End: 2021-08-20 | Stop reason: HOSPADM

## 2021-08-19 RX ORDER — CLONAZEPAM 0.5 MG/1
1 TABLET ORAL EVERY MORNING
Status: DISCONTINUED | OUTPATIENT
Start: 2021-08-20 | End: 2021-08-20 | Stop reason: HOSPADM

## 2021-08-19 RX ORDER — FENTANYL 75 UG/1
75 PATCH TRANSDERMAL
Status: DISCONTINUED | OUTPATIENT
Start: 2021-08-19 | End: 2021-08-20 | Stop reason: HOSPADM

## 2021-08-19 RX ORDER — IBUPROFEN 400 MG/1
400 TABLET, FILM COATED ORAL 3 TIMES DAILY PRN
Status: DISCONTINUED | OUTPATIENT
Start: 2021-08-19 | End: 2021-08-20 | Stop reason: HOSPADM

## 2021-08-19 RX ADMIN — MEROPENEM 1 G: 1 INJECTION, POWDER, FOR SOLUTION INTRAVENOUS at 03:46

## 2021-08-19 RX ADMIN — ACETAMINOPHEN 650 MG: 325 TABLET, FILM COATED ORAL at 18:29

## 2021-08-19 RX ADMIN — HYDROMORPHONE HYDROCHLORIDE 6 MG: 1 SOLUTION ORAL at 22:11

## 2021-08-19 RX ADMIN — ACETAMINOPHEN 650 MG: 160 LIQUID ORAL at 05:57

## 2021-08-19 RX ADMIN — PANCRELIPASE LIPASE, PANCRELIPASE PROTEASE, PANCRELIPASE AMYLASE 3 CAPSULE: 20000; 63000; 84000 CAPSULE, DELAYED RELEASE ORAL at 12:51

## 2021-08-19 RX ADMIN — METHOCARBAMOL 1000 MG: 500 TABLET, FILM COATED ORAL at 08:20

## 2021-08-19 RX ADMIN — METHOCARBAMOL 1000 MG: 500 TABLET, FILM COATED ORAL at 20:40

## 2021-08-19 RX ADMIN — Medication 1 PACKET: at 20:40

## 2021-08-19 RX ADMIN — LORAZEPAM 0.5 MG: 2 LIQUID ORAL at 10:19

## 2021-08-19 RX ADMIN — Medication 1 PACKET: at 20:41

## 2021-08-19 RX ADMIN — HYDROMORPHONE HYDROCHLORIDE 6 MG: 1 SOLUTION ORAL at 14:14

## 2021-08-19 RX ADMIN — GABAPENTIN 300 MG: 250 SUSPENSION ORAL at 20:40

## 2021-08-19 RX ADMIN — GABAPENTIN 300 MG: 250 SUSPENSION ORAL at 08:16

## 2021-08-19 RX ADMIN — Medication 40 MG: at 08:16

## 2021-08-19 RX ADMIN — METHOCARBAMOL 1000 MG: 500 TABLET, FILM COATED ORAL at 14:14

## 2021-08-19 RX ADMIN — FENTANYL 1 PATCH: 75 PATCH, EXTENDED RELEASE TRANSDERMAL at 12:51

## 2021-08-19 RX ADMIN — HYDROMORPHONE HYDROCHLORIDE 6 MG: 1 SOLUTION ORAL at 18:29

## 2021-08-19 RX ADMIN — Medication 1 PACKET: at 08:20

## 2021-08-19 RX ADMIN — CLONAZEPAM 1.5 MG: 1 TABLET ORAL at 22:09

## 2021-08-19 RX ADMIN — LORAZEPAM 0.5 MG: 2 LIQUID ORAL at 03:58

## 2021-08-19 RX ADMIN — IBUPROFEN 400 MG: 400 TABLET, FILM COATED ORAL at 17:39

## 2021-08-19 RX ADMIN — Medication 40 MG: at 20:39

## 2021-08-19 RX ADMIN — Medication 1 PACKET: at 10:20

## 2021-08-19 RX ADMIN — SERTRALINE HYDROCHLORIDE 150 MG: 20 SOLUTION ORAL at 22:11

## 2021-08-19 RX ADMIN — ACETAMINOPHEN 650 MG: 325 TABLET, FILM COATED ORAL at 23:55

## 2021-08-19 RX ADMIN — LIDOCAINE 1 PATCH: 560 PATCH PERCUTANEOUS; TOPICAL; TRANSDERMAL at 08:16

## 2021-08-19 RX ADMIN — Medication 15 ML: at 08:16

## 2021-08-19 RX ADMIN — Medication 1 PACKET: at 14:15

## 2021-08-19 RX ADMIN — LINEZOLID 600 MG: 100 POWDER, FOR SUSPENSION ORAL at 08:16

## 2021-08-19 RX ADMIN — FLUCONAZOLE 200 MG: 40 POWDER, FOR SUSPENSION ORAL at 08:16

## 2021-08-19 RX ADMIN — HYDROMORPHONE HYDROCHLORIDE 6 MG: 1 SOLUTION ORAL at 02:16

## 2021-08-19 RX ADMIN — HYDROMORPHONE HYDROCHLORIDE 6 MG: 1 SOLUTION ORAL at 10:19

## 2021-08-19 RX ADMIN — Medication 325 MG: at 08:16

## 2021-08-19 RX ADMIN — ACETAMINOPHEN 650 MG: 160 LIQUID ORAL at 00:43

## 2021-08-19 RX ADMIN — ACETAMINOPHEN 650 MG: 325 TABLET, FILM COATED ORAL at 12:51

## 2021-08-19 RX ADMIN — INSULIN ASPART 1 UNITS: 100 INJECTION, SOLUTION INTRAVENOUS; SUBCUTANEOUS at 17:35

## 2021-08-19 RX ADMIN — HYDROMORPHONE HYDROCHLORIDE 6 MG: 1 SOLUTION ORAL at 06:30

## 2021-08-19 RX ADMIN — TRAZODONE HYDROCHLORIDE 100 MG: 100 TABLET ORAL at 22:11

## 2021-08-19 ASSESSMENT — ACTIVITIES OF DAILY LIVING (ADL)
ADLS_ACUITY_SCORE: 17
ADLS_ACUITY_SCORE: 18

## 2021-08-19 ASSESSMENT — MIFFLIN-ST. JEOR: SCORE: 1103.04

## 2021-08-19 NOTE — PROGRESS NOTES
Diabetes Consult Daily  Progress Note          Assessment/Plan:   Meme Robins is a 51-year-old woman with chronic pancreatitis secondary to Idiopathic pancreatitis s/p John's procedure X 2 who is s/p open total pancreatectomy with autologous islet cell transplantation (Islet equivalents/kilogram: 2386), splenectomy, and incidental appendectomy with GJ tube placement on August 9, 2021 with Dr. Shirley.       Plan     - increase glargine from 15 to 17 units BID   - aspart 1 per 50 mg/dL cof BG>120 Q4h  - aspart 1 unit per 12 grams carb QID meals and PRN snacks--** cover all the intake  - hypoglycemia protocol  - PRN D10W 85 ml/h for hypoglycemia prevention in case of TF interruption once on full dose glargine  - will need insulin adjustments for nutrition changes, particularly if changes back to standard formula (Vital 1.2 @ 55 ml/hr --247 g CHO)  - diabetes education review has been requested       Outpatient diabetes follow up: Oct 21 w/ Dr. Bowman in [place].  Appears she needs appt for local follow up w/ provider as well as outpt RN and RD CDEs to be scheduled  Plan discussed with patient, , PLC, RN CC           Interval History:     The last 24 hours progress and nursing notes reviewed.  Episode of pain last evening.  Fearful of that returning.  Trying full liquids-- limited options.  More activity yesterday, including walk outside.  BG vick was 92, after walking.  BG max 189 this noon, following oral carb intake (not sure total grams) not covered w/ aspart.  Meme asking great questions.  Brendan supportive.      Recent Labs   Lab 08/19/21  1602 08/19/21  1141 08/19/21  0800 08/19/21  0543 08/19/21  0401 08/19/21  0002   * 189* 144* 112* 128* 135*           Nutrition:     Orders Placed This Encounter      Combination Diet Full Liquid; No Fat Diet            PTA Regimen:     BG monitoring via CGMS and fingerstick          Review of Systems:   See interval hx           "Medications:   no steroid         Physical Exam:     Gen: Alert, in NAD , bed,  bedside and supportive  HEENT: NC/aT hearing intact to conversational volume  Resp: Unlabored  Neuro: oriented x3, communicating clearly  Psych: calm mood  /85 (BP Location: Right arm)   Pulse 101   Temp 99.2  F (37.3  C) (Oral)   Resp 16   Ht 1.549 m (5' 1\")   Wt 55.6 kg (122 lb 8 oz)   SpO2 96%   BMI 23.15 kg/m               Data:     Lab Results   Component Value Date    A1C 5.5 08/04/2021    A1C 6.0 05/19/2021          No results found for: HEBMP, [id number], CREAT      CBC RESULTS:   Recent Labs   Lab Test 08/18/21  0729   WBC 18.3*   RBC 3.00*   HGB 8.4*   HCT 26.8*   MCV 89   MCH 28.0   MCHC 31.3*   RDW 14.6   *     Recent Labs   Lab Test 08/18/21  0729 08/18/21  0635 08/17/21  0618     --  140   POTASSIUM 3.2*  --  3.4   CHLORIDE 103  --  102   CO2 34*  --  32   ANIONGAP 2*  --  6   * 106* 173*   BUN 8  --  6*   CR 0.48*  --  0.45*   VIKAS 8.0*  --  8.2*     Liver Function Studies -   Recent Labs   Lab Test 08/11/21  0635   PROTTOTAL 4.5*   ALBUMIN 2.0*   BILITOTAL 0.3   ALKPHOS 76   AST 76*   *     Lab Results   Component Value Date    INR 1.45 08/09/2021    INR 0.99 08/09/2021    INR 1.00 08/04/2021       I spent a total of 35 minutes bedside and on the inpatient unit managing the glycemic care of Meme Robins. Over 50% of my time on the unit was spent counseling the patient and  and/or coordinating care regarding acute glycemic mgmnt.  See note for details.            Effie Motta APRN [id number]  To contact Endocrine Diabetes service:   From 8AM-4PM: page inpatient diabetes provider that is following the patient that day (see filed or incomplete progress notes/consult notes).  If uncertain of provider assignment: page job code [phone].  For questions or updates from 4PM-8AM: page the diabetes job code for on call fellow: [phone]    Please notify inpatient diabetes service " if changes are planned that will impact glycemia, such as to steroids, enteral feeding, parenteral feeding, dextrose fluids or procedures requiring prolonged NPO status.

## 2021-08-19 NOTE — PROGRESS NOTES
CLINICAL NUTRITION SERVICES - BRIEF NOTE     Nutrition Prescription    Recommendations already ordered by Registered Dietitian (RD):  Adding Nutrisource Fiber 1 pkt BID to potentially help bulk stools.     Future/Additional Recommendations:  See note from 8/17 for further recommendations.      EVALUATION OF THE PROGRESS TOWARD GOALS      NEW FINDINGS   Ongoing loose stools.  C.Diff negative 8/18.     Monitoring/Evaluation  Progress toward goals will be monitored and evaluated per protocol.     Effie Ramon MS, RD, LD  Pager [phone]

## 2021-08-19 NOTE — PROVIDER NOTIFICATION
Pt triggered sepsis protocol d/t elevated WBC and tachycardia. Lactic came back critical at 2.1 Rapid response was initiated. RN notified provider of lab results.

## 2021-08-19 NOTE — PROGRESS NOTES
"Rapid Response Team Note    Assessment   In assessment a rapid response was called on Meme Robins due to hyperlactatemia w/ LA 2.1. Patient reports that she is experiencing lower abdominal/pelvic pain that began this afternoon. She also reports that she has periods of time when she feels \"unwell\" with rigors and heart racing. During these periods of time she also reports increase in urination and loose stool. BP stable to elevated. HR elevated at 110. Afebrile.     Plan   -  BCx2, UA, AXR  - IVF bolus, repeat LA at 0600  -  The Transplant Surgery primary team was able to be reached and they are in agreement with the above plan.  -  Disposition: The patient will remain on the current unit. We will continue to monitor this patient closely.  -  Reassessment and plan follow-up will be performed by the primary team      Sharon Saenz PA-C  [facility] RRT AMCOM Job Code Contact #[phone]    Hospital Course   Brief Summary of events leading to rapid response:   Meme Robins is a 51-year-old woman with chronic pancreatitis, who is s/p open total pancreatectomy with autologous islet cell transplantation, splenectomy, and incidental appendectomy with GJ tube placement on 2021 with Dr. Shirley. 2300 IE/kg. Intraop hypotension during islet infusion, thought to be 2/2 anaphylaxis and treated with benadryl, steroids and resolved. RRT called this evening for Hyperlactatemia w/ LA 2.1. Patient denies fever, dysuria, HA, dizziness, vision changes. We discussed POC as outlined above and patient agreeable.      Admission Diagnosis:   Chronic pancreatitis (H) [K86.1]     Physical Exam   Temp: 99.1  F (37.3  C) Temp  Min: 98.5  F (36.9  C)  Max: 99.9  F (37.7  C)  Resp: 18 Resp  Min: 16  Max: 20  SpO2: 96 % SpO2  Min: 94 %  Max: 99 %  Pulse: 106 Pulse  Min: 82  Max: 110    No data recorded  BP: (!) 153/86 Systolic (24hrs), Av , Min:130 , Max:156   Diastolic (24hrs), Av, Min:78, Max:98     I/Os: I/O last 3 " completed shifts:  In: 330 [I.V.:30]  Out: 1435 [Urine:600; Emesis/NG output:675; Drains:60; Stool:100]     Exam:   General: in no acute distress  Mental Status: AAOx4.  CV: Tachycardia, thogh no m/r/g  Resp: Breathing non-labored on RA  GI: BS+, No rebound or guarding    Significant Results and Procedures   Lactic Acid:   Recent Labs   Lab Test 08/18/21  2217 08/17/21 2005 08/16/21  1156   LACT 2.1* 1.0 0.7     CBC:   Recent Labs   Lab Test 08/18/21  0729 08/17/21  0618 08/16/21  0638   WBC 18.3* 18.9* 13.5*   HGB 8.4* 9.0* 8.1*   HCT 26.8* 28.5* 26.0*   * 525* 371        Sepsis Evaluation   The patient is known to have an infection.  Meme Robins meets SIRS criteria AND has a lactate >2 or other evidence of acute organ damage.  These vital signs, lab and physical exam findings constitute a diagnosis of SEVERE SEPSIS.    Sepsis Time-Zero (time severe sepsis diagnosis confirmed):   08/18/21 as this was the time when Lactate resulted, and the level was > 2.0     Anti-infectives (From now, onward)    Start     Dose/Rate Route Frequency Ordered Stop    08/18/21 2000  linezolid (ZYVOX) suspension 600 mg      600 mg Oral EVERY 12 HOURS SCHEDULED 08/18/21 1354      08/18/21 1230  fluconazole (DIFLUCAN) suspension 200 mg      200 mg Oral DAILY 08/18/21 1129      08/17/21 1130  meropenem (MERREM) 1 g vial to attach to  mL bag      1 g  over 30 Minutes Intravenous EVERY 8 HOURS 08/17/21 1028          Current antibiotic coverage is appropriate for source of infection.

## 2021-08-19 NOTE — CONSULTS
Met with patient and  Brendan for PLC teaching mainly for diabetes review.    We focused on Insulin types, how to give injections, when to give correction vs meal/carb coverage, long acting insulin,  reviewed glucometer and how to check a blood glucose, hypo/hyper glcemia and what to do on sick days.    Both were good participants of learning and demonstrated good technique with flex pens and injections.    Literature Given: Understanding Diabetes, My Diabetes Treatment Plan,  Healthy Injections Sites    PLC also consulted for port teaching and possible IV antibiotics going home. Patient and  have used port at home before and feel comfortable flushing with heparin as needed. All IV antibiotics have been discontinued as well.     No literature left for this to cut down on materials they have and actually need. Can come back to see patient for IV medication administration if the need arises.

## 2021-08-19 NOTE — PROGRESS NOTES
Solid-Organ Transplant Service - Daily Progress Note  08/19/2021    Assessment & Plan: Meme Robins is a 51-year-old woman with chronic pancreatitis, who is s/p 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 Shirley. 2300 IE/kg. Intraop hypotension during islet infusion, thought to be 2/2 anaphylaxis and treated with benadryl, steroids and resolved.     Cardiorespiratory:   Hypotension: -130s initially. Now -150s. Elevation correlates with pain  Tachycardia: improving, HR 90-110s, EKG ST.     Hematology:  Post op anemia: Likely dilutional vs some oozing post op. Hgb 5.6 on 8/12, transfused 2 unit pRBCs. Hgb stable 8-9.    GI/Nutrition:   Diet: G tube to gravity-continue with clamping trials during day and vent in the evening. TFs at goal 60 ml/hr. Relizorb when not on elemental TF.  Bowel regimen:  Multiple watery stools. Add fiber 1pkt BID to TF. Continue scheduled bowel regimen, hold if stools loose.   -Milk of magnesia BID   -Docusate 100mg BID    -Senna BID   -Dulcolax suppository daily  Chylous leak: Changed TF to elemental formula. ARTEMIO clear.    Fluid/Electrolytes:   Hypervolemia: TKO IVF. Improved, weight now stable.     : no acute issues     Post-pancreatectomy diabetes: Endocrine following consulting. Lantus 17 units BID, sliding scale correction every 4 hours PRN, carb coverage with full liquid intake, 1 unit per 12 grams of carbohydrate.    Diabetes education requested. DM supplies ordered.    Infection: Low grade fever-resolved.   Islet cultures positive: Enterococcus faecalis, morganella m, E. Coli, klebsiella oxy ESBL (MDR). Stop all antibiotics today  Continue meropenem today. Change diflucan to susp. Stop vanco and start linezolid susp.   -Ertapenem 8/9-8/16  -Meropenem 8/17-8/19  -Vanco 8/9-8/18  -Fluconazole 8/9-8/19    Prophylaxis: PPI, Anticoagulation: Heparin gtt  complete    Pain control:Moderate pain control  -Tylenol 650 mg Q6H  -Fentanyl 75 mcg Patch  -Dilaudid 3-6mg Q6   -Gabapentin 300 mg BID  -Lidoderm patches  -Methocarbamol 1G TID tablet   -Ativan, 0.25mg susp BID-change today to home clonazepam 1qam /1.5 qpm  -Toradol 15mg q6h-complete   -Ketamine, 2.5mg-complete  -Benadryl 25 mg susp every 6 hours PRN pruritus    Activity: PT/OT; Up to bathroom without assistance. Encourage ambulation/OOB with fever    Anticipated LOS/Discharge: 7-10 days; PT/OT recommending home with assist. Plan for discharge tomorrow. Possible ARTEMIO removal.     Medical Decision Making: Medium  Subsequent visit 23866 (moderate level decision making)    JOSE ALBERTO/Fellow/Resident Provider: Aliza Ridely NP, #[id number]     Faculty: Clyde Shirley MD  __________________________________________________________________  Transplant History: Admitted 8/9/2021 for TP-AIT  8/9/2021 (Islet), Postoperative day: 10     Interval History: History is obtained from the patient  Overnight events: C/o LBP and suprapubic pain. Denies incision pain/discomfort. Tolerating clamp trials and clears.  Ambulating/stairs. Tolerating EN @ goal.      ROS:   A 10-point review of systems was negative except as noted above.    Curent Meds:    acetaminophen  650 mg Oral Q6H     amylase-lipase-protease  3 capsule Oral TID w/meals     aspirin  325 mg Oral or Feeding Tube Daily     docusate  100 mg Per J Tube BID     fentaNYL  75 mcg Transdermal Q72H     fentaNYL   Transdermal Q8H     fiber modular (NUTRISOURCE FIBER)  1 packet Per Feeding Tube BID     gabapentin  300 mg Oral or J tube BID     heparin  5-10 mL Intracatheter Q28 Days     heparin lock flush  5-10 mL Intracatheter Q24H     HYDROmorphone (STANDARD CONC)  3-6 mg Per J Tube Q4H     insulin aspart  1-5 Units Subcutaneous Q4H     insulin aspart   Subcutaneous 4x Daily     insulin glargine  17 Units Subcutaneous BID     lidocaine  1-2 patch Transdermal Q24H     lidocaine    "Transdermal Q8H     LORazepam  0.5 mg Oral Q6H     magnesium hydroxide  30 mL Per J Tube BID     menthol   Transdermal Q8H     methocarbamol  1,000 mg Oral TID     multivitamins w/minerals  15 mL Per Feeding Tube Daily     pantoprazole  40 mg Oral or J tube BID     protein modular  1 packet Per Feeding Tube TID     sennosides  5 mL Per J Tube BID     sertraline  150 mg Per J Tube At Bedtime     sodium chloride (PF)  10-20 mL Intracatheter Q28 Days     sodium chloride (PF)  3 mL Intracatheter Q8H     sodium chloride (PF)  3 mL Intracatheter Q8H     sodium chloride (PF)  5 mL Perineural Once     traZODone  100 mg Per J Tube At Bedtime       Physical Exam:     Admit Weight: 55.2 kg (121 lb 11.1 oz)    Current Vitals:   /88 (BP Location: Right arm)   Pulse 103   Temp 98.5  F (36.9  C) (Oral)   Resp 16   Ht 1.549 m (5' 1\")   Wt 55.6 kg (122 lb 8 oz)   SpO2 96%   BMI 23.15 kg/m      Vital sign ranges:    Temp:  [98.5  F (36.9  C)-99.4  F (37.4  C)] 98.5  F (36.9  C)  Pulse:  [] 103  Resp:  [16-18] 16  BP: (110-156)/(66-98) 137/88  SpO2:  [93 %-99 %] 96 %  Patient Vitals for the past 24 hrs:   BP Temp Temp src Pulse Resp SpO2 Weight   08/19/21 1144 137/88 98.5  F (36.9  C) Oral 103 16 96 % --   08/19/21 0924 -- -- -- -- -- -- 55.6 kg (122 lb 8 oz)   08/19/21 0757 (!) 154/84 98.6  F (37  C) Oral 114 16 93 % --   08/19/21 0407 110/66 98.6  F (37  C) Oral 88 16 93 % --   08/19/21 0056 133/86 98.5  F (36.9  C) Oral 102 18 95 % --   08/18/21 2236 (!) 153/86 99.1  F (37.3  C) Oral 106 18 96 % --   08/18/21 2152 (!) 156/98 98.7  F (37.1  C) Oral 110 16 99 % --   08/18/21 1929 (!) 146/92 98.9  F (37.2  C) Oral 98 16 95 % --   08/18/21 1556 (!) 146/95 99.4  F (37.4  C) Oral 98 16 97 % --     General Appearance: in no apparent distress.   Skin: normal, warm,dry  Heart: well perfused  Lungs: Nonlabored resps on RA  Abdomen: The abdomen is soft, ND, and minimally tender to palpation. The wound is dermabonded, " healing well with resolving ecchymosis. Tube/Drain sites are: G tube clamped. J tube with tube feeds infusing. ARTEMIO: serous  : voiding  Extremities: edema: none   Neuro: A&Ox4    Data:   CMP  Recent Labs   Lab 08/19/21  1141 08/19/21  0800 08/19/21  0543 08/18/21  0729   NA  --   --  140 139   POTASSIUM  --   --  3.9 3.2*   CHLORIDE  --   --  103 103   CO2  --   --  34* 34*   * 144* 112* 134*   BUN  --   --  6* 8   CR  --   --  0.42* 0.48*   GFRESTIMATED  --   --  >90 >90   VIKAS  --   --  8.5 8.0*   MAG  --   --  2.2 1.9   PHOS  --   --  3.4 3.2     CBC  Recent Labs   Lab 08/19/21  0543 08/18/21  0729   HGB 8.1* 8.4*   WBC 20.3* 18.3*   * 641*     Coags  Recent Labs   Lab 08/15/21  0743 08/14/21  0426   PTT 34 50*      Urinalysis  Recent Labs   Lab Test 08/19/21  0158 08/17/21  2151   COLOR Straw Light Yellow   APPEARANCE Clear Clear   URINEGLC Negative Negative   URINEBILI Negative Negative   URINEKETONE Negative Negative   SG 1.009 1.019   UBLD Negative Negative   URINEPH 7.0 7.5*   PROTEIN Negative Negative   NITRITE Negative Negative   LEUKEST Negative Negative   RBCU 1 3*   WBCU <1 3

## 2021-08-19 NOTE — PLAN OF CARE
Occupational Therapy Discharge Summary    Reason for therapy discharge:    All goals and outcomes met, no further needs identified.    Progress towards therapy goal(s). See goals on Care Plan in [facility] electronic health record for goal details.  Adequate for discharge with spouse A for bathing as needed.     Therapy recommendation(s):    No further therapy is recommended.

## 2021-08-19 NOTE — PLAN OF CARE
Physical Therapy Discharge Summary    Reason for therapy discharge:    All goals and outcomes met, no further needs identified.    Progress towards therapy goal(s). See goals on Care Plan in [facility] electronic health record for goal details.  Goals met    Therapy recommendation(s):    Continue home exercise program.

## 2021-08-19 NOTE — PROVIDER NOTIFICATION
08/18/21 2200   Call Information   Date of Call 08/18/21   Time of Call 2229   Name of person requesting the team Abbie   Title of person requesting team RN   RRT Arrival time 2230   Time RRT ended 2250   Reason for call   Type of RRT Adult   Primary reason for call Sepsis suspected   Sepsis Suspected Elevated Lactate level;Heart Rate > 100;WBC <4 or >12   Was patient transferred from the ED, ICU, or PACU within last 24 hours prior to RRT call? No   SBAR   Situation LA - 2.1   Background a 51-year-old woman with chronic pancreatitis, who is S/P total pancreatectomy and islet autotransplant on August 9, 2021, who was admitted to the ICU for hemodynamic monitoring and pain control. Reported to have had intraop hypotension with islet infusion, thought to be 2/2 anaphylaxis and treated with benadryl, steroids and resolved.    Notable History/Conditions Recent surgery;Transplant  (Adrenal insufficiency (H), Anemia, pre-diabetic)   Assessment AOx4, VSS except tachy, c/o pain in back to groin   Interventions CXR;Fluid bolus;Labs   Patient Outcome   Patient Outcome Stabilized on unit   RRT Team   Physician(s) YFN Garrett   Lead RN Cyndy Montano   Post RRT Intervention Assessment   Post RRT Assessment Stable/Improved   Date Follow Up Done 08/19/21   Time Follow Up Done 8306

## 2021-08-19 NOTE — PLAN OF CARE
"BP (!) 146/92 (BP Location: Left arm)   Pulse 98   Temp 98.9  F (37.2  C) (Oral)   Resp 16   Ht 1.549 m (5' 1\")   Wt 56.6 kg (124 lb 12.8 oz)   SpO2 95%   BMI 23.58 kg/m    5431-9599  Patient hypertensive on RA, afebrile. BG q4h. Patient started complaining of pain in lower back around 1600-paoin escalated to a 9/10. Pt requested her G tube be put to gravity to see if that would help relieve some pain-patient was left to gravity for 2 hours-145ml output during this time frame. J with TF at 60.  Patient did not feel relief during this time but wanted to stay to gravity for fear of pain increasing if clamped. Tolerating full liquid diet with fair appetite. Port TKO per MAR. Voiding and stooling. Pt is UAL.   .   Will continue with POC and notify MD with changes or concerns.    "

## 2021-08-19 NOTE — PLAN OF CARE
"/88 (BP Location: Right arm)   Pulse 103   Temp 98.5  F (36.9  C) (Oral)   Resp 16   Ht 1.549 m (5' 1\")   Wt 55.6 kg (122 lb 8 oz)   SpO2 96%   BMI 23.15 kg/m      5728-8745. AVSS on RA.  & 189.  Pain well controlled with scheduled meds. Denies nausea. Tolerating a full liquid, fat free diet. Port-a-cath SL. Voiding adequate amounts, not saving. Pt continues to have loose BM's. R ARTEMIO w/ minimal output. Incision w/ dermabond, ALONDRA Up ad rona. Diabetic education completed @ bedside with PLC today. Plan for discharge to local hotel tomorrow. Will continue to monitor and update with any changes.   "

## 2021-08-19 NOTE — PLAN OF CARE
"/66 (BP Location: Right arm)   Pulse 88   Temp 98.6  F (37  C) (Oral)   Resp 16   Ht 1.549 m (5' 1\")   Wt 56.6 kg (124 lb 12.8 oz)   SpO2 93%   BMI 23.58 kg/m      Shift: 5700-1850  VS: VSS on RA despite intermittent tachycardia, afebrile  Neuro & Mood/Behavior: A&Ox4.Pt expressed feeling anxious and feeling that \"something isn't right\" and that \"the pain medication could be masking something underlying\". Pt also expressed frustration due to feeling like she shouldn't be in as much pain as she has been in, stating that \"it is barbaric\". Pain & anxiety eventually treated with scheduled meds (see MAR)  BG: q4hr  Labs: Lactic 2.1; additional UA sent after lactic came back 2.1  Respiratory: WDL  Pain/Nausea/PRN: Pain intolerable during evening, rated at 9 or 10. Pain became managed with scheduled meds (see MAR), aromatherapy, and therapeutic listening  Diet: J with TF @ 60 mL  LDA: G tube open to gravity with 125 tan, milky drainage. Port and PIV. PIV SL.  GI/: Multiple loose stools this shift. Voiding spontaneously.   Skin: WDL  Mobility: UAL  Plan: Continue with pain management and plan of care. Update team with any changes.    Handoff given to following RN.    "

## 2021-08-20 ENCOUNTER — HOME INFUSION (PRE-WILLOW HOME INFUSION) (OUTPATIENT)
Dept: PHARMACY | Facility: CLINIC | Age: 51
End: 2021-08-20

## 2021-08-20 VITALS
SYSTOLIC BLOOD PRESSURE: 129 MMHG | WEIGHT: 121.5 LBS | BODY MASS INDEX: 22.94 KG/M2 | OXYGEN SATURATION: 97 % | DIASTOLIC BLOOD PRESSURE: 86 MMHG | RESPIRATION RATE: 16 BRPM | HEIGHT: 61 IN | TEMPERATURE: 98.4 F | HEART RATE: 111 BPM

## 2021-08-20 PROBLEM — G89.18 POSTOPERATIVE ABDOMINAL PAIN: Status: ACTIVE | Noted: 2021-08-09

## 2021-08-20 PROBLEM — R10.9 POSTOPERATIVE ABDOMINAL PAIN: Status: ACTIVE | Noted: 2021-08-09

## 2021-08-20 LAB
ANION GAP SERPL CALCULATED.3IONS-SCNC: 1 MMOL/L (ref 3–14)
BASOPHILS # BLD AUTO: 0.1 10E3/UL (ref 0–0.2)
BASOPHILS NFR BLD AUTO: 0 %
BUN SERPL-MCNC: 9 MG/DL (ref 7–30)
CALCIUM SERPL-MCNC: 8.6 MG/DL (ref 8.5–10.1)
CHLORIDE BLD-SCNC: 103 MMOL/L (ref 94–109)
CO2 SERPL-SCNC: 34 MMOL/L (ref 20–32)
CREAT SERPL-MCNC: 0.45 MG/DL (ref 0.52–1.04)
EOSINOPHIL # BLD AUTO: 0.6 10E3/UL (ref 0–0.7)
EOSINOPHIL NFR BLD AUTO: 4 %
ERYTHROCYTE [DISTWIDTH] IN BLOOD BY AUTOMATED COUNT: 14.9 % (ref 10–15)
GFR SERPL CREATININE-BSD FRML MDRD: >90 ML/MIN/1.73M2
GLUCOSE BLD-MCNC: 128 MG/DL (ref 70–99)
GLUCOSE BLDC GLUCOMTR-MCNC: 102 MG/DL (ref 70–99)
GLUCOSE BLDC GLUCOMTR-MCNC: 109 MG/DL (ref 70–99)
GLUCOSE BLDC GLUCOMTR-MCNC: 140 MG/DL (ref 70–99)
GLUCOSE BLDC GLUCOMTR-MCNC: 84 MG/DL (ref 70–99)
HCT VFR BLD AUTO: 26.9 % (ref 35–47)
HGB BLD-MCNC: 8.4 G/DL (ref 11.7–15.7)
IMM GRANULOCYTES # BLD: 0.2 10E3/UL
IMM GRANULOCYTES NFR BLD: 1 %
LYMPHOCYTES # BLD AUTO: 1.6 10E3/UL (ref 0.8–5.3)
LYMPHOCYTES NFR BLD AUTO: 11 %
MAGNESIUM SERPL-MCNC: 2.3 MG/DL (ref 1.6–2.3)
MCH RBC QN AUTO: 28.1 PG (ref 26.5–33)
MCHC RBC AUTO-ENTMCNC: 31.2 G/DL (ref 31.5–36.5)
MCV RBC AUTO: 90 FL (ref 78–100)
MONOCYTES # BLD AUTO: 1.7 10E3/UL (ref 0–1.3)
MONOCYTES NFR BLD AUTO: 11 %
NEUTROPHILS # BLD AUTO: 11.3 10E3/UL (ref 1.6–8.3)
NEUTROPHILS NFR BLD AUTO: 73 %
NRBC # BLD AUTO: 0.2 10E3/UL
NRBC BLD AUTO-RTO: 1 /100
PHOSPHATE SERPL-MCNC: 4 MG/DL (ref 2.5–4.5)
PLATELET # BLD AUTO: 936 10E3/UL (ref 150–450)
POTASSIUM BLD-SCNC: 3.9 MMOL/L (ref 3.4–5.3)
RBC # BLD AUTO: 2.99 10E6/UL (ref 3.8–5.2)
SODIUM SERPL-SCNC: 138 MMOL/L (ref 133–144)
WBC # BLD AUTO: 15.4 10E3/UL (ref 4–11)

## 2021-08-20 PROCEDURE — 250N000013 HC RX MED GY IP 250 OP 250 PS 637: Performed by: STUDENT IN AN ORGANIZED HEALTH CARE EDUCATION/TRAINING PROGRAM

## 2021-08-20 PROCEDURE — 250N000013 HC RX MED GY IP 250 OP 250 PS 637: Performed by: TRANSPLANT SURGERY

## 2021-08-20 PROCEDURE — 99233 SBSQ HOSP IP/OBS HIGH 50: CPT | Mod: 24 | Performed by: CLINICAL NURSE SPECIALIST

## 2021-08-20 PROCEDURE — 84100 ASSAY OF PHOSPHORUS: CPT | Performed by: PHYSICIAN ASSISTANT

## 2021-08-20 PROCEDURE — 80048 BASIC METABOLIC PNL TOTAL CA: CPT | Performed by: NURSE PRACTITIONER

## 2021-08-20 PROCEDURE — 36591 DRAW BLOOD OFF VENOUS DEVICE: CPT | Performed by: NURSE PRACTITIONER

## 2021-08-20 PROCEDURE — 250N000011 HC RX IP 250 OP 636

## 2021-08-20 PROCEDURE — 83735 ASSAY OF MAGNESIUM: CPT | Performed by: NURSE PRACTITIONER

## 2021-08-20 PROCEDURE — 250N000013 HC RX MED GY IP 250 OP 250 PS 637: Performed by: PHYSICIAN ASSISTANT

## 2021-08-20 PROCEDURE — 250N000013 HC RX MED GY IP 250 OP 250 PS 637: Performed by: NURSE PRACTITIONER

## 2021-08-20 PROCEDURE — 85025 COMPLETE CBC W/AUTO DIFF WBC: CPT | Performed by: NURSE PRACTITIONER

## 2021-08-20 RX ORDER — AMINO AC/PROTEIN HYDR/WHEY PRO 10G-100/30
1 LIQUID (ML) ORAL 3 TIMES DAILY
Qty: 180 PACKET | Refills: 1 | Status: SHIPPED | OUTPATIENT
Start: 2021-08-20 | End: 2022-03-06

## 2021-08-20 RX ORDER — PANTOPRAZOLE SODIUM 40 MG/1
40 TABLET, DELAYED RELEASE ORAL
Qty: 60 TABLET | Refills: 1 | Status: CANCELLED | OUTPATIENT
Start: 2021-08-20

## 2021-08-20 RX ORDER — DOCUSATE SODIUM 100 MG/1
100 CAPSULE, LIQUID FILLED ORAL 2 TIMES DAILY PRN
Status: DISCONTINUED | OUTPATIENT
Start: 2021-08-20 | End: 2021-08-20 | Stop reason: HOSPADM

## 2021-08-20 RX ORDER — BISACODYL 10 MG
10 SUPPOSITORY, RECTAL RECTAL DAILY PRN
Qty: 5 SUPPOSITORY | Refills: 0 | Status: SHIPPED | OUTPATIENT
Start: 2021-08-20 | End: 2022-03-06

## 2021-08-20 RX ORDER — PANTOPRAZOLE SODIUM 40 MG/1
40 TABLET, DELAYED RELEASE ORAL
Status: DISCONTINUED | OUTPATIENT
Start: 2021-08-20 | End: 2021-08-20 | Stop reason: HOSPADM

## 2021-08-20 RX ORDER — HYDROMORPHONE HYDROCHLORIDE 2 MG/1
3-6 TABLET ORAL EVERY 4 HOURS
Qty: 270 TABLET | Refills: 0 | Status: SHIPPED | OUTPATIENT
Start: 2021-08-20 | End: 2021-09-07

## 2021-08-20 RX ORDER — GABAPENTIN 300 MG/1
300 CAPSULE ORAL 2 TIMES DAILY
Qty: 30 CAPSULE | Refills: 0 | Status: SHIPPED | OUTPATIENT
Start: 2021-08-20 | End: 2021-09-02

## 2021-08-20 RX ORDER — ACETAMINOPHEN 325 MG/1
650 TABLET ORAL EVERY 6 HOURS
Qty: 120 TABLET | Refills: 0 | Status: SHIPPED | OUTPATIENT
Start: 2021-08-20 | End: 2022-03-06

## 2021-08-20 RX ORDER — AMOXICILLIN 250 MG
1 CAPSULE ORAL 2 TIMES DAILY PRN
Qty: 60 TABLET | Refills: 0 | Status: SHIPPED | OUTPATIENT
Start: 2021-08-20 | End: 2022-03-06

## 2021-08-20 RX ORDER — MULTIPLE VITAMINS W/ MINERALS TAB 9MG-400MCG
1 TAB ORAL DAILY
Qty: 30 TABLET | Refills: 1 | Status: SHIPPED | OUTPATIENT
Start: 2021-08-20

## 2021-08-20 RX ORDER — GABAPENTIN 300 MG/1
300 CAPSULE ORAL 2 TIMES DAILY
Status: DISCONTINUED | OUTPATIENT
Start: 2021-08-20 | End: 2021-08-20 | Stop reason: HOSPADM

## 2021-08-20 RX ORDER — ASPIRIN 325 MG
325 TABLET ORAL DAILY
Qty: 30 TABLET | Refills: 1 | Status: SHIPPED | OUTPATIENT
Start: 2021-08-20 | End: 2022-03-06

## 2021-08-20 RX ORDER — INSULIN LISPRO 100 [IU]/ML
INJECTION, SOLUTION INTRAVENOUS; SUBCUTANEOUS
Qty: 3 ML | Refills: 1 | Status: SHIPPED | OUTPATIENT
Start: 2021-08-20 | End: 2022-03-06

## 2021-08-20 RX ORDER — FENTANYL 75 UG/1
1 PATCH TRANSDERMAL
Qty: 5 PATCH | Refills: 0 | Status: SHIPPED | OUTPATIENT
Start: 2021-08-22 | End: 2021-09-02

## 2021-08-20 RX ORDER — METHOCARBAMOL 500 MG/1
1000 TABLET, FILM COATED ORAL 3 TIMES DAILY
Qty: 90 TABLET | Refills: 0 | Status: ON HOLD | OUTPATIENT
Start: 2021-08-20 | End: 2023-02-25

## 2021-08-20 RX ORDER — ASPIRIN 325 MG
325 TABLET ORAL DAILY
Status: DISCONTINUED | OUTPATIENT
Start: 2021-08-20 | End: 2021-08-20 | Stop reason: HOSPADM

## 2021-08-20 RX ORDER — GUAR GUM
1 PACKET (EA) ORAL 2 TIMES DAILY
Qty: 60 PACKET | Refills: 1 | Status: SHIPPED | OUTPATIENT
Start: 2021-08-20 | End: 2022-03-06

## 2021-08-20 RX ORDER — TRAZODONE HYDROCHLORIDE 50 MG/1
100 TABLET, FILM COATED ORAL AT BEDTIME
Status: DISCONTINUED | OUTPATIENT
Start: 2021-08-20 | End: 2021-08-20 | Stop reason: HOSPADM

## 2021-08-20 RX ORDER — SENNOSIDES 8.6 MG
8.6 TABLET ORAL 2 TIMES DAILY PRN
Status: DISCONTINUED | OUTPATIENT
Start: 2021-08-20 | End: 2021-08-20 | Stop reason: HOSPADM

## 2021-08-20 RX ORDER — SENNOSIDES 8.6 MG
1 TABLET ORAL 2 TIMES DAILY PRN
Qty: 60 TABLET | Refills: 1 | Status: CANCELLED | OUTPATIENT
Start: 2021-08-20

## 2021-08-20 RX ORDER — CLONAZEPAM 0.5 MG/1
TABLET ORAL
Qty: 75 TABLET | Refills: 0 | Status: CANCELLED | OUTPATIENT
Start: 2021-08-20 | End: 2021-09-04

## 2021-08-20 RX ORDER — INSULIN LISPRO 100 [IU]/ML
INJECTION, SOLUTION INTRAVENOUS; SUBCUTANEOUS
Qty: 5 ML | Refills: 1 | Status: SHIPPED | OUTPATIENT
Start: 2021-08-20 | End: 2022-03-06

## 2021-08-20 RX ORDER — DIPHENHYDRAMINE HCL 25 MG
25 CAPSULE ORAL EVERY 6 HOURS PRN
Status: DISCONTINUED | OUTPATIENT
Start: 2021-08-20 | End: 2021-08-20 | Stop reason: HOSPADM

## 2021-08-20 RX ORDER — MULTIPLE VITAMINS W/ MINERALS TAB 9MG-400MCG
1 TAB ORAL DAILY
Status: DISCONTINUED | OUTPATIENT
Start: 2021-08-20 | End: 2021-08-20 | Stop reason: HOSPADM

## 2021-08-20 RX ORDER — DOCUSATE SODIUM 100 MG/1
100 CAPSULE, LIQUID FILLED ORAL 2 TIMES DAILY PRN
Qty: 60 CAPSULE | Refills: 1 | COMMUNITY
Start: 2021-08-20 | End: 2022-03-06

## 2021-08-20 RX ADMIN — METHOCARBAMOL 1000 MG: 500 TABLET, FILM COATED ORAL at 07:57

## 2021-08-20 RX ADMIN — Medication 6 MG: at 14:28

## 2021-08-20 RX ADMIN — Medication 1 MG: at 07:57

## 2021-08-20 RX ADMIN — ACETAMINOPHEN 650 MG: 325 TABLET, FILM COATED ORAL at 06:00

## 2021-08-20 RX ADMIN — ASPIRIN 325 MG ORAL TABLET 325 MG: 325 PILL ORAL at 07:57

## 2021-08-20 RX ADMIN — HYDROMORPHONE HYDROCHLORIDE 6 MG: 1 SOLUTION ORAL at 02:25

## 2021-08-20 RX ADMIN — Medication 5 ML: at 14:28

## 2021-08-20 RX ADMIN — INSULIN ASPART 2 UNITS: 100 INJECTION, SOLUTION INTRAVENOUS; SUBCUTANEOUS at 10:21

## 2021-08-20 RX ADMIN — METHOCARBAMOL 1000 MG: 500 TABLET, FILM COATED ORAL at 14:28

## 2021-08-20 RX ADMIN — PANCRELIPASE LIPASE, PANCRELIPASE PROTEASE, PANCRELIPASE AMYLASE 3 CAPSULE: 20000; 63000; 84000 CAPSULE, DELAYED RELEASE ORAL at 12:59

## 2021-08-20 RX ADMIN — Medication 5 ML: at 07:44

## 2021-08-20 RX ADMIN — PANCRELIPASE LIPASE, PANCRELIPASE PROTEASE, PANCRELIPASE AMYLASE 3 CAPSULE: 20000; 63000; 84000 CAPSULE, DELAYED RELEASE ORAL at 07:59

## 2021-08-20 RX ADMIN — ACETAMINOPHEN 650 MG: 325 TABLET, FILM COATED ORAL at 12:16

## 2021-08-20 RX ADMIN — MULTIPLE VITAMINS W/ MINERALS TAB 1 TABLET: TAB at 07:57

## 2021-08-20 RX ADMIN — Medication 6 MG: at 10:20

## 2021-08-20 RX ADMIN — Medication 1 PACKET: at 07:58

## 2021-08-20 RX ADMIN — PANTOPRAZOLE SODIUM 40 MG: 40 TABLET, DELAYED RELEASE ORAL at 07:58

## 2021-08-20 RX ADMIN — Medication 1 PACKET: at 08:00

## 2021-08-20 RX ADMIN — Medication 1 PACKET: at 14:28

## 2021-08-20 RX ADMIN — HYDROMORPHONE HYDROCHLORIDE 6 MG: 1 SOLUTION ORAL at 06:28

## 2021-08-20 RX ADMIN — INSULIN ASPART 2 UNITS: 100 INJECTION, SOLUTION INTRAVENOUS; SUBCUTANEOUS at 13:21

## 2021-08-20 RX ADMIN — GABAPENTIN 300 MG: 300 CAPSULE ORAL at 07:57

## 2021-08-20 ASSESSMENT — ACTIVITIES OF DAILY LIVING (ADL)
ADLS_ACUITY_SCORE: 17
ADLS_ACUITY_SCORE: 18
ADLS_ACUITY_SCORE: 17

## 2021-08-20 ASSESSMENT — MIFFLIN-ST. JEOR: SCORE: 1098.5

## 2021-08-20 NOTE — PLAN OF CARE
"VS: /78 (BP Location: Left arm)   Pulse 94   Temp 98.6  F (37  C) (Oral)   Resp 16   Ht 1.549 m (5' 1\")   Wt 55.1 kg (121 lb 8 oz)   SpO2 95%   BMI 22.96 kg/m      Cares: 7794-3654    Current condition: Stable on RA   Neuro: WDL  Cardio: WDL  Respiratory: WDL  GI/: Voiding spontaneously, Last BM 8/20  Skin: WDL, abd inc dermabonded.   Diet:   Full liquid, no fat diet.   BG: Q4: 102, 109  Mobility:  UAL  Pain: incisional   PRN medications:    Changes: Pt getting ready for discharge, de-accessed port. Pulled PIV   Plan of Care: Plan for discharge to [facility]  "

## 2021-08-20 NOTE — PROGRESS NOTES
Transplant Social Work Service Discharge Note      Patient Name:  Meme Robins     Anticipated Discharge Date: 8/20/2021    Discharge Disposition: Local [facility] with home infusion    Plan for 24 hour care for immediate post transplant period: Patient will receive care from her     If not local, plans for short term stay:  Local [facility] until they are able to return home    Additional Services/Equipment Arranged: RNCC set up appropriate home infusion and ATC visits. No further SW needs.    Persons notified of above discharge plan:  Patient, patient's , treatment team, nursing staff, ATC, home infusion    Patient / Family response to discharge plan: Agreeable    Education and resources provided by SW at discharge: Social Work role inpatient setting, cost of medication, and outpatient social work contact information.    Discussed anticipated pharmacy out of pocket costs: YES    Provided [employer] Donor Letter Writing packet : N/A    Plan: Patient will discharge to a local [facility] with care from her . Patient will attend ATC and will have [facility] for Home Infusion. No SW needs identified at discharge.    JAEL Ritter, CEDRICK  7A   Ph: [phone]  Pager: [phone]

## 2021-08-20 NOTE — CONSULTS
"Diabetes Educator consult received for Meme Robins, age 51, with chronic pancreatitis secondary to Idiopathic pancreatitis s/p John's procedure X 2 who is s/p open total pancreatectomy with autologous islet cell transplantation (Islet equivalents/kilogram: 2386), splenectomy, and incidental appendectomy with GJ tube placement on August 9, 2021, with Dr. Shirley.    Meme received outpatient diabetes education on 8/4/21 with Mariajose Solitario RN, CDE, prior to her procedure/hospitalization.  This patient had a history of more recent hypoglycemic episodes.  Already has an endocrinologist in [place], [place].  Per Mariajose's note:  \"She states that her previous endocrinologist recommended a Nneka sensor, which she tried for a while but felt that it was inaccurate, and stopped wearing it.  She has a One Touch Verio meter and checks when she doesn't feel right.  In the interest of keeping her safe, we started her on a Dexcom today, using a starter kit.  She was instructed in all aspects of using the CGM, was assisted with pairing it with her phone and linking her Dexcom account with our clinic.  She was given an additional sensor to use after this one is done.  We will need to wait until she has a diagnosis of diabetes to submit an application for her own Dexcom.  We will also need supporting documentation from Dr. Bermeo.  Low alert set at 80 with a high alert of 150.  She will be having both her son, Shaheed, and her  following her.\"    [facility], Mayra Becker RN,  saw Meme and her  on 8/19/21 for a review of learnings including the One Touch Verio glucose meter, insulins and injection technique, hypo and hyper glycemia, and what to do on sick days.  RD has met with patient regarding nutrition.    Patient denies any further need for additional eduction or review at this time.  To discharge today.    ABBEY Kirk  Diabetes Clinical Nurse Specialist/CDE  [phone]  "

## 2021-08-20 NOTE — PROGRESS NOTES
Care Management Discharge Note    Discharge Date: 08/20/2021       Discharge Disposition: Transitional Care, Home Care, Home Infusion    Discharge Services: None    Discharge DME: None    Discharge Transportation: family or friend will provide    Patient/family educated on Medicare website which has current facility and service quality ratings: other (see comments) (N/A)    Education Provided on the Discharge Plan:    Persons Notified of Discharge Plans: reviewed with care team.  Patient/Family in Agreement with the Plan: yes    Handoff Referral Completed: Yes    Additional Information:  Patient discharging after Total Pancreatectomy and Auto Islet Transplant. Home enteral feedings set up through [facility], ATC appointment already set up for tomorrow, provider to set up endocrinology appointment, HAMILTON Feng reached out to Maddi diabetic  for ongoing DM education. Patient discharges home with . With plan for home enteral feedings and home PT. [facility] coordinated with [facility] for nursing and PT. Updated Karol FAITH liaison; [facility] will deliver supplies and plan is to hook up patient for continuing of continuous TF later this afternoon.         Yecenia Babb RNCC      ,

## 2021-08-20 NOTE — PLAN OF CARE
"/76 (BP Location: Left arm)   Pulse 80   Temp 98.8  F (37.1  C) (Oral)   Resp 16   Ht 1.549 m (5' 1\")   Wt 55.6 kg (122 lb 8 oz)   SpO2 99%   BMI 23.15 kg/m      Shift: 4684-1760  VS: VSS on RA, afebrile  Neuro: AOx4  BG: q4hr (121, 139, 140)  Respiratory: WDL  Pain/Nausea/PRN: Pain managed with scheduled meds. Denies nausea  Diet: TF @ 60 mL/hr. Full liquid no fat diet  LDA: PIV, port-a-cath SL. ARTEMIO with minimal serous output.   GI/: Last BM 8/19. More formed than previously. Voiding spontaneously.  Skin: Incision and lap sites dermabonded ALONDRA  Mobility: UAL  Plan: Discharge today to airbnb with , Brendan     Handoff given to following RN.    "

## 2021-08-20 NOTE — PROGRESS NOTES
"/85 (BP Location: Right arm)   Pulse 101   Temp 99.2  F (37.3  C) (Oral)   Resp 16   Ht 1.549 m (5' 1\")   Wt 55.6 kg (122 lb 8 oz)   SpO2 96%   BMI 23.15 kg/m      Shift: 2025-2569  VS: VSS on RA, afebrile  Neuro: AOx4  BG: q4h (124). Sliding scale and carb coverage given.  Respiratory: WDL  Pain/Nausea/PRN: Denies nausea. Pain well-controlled w/ scheduled meds.   Diet: Full liquid, fat free. Fair appetite.  LDA: PIV, port-a-cath SL. ARTEMIO x1 w/ minimal serous output. PEG/J; G clamped, J w/ continuous TF vivonex @ 60 (goal)  GI/: BM this shift. Voids spontaneously w/o difficulty  Skin: Midline abdominal incision w/ dermabond, ALONDRA  Mobility: UAL. Ambulated halls x1  Plan: Plan for discharge tomorrow to local hotel. Continue POC, update provider w/ any changes.    Handoff given to following RN.    "

## 2021-08-20 NOTE — PROGRESS NOTES
This is a recent snapshot of the patient's [facility] medical record.  For current drug dose and complete information and questions, call [phone] or In Basket pool, [facility]v home infusion ([id number])  CSN Number:  [id number]

## 2021-08-20 NOTE — PROGRESS NOTES
Diabetes Consult Daily  Progress Note          Assessment/Plan:   Meme Robins is a 51-year-old woman with chronic pancreatitis secondary to Idiopathic pancreatitis s/p John's procedure X 2 who is s/p open total pancreatectomy with autologous islet cell transplantation (Islet equivalents/kilogram: 2386), splenectomy, and incidental appendectomy with GJ tube placement on August 9, 2021 with Dr. Shirley.       Plan     - increase glargine from 17 to 18 units BID   - aspart 1 per 50 mg/dL cof BG>120 Q4h  - aspart 1 unit per 12 grams carb QID meals and PRN snacks--** cover all the intake  - hypoglycemia protocol  - PRN D10W 85 ml/h for hypoglycemia prevention in case of TF interruption once on full dose glargine  - will need insulin adjustments for nutrition changes, particularly if changes back to standard formula (Vital 1.2 @ 55 ml/hr --247 g CHO)  - diabetes education completed 8/19       Outpatient diabetes follow up: Oct 21 w/ Dr. Bowman in [place].  Appears she needs appt for local follow up w/ provider as well as outpt RN and RD CDEs to be scheduled  Plan discussed with patient, , RN, primary team    Plan pasted into AVS:  Diabetes PLAN  This is specific to your current tube feed plan and will need adjustment when your tube feed formula, schedule, or rate changes. Healing, activity, food intake and islet cells will also impact changes in insulin need.    Lantus 18 units two times per day,  12 hours apart (9 AM and 9 PM for example)  Novolog 1 unit per 12 grams carbohydrate  Novolog correction every 4 hours  Correction Scale - custom DOSING      Do Not give Correction Insulin if BG less than 121.   For  - 169 give 1 unit.    For  - 219 give 2 units.    For  - 269 give 3 units.    For  - 319 give 4 units.    For BG = or > 320 give 5 units.       You will have an outpatient diabetes follow up appt-- to be scheduled.  You will communicate with Dr. Bermeo  "or .  Your transplant coordinator Maddi can usually facilitate any communication needs.  If you have troubleshooting needs for diabetes after hours, you can call the hospital  at [phone], and ask to be connected with on-call endocrinologist.             Interval History:     The last 24 hours progress and nursing notes reviewed.  PLC for diabetes educ yesterday  Peak BG yesterday followed eating without carb coverge      Feeling ready to go.  Dexcom applied at bedside.  Brendan learning the steps under Meme's instruction.  She really only needed help with transmitter removal from prev sensor and application to back of arm.  Note, there was bleeding after sensor application.  Resolved within a few minutes and transmitter appeared to be successfully connecting for warm up.            Recent Labs   Lab 08/20/21  0812 08/20/21  0749 08/20/21  0410 08/19/21  2333 08/19/21 2058 08/19/21  1602   * 128* 140* 139* 121* 124*           Nutrition:     Orders Placed This Encounter      Combination Diet Full Liquid; No Fat Diet            PTA Regimen:     BG monitoring via CGMS and fingerstick          Review of Systems:   See interval hx          Medications:   no steroid         Physical Exam:     Gen: Alert, in NAD , bed,  bedside and supportive  HEENT: NC/aT hearing intact to conversational volume  Resp: Unlabored  Neuro: oriented x3, communicating clearly  Psych: calm mood, hopeful  /78 (BP Location: Left arm)   Pulse 94   Temp 98.6  F (37  C) (Oral)   Resp 16   Ht 1.549 m (5' 1\")   Wt 55.1 kg (121 lb 8 oz)   SpO2 95%   BMI 22.96 kg/m               Data:     Lab Results   Component Value Date    A1C 5.5 08/04/2021    A1C 6.0 05/19/2021          No results found for: HEBMP, [id number], CREAT  Recent Labs   Lab Test 08/20/21  0812 08/20/21  0749 08/19/21  0543   NA  --  138 140   POTASSIUM  --  3.9 3.9   CHLORIDE  --  103 103   CO2  --  34* 34*   ANIONGAP  --  1* 3   GLC " 109* 128* 112*   BUN  --  9 6*   CR  --  0.45* 0.42*   VIKAS  --  8.6 8.5     CBC RESULTS: Recent Labs   Lab Test 08/20/21  0749   WBC 15.4*   RBC 2.99*   HGB 8.4*   HCT 26.9*   MCV 90   MCH 28.1   MCHC 31.2*   RDW 14.9   *       I spent a total of 40 minutes bedside and on the inpatient unit managing the glycemic care of Meme Robins. Over 50% of my time on the unit was spent counseling the patient and  and/or coordinating care regarding acute glycemic mgmnt, discharge planning, CGMS.  See note for details.            Effie Motta APRN -[id number]  To contact Endocrine Diabetes service:   From 8AM-4PM: page inpatient diabetes provider that is following the patient that day (see filed or incomplete progress notes/consult notes).  If uncertain of provider assignment: page job code [phone].  For questions or updates from 4PM-8AM: page the diabetes job code for on call fellow: [phone]    Please notify inpatient diabetes service if changes are planned that will impact glycemia, such as to steroids, enteral feeding, parenteral feeding, dextrose fluids or procedures requiring prolonged NPO status.

## 2021-08-20 NOTE — DISCHARGE SUMMARY
[facility]    Discharge Summary  Solid Organ Transplant    Date of Admission:  8/9/2021  Date of Discharge:  8/20/2021  Discharging Provider: Aliza Ridley NP    Discharge Diagnoses   Principal Problem:    S/P pancreatic islet cell transplantation (H)  Active Problems:    Chronic abdominal pain    Chronic pancreatitis (H)    Major depressive disorder, single episode    Postoperative abdominal pain      Follow-ups Needed After Discharge       Unresulted Labs Ordered in the Past 30 Days of this Admission       Date and Time Order Name Status Description    8/18/2021 10:52 PM Blood Culture Hand, Left Preliminary     8/18/2021 10:52 PM Blood Culture Hand, Right Preliminary     8/17/2021  8:10 AM Blood Culture Hand, Left Preliminary     8/17/2021  8:10 AM Blood Culture Line, venous Preliminary     8/9/2021 11:52 AM Prepare red blood cells (unit) Preliminary         These results will be followed up by transplant fellow    Discharge Disposition   Discharged to home  Condition at discharge: Stable    Hospital Course   Meme Robins with a PMH of idiopathic chronic pancreatitis, underwent John's procedure in 2011 followed by a revision in 2014. Symptoms continued to persist. she was admitted on 8/9/2021 following a Total pancreatectomy and islet cell transplant on 8/9/21.  The following problems were addressed during her hospitalization:    Total pancreatectomy and islet cell transplant into liver-8/9/21, see operative note. Anticoagulated with heparin after islet cell infusion into liver, discontinued on POD #6 and transitioned to Aspirin 325mg daily.  Follow up ultrasound of liver as above. Gastrostomy tube to vent post-op.  Jejunostomy tube used for feedings.   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 Transitioned to lantus with sliding scale when tube feed reached goal rate.  Will discharge with insulin regimen of Lantus 18 units  BID, Aspart 1 per 50mg/dL BG>120 Q4 and 1 unit per 12 grams of carb QID meals and PRN snacks.  Diabetic teaching completed 8/19.     Nutrition and pancreatic insufficiency- Tube feed via jejunostomy tube increased slowly over first several days post-op. Chylous leak:and tube feed was changed to an elemental formula. ARTEMIO clear prior to its removal prior to discharge. Pancreatic enzymes added with meals and snacks(see discharge medications). Tolerating tube feeds and having BMs by the time of discharge. Tube feeding classes completed prior to discharge. She was tolerating a Full liquid diet at discharge.    Acute on chronic pain- Pain Mgmt consulted. Pain initially controlled with Paravertebral catheters, ketamine, and a dilaudid PCA. She was tapered down and transitioned to oral medicines. She will continue a bowel regimen of senna, MOM, and Docusate twice daily along with her pain regimen. She was discharged on the following.    -Tylenol 650 mg Q6H  -Fentanyl 75 mcg Patch  -Dilaudid 3-6mg Q6   -Gabapentin 300 mg BID  -Lidoderm patches  -Methocarbamol 1G TID tablet   - clonazepam 1qam /1.5 qpm  -Benadryl 25 mg susp every 6 hours PRN pruritus    Islet cultures positive: Enterococcus faecalis, morganella m, E. Coli, klebsiella oxy ESBL (MDR). Initiated on Ertapenem and flucoonazole. She developed a low grade fever on POD #7 and was transitioned to Meropenem and linezolid.  Fever curve improved and all antibiotics were stopped prior to discharge. She remained afebrile with a down trending white count.     Post op anemia: Likely dilutional vs some oozing post op. Hgb 5.6 on 8/12, transfused 2 unit pRBCs. Hgb stable 8-9.    For questions about post-hospital medical regimen, call [facility] Transplant coordinator Tenisha Pearce [phone].        Consultations This Hospital Stay   PHARMACY IP CONSULT  ENDOCRINOLOGY IP CONSULT  ENDOCRINE DIABETES ADULT IP CONSULT  PHYSICAL THERAPY ADULT IP CONSULT  CNS DIABETES IP  CONSULT  NUTRITION SERVICES ADULT IP CONSULT  PHARMACY TO DOSE VANCO  PHARMACY IP CONSULT  PHARMACY IP CONSULT  OCCUPATIONAL THERAPY ADULT IP CONSULT  CARE MANAGEMENT / SOCIAL WORK IP CONSULT  PATIENT LEARNING CENTER IP CONSULT  ENDOCRINE DIABETES ADULT IP CONSULT  DIABETES EDUCATION IP CONSULT  VASCULAR ACCESS CARE ADULT IP CONSULT  DIABETES EDUCATION IP CONSULT  PATIENT LEARNING CENTER IP CONSULT  VASCULAR ACCESS CARE ADULT IP CONSULT    Code Status   Full Code    Time Spent on this Encounter   I, Aliza Ridley NP, personally saw the patient today and spent greater than 30 minutes discharging this patient.       Aliza Ridley NP  [facility]  ______________________________________________________________________    Physical Exam   Temp: 98.6  F (37  C) Temp src: Oral BP: 121/78 Pulse: 94   Resp: 16 SpO2: 95 % O2 Device: None (Room air)    Vitals:    08/18/21 0333 08/19/21 0924 08/20/21 0903   Weight: 56.6 kg (124 lb 12.8 oz) 55.6 kg (122 lb 8 oz) 55.1 kg (121 lb 8 oz)     Vital Signs with Ranges  Temp:  [98.6  F (37  C)-99.2  F (37.3  C)] 98.6  F (37  C)  Pulse:  [] 94  Resp:  [16] 16  BP: (121-135)/(76-85) 121/78  SpO2:  [95 %-99 %] 95 %  I/O last 3 completed shifts:  In: 720   Out: -     General Appearance: in no apparent distress. Seen sitting up in chair  Skin: normal, warm,dry  Heart: well perfused, RRR  Lungs:CTA, RA  Abdomen: The abdomen is soft, ND, and minimally tender to palpation. The wound is dermabonded, healing well with resolving ecchymosis. Tube/Drain sites are: G tube clamped. J tube with tube feeds infusing.    : voiding  Extremities: edema: none   Neuro: A&Ox4       Primary Care Physician   Pdamini Hatch    Discharge Orders      Reason for your hospital stay    S/P pancreatic islet cell transplantation (H) due to history of chronic pancreatitis     Acute on chronic abdominal pain, Postoperative abdominal pain        Major depressive  disorder, single episode     Activity    Walk at least four times a day, lift no greater than 10 pounds for 8 weeks from the time of surgery.  No driving while taking narcotics or 3 weeks after surgery.     When to contact your care team    WHEN TO CONTACT YOUR  COORDINATOR:   Transplant Coordinator, Maddi Haynes, [phone]  Notify your coordinator if you have pain uncontrolled on current regimen, increased redness or drainage from your incision, fever greater than 100.5F, or decreased urine output.  If it is outside of office hours, please call the hospital switchboard at [phone] and ask to have the  pancreas transplant surgery fellow paged for urgent medical questions, or present to the emergency department.     Wound care and dressings    Instructions to care for your wound at home:keep incision clean and dry. Ok to shower. Do not scrub incision.  Pat dry.  Do not soak or submerge in baths, hot tubs, or pools. Do not apply lotions or powders to incision.     Tubes and drains    You are going home with the following tubes or drains: feeding tube GJ-Tube.   Tube cares per hospital or home care instructions. Keep G tube clamped, vent as needed for nausea/vomiting.     [facility] Follow-up and recommended labs and tests    [facility] for labs and assess.  Please send results to fellow, Dr. Marvin Benito Pager [phone]  Labs to be drawn via port include: CBC and CMP and then every Monday.    Follow up with surgeon, Dr. Inocente Zaldivar , within 1 week after discharge.  Follow up with endocrinology in 2-4 weeks.  Follow up with diabetic educator (to be scheduled at the same time as endocrine follow up.)  Follow up with dietician for review of carb counting in 1-2 weeks time.    Call your transplant coordinator at [phone] or [phone] with any new onset or dramatic increase in pain, or difficulty obtaining or taking your medications.  If after hours,  please call [phone] and an on call coordinator will assist you.    Appointments on [place] and/or [facility] (with [facility] or [facility] provider or service). Call [phone] if you haven't heard regarding these appointments within 7 days of discharge.     MD face to face encounter    Documentation of Face to Face and Certification for Home Health Services    I certify that patient: Meme Robins is under my care and that I, or a nurse practitioner or physician's assistant working with me, had a face-to-face encounter that meets the physician face-to-face encounter requirements with this patient on: 8/20/2021.    This encounter with the patient was in whole, or in part, for the following medical condition, which is the primary reason for home health care: patient is s/p total pancreatectomy with auto islet transplant.    I certify that, based on my findings, the following services are medically necessary home health services: Nursing and Physical Therapy.    My clinical findings support the need for the above services because: Nurse is needed: To assess surgical incision for s/s of infection, enteral feeding education and management, medication management, diabetic education, after changes in medications or other medical regimen.. and Physical Therapy Services are needed to assess and treat the following functional impairments: mobility, strength and endurance.    Further, I certify that my clinical findings support that this patient is homebound (i.e. absences from home require considerable and taxing effort and are for medical reasons or [employer] services or infrequently or of short duration when for other reasons) because: Requires assistance of another person or specialized equipment to access medical services because patient: Requires supervision of another for safe transfer...    Based on the above findings. I certify that this patient is confined to the home and needs intermittent skilled nursing  care, physical therapy and/or speech therapy.  The patient is under my care, and I have initiated the establishment of the plan of care.  This patient will be followed by a physician who will periodically review the plan of care.  Physician/Provider to provide follow up care: Padmini Hatch    Attending hospital physician (the Medicare certified [place] provider): Elfego Shirley MD  Physician Signature: See electronic signature associated with these discharge orders.  Date: 8/20/2021     Diet    Follow this diet upon discharge: Full Liquid; No Fat Diet      Adult Formula Drip Feeding: Continuous Vivonex TEN; Jejunostomy; Goal Rate: 60; mL/hr; + free water flushes 100cc every 4 hrs. Feeding Tube Flush : At least 15-30 mL water before and after medication administration and with tube clogging       Significant Results and Procedures      PREOPERATIVE DIAGNOSES: Chronic pancreatitis secondary to Idiopathic pancreatitis s/p John's procedure X 2  POSTOPERATIVE DIAGNOSES: Same  PROCEDURE:   Open total pancreatectomy with splenectomy  Adhesiolysis for more than 120 minutes  Open Gastrojejunostomy tube placement (Sergio's technique)  Bowel & Biliary reconstruction using Bonilla-en Y choledochojejunostomy (retrocolic) and Duodenojejunostomy (retrocolic)  Interval Appendectomy  Infusion of islet cells in portal circulation through splenic vein  Wedge biopsy of Liver     SURGEON: Elfego Shirley MD  ASSISTANT:  Marvin Benito, fellow. There was no qualified general surgery resident available to assist during this procedure.   ANESTHESIA:  General endotracheal with paravertebral block  SPECIMENS: pancreas to the islet lab, pancreatic biopsies, spleen, duodenum and liver biopsy  DRAINS: 19 F Davide drain   EBL: 300 ml  UO: 1625 ml  FLUIDS: 3500 cc crystalloid, 1000 cc colloid   COMPLICATIONS:  Patient became hypotensive as soon as the islet infusion was started through the splenic vein possibly anaphylactic reaction. Infusion  was stopped and steroid with antihistaminic were administered. Once blood pressure was stable, rest of the islets were infused into portal circulation with no further events.      FINDINGS: Dense adhesions noted between liver, duodenum and stomach, omental adhesions noted with liver which were taken down, Bonilla limb anastomosis with the pancreatic duct noted anteriorly, adhesions noted in lesser sac. Multiple tortuous short gastric arteries present.      Autotransplant data:   Patient weight: 55.2 kg  Tissue mass: 9 ml  Total Islet number: 130,900  Total Islet number/k  Islet equivalents: 131,600  Islet equivalents/kilogram: 2386  Pre-infusion portal pressure: 1 cm/H2O  Peak portal pressure: 9 cm/H2O  Post-rinse (final) portal pressure: 9 cm/H2O      Results for orders placed or performed during the hospital encounter of 21   POC US Guidance Needle Placement    Narrative    Bilateral erector spinae plane catheter placement.   XR Chest Port 1 View    Narrative    XR CHEST PORT 1 VIEW  2021 6:47 PM      HISTORY: s/p TPIAT for position of lines and NG tube    COMPARISON: None    FINDINGS: AP view of the chest. Enteric tube sidehole projects over  the mid-distal esophagus with tip near the GE junction. Left IJ CVC  tip is at the superior to mid SVC. Right chest port tip is at the  cavoatrial junction. Cardiac silhouette is normal limits. No acute  airspace opacities. No pleural effusion or pneumothorax. Surgical  changes of the left upper quadrant.      Impression    IMPRESSION:   1. Enteric tube sidehole projects over the mid to lower esophagus.  Suggest advancement.  2. No acute airspace opacities.    I have personally reviewed the examination and initial interpretation  and I agree with the findings.    MIHIR PRUITT MD         SYSTEM ID:  [id number]   US Abd/Pelvis Duplex Complete Portable    Narrative    EXAMINATION: US abdomen Doppler complete  2021 7:42 PM      CLINICAL HISTORY: Status post  TPIAT. Evaluate for portal vein patency  and flows    COMPARISON: Abdomen and pelvis CT 5/20/2021    TECHNIQUE: The abdomen was scanned in standard fashion with  specialized ultrasound transducer(s) using both gray-scale, color  Doppler, and spectral flow techniques.    Findings:  Liver: The liver demonstrates normal homogeneous echotexture. No  evidence of a focal hepatic mass.     Extrahepatic portal vein flow is antegrade at 17 cm/s.  Right portal vein flow is antegrade, measuring 13 cm/s.  Left portal vein flow is antegrade, measuring 12 cm/s.    Flow in the hepatic artery is towards the liver and:  206 cm/s peak systolic  0.48 resistive index.     Right hepatic artery peak systolic velocity: 98 cm/s, hepatic artery:  76 cm/s    The middle right and left hepatic veins are patent with flow towards  the IVC. IVC is patent with velocity 63 cm/s. Splenic vein was not  visualized       Impression    Impression:   Patent Doppler evaluation. Splenic vein was not visualized.      I have personally reviewed the examination and initial interpretation  and I agree with the findings.    MIHIR PRUITT MD         SYSTEM ID:  [id number]   XR Abdomen Port 1 View    Narrative    EXAMINATION:  XR ABDOMEN PORT 1 VIEWS 8/16/2021 11:38 AM     COMPARISON: CT 5/20/2021, ultrasound 8/9/2021.    HISTORY: abdominal distention.    TECHNIQUE: Frontal supine views of the abdomen.    FINDINGS: Gastrostomy tube projects over the stomach. Abdominal drain  tips project over the right and left lower quadrants. A biliary drain  is present. Gas-filled colon without abnormal distention. No  abnormally dilated loops of small bowel. No pneumatosis or portal  venous gas. Left pleural effusion and associated atelectasis. Central  venous catheter tip terminates at the cavoatrial junction. No acute  osseous abnormality.      Impression    IMPRESSION:   1. Non-obstructed bowel gas pattern. Gas-filled colon and small bowel  without abnormal dilation  suggest ileus.  2. Left pleural effusion and associated atelectasis.    I have personally reviewed the examination and initial interpretation  and I agree with the findings.    GISELL WEAVER MD         SYSTEM ID:  [id number]   XR Chest Port 1 View    Narrative    EXAM: XR CHEST PORT 1 VIEW  8/17/2021 9:50 AM     HISTORY:  s/p TPIAT, now with leukocytosis and fever.       COMPARISON:  Chest radiograph 8/9/2021    FINDINGS:   AP portable view of the chest. Postsurgical changes the abdomen is  seen with multiple surgical clips overlying the left upper quadrant.  Right IJ Port-A-Cath missing its tip projecting over the superior  cavoatrial junction. Left IJ catheter can be seen with distal tip  projecting over the proximal SVC.    Trachea is midline. Increased bibasilar predominant mixed interstitial  and streaky airspace opacities. Retrocardiac opacification and  silhouetting of the left hemidiaphragm is now seen compared to prior.  Trace blunting of the left costophrenic angle. Borderline enlarged  cardiac silhouette. Bones are grossly intact.      Impression    IMPRESSION:  1. Increased bibasilar predominant mixed interstitial and streaky  opacities, consistent with atelectasis versus pulmonary edema.  2. Retrocardiac opacification and silhouetting of the left  hemidiaphragm, findings may represent infection versus atelectasis.  3. Trace left pleural effusion.    I have personally reviewed the examination and initial interpretation  and I agree with the findings.    PARISH PRICE MD         SYSTEM ID:  [id number]   XR Abdomen Port 1 View    Narrative    XR ABDOMEN PORT 1 VIEWS  8/18/2021 11:25 PM      HISTORY: abdominal/pelvic pain s/p recent TPAIT    COMPARISON: 8/16/2021    FINDINGS: AP supine view of the abdomen. Postsurgical changes of total  pancreatectomy and autologous islet cell transplant. Right-sided drain  in place. Gastrojejunostomy. Nonobstructive bowel gas pattern. No  definite free air or pneumatosis.  Pelvic phleboliths.      Impression    IMPRESSION: Nonobstructive bowel gas pattern.    I have personally reviewed the examination and initial interpretation  and I agree with the findings.    HAZEL KIDD MD         SYSTEM ID:  [id number]     Results for TORRES DAMIAN (MRN [id number])    Ref. Range 8/20/2021 07:49   Sodium Latest Ref Range: 133 - 144 mmol/L 138   Potassium Latest Ref Range: 3.4 - 5.3 mmol/L 3.9   Chloride Latest Ref Range: 94 - 109 mmol/L 103   Carbon Dioxide Latest Ref Range: 20 - 32 mmol/L 34 (H)   Urea Nitrogen Latest Ref Range: 7 - 30 mg/dL 9   Creatinine Latest Ref Range: 0.52 - 1.04 mg/dL 0.45 (L)   GFR Estimate Latest Ref Range: >60 mL/min/1.73m2 >90   Calcium Latest Ref Range: 8.5 - 10.1 mg/dL 8.6   Anion Gap Latest Ref Range: 3 - 14 mmol/L 1 (L)   Magnesium Latest Ref Range: 1.6 - 2.3 mg/dL 2.3   Phosphorus Latest Ref Range: 2.5 - 4.5 mg/dL 4.0   Glucose Latest Ref Range: 70 - 99 mg/dL 128 (H)   WBC Latest Ref Range: 4.0 - 11.0 10e3/uL 15.4 (H)   Hemoglobin Latest Ref Range: 11.7 - 15.7 g/dL 8.4 (L)   Hematocrit Latest Ref Range: 35.0 - 47.0 % 26.9 (L)   Platelet Count Latest Ref Range: 150 - 450 10e3/uL 936 (H)   RBC Count Latest Ref Range: 3.80 - 5.20 10e6/uL 2.99 (L)   MCV Latest Ref Range: 78 - 100 fL 90   MCH Latest Ref Range: 26.5 - 33.0 pg 28.1   MCHC Latest Ref Range: 31.5 - 36.5 g/dL 31.2 (L)   RDW Latest Ref Range: 10.0 - 15.0 % 14.9   % Neutrophils Latest Units: % 73   % Lymphocytes Latest Units: % 11   % Monocytes Latest Units: % 11   % Eosinophils Latest Units: % 4   Absolute Basophils Latest Ref Range: 0.0 - 0.2 10e3/uL 0.1   % Basophils Latest Units: % 0   Absolute Eosinophils Latest Ref Range: 0.0 - 0.7 10e3/uL 0.6   Absolute Immature Granulocytes Latest Ref Range: <=0.0 10e3/uL 0.2 (H)   Absolute Lymphocytes Latest Ref Range: 0.8 - 5.3 10e3/uL 1.6   Absolute Monocytes Latest Ref Range: 0.0 - 1.3 10e3/uL 1.7 (H)   % Immature Granulocytes Latest Units: % 1   Absolute  Neutrophils Latest Ref Range: 1.6 - 8.3 10e3/uL 11.3 (H)   Absolute NRBCs Latest Units: 10e3/uL 0.2   NRBCs per 100 WBC Latest Ref Range: <1 /100 1 (H)       Discharge Medications   Current Discharge Medication List        START taking these medications    Details   acetaminophen (TYLENOL) 325 MG tablet Take 2 tablets (650 mg) by mouth every 6 hours  Qty: 120 tablet, Refills: 0    Associated Diagnoses: S/P pancreatic islet cell transplantation (H)      Alcohol Swabs PADS Use to swab the area of the injection or pamella as directed   Per insurance coverage  Qty: 100 each, Refills: 0    Associated Diagnoses: Post-pancreatectomy diabetes (H)      amylase-lipase-protease (ZENPEP) 82205-45505 units CPEP Take 3 capsules by mouth 3 times daily (with meals) And take 1 capsule by mouth with snacks or supplements  Qty: 330 capsule, Refills: 1    Associated Diagnoses: S/P pancreatic islet cell transplantation (H)      aspirin (ASA) 325 MG tablet Take 1 tablet (325 mg) by mouth daily  Qty: 30 tablet, Refills: 1    Associated Diagnoses: S/P pancreatic islet cell transplantation (H)      bisacodyl (DULCOLAX) 10 MG suppository Place 1 suppository (10 mg) rectally daily as needed for constipation  Qty: 5 suppository, Refills: 0    Associated Diagnoses: S/P pancreatic islet cell transplantation (H)      blood glucose (NO BRAND SPECIFIED) lancets standard To use to test glucose level in the blood  Use to test blood sugar  6  times daily as directed. To accompany glucose monitor brands per insurance coverage.  One touch Delica lancets  Qty: 100 each, Refills: 0    Associated Diagnoses: Post-pancreatectomy diabetes (H)      blood glucose (NO BRAND SPECIFIED) test strip To use to test glucose level in the blood  Use to test blood sugar  6  times daily as directed. To accompany glucose monitor brands per insurance coverage.  One touch Verio strip  Qty: 100 strip, Refills: 3    Associated Diagnoses: Post-pancreatectomy diabetes (H)       docusate sodium (COLACE) 100 MG capsule Take 1 capsule (100 mg) by mouth 2 times daily as needed for constipation  Qty: 60 capsule, Refills: 1      fentaNYL (DURAGESIC) 75 mcg/hr 72 hr patch Place 1 patch onto the skin every 72 hours remove old patch.  Qty: 5 patch, Refills: 0    Associated Diagnoses: S/P pancreatic islet cell transplantation (H)      gabapentin (NEURONTIN) 300 MG capsule Take 1 capsule (300 mg) by mouth 2 times daily  Qty: 30 capsule, Refills: 0    Associated Diagnoses: S/P pancreatic islet cell transplantation (H)      glucagon 1 MG kit Inject 1 mg Subcutaneous once as needed for low blood sugar (Use as directed by provider)  Qty: 1 each, Refills: 0    Associated Diagnoses: Hypoglycemia after GI (gastrointestinal) surgery      Guar Gum (FIBER MODULAR, NUTRISOURCE FIBER,) packet 1 packet by Per Feeding Tube route 2 times daily  Qty: 60 packet, Refills: 1    Associated Diagnoses: S/P pancreatic islet cell transplantation (H)      insulin glargine (LANTUS PEN) 100 UNIT/ML pen Inject 18 Units Subcutaneous 2 times daily  Qty: 6 mL, Refills: 1    Comments: If Lantus is not covered by insurance, may substitute Basaglar at same dose and frequency.    Associated Diagnoses: S/P pancreatic islet cell transplantation (H)      !! insulin lispro (HUMALOG KWIKPEN) 100 UNIT/ML (1 unit dial) KWIKPEN For  - 169 give 1 unit.   For  - 219 give 2 units.   For  - 269 give 3 units.   For  - 319 give 4 units.   For BG >320 give 5 units.  Qty: 5 mL, Refills: 1    Associated Diagnoses: S/P pancreatic islet cell transplantation (H)      !! insulin lispro (HUMALOG KWIKPEN) 100 UNIT/ML (1 unit dial) KWIKPEN 1 unit per 12g Carb with meals and snacks/supplement  Qty: 3 mL, Refills: 1    Associated Diagnoses: S/P pancreatic islet cell transplantation (H)      insulin pen needle (32G X 4 MM) 32G X 4 MM miscellaneous Use as directed by provider  Per insurance coverage  Qty: 100 each, Refills: 0     Associated Diagnoses: Post-pancreatectomy diabetes (H)      magnesium hydroxide (MILK OF MAGNESIA) 400 MG/5ML suspension 30 mLs by Per J Tube route 2 times daily as needed for constipation  Qty: 1800 mL, Refills: 1    Associated Diagnoses: S/P pancreatic islet cell transplantation (H)      multivitamin w/minerals (THERA-VIT-M) tablet Take 1 tablet by mouth daily  Qty: 30 tablet, Refills: 1    Associated Diagnoses: S/P pancreatic islet cell transplantation (H)      Sharps Container MISC Use as directed to dispose of needles, lancets and other sharps  Per Insurance coverage  Qty: 1 each, Refills: 0    Associated Diagnoses: Post-pancreatectomy diabetes (H)       !! - Potential duplicate medications found. Please discuss with provider.        CONTINUE these medications which have CHANGED    Details   HYDROmorphone (DILAUDID) 2 MG tablet Take 1.5-3 tablets (3-6 mg) by mouth every 4 hours  Qty: 270 tablet, Refills: 0    Associated Diagnoses: S/P pancreatic islet cell transplantation (H)      methocarbamol (ROBAXIN) 500 MG tablet Take 2 tablets (1,000 mg) by mouth 3 times daily  Qty: 90 tablet, Refills: 0    Associated Diagnoses: S/P pancreatic islet cell transplantation (H)      senna-docusate (SENOKOT-S/PERICOLACE) 8.6-50 MG tablet Take 1 tablet by mouth 2 times daily as needed for constipation  Qty: 60 tablet, Refills: 0    Associated Diagnoses: S/P pancreatic islet cell transplantation (H)           CONTINUE these medications which have NOT CHANGED    Details   clonazePAM (KLONOPIN) 1 MG tablet 1mg morning and 1.5mg every evening      diphenhydrAMINE (BENADRYL) 25 MG tablet Take 25 mg by mouth every 6 hours as needed for itching or allergies      Melatonin 10 MG TABS tablet Take 10 mg by mouth nightly as needed for sleep      ondansetron (ZOFRAN-ODT) 4 MG ODT tab Take 4 mg by mouth as needed      pantoprazole (PROTONIX) 40 MG EC tablet Take 40 mg by mouth 2 times daily      protein modular (PROSOURCE TF) LIQD 1 packet  by Per Feeding Tube route 3 times daily  Qty: 180 packet, Refills: 1    Associated Diagnoses: S/P pancreatic islet cell transplantation (H)      sertraline (ZOLOFT) 100 MG tablet Take 150 mg by mouth At Bedtime      traZODone (DESYREL) 100 MG tablet Take 100 mg by mouth At Bedtime           STOP taking these medications       furosemide (LASIX) 20 MG tablet Comments:   Reason for Stopping:         hyoscyamine SL (LEVSIN/SL) 0.125 MG sublingual tablet Comments:   Reason for Stopping:         lipase-protease-amylase (ZENPEP) 40769-84237 units CPEP Comments:   Reason for Stopping:         Oxymorphone HCl 10 MG TB12 Comments:   Reason for Stopping:         potassium chloride ER (KLOR-CON M) 20 MEQ CR tablet Comments:   Reason for Stopping:             Allergies   Allergies   Allergen Reactions    Metoclopramide     Morphine     Penicillins     Prochlorperazine     Promethazine        Attestation:  Patient has been seen and evaluated by me and team.  Saw patient and discussed plans for discharge and expectations.  Less than 30 min planning discharge  Vital signs, labs, medications and orders were reviewed.   When obtained, diagnostic images were reviewed by me and interpreted as above.    The care plan was discussed with the multidisciplinary team and I agree with the findings and plan in this note, with any differences recorded in blue.    .

## 2021-08-20 NOTE — DISCHARGE SUMMARY
DISCHARGE:  D: Patient with orders to discharge to [employer].   I: Discharge instructions, medications, & follow ups reviewed with Patient. Copy of discharge summary given to Patient.  PIV removed. All belongings packed & sent with patient. Medications filled & picked up at discharge pharmacy. Paperwork faxed.   A: Patient in stable condition. AVSS. Patient had no further questions regarding discharge instructions and medications. Patient transferred out by wheelchair & left with .   P: Plan for discharge to [employer]

## 2021-08-21 ENCOUNTER — INFUSION THERAPY VISIT (OUTPATIENT)
Dept: INFUSION THERAPY | Facility: CLINIC | Age: 51
End: 2021-08-21
Attending: PEDIATRICS
Payer: COMMERCIAL

## 2021-08-21 VITALS
RESPIRATION RATE: 18 BRPM | HEART RATE: 105 BPM | BODY MASS INDEX: 22.52 KG/M2 | OXYGEN SATURATION: 97 % | TEMPERATURE: 98.9 F | DIASTOLIC BLOOD PRESSURE: 80 MMHG | WEIGHT: 119.2 LBS | SYSTOLIC BLOOD PRESSURE: 119 MMHG

## 2021-08-21 DIAGNOSIS — K76.6 PORTAL HYPERTENSION (H): ICD-10-CM

## 2021-08-21 DIAGNOSIS — Z94.83 S/P PANCREATIC ISLET CELL TRANSPLANTATION (H): Primary | ICD-10-CM

## 2021-08-21 LAB
ALBUMIN SERPL-MCNC: 2.5 G/DL (ref 3.4–5)
ALP SERPL-CCNC: 154 U/L (ref 40–150)
ALT SERPL W P-5'-P-CCNC: 48 U/L (ref 0–50)
ANION GAP SERPL CALCULATED.3IONS-SCNC: 7 MMOL/L (ref 3–14)
AST SERPL W P-5'-P-CCNC: 42 U/L (ref 0–45)
BASOPHILS # BLD AUTO: 0.1 10E3/UL (ref 0–0.2)
BASOPHILS NFR BLD AUTO: 0 %
BILIRUB SERPL-MCNC: 0.3 MG/DL (ref 0.2–1.3)
BUN SERPL-MCNC: 9 MG/DL (ref 7–30)
CALCIUM SERPL-MCNC: 8.9 MG/DL (ref 8.5–10.1)
CHLORIDE BLD-SCNC: 99 MMOL/L (ref 94–109)
CO2 SERPL-SCNC: 31 MMOL/L (ref 20–32)
CREAT SERPL-MCNC: 0.48 MG/DL (ref 0.52–1.04)
EOSINOPHIL # BLD AUTO: 0.4 10E3/UL (ref 0–0.7)
EOSINOPHIL NFR BLD AUTO: 2 %
ERYTHROCYTE [DISTWIDTH] IN BLOOD BY AUTOMATED COUNT: 14.7 % (ref 10–15)
GFR SERPL CREATININE-BSD FRML MDRD: >90 ML/MIN/1.73M2
GLUCOSE BLD-MCNC: 127 MG/DL (ref 70–99)
HCT VFR BLD AUTO: 30.2 % (ref 35–47)
HGB BLD-MCNC: 9.7 G/DL (ref 11.7–15.7)
IMM GRANULOCYTES # BLD: 0.4 10E3/UL
IMM GRANULOCYTES NFR BLD: 2 %
LYMPHOCYTES # BLD AUTO: 1.6 10E3/UL (ref 0.8–5.3)
LYMPHOCYTES NFR BLD AUTO: 8 %
MCH RBC QN AUTO: 27.7 PG (ref 26.5–33)
MCHC RBC AUTO-ENTMCNC: 32.1 G/DL (ref 31.5–36.5)
MCV RBC AUTO: 86 FL (ref 78–100)
MONOCYTES # BLD AUTO: 1.8 10E3/UL (ref 0–1.3)
MONOCYTES NFR BLD AUTO: 9 %
NEUTROPHILS # BLD AUTO: 15.8 10E3/UL (ref 1.6–8.3)
NEUTROPHILS NFR BLD AUTO: 79 %
NRBC # BLD AUTO: 0.2 10E3/UL
NRBC BLD AUTO-RTO: 1 /100
PLATELET # BLD AUTO: 1372 10E3/UL (ref 150–450)
POTASSIUM BLD-SCNC: 4.4 MMOL/L (ref 3.4–5.3)
PROT SERPL-MCNC: 7.3 G/DL (ref 6.8–8.8)
RBC # BLD AUTO: 3.5 10E6/UL (ref 3.8–5.2)
SODIUM SERPL-SCNC: 137 MMOL/L (ref 133–144)
WBC # BLD AUTO: 20 10E3/UL (ref 4–11)

## 2021-08-21 PROCEDURE — 36591 DRAW BLOOD OFF VENOUS DEVICE: CPT | Performed by: TRANSPLANT SURGERY

## 2021-08-21 PROCEDURE — 85025 COMPLETE CBC W/AUTO DIFF WBC: CPT | Performed by: TRANSPLANT SURGERY

## 2021-08-21 PROCEDURE — 250N000011 HC RX IP 250 OP 636: Performed by: TRANSPLANT SURGERY

## 2021-08-21 PROCEDURE — 80053 COMPREHEN METABOLIC PANEL: CPT | Performed by: TRANSPLANT SURGERY

## 2021-08-21 RX ORDER — HEPARIN SODIUM (PORCINE) LOCK FLUSH IV SOLN 100 UNIT/ML 100 UNIT/ML
5 SOLUTION INTRAVENOUS ONCE
Status: COMPLETED | OUTPATIENT
Start: 2021-08-21 | End: 2021-08-21

## 2021-08-21 RX ADMIN — Medication 5 ML: at 12:51

## 2021-08-21 NOTE — LETTER
"    8/21/2021         RE: Meme Robins  [address] [facility] [address]        Dear Colleague,    Thank you for referring your patient, Meme Robins, to the [facility]. Please see a copy of my visit note below.    Chief Complaint   Patient presents with     Eval/Assessment     auto-islet transplant     Meme Robins came to [facility] today for a lab and assess following a auto-islet transplant on 8/9/21.      Discharge date: 8/20/2021  Transplant coordinator: Maddi CHONG    Physical Assessment:  See physical assessment located under \"Document Flowsheets\".  Incision site: midline abdominal with dermabond. CDI  Lines: tube feed, CDI. Supplies and education given  Miller: n/a  Urine clarity: clear yellow. Denies burning or irritation  Hydration: 20 oz water since discharge, plus juice   Nutrition: tube feed. Patient voiced understanding   Last BM: today, soft/loose. Holding stool softeners/colace, dulocolax suppository. Understands when to add/increase.  Pain: 4/10 back pain. Taking pain meds as prescribed. Applied cold pack which is helping     BP's stable 121/80  107. 121/83 107. Weight 119.2 (baseline 118).     Patient clinically doing well. Denies any nausea/ vomiting. Largest concern today is her CGM system.   Patient stated her Dexcom CGM is not working. Multiple warning alerts for low glucose level which were inaccurate and stable when tested via traditional glucometer and has been doing Q4 hour glucose checks which have been stable ranging from . One high reading of 130's last night which she corrected without difficulty.   Medications reviewed and patient feels comfortable with them all. Q4 hour increments confirmed. Understands when to make changes on card. Updated pharmacy on file while in [place]. Has adequate supply of all medications and diabetic supplies. Confirmed patient has all emergency meds including clog zapper. She and her  " "understand how to do tube feedings.     Platelets 1372. Currently on 325 mg aspirin.     Labs drawn by [facility] staff Yes, copy of results given to patient.     Plan of care for today:   -Unable to reach pancreas transplant fellow x2 attempts. Labs and assessment reviewed with YFN Jackson and Tenisha, transplant coordinator. Per Lyn, no change in plan of care. Okay to discharge to [facility].  -Tenisha aware Dexcom is not working and will coordinate troubleshooting for this.    -Patient to check glucose level Q4 hrs in meantime.   -[facility] checklist completed.      Medication changes: n/a    Medications administered: n/a      Patient education:  The following teaching topics were addressed: Importance of drinking 2L of non-caffeinated fluids daily, Incisional care, Signs/symptoms of infection, Good handwashing, Medications (purposes, doses and times of administration), Phone numbers to call with concenrs (Transplant coordinator, [address] and [facility]) and Plan of care   Patient and spouse verbalized understanding and all questions answered.      Face to face time: 120 minutes    Discharge Plan  AVS given to patient.   Pt will follow up with Auto-islet team.   Discharge instructions reviewed with patient: YES  Patient/Representative verbalized understanding, all questions answered: YES    Discharged from unit at 1100 with whom:  to [facility].    Vital signs:  Temp: 98.9  F (37.2  C) Temp src: Oral BP: 119/80 Pulse: 105   Resp: 18 SpO2: 97 % O2 Device: None (Room air)     Weight: 54.1 kg (119 lb 3.2 oz)  Estimated body mass index is 22.52 kg/m  as calculated from the following:    Height as of 8/9/21: 1.549 m (5' 1\").    Weight as of this encounter: 54.1 kg (119 lb 3.2 oz).    Administrations This Visit     heparin 100 UNIT/ML injection 5 mL     Admin Date  08/21/2021 Action  Given Dose  5 mL Route  Intracatheter Administered By  Richard Gomez RN                      Again, thank you for allowing me to " participate in the care of your patient.        Sincerely,        [facility]

## 2021-08-21 NOTE — PROGRESS NOTES
"Chief Complaint   Patient presents with     Eval/Assessment     auto-islet transplant     Meme Robins came to [facility] today for a lab and assess following a auto-islet transplant on 8/9/21.      Discharge date: 8/20/2021  Transplant coordinator: Maddi CHONG    Physical Assessment:  See physical assessment located under \"Document Flowsheets\".  Incision site: midline abdominal with dermabond. CDI  Lines: tube feed, CDI. Supplies and education given  Miller: n/a  Urine clarity: clear yellow. Denies burning or irritation  Hydration: 20 oz water since discharge, plus juice   Nutrition: tube feed. Patient voiced understanding   Last BM: today, soft/loose. Holding stool softeners/colace, dulocolax suppository. Understands when to add/increase.  Pain: 4/10 back pain. Taking pain meds as prescribed. Applied cold pack which is helping     BP's stable 121/80  107. 121/83 107. Weight 119.2 (baseline 118).     Patient clinically doing well. Denies any nausea/ vomiting. Largest concern today is her CGM system.   Patient stated her Dexcom CGM is not working. Multiple warning alerts for low glucose level which were inaccurate and stable when tested via traditional glucometer and has been doing Q4 hour glucose checks which have been stable ranging from . One high reading of 130's last night which she corrected without difficulty.   Medications reviewed and patient feels comfortable with them all. Q4 hour increments confirmed. Understands when to make changes on card. Updated pharmacy on file while in [place]. Has adequate supply of all medications and diabetic supplies. Confirmed patient has all emergency meds including clog zapper. She and her  understand how to do tube feedings.     Platelets 1372. Currently on 325 mg aspirin.     Labs drawn by [facility] staff Yes, copy of results given to patient.     Plan of care for today:   -Unable to reach pancreas transplant fellow x2 attempts. Labs and assessment reviewed with yLn " "YFN Norman and Tenisha, transplant coordinator. Per Lyn, no change in plan of care. Okay to discharge to [facility].  -Tenisha aware Dexcom is not working and will coordinate troubleshooting for this.    -Patient to check glucose level Q4 hrs in meantime.   -[facility] checklist completed.      Medication changes: n/a    Medications administered: n/a      Patient education:  The following teaching topics were addressed: Importance of drinking 2L of non-caffeinated fluids daily, Incisional care, Signs/symptoms of infection, Good handwashing, Medications (purposes, doses and times of administration), Phone numbers to call with concenrs (Transplant coordinator, [address] and [facility]) and Plan of care   Patient and spouse verbalized understanding and all questions answered.      Face to face time: 120 minutes    Discharge Plan  AVS given to patient.   Pt will follow up with Auto-islet team.   Discharge instructions reviewed with patient: YES  Patient/Representative verbalized understanding, all questions answered: YES    Discharged from unit at 1100 with whom:  to [facility].    Vital signs:  Temp: 98.9  F (37.2  C) Temp src: Oral BP: 119/80 Pulse: 105   Resp: 18 SpO2: 97 % O2 Device: None (Room air)     Weight: 54.1 kg (119 lb 3.2 oz)  Estimated body mass index is 22.52 kg/m  as calculated from the following:    Height as of 8/9/21: 1.549 m (5' 1\").    Weight as of this encounter: 54.1 kg (119 lb 3.2 oz).    Administrations This Visit     heparin 100 UNIT/ML injection 5 mL     Admin Date  08/21/2021 Action  Given Dose  5 mL Route  Intracatheter Administered By  Richard Gomez, KATT                  "

## 2021-08-21 NOTE — PATIENT INSTRUCTIONS
Dear Meme Robins    Thank you for choosing [facility] ([facility]) for your transplant cares.  The following information is a summary of our appointment as well as important reminders.      Dr. Bermeo study:  [email or web address]   Tenisha, transplant coordinator will email you today with study information. Please send your daily glucose level (around noon) to Dr. Bermeo.    Your auto-islet team will be in contact with you on next steps for the Dexcom. Please check glucose levels every 4 hours in the meantime.       Congratulations!    We look forward in seeing you on your next appointment here at [facility] ([facility]).  Please don t hesitate to call us at [phone] to reschedule any of your appointments or to speak with one of the [facility] registered nurses.  It was a pleasure taking care of you today.    Sincerely,    [facility]  [address] [place] [address]  [address]  Phone:  [phone]

## 2021-08-22 LAB
BACTERIA BLD CULT: NO GROWTH
BACTERIA BLD CULT: NO GROWTH

## 2021-08-23 LAB
BACTERIA SPEC CULT: ABNORMAL

## 2021-08-23 NOTE — PROGRESS NOTES
This is a recent snapshot of the patient's [facility] medical record.  For current drug dose and complete information and questions, call [phone] or In [facility] pool, fv home infusion ([id number])  CSN Number:  [id number]

## 2021-08-24 LAB
BACTERIA BLD CULT: NO GROWTH
BACTERIA BLD CULT: NO GROWTH

## 2021-08-26 ENCOUNTER — LAB (OUTPATIENT)
Dept: LAB | Facility: CLINIC | Age: 51
End: 2021-08-26
Payer: COMMERCIAL

## 2021-08-26 ENCOUNTER — OFFICE VISIT (OUTPATIENT)
Dept: TRANSPLANT | Facility: CLINIC | Age: 51
End: 2021-08-26
Attending: TRANSPLANT SURGERY
Payer: COMMERCIAL

## 2021-08-26 VITALS — SYSTOLIC BLOOD PRESSURE: 104 MMHG | OXYGEN SATURATION: 97 % | DIASTOLIC BLOOD PRESSURE: 70 MMHG | HEART RATE: 110 BPM

## 2021-08-26 DIAGNOSIS — E89.1 POST-PANCREATECTOMY DIABETES (H): Primary | ICD-10-CM

## 2021-08-26 DIAGNOSIS — K86.89 PANCREATIC INSUFFICIENCY: ICD-10-CM

## 2021-08-26 DIAGNOSIS — R10.9 CHRONIC ABDOMINAL PAIN: Primary | ICD-10-CM

## 2021-08-26 DIAGNOSIS — G89.29 CHRONIC ABDOMINAL PAIN: Primary | ICD-10-CM

## 2021-08-26 DIAGNOSIS — Z90.410 POST-PANCREATECTOMY DIABETES (H): Primary | ICD-10-CM

## 2021-08-26 DIAGNOSIS — K86.89 PANCREATIC INSUFFICIENCY: Primary | ICD-10-CM

## 2021-08-26 DIAGNOSIS — E13.9 POST-PANCREATECTOMY DIABETES (H): Primary | ICD-10-CM

## 2021-08-26 DIAGNOSIS — K21.9 GASTROESOPHAGEAL REFLUX DISEASE WITHOUT ESOPHAGITIS: Primary | ICD-10-CM

## 2021-08-26 LAB
ALBUMIN SERPL-MCNC: 2.4 G/DL (ref 3.4–5)
ALP SERPL-CCNC: 155 U/L (ref 40–150)
ALT SERPL W P-5'-P-CCNC: 38 U/L (ref 0–50)
ANION GAP SERPL CALCULATED.3IONS-SCNC: 7 MMOL/L (ref 3–14)
AST SERPL W P-5'-P-CCNC: 28 U/L (ref 0–45)
BASOPHILS # BLD AUTO: 0.1 10E3/UL (ref 0–0.2)
BASOPHILS NFR BLD AUTO: 1 %
BILIRUB SERPL-MCNC: 0.2 MG/DL (ref 0.2–1.3)
BUN SERPL-MCNC: 12 MG/DL (ref 7–30)
CALCIUM SERPL-MCNC: 9.4 MG/DL (ref 8.5–10.1)
CHLORIDE BLD-SCNC: 97 MMOL/L (ref 94–109)
CO2 SERPL-SCNC: 32 MMOL/L (ref 20–32)
CREAT SERPL-MCNC: 0.52 MG/DL (ref 0.52–1.04)
EOSINOPHIL # BLD AUTO: 0.3 10E3/UL (ref 0–0.7)
EOSINOPHIL NFR BLD AUTO: 2 %
ERYTHROCYTE [DISTWIDTH] IN BLOOD BY AUTOMATED COUNT: 14.4 % (ref 10–15)
GFR SERPL CREATININE-BSD FRML MDRD: >90 ML/MIN/1.73M2
GLUCOSE BLD-MCNC: 128 MG/DL (ref 70–99)
HCT VFR BLD AUTO: 26.9 % (ref 35–47)
HGB BLD-MCNC: 8.5 G/DL (ref 11.7–15.7)
IMM GRANULOCYTES # BLD: 0.1 10E3/UL
IMM GRANULOCYTES NFR BLD: 1 %
LYMPHOCYTES # BLD AUTO: 1.5 10E3/UL (ref 0.8–5.3)
LYMPHOCYTES NFR BLD AUTO: 11 %
MCH RBC QN AUTO: 27.8 PG (ref 26.5–33)
MCHC RBC AUTO-ENTMCNC: 31.6 G/DL (ref 31.5–36.5)
MCV RBC AUTO: 88 FL (ref 78–100)
MONOCYTES # BLD AUTO: 2.3 10E3/UL (ref 0–1.3)
MONOCYTES NFR BLD AUTO: 17 %
NEUTROPHILS # BLD AUTO: 9.4 10E3/UL (ref 1.6–8.3)
NEUTROPHILS NFR BLD AUTO: 68 %
NRBC # BLD AUTO: 0 10E3/UL
NRBC BLD AUTO-RTO: 0 /100
PLATELET # BLD AUTO: 1658 10E3/UL (ref 150–450)
POTASSIUM BLD-SCNC: 4 MMOL/L (ref 3.4–5.3)
PROT SERPL-MCNC: 6.7 G/DL (ref 6.8–8.8)
RBC # BLD AUTO: 3.06 10E6/UL (ref 3.8–5.2)
SODIUM SERPL-SCNC: 136 MMOL/L (ref 133–144)
WBC # BLD AUTO: 13.7 10E3/UL (ref 4–11)

## 2021-08-26 PROCEDURE — 99024 POSTOP FOLLOW-UP VISIT: CPT | Performed by: TRANSPLANT SURGERY

## 2021-08-26 PROCEDURE — 80053 COMPREHEN METABOLIC PANEL: CPT

## 2021-08-26 PROCEDURE — 36591 DRAW BLOOD OFF VENOUS DEVICE: CPT

## 2021-08-26 PROCEDURE — 250N000011 HC RX IP 250 OP 636: Performed by: TRANSPLANT SURGERY

## 2021-08-26 PROCEDURE — 85048 AUTOMATED LEUKOCYTE COUNT: CPT

## 2021-08-26 PROCEDURE — G0463 HOSPITAL OUTPT CLINIC VISIT: HCPCS

## 2021-08-26 RX ORDER — GLUCAGON INJECTION, SOLUTION 1 MG/.2ML
1 INJECTION, SOLUTION SUBCUTANEOUS
Qty: 0.4 ML | Refills: 1 | Status: SHIPPED | OUTPATIENT
Start: 2021-08-26

## 2021-08-26 RX ORDER — OMEPRAZOLE 10 MG/1
20 CAPSULE, DELAYED RELEASE ORAL DAILY
COMMUNITY
End: 2021-08-26 | Stop reason: DRUGHIGH

## 2021-08-26 RX ORDER — OMEPRAZOLE 40 MG/1
CAPSULE, DELAYED RELEASE ORAL
Qty: 60 CAPSULE | Refills: 3 | Status: ON HOLD | OUTPATIENT
Start: 2021-08-26 | End: 2023-02-19

## 2021-08-26 RX ORDER — HYDROMORPHONE HYDROCHLORIDE 4 MG/1
TABLET ORAL
Qty: 56 TABLET | Refills: 0 | Status: SHIPPED | OUTPATIENT
Start: 2021-08-26

## 2021-08-26 RX ORDER — HEPARIN SODIUM (PORCINE) LOCK FLUSH IV SOLN 100 UNIT/ML 100 UNIT/ML
5 SOLUTION INTRAVENOUS ONCE
Status: COMPLETED | OUTPATIENT
Start: 2021-08-26 | End: 2021-08-26

## 2021-08-26 RX ADMIN — Medication 5 ML: at 10:50

## 2021-08-26 NOTE — NURSING NOTE
Chief Complaint   Patient presents with     Port Draw     Labs drawn via port by RN in lab.      Port accessed with 20 gauge 3/4 inch gripper needle and labs drawn by rn.  Port flushed with saline and heparin. Port then de-accessed.  Pt tolerated well.      Antoinette Ortiz RN

## 2021-08-26 NOTE — LETTER
8/26/2021         RE: Meme Robins  [address] [facility] [address]        Dear Colleague,    Thank you for referring your patient, Meme Robins, to the [facility]. Please see a copy of my visit note below.    52 yo SP TPIAT.  discharged for about a week.  Doing ok: on full tube feeds, no N,V.  No need to vent G-tube.  Eating small amounts of soup/liquids.  Drinking without prob.    Occ back discomfort. Better with cold/heat.  Prob associated with inc activity upon prior disuse.  Has some heartburn, would like omeprazole as PPI (will prescribe).  Pain manageable on same dose dilaudid and fentanyl patch (will not change at this time).  Bowel function: two soft, unformed BM/d.  Gluc being managed by Dr Bremeo: no complaints of hypoglycemia.    PE /70   Pulse 110   SpO2 97%   Alert interactive  Abd: soft, nontender.  Gtube site ok  Wound ok  Ext: minimal edema, full ROM    Current Outpatient Medications   Medication     acetaminophen (TYLENOL) 325 MG tablet     Alcohol Swabs PADS     amylase-lipase-protease (ZENPEP) 79081-15184 units CPEP     aspirin (ASA) 325 MG tablet     blood glucose (NO BRAND SPECIFIED) lancets standard     blood glucose (NO BRAND SPECIFIED) test strip     clonazePAM (KLONOPIN) 1 MG tablet     fentaNYL (DURAGESIC) 75 mcg/hr 72 hr patch     gabapentin (NEURONTIN) 300 MG capsule     Guar Gum (FIBER MODULAR, NUTRISOURCE FIBER,) packet     HYDROmorphone (DILAUDID) 2 MG tablet     insulin glargine (LANTUS PEN) 100 UNIT/ML pen     insulin lispro (HUMALOG KWIKPEN) 100 UNIT/ML (1 unit dial) KWIKPEN     insulin lispro (HUMALOG KWIKPEN) 100 UNIT/ML (1 unit dial) KWIKPEN     insulin pen needle (32G X 4 MM) 32G X 4 MM miscellaneous     Melatonin 10 MG TABS tablet     methocarbamol (ROBAXIN) 500 MG tablet     multivitamin w/minerals (THERA-VIT-M) tablet     protein modular (PROSOURCE TF) LIQD     sertraline (ZOLOFT) 100 MG tablet     Sharps Container MISC      traZODone (DESYREL) 100 MG tablet     bisacodyl (DULCOLAX) 10 MG suppository     diphenhydrAMINE (BENADRYL) 25 MG tablet     docusate sodium (COLACE) 100 MG capsule     Glucagon (GVOKE HYPOPEN 2-PACK) 1 MG/0.2ML SOAJ     glucagon 1 MG kit     HYDROmorphone (DILAUDID) 4 MG tablet     insulin pen needle (32G X 4 MM) 32G X 4 MM miscellaneous     magnesium hydroxide (MILK OF MAGNESIA) 400 MG/5ML suspension     omeprazole (PRILOSEC) 40 MG DR capsule     ondansetron (ZOFRAN-ODT) 4 MG ODT tab     senna-docusate (SENOKOT-S/PERICOLACE) 8.6-50 MG tablet     No current facility-administered medications for this visit.     Results for TORRES DAMIAN (MRN [id number]) as of 8/26/2021 14:11   Ref. Range 8/26/2021 10:58   Bilirubin Total Latest Ref Range: 0.2 - 1.3 mg/dL 0.2   Alkaline Phosphatase Latest Ref Range: 40 - 150 U/L 155 (H)   ALT Latest Ref Range: 0 - 50 U/L 38   AST Latest Ref Range: 0 - 45 U/L 28   Results for TORRES DAMIAN (MRN [id number]) as of 8/26/2021 14:11   Ref. Range 8/26/2021 10:58   WBC Latest Ref Range: 4.0 - 11.0 10e3/uL 13.7 (H)   Hemoglobin Latest Ref Range: 11.7 - 15.7 g/dL 8.5 (L)   Hematocrit Latest Ref Range: 35.0 - 47.0 % 26.9 (L)   Platelet Count Latest Ref Range: 150 - 450 10e3/uL 1,658 (HH)   Results for TORRES DAMIAN (MRN [id number]) as of 8/26/2021 14:11   Ref. Range 8/26/2021 10:58   Sodium Latest Ref Range: 133 - 144 mmol/L 136   Potassium Latest Ref Range: 3.4 - 5.3 mmol/L 4.0   Chloride Latest Ref Range: 94 - 109 mmol/L 97   Carbon Dioxide Latest Ref Range: 20 - 32 mmol/L 32   Urea Nitrogen Latest Ref Range: 7 - 30 mg/dL 12   Creatinine Latest Ref Range: 0.52 - 1.04 mg/dL 0.52   Results for TORRES DAMIAN (MRN [id number]) as of 8/26/2021 14:11   Ref. Range 8/26/2021 10:58   Glucose Latest Ref Range: 70 - 99 mg/dL 128 (H)       I/P  1. No immediate issues after TPIAT,    2. Thrombocytotosis: 1.6M.  SP splenectomy, should start coming down over next few weeks.  Stay on ASA (1/d)  as long as platelets> 1M  3. Pain: stable: no change for time being  4. Nutrition: on full tube feeds, but gut function return should allow for increased oral intake.  Gradually try new soft foods as tolerated.  May transition in future to nocturnal feeds, but will require restructure of insulin dosing.  5. Not on enzyme replacement yet.  Work with Maddi as diet increases.  6. Heart burn: wants PPI, will prescribe omeprazole.  Add oral agent (Maalox, Mylanta) as need be to assess for quick relief of irritation.  7. Glu: ok for now, but will change as activity and nutrition changes.    25 minutes adjusting and explaining issues related to management issues noted above.      Again, thank you for allowing me to participate in the care of your patient.        Sincerely,        Inocente Zaldivar MD

## 2021-08-26 NOTE — NURSING NOTE
Chief Complaint   Patient presents with     RECHECK     post auto islet     Blood pressure 104/70, pulse 110, SpO2 97 %, not currently breastfeeding.    Adela Rodriguez on 8/26/2021 at 11:09 AM

## 2021-08-26 NOTE — PROGRESS NOTES
52 yo SP TPIAT.  discharged for about a week.  Doing ok: on full tube feeds, no N,V.  No need to vent G-tube.  Eating small amounts of soup/liquids.  Drinking without prob.    Occ back discomfort. Better with cold/heat.  Prob associated with inc activity upon prior disuse.  Has some heartburn, would like omeprazole as PPI (will prescribe).  Pain manageable on same dose dilaudid and fentanyl patch (will not change at this time).  Bowel function: two soft, unformed BM/d.  Gluc being managed by Dr Bermeo: no complaints of hypoglycemia.    PE /70   Pulse 110   SpO2 97%   Alert interactive  Abd: soft, nontender.  Gtube site ok  Wound ok  Ext: minimal edema, full ROM    Current Outpatient Medications   Medication     acetaminophen (TYLENOL) 325 MG tablet     Alcohol Swabs PADS     amylase-lipase-protease (ZENPEP) 58006-47458 units CPEP     aspirin (ASA) 325 MG tablet     blood glucose (NO BRAND SPECIFIED) lancets standard     blood glucose (NO BRAND SPECIFIED) test strip     clonazePAM (KLONOPIN) 1 MG tablet     fentaNYL (DURAGESIC) 75 mcg/hr 72 hr patch     gabapentin (NEURONTIN) 300 MG capsule     Guar Gum (FIBER MODULAR, NUTRISOURCE FIBER,) packet     HYDROmorphone (DILAUDID) 2 MG tablet     insulin glargine (LANTUS PEN) 100 UNIT/ML pen     insulin lispro (HUMALOG KWIKPEN) 100 UNIT/ML (1 unit dial) KWIKPEN     insulin lispro (HUMALOG KWIKPEN) 100 UNIT/ML (1 unit dial) KWIKPEN     insulin pen needle (32G X 4 MM) 32G X 4 MM miscellaneous     Melatonin 10 MG TABS tablet     methocarbamol (ROBAXIN) 500 MG tablet     multivitamin w/minerals (THERA-VIT-M) tablet     protein modular (PROSOURCE TF) LIQD     sertraline (ZOLOFT) 100 MG tablet     Sharps Container MISC     traZODone (DESYREL) 100 MG tablet     bisacodyl (DULCOLAX) 10 MG suppository     diphenhydrAMINE (BENADRYL) 25 MG tablet     docusate sodium (COLACE) 100 MG capsule     Glucagon (GVOKE HYPOPEN 2-PACK) 1 MG/0.2ML SOAJ     glucagon 1 MG kit      HYDROmorphone (DILAUDID) 4 MG tablet     insulin pen needle (32G X 4 MM) 32G X 4 MM miscellaneous     magnesium hydroxide (MILK OF MAGNESIA) 400 MG/5ML suspension     omeprazole (PRILOSEC) 40 MG DR capsule     ondansetron (ZOFRAN-ODT) 4 MG ODT tab     senna-docusate (SENOKOT-S/PERICOLACE) 8.6-50 MG tablet     No current facility-administered medications for this visit.     Results for TORRES DAMIAN (MRN [id number]) as of 8/26/2021 14:11   Ref. Range 8/26/2021 10:58   Bilirubin Total Latest Ref Range: 0.2 - 1.3 mg/dL 0.2   Alkaline Phosphatase Latest Ref Range: 40 - 150 U/L 155 (H)   ALT Latest Ref Range: 0 - 50 U/L 38   AST Latest Ref Range: 0 - 45 U/L 28   Results for TORRES DAMIAN (MRN [id number]) as of 8/26/2021 14:11   Ref. Range 8/26/2021 10:58   WBC Latest Ref Range: 4.0 - 11.0 10e3/uL 13.7 (H)   Hemoglobin Latest Ref Range: 11.7 - 15.7 g/dL 8.5 (L)   Hematocrit Latest Ref Range: 35.0 - 47.0 % 26.9 (L)   Platelet Count Latest Ref Range: 150 - 450 10e3/uL 1,658 (HH)   Results for TORRES DAMIAN (MRN [id number]) as of 8/26/2021 14:11   Ref. Range 8/26/2021 10:58   Sodium Latest Ref Range: 133 - 144 mmol/L 136   Potassium Latest Ref Range: 3.4 - 5.3 mmol/L 4.0   Chloride Latest Ref Range: 94 - 109 mmol/L 97   Carbon Dioxide Latest Ref Range: 20 - 32 mmol/L 32   Urea Nitrogen Latest Ref Range: 7 - 30 mg/dL 12   Creatinine Latest Ref Range: 0.52 - 1.04 mg/dL 0.52   Results for TORRES DAMIAN (MRN [id number]) as of 8/26/2021 14:11   Ref. Range 8/26/2021 10:58   Glucose Latest Ref Range: 70 - 99 mg/dL 128 (H)       I/P  1. No immediate issues after TPIAT,    2. Thrombocytotosis: 1.6M.  SP splenectomy, should start coming down over next few weeks.  Stay on ASA (1/d) as long as platelets> 1M  3. Pain: stable: no change for time being  4. Nutrition: on full tube feeds, but gut function return should allow for increased oral intake.  Gradually try new soft foods as tolerated.  May transition in future to  nocturnal feeds, but will require restructure of insulin dosing.  5. Not on enzyme replacement yet.  Work with Maddi as diet increases.  6. Heart burn: wants PPI, will prescribe omeprazole.  Add oral agent (Maalox, Mylanta) as need be to assess for quick relief of irritation.  7. Glu: ok for now, but will change as activity and nutrition changes.    25 minutes adjusting and explaining issues related to management issues noted above.

## 2021-08-31 ENCOUNTER — HOME INFUSION (PRE-WILLOW HOME INFUSION) (OUTPATIENT)
Dept: PHARMACY | Facility: CLINIC | Age: 51
End: 2021-08-31

## 2021-09-01 DIAGNOSIS — Z90.410 ACQUIRED TOTAL ABSENCE OF PANCREAS: Primary | ICD-10-CM

## 2021-09-01 DIAGNOSIS — Z90.410 POST-PANCREATECTOMY DIABETES (H): ICD-10-CM

## 2021-09-01 DIAGNOSIS — Z98.890 POST-OPERATIVE STATE: Primary | ICD-10-CM

## 2021-09-01 DIAGNOSIS — E13.9 POST-PANCREATECTOMY DIABETES (H): ICD-10-CM

## 2021-09-01 DIAGNOSIS — E89.1 POST-PANCREATECTOMY DIABETES (H): ICD-10-CM

## 2021-09-01 RX ORDER — HYDROMORPHONE HYDROCHLORIDE 2 MG/1
4 TABLET ORAL EVERY 4 HOURS PRN
Qty: 90 TABLET | Refills: 0 | Status: SHIPPED | OUTPATIENT
Start: 2021-09-01 | End: 2021-09-02

## 2021-09-02 ENCOUNTER — OFFICE VISIT (OUTPATIENT)
Dept: TRANSPLANT | Facility: CLINIC | Age: 51
End: 2021-09-02
Attending: TRANSPLANT SURGERY
Payer: COMMERCIAL

## 2021-09-02 ENCOUNTER — LAB (OUTPATIENT)
Dept: LAB | Facility: CLINIC | Age: 51
End: 2021-09-02
Attending: TRANSPLANT SURGERY
Payer: COMMERCIAL

## 2021-09-02 VITALS
DIASTOLIC BLOOD PRESSURE: 75 MMHG | BODY MASS INDEX: 23.69 KG/M2 | WEIGHT: 125.4 LBS | SYSTOLIC BLOOD PRESSURE: 112 MMHG | OXYGEN SATURATION: 98 % | HEART RATE: 101 BPM

## 2021-09-02 VITALS
BODY MASS INDEX: 23.7 KG/M2 | DIASTOLIC BLOOD PRESSURE: 75 MMHG | OXYGEN SATURATION: 98 % | HEART RATE: 101 BPM | WEIGHT: 125.44 LBS | SYSTOLIC BLOOD PRESSURE: 112 MMHG

## 2021-09-02 DIAGNOSIS — E10.9 TYPE 1 DIABETES MELLITUS WITHOUT COMPLICATION (H): Primary | ICD-10-CM

## 2021-09-02 DIAGNOSIS — Z98.890 POST-OPERATIVE STATE: ICD-10-CM

## 2021-09-02 DIAGNOSIS — Z90.410 ACQUIRED TOTAL ABSENCE OF PANCREAS: ICD-10-CM

## 2021-09-02 DIAGNOSIS — Z94.83 S/P PANCREATIC ISLET CELL TRANSPLANTATION (H): ICD-10-CM

## 2021-09-02 LAB
ALBUMIN SERPL-MCNC: 2.4 G/DL (ref 3.4–5)
ALP SERPL-CCNC: 134 U/L (ref 40–150)
ALT SERPL W P-5'-P-CCNC: 48 U/L (ref 0–50)
ANION GAP SERPL CALCULATED.3IONS-SCNC: 6 MMOL/L (ref 3–14)
AST SERPL W P-5'-P-CCNC: 36 U/L (ref 0–45)
BASOPHILS # BLD AUTO: 0.1 10E3/UL (ref 0–0.2)
BASOPHILS NFR BLD AUTO: 1 %
BILIRUB SERPL-MCNC: 0.2 MG/DL (ref 0.2–1.3)
BUN SERPL-MCNC: 14 MG/DL (ref 7–30)
CALCIUM SERPL-MCNC: 9.4 MG/DL (ref 8.5–10.1)
CHLORIDE BLD-SCNC: 100 MMOL/L (ref 94–109)
CO2 SERPL-SCNC: 32 MMOL/L (ref 20–32)
CREAT SERPL-MCNC: 0.47 MG/DL (ref 0.52–1.04)
EOSINOPHIL # BLD AUTO: 0.3 10E3/UL (ref 0–0.7)
EOSINOPHIL NFR BLD AUTO: 3 %
ERYTHROCYTE [DISTWIDTH] IN BLOOD BY AUTOMATED COUNT: 14.8 % (ref 10–15)
GFR SERPL CREATININE-BSD FRML MDRD: >90 ML/MIN/1.73M2
GLUCOSE BLD-MCNC: 127 MG/DL (ref 70–99)
HCT VFR BLD AUTO: 28.4 % (ref 35–47)
HGB BLD-MCNC: 8.7 G/DL (ref 11.7–15.7)
IMM GRANULOCYTES # BLD: 0.1 10E3/UL
IMM GRANULOCYTES NFR BLD: 1 %
LYMPHOCYTES # BLD AUTO: 1.9 10E3/UL (ref 0.8–5.3)
LYMPHOCYTES NFR BLD AUTO: 15 %
MCH RBC QN AUTO: 26.9 PG (ref 26.5–33)
MCHC RBC AUTO-ENTMCNC: 30.6 G/DL (ref 31.5–36.5)
MCV RBC AUTO: 88 FL (ref 78–100)
MONOCYTES # BLD AUTO: 1.7 10E3/UL (ref 0–1.3)
MONOCYTES NFR BLD AUTO: 13 %
NEUTROPHILS # BLD AUTO: 8.6 10E3/UL (ref 1.6–8.3)
NEUTROPHILS NFR BLD AUTO: 67 %
NRBC # BLD AUTO: 0 10E3/UL
NRBC BLD AUTO-RTO: 0 /100
PLAT MORPH BLD: NORMAL
PLATELET # BLD AUTO: 1239 10E3/UL (ref 150–450)
POTASSIUM BLD-SCNC: 4.7 MMOL/L (ref 3.4–5.3)
PROT SERPL-MCNC: 7.1 G/DL (ref 6.8–8.8)
RBC # BLD AUTO: 3.24 10E6/UL (ref 3.8–5.2)
RBC MORPH BLD: NORMAL
SODIUM SERPL-SCNC: 138 MMOL/L (ref 133–144)
WBC # BLD AUTO: 12.6 10E3/UL (ref 4–11)

## 2021-09-02 PROCEDURE — 84450 TRANSFERASE (AST) (SGOT): CPT

## 2021-09-02 PROCEDURE — 84460 ALANINE AMINO (ALT) (SGPT): CPT

## 2021-09-02 PROCEDURE — 85025 COMPLETE CBC W/AUTO DIFF WBC: CPT

## 2021-09-02 PROCEDURE — 250N000011 HC RX IP 250 OP 636: Performed by: TRANSPLANT SURGERY

## 2021-09-02 PROCEDURE — 99215 OFFICE O/P EST HI 40 MIN: CPT | Performed by: PEDIATRICS

## 2021-09-02 PROCEDURE — 99024 POSTOP FOLLOW-UP VISIT: CPT | Performed by: TRANSPLANT SURGERY

## 2021-09-02 PROCEDURE — 36591 DRAW BLOOD OFF VENOUS DEVICE: CPT

## 2021-09-02 RX ORDER — GABAPENTIN 300 MG/1
300 CAPSULE ORAL 2 TIMES DAILY
Qty: 60 CAPSULE | Refills: 1 | Status: ON HOLD | OUTPATIENT
Start: 2021-09-02 | End: 2023-02-25

## 2021-09-02 RX ORDER — HYDROMORPHONE HYDROCHLORIDE 2 MG/1
2-4 TABLET ORAL EVERY 6 HOURS PRN
Qty: 60 TABLET | Refills: 0 | Status: SHIPPED | OUTPATIENT
Start: 2021-09-02 | End: 2021-09-14

## 2021-09-02 RX ORDER — HEPARIN SODIUM (PORCINE) LOCK FLUSH IV SOLN 100 UNIT/ML 100 UNIT/ML
5 SOLUTION INTRAVENOUS ONCE
Status: COMPLETED | OUTPATIENT
Start: 2021-09-02 | End: 2021-09-02

## 2021-09-02 RX ORDER — FENTANYL 50 UG/1
1 PATCH TRANSDERMAL
Qty: 5 PATCH | Refills: 0 | Status: SHIPPED | OUTPATIENT
Start: 2021-09-02 | End: 2021-09-14

## 2021-09-02 RX ADMIN — Medication 5 ML: at 12:51

## 2021-09-02 NOTE — LETTER
9/2/2021         RE: Meme Robins  [address] [facility] [address]        Dear Colleague,    Thank you for referring your patient, Meme Robins, to the [facility]PL[facility]. Please see a copy of my visit note below.    [facility]  Islet Autotransplant, Diabetes Follow Up    Problem List:  Patient Active Problem List   Diagnosis     Abdominal pain, RUQ (right upper quadrant)     Acute upper respiratory infection     Anemia     Cellulitis and abscess     Chronic abdominal pain     Chronic pancreatitis (H)     Dysfunctional sphincter of Oddi     Diarrhea     Degeneration of cervical intervertebral disc     Dysthymic disorder     Iron deficiency anemia     Glucocorticoid deficiency (H)     Joint pain, hip     Long term current use of anticoagulant therapy     Other symptoms involving urinary system(788.99)     Nausea alone     Myalgia and myositis     Migraine     Malaise and fatigue     Major depressive disorder, single episode     Urinary tract infection     Pulmonary embolism (H)     Primary hypercoagulable state (H)     Hypoglycemia after GI (gastrointestinal) surgery     Postoperative abdominal pain     S/P pancreatic islet cell transplantation (H)       HPI:  Meme is a 51 year old female here for follow up of Open total pancreatectomy with splenectomy; Adhesiolysis for more than 120 minutes; Open Gastrojejunostomy tube placement (Sergio's technique); Bowel & Biliary reconstruction using Bonilla-en Y choledochojejunostomy (retrocolic) over 8-Fr pediatric feeding tube stent and Duodenojejunostomy (retrocolic); Interval Appendectomy; Infusion of islet cells in portal circulation through splenic vein; Wedge biopsy of Liver performed on 8/9/2021.  At the time of the procedure, the patient received 131,600 IEQ, or 2,386 IEQ/kg body weight.   Other endocrine history includes a distant preceding history of adrenal insufficiency, but an AM cortisol even before  cosyntropin at presurgical baseline was 35, demonstrating adequate cortisol response to physiologic stress.     Autotransplant data:   Patient weight: 55.2 kg  Tissue mass: 9 ml  Total Islet number: 130,900  Total Islet number/k  Islet equivalents: 131,600  Islet equivalents/kilogram: 2386  Pre-infusion portal pressure: 1 cm/H2O  Peak portal pressure: 9 cm/H2O  Post-rinse (final) portal pressure: 9 cm/H2O    At today's visit, She is really looking great at 24 days post op.  She says she has made a lot of progress in the past week.  She is actually eating pretty well with no issues - still small meals but eating about 3 small meals per day.    We reduced tube feeds to 30 ml per hour 2 days ago and adjusted insulin doses.  She did have some lows yesterday, but that is probably in part due to overlap with the higher doses of Lantus from the day prior.  Dr Shirley did OK coming off feeds    Diabetes history:  Current insulin regimen:  Lantus 9 u AM, 8 u PM  Humalog 1 per 50>120    Patient is good candidate for CGM therapy.  Meets these criteria:  -- Has a diagnosis of diabetes,: This patient has type 1 diabetes secondary to pancreatectomy (surgical type 1)    --  Uses a home blood glucose monitor (BGM) and conduct four or more daily BGM tests, or is on CGM therapy:  On CGM or tests 6 times per day  --- Treated with insulin with multiple daily injections or a continuous subcutaneous insulin infusion (CSII) pump:  This patient is on MDI (3-4 injections per day on avg)  --  Require frequent adjustments of the insulin treatment regimen, based on therapeutic CGM test results:  yes      Recent hemoglobin A1c levels:  Lab Results   Component Value Date    A1C 5.5 2021    A1C 6.0 2021     Hypoglycemia history:  Mild to moderate.  The patient has had no episodes of severe hypoglycemia (seizure, coma, or neuroglycopenic symptoms severe enough to require assistance from another person).  Blood sugars were  reviewed from the patient records and/or the meter download.    Reviewed BG from logs -- range 48 (after transition to 30 mL/hour) to mid 100s.       Review of systems:  A complete ROS is performed and is negative except as noted in the HPI or below:    Past Medical and Surgical History:  Past Medical History:   Diagnosis Date     Adrenal insufficiency (H)     iatrogenic, around 2013, off treatment for several years as of 2021 visit     Anemia      Depression      Esophageal reflux      Idiopathic chronic pancreatitis (H)      Pre-diabetes      Pulmonary embolism (H)      Past Surgical History:   Procedure Laterality Date     CELIAC PLEXUS BLOCK  05/29/2014    with alcohol x 1     ENDOMETRIAL ABLATION  03/29/2014     ENDOSCOPIC RETROGRADE CHOLANGIOPANCREATOGRAM      about 30, most with PD stentes     ENDOSCOPIC ULTRASOUND, ESOPHAGOSCOPY, GASTROSCOPY, DUODENOSCOPY (EGD), COMBINED       HC SHOULDER ARTHROSCOPY,SURGICAL BICEPS TENODESIS  02/09/2017     JEJUNOSTOMY CARE      multiple feeding tubes x 3, at least one was direct J     LAPAROSCOPIC PANCREATECTOMY, TRANSPLANT AUTO ISLET CELL N/A 8/9/2021    Procedure: Open total pancreatectomy with autologous islet cell transplantation, splenectomy, and incidental appendectomy;  Surgeon: Elfego Shirley MD;  Location: [facility] OR     PANCREAS SURGERY  05/29/2014    John procedure revision     PANCREAS SURGERY  02/2011    John       Family History:  New changes since last visit:  none  Family History   Problem Relation Age of Onset     Breast Cancer Mother      Diabetes No family hx of      Pancreatitis No family hx of        Social History:  Social History     Social History Narrative    Meme is  and has three children.       Staying locally.     Physical Exam:  Vitals: /75   Pulse 101   Wt 56.9 kg (125 lb 7.1 oz)   SpO2 98%   BMI 23.70 kg/m    BMI= Body mass index is 23.7 kg/m .  General:  Appearance is normal, no acute distress  HEENT:  NC/AT, sclera  appear white  Neck:  No obvious thyromegaly  CV/Lungs:  Non distressed breathing  Skin:  No apparent rashes  Neuro:  Normal mental status  Psych:  Normal affect          Assessment:  1.  Post pancreatectomy diabetes mellitus, s/p total pancreatectomy and islet autotransplant.  (ICD-10 E89.1)  2.  Type 1 diabetes secondary to pancreatectomy, as outlined in #1 above (Surgical type 1 DM, ICD-10 E10.9)    Meme is a 51 year old with history of chronic pancreatitis who is s/p total pancreatectomy and islet autotransplant.  She received a low-moderate mass of about 2300 IEQ/kg but is doing quite well on low insulin doses.  I think as she transitions off feeds she will need half unit dosing and we discussed trying an In Pen.  We reviewed correction and meal dosing and timing of BG corrections.  She has just restarted a clinic dispensed Dexcom G6 but we would like her to stay on her own personal CGM for safety and monitoring and dose adjustments.  We reviewed plan below.      Plan:  1.  Changes to current diabetes regimen:  Patient Instructions   1)  Stop tube feeds-- if you are running >100 you can stop them tonight but if you are getting lows again, keep it running until the morning and stop in the morning.    2)  Keep the Lantus at 9 units in the morning.  No lantus in the evenings anymore.    3)  Keep the Humalog the same, but I will work on getting the half unit In-Pen so we can eventually be a bit more precise with your dosing.  And then we will see by your pattern off feeds if we need to make more adjustments.        2.  Frequency of blood sugar checks:  6 times per day if not wearing Dexcom  3.  Continue routine follow up for autoislet transplant patients:  Mixed meal test (6 mL/kg BoostHP to max of 360 mL) at 3 months, 6 months, and once a year post transplant.  Hemoglobin A1c levels at these time points and quarterly.  4.  Other issues addressed today:  None.      Follow up:  TBD    Contact me for questions at  [phone] or [phone].  Emergency number to reach pediatric endocrinology after hours is [phone].        Dr. Patricia Bermeo MD  [facility] [place]  Director of Research, Islet Auto-transplant Program  Phone:  [phone]  Electronically signed: September 3, 2021 at 8:29 AM        Discussion of management or test interpretation with external physician/other qualified healthcare professional/appropriate source - Dr Shirley  Prescription drug management  45 minutes spent on the date of the encounter doing chart review, history and exam, documentation and further activities per the note.  I spent 30 minutes face to face with Meme.\      Again, thank you for allowing me to participate in the care of your patient.        Sincerely,        Patricia Bermeo MD

## 2021-09-02 NOTE — NURSING NOTE
Chief Complaint   Patient presents with     Port Draw     Labs drawn via port by rn in lab. VS taken.     Port accessed with 20g 3/4 inch gripper needle by RN, labs collected, line flushed with saline and heparin.  Vitals taken. Pt checked in for appointment(s).    Waqar Kwong, RN

## 2021-09-02 NOTE — LETTER
9/2/2021         RE: Meme Robins  [address] [facility] [address]        Dear Colleague,    Thank you for referring your patient, Meme Robins, to the [facility] TRANSPLANT [facility]. Please see a copy of my visit note below.    Chronic Pancreatitis Group Progress Note    I had the pleasure of seeing Ms. Meme Robins today. She is 29 days status post total pancreatectomy with islet autotransplant.    Interval events since last visit with me:   ER visits = 0, reasons: 0  Inpatient admissions = 0, reasons 0  The patient reports their current symptoms to be:   GI function:   Nausea:   [x]  none    []  rare    []  often    []  daily  Comment:   Vomiting: [x]  none    []  rare    []  often    []  daily   Comment:   Last BM: Today.  Stools are soft  Appetite: good    Pancreatic exocrine insufficiency:     Greasy stools: [x]  none   []  rarely    []  several times/wk   []  daily      Diabetes management:     Lab Results   Component Value Date    A1C 5.5 08/04/2021    A1C 6.0 05/19/2021      Pain management:   Fentanyl 75 mcg patc  Dilaudid 2-4 mg q4h prn  Prescription Medications as of 9/7/2021       Rx Number Disp Refills Start End Last Dispensed Date Next Fill Date Owning Pharmacy    acetaminophen (TYLENOL) 325 MG tablet  120 tablet 0 8/20/2021    [address]    Sig: Take 2 tablets (650 mg) by mouth every 6 hours    Class: E-Prescribe    Route: Oral    Renewals     Renewal requests to authorizing provider (Aliza Ridley NP) <b>prohibited</b>          Alcohol Swabs PADS  100 each 0 8/18/2021    [address]    Sig: Use to swab the area of the injection or pamella as directed   Per insurance coverage    Class: Local Print    amylase-lipase-protease (ZENPEP) [address]87-34[address] units CPEP  330 capsule 1 8/20/2021 9/19/2021   [place] [address]  [facility]    Sig: Take 3 capsules by mouth 3 times daily (with meals) And take 1 capsule by mouth with snacks or supplements    Class: E-Prescribe    Route: Oral    Renewals     Renewal requests to authorizing provider (Aliza Ridley NP) <b>prohibited</b>          aspirin (ASA) 325 MG tablet  30 tablet 1 2021    [address]    Sig: Take 1 tablet (325 mg) by mouth daily    Class: E-Prescribe    Route: Oral    Renewals     Renewal requests to authorizing provider (Aliza Ridley NP) <b>prohibited</b>          blood glucose (NO BRAND SPECIFIED) lancets standard  100 each 0 2021    [address]    Sig: To use to test glucose level in the blood  Use to test blood sugar  6  times daily as directed. To accompany glucose monitor brands per insurance coverage.  One touch Delica lancets    Class: Local Print    blood glucose (NO BRAND SPECIFIED) test strip  100 strip 3 2021    [address]    Sig: To use to test glucose level in the blood  Use to test blood sugar  6  times daily as directed. To accompany glucose monitor brands per insurance coverage.  One touch Verio strip    Class: Local Print    clonazePAM (KLONOPIN) 1 MG tablet    2021        Simg morning and 1.5mg every evening    Class: Historical    Continuous Blood Gluc Sensor (DEXCOM G6 SENSOR) MISC  3 each 5 9/3/2021    [id number] - [place], [place] - [address]    Si each every 10 days Change every 10 days.    Class: E-Prescribe    Route: Does not apply    Continuous Blood Gluc Transmit (DEXCOM G6 TRANSMITTER) MISC  1 each 1 9/3/2021    Count includes the [id number] - [place], [place] - [address]    Si each every 3 months Change every 3 months.    Class: E-Prescribe    Route: Does not apply    fentaNYL (DURAGESIC) 50  mcg/hr 72 hr patch  5 patch 0 2021    [id number] - [place], [place] - [address] [facility]    Sig: Place 1 patch onto the skin every 72 hours remove old patch.    Class: Local Print    Earliest Fill Date: 2021    Route: Transdermal    gabapentin (NEURONTIN) 300 MG capsule  60 capsule 1 2021    [facility][id number] - [place], [place] - [address] [facility]    Sig: Take 1 capsule (300 mg) by mouth 2 times daily    Class: E-Prescribe    Route: Oral    Glucagon (GVOKE HYPOPEN 2-PACK) 1 MG/0.2ML SOAJ  0.4 mL 1 2021    [facility] [address]    Sig: Inject 1 each Subcutaneous once as needed (for severe hypoglycemia) Use x1 dose and then call seek emergent care for treatment    Class: E-Prescribe    Notes to Pharmacy: Please call Maddi Almaraz RN Transplant Coordinator, with fill/formulary issues: [phone]    Route: Subcutaneous    Guar Gum (FIBER MODULAR, NUTRISOURCE FIBER,) packet  60 packet 1 2021    [facility] [address]    Si packet by Per Feeding Tube route 2 times daily    Class: E-Prescribe    Route: Per Feeding Tube    Renewals     Renewal requests to authorizing provider (Aliza Ridley NP) <b>prohibited</b>          HYDROmorphone (DILAUDID) 2 MG tablet  60 tablet 0 2021    [facility][id number] - [place], [place] - [address] [facility]    Sig: Take 1-2 tablets (2-4 mg) by mouth every 6 hours as needed for severe pain    Class: Local Print    Earliest Fill Date: 2021    Route: Oral    insulin glargine (LANTUS PEN) 100 UNIT/ML pen  6 mL 1 2021    [facility] [address]    Sig: Inject 18 Units Subcutaneous 2 times daily    Class: E-Prescribe    Notes to Pharmacy: If Lantus is not covered by insurance, may substitute  Basaglar at same dose and frequency.      Route: Subcutaneous    insulin lispro (HUMALOG KWIKPEN) 100 UNIT/ML (1 unit dial) KWIKPEN  5 mL 1 2021    [address]    Sig: For  - 169 give 1 unit.   For  - 219 give 2 units.   For  - 269 give 3 units.   For  - 319 give 4 units.   For BG >320 give 5 units.    Class: Local Print    insulin lispro (HUMALOG KWIKPEN) 100 UNIT/ML (1 unit dial) KWIKPEN  3 mL 1 2021    [address]    Si unit per 12g Carb with meals and snacks/supplement    Class: E-Prescribe    Renewals     Renewal requests to authorizing provider (Aliza Ridley NP) <b>prohibited</b>          insulin lispro (HUMALOG) 100 UNIT/ML Cartridge  15 mL 3 9/3/2021    [id number] - [place], [place] - [address]    Sig: Take 0.5- 5 units pre meal using scale prescribed, up to 30 unit per day supply.    Class: E-Prescribe    insulin pen needle (32G X 4 MM) 32G X 4 MM miscellaneous  200 each 3 2021    [address]    Sig: Use 4-6 pen needles daily to inject subcutaneous insulin    Class: E-Prescribe    insulin pen needle (32G X 4 MM) 32G X 4 MM miscellaneous  100 each 0 2021    [address]    Sig: Use as directed by provider  Per insurance coverage    Class: Local Print    magnesium hydroxide (MILK OF MAGNESIA) 400 MG/5ML suspension  1800 mL 1 2021    [address]    Si mLs by Per J Tube route 2 times daily as needed for constipation    Class: E-Prescribe    Route: Per J Tube    Renewals     Renewal requests to authorizing provider (Ailza Ridley, NP) <b>prohibited</b>          Melatonin 10 MG TABS tablet            Sig: Take 10 mg by mouth  nightly as needed for sleep    Class: Historical    Route: Oral    methocarbamol (ROBAXIN) 500 MG tablet  90 tablet 0 2021    [address]    Sig: Take 2 tablets (1,000 mg) by mouth 3 times daily    Class: E-Prescribe    Route: Oral    multivitamin w/minerals (THERA-VIT-M) tablet  30 tablet 1 2021    [address]    Sig: Take 1 tablet by mouth daily    Class: E-Prescribe    Route: Oral    Renewals     Renewal requests to authorizing provider (Aliza Ridley NP) <b>prohibited</b>          omeprazole (PRILOSEC) 40 MG DR capsule  60 capsule 3 2021    [address]    Sig: Take 1 tablet twice daily    Class: E-Prescribe    protein modular (PROSOURCE TF) LIQD  180 packet 1 2021    [address]    Si packet by Per Feeding Tube route 3 times daily    Class: E-Prescribe    Route: Per Feeding Tube    Renewals     Renewal requests to authorizing provider (lAiza Ridley NP) <b>prohibited</b>          sertraline (ZOLOFT) 100 MG tablet    2021        Sig: Take 150 mg by mouth At Bedtime    Class: Historical    Route: Oral    Sharps Container MISC  1 each 0 2021    [address]    Sig: Use as directed to dispose of needles, lancets and other sharps  Per Insurance coverage    Class: Local Print    traZODone (DESYREL) 100 MG tablet    2021        Sig: Take 100 mg by mouth At Bedtime    Class: Historical    Route: Oral    bisacodyl (DULCOLAX) 10 MG suppository  5 suppository 0 2021    [address]    Sig: Place 1 suppository (10 mg) rectally daily as needed for constipation    Class: E-Prescribe    Route: Rectal    diphenhydrAMINE  (BENADRYL) 25 MG tablet            Sig: Take 25 mg by mouth every 6 hours as needed for itching or allergies    Class: Historical    Route: Oral    docusate sodium (COLACE) 100 MG capsule  60 capsule 1 8/20/2021    [address]    Sig: Take 1 capsule (100 mg) by mouth 2 times daily as needed for constipation    Class: Historical    Route: Oral    Renewals     Renewal requests to authorizing provider (Aliza Ridley NP) <b>prohibited</b>          glucagon 1 MG kit  1 each 0 8/18/2021    [address]    Sig: Inject 1 mg Subcutaneous once as needed for low blood sugar (Use as directed by provider)    Class: E-Prescribe    Route: Subcutaneous    HYDROmorphone (DILAUDID) 4 MG tablet  56 tablet 0 8/26/2021    [address]    Sig: Take 1-2 tablets (4-6 mg) every 4 hours as needed for severe abdominal pain    Class: E-Prescribe    Earliest Fill Date: 8/26/2021    ondansetron (ZOFRAN-ODT) 4 MG ODT tab            Sig: Take 4 mg by mouth as needed    Class: Historical    Route: Oral    senna-docusate (SENOKOT-S/PERICOLACE) 8.6-50 MG tablet  60 tablet 0 8/20/2021    [address]    Sig: Take 1 tablet by mouth 2 times daily as needed for constipation    Class: E-Prescribe    Route: Oral    Renewals     Renewal requests to authorizing provider (Aliza Ridley NP) <b>prohibited</b>              Physical exam:   General Appearance: in no apparent distress.   Pulse:  [103] 103  BP: (118)/(83) 118/83  SpO2:  [94 %] 94 %   Skin: Normal, no rashes or jaundice  Heart: Regular rhythm.  Lungs: no audible wheezes or increased work of breathing.  Abdomen: The abdomen is flat, and the wound is Healing well, w/out hernia.  Tube exit site is clean. The abdomen is non-tender.    Edema: absent.    Chronic  Pancreatitis Latest Ref Rng & Units 8/26/2021 9/2/2021 9/2/2021 9/2/2021 9/7/2021   Weight - - - 56.881 kg 56.9 kg 53.751 kg   AMYLASE 30 - 110 U/L - - - - -   LIPASE 73 - 393 U/L - - - - -   A1C 0.0 - 5.6 % - - - - -   C PEPTIDE 0.9 - 6.9 ng/mL - - - - -   GLUCOSE 70 - 99 mg/dL 128(H) 127(H) - - -   GLUCOSE BY METER POCT 70 - 99 mg/dL - - - - -   GLUCOSE WHOLE BLOOD POCT 70 - 99 mg/dL - - - - -   HEMOGLOBIN 11.7 - 15.7 g/dL 8.5(L) 8.7(L) - - -    - 450 10e3/uL 1,658(HH) 1,239(HH) - - -   ALBUMIN 3.4 - 5.0 g/dL 2.4(L) 2.4(L) - - -   PREALBUMIN 15 - 45 mg/dL - - - - -       Assessment and Plan: She is doing well s/p TP-IAT.  Interval progress has been good.  Postoperative gastric ileus: resolved  Pancreatic exocrine insufficiency: controled   Malnutrition: resolved, TFs - stop  Diabetes mellitus: per Dr. Bermeo  Abdominal pain management: decreasing fentanyl patch to 50; dilaudid 2-6 mg q6h prn  Followup: I will see her again in clinic in 2 weeks            Again, thank you for allowing me to participate in the care of your patient.        Sincerely,        Elfego Shirley MD

## 2021-09-02 NOTE — PATIENT INSTRUCTIONS
1)  Stop tube feeds-- if you are running >100 you can stop them tonight but if you are getting lows again, keep it running until the morning and stop in the morning.    2)  Keep the Lantus at 9 units in the morning.  No lantus in the evenings anymore.    3)  Keep the Humalog the same, but I will work on getting the half unit In-Pen so we can eventually be a bit more precise with your dosing.  And then we will see by your pattern off feeds if we need to make more adjustments.

## 2021-09-02 NOTE — NURSING NOTE
Chief Complaint   Patient presents with     RECHECK     TP-IAT F/U     Blood pressure 112/75, pulse 101, weight 56.9 kg (125 lb 7.1 oz), SpO2 98 %, not currently breastfeeding.    Meme Orellana, CMA

## 2021-09-02 NOTE — PROGRESS NOTES
[facility]  Islet Autotransplant, Diabetes Follow Up    Problem List:  Patient Active Problem List   Diagnosis     Abdominal pain, RUQ (right upper quadrant)     Acute upper respiratory infection     Anemia     Cellulitis and abscess     Chronic abdominal pain     Chronic pancreatitis (H)     Dysfunctional sphincter of Oddi     Diarrhea     Degeneration of cervical intervertebral disc     Dysthymic disorder     Iron deficiency anemia     Glucocorticoid deficiency (H)     Joint pain, hip     Long term current use of anticoagulant therapy     Other symptoms involving urinary system(788.99)     Nausea alone     Myalgia and myositis     Migraine     Malaise and fatigue     Major depressive disorder, single episode     Urinary tract infection     Pulmonary embolism (H)     Primary hypercoagulable state (H)     Hypoglycemia after GI (gastrointestinal) surgery     Postoperative abdominal pain     S/P pancreatic islet cell transplantation (H)       HPI:  Meme is a 51 year old female here for follow up of Open total pancreatectomy with splenectomy; Adhesiolysis for more than 120 minutes; Open Gastrojejunostomy tube placement (Sergio's technique); Bowel & Biliary reconstruction using Bonilla-en Y choledochojejunostomy (retrocolic) over 8-Fr pediatric feeding tube stent and Duodenojejunostomy (retrocolic); Interval Appendectomy; Infusion of islet cells in portal circulation through splenic vein; Wedge biopsy of Liver performed on 2021.  At the time of the procedure, the patient received 131,600 IEQ, or 2,386 IEQ/kg body weight.   Other endocrine history includes a distant preceding history of adrenal insufficiency, but an AM cortisol even before cosyntropin at presurgical baseline was 35, demonstrating adequate cortisol response to physiologic stress.     Autotransplant data:   Patient weight: 55.2 kg  Tissue mass: 9 ml  Total Islet number: 130,900  Total Islet number/k  Islet  equivalents: 131,600  Islet equivalents/kilogram: 2386  Pre-infusion portal pressure: 1 cm/H2O  Peak portal pressure: 9 cm/H2O  Post-rinse (final) portal pressure: 9 cm/H2O    At today's visit, She is really looking great at 24 days post op.  She says she has made a lot of progress in the past week.  She is actually eating pretty well with no issues - still small meals but eating about 3 small meals per day.    We reduced tube feeds to 30 ml per hour 2 days ago and adjusted insulin doses.  She did have some lows yesterday, but that is probably in part due to overlap with the higher doses of Lantus from the day prior.  Dr Shirley did OK coming off feeds    Diabetes history:  Current insulin regimen:  Lantus 9 u AM, 8 u PM  Humalog 1 per 50>120    Patient is good candidate for CGM therapy.  Meets these criteria:  -- Has a diagnosis of diabetes,: This patient has type 1 diabetes secondary to pancreatectomy (surgical type 1)    --  Uses a home blood glucose monitor (BGM) and conduct four or more daily BGM tests, or is on CGM therapy:  On CGM or tests 6 times per day  --- Treated with insulin with multiple daily injections or a continuous subcutaneous insulin infusion (CSII) pump:  This patient is on MDI (3-4 injections per day on avg)  --  Require frequent adjustments of the insulin treatment regimen, based on therapeutic CGM test results:  yes      Recent hemoglobin A1c levels:  Lab Results   Component Value Date    A1C 5.5 08/04/2021    A1C 6.0 05/19/2021     Hypoglycemia history:  Mild to moderate.  The patient has had no episodes of severe hypoglycemia (seizure, coma, or neuroglycopenic symptoms severe enough to require assistance from another person).  Blood sugars were reviewed from the patient records and/or the meter download.    Reviewed BG from logs -- range 48 (after transition to 30 mL/hour) to mid 100s.       Review of systems:  A complete ROS is performed and is negative except as noted in the HPI or  below:    Past Medical and Surgical History:  Past Medical History:   Diagnosis Date     Adrenal insufficiency (H)     iatrogenic, around 2013, off treatment for several years as of 2021 visit     Anemia      Depression      Esophageal reflux      Idiopathic chronic pancreatitis (H)      Pre-diabetes      Pulmonary embolism (H)      Past Surgical History:   Procedure Laterality Date     CELIAC PLEXUS BLOCK  05/29/2014    with alcohol x 1     ENDOMETRIAL ABLATION  03/29/2014     ENDOSCOPIC RETROGRADE CHOLANGIOPANCREATOGRAM      about 30, most with PD stentes     ENDOSCOPIC ULTRASOUND, ESOPHAGOSCOPY, GASTROSCOPY, DUODENOSCOPY (EGD), COMBINED        SHOULDER ARTHROSCOPY,SURGICAL BICEPS TENODESIS  02/09/2017     JEJUNOSTOMY CARE      multiple feeding tubes x 3, at least one was direct J     LAPAROSCOPIC PANCREATECTOMY, TRANSPLANT AUTO ISLET CELL N/A 8/9/2021    Procedure: Open total pancreatectomy with autologous islet cell transplantation, splenectomy, and incidental appendectomy;  Surgeon: Elfego Shirley MD;  Location: [facility] OR     PANCREAS SURGERY  05/29/2014    John procedure revision     PANCREAS SURGERY  02/2011    John       Family History:  New changes since last visit:  none  Family History   Problem Relation Age of Onset     Breast Cancer Mother      Diabetes No family hx of      Pancreatitis No family hx of        Social History:  Social History     Social History Narrative    Meme is  and has three children.       Staying locally.     Physical Exam:  Vitals: /75   Pulse 101   Wt 56.9 kg (125 lb 7.1 oz)   SpO2 98%   BMI 23.70 kg/m    BMI= Body mass index is 23.7 kg/m .  General:  Appearance is normal, no acute distress  HEENT:  NC/AT, sclera appear white  Neck:  No obvious thyromegaly  CV/Lungs:  Non distressed breathing  Skin:  No apparent rashes  Neuro:  Normal mental status  Psych:  Normal affect          Assessment:  1.  Post pancreatectomy diabetes mellitus, s/p total  pancreatectomy and islet autotransplant.  (ICD-10 E89.1)  2.  Type 1 diabetes secondary to pancreatectomy, as outlined in #1 above (Surgical type 1 DM, ICD-10 E10.9)    Meme is a 51 year old with history of chronic pancreatitis who is s/p total pancreatectomy and islet autotransplant.  She received a low-moderate mass of about 2300 IEQ/kg but is doing quite well on low insulin doses.  I think as she transitions off feeds she will need half unit dosing and we discussed trying an In Pen.  We reviewed correction and meal dosing and timing of BG corrections.  She has just restarted a clinic dispensed Dexcom G6 but we would like her to stay on her own personal CGM for safety and monitoring and dose adjustments.  We reviewed plan below.      Plan:  1.  Changes to current diabetes regimen:  Patient Instructions   1)  Stop tube feeds-- if you are running >100 you can stop them tonight but if you are getting lows again, keep it running until the morning and stop in the morning.    2)  Keep the Lantus at 9 units in the morning.  No lantus in the evenings anymore.    3)  Keep the Humalog the same, but I will work on getting the half unit In-Pen so we can eventually be a bit more precise with your dosing.  And then we will see by your pattern off feeds if we need to make more adjustments.        2.  Frequency of blood sugar checks:  6 times per day if not wearing Dexcom  3.  Continue routine follow up for autoislet transplant patients:  Mixed meal test (6 mL/kg BoostHP to max of 360 mL) at 3 months, 6 months, and once a year post transplant.  Hemoglobin A1c levels at these time points and quarterly.  4.  Other issues addressed today:  None.      Follow up:  TBD    Contact me for questions at [phone] or [phone].  Emergency number to reach pediatric endocrinology after hours is [phone].        Dr. Patricia Bermeo MD  [facility] [place]  Director of Research, Islet  Auto-transplant Program  Phone:  [phone]  Electronically signed: September 3, 2021 at 8:29 AM        Discussion of management or test interpretation with external physician/other qualified healthcare professional/appropriate source - Dr Shirley  Prescription drug management  45 minutes spent on the date of the encounter doing chart review, history and exam, documentation and further activities per the note.  I spent 30 minutes face to face with Meme.\

## 2021-09-03 RX ORDER — INSULIN LISPRO 100 [IU]/ML
INJECTION, SOLUTION INTRAVENOUS; SUBCUTANEOUS
Qty: 15 ML | Refills: 3 | Status: ON HOLD | OUTPATIENT
Start: 2021-09-03 | End: 2023-02-19

## 2021-09-03 RX ORDER — PROCHLORPERAZINE 25 MG/1
1 SUPPOSITORY RECTAL
Qty: 3 EACH | Refills: 5 | Status: SHIPPED | OUTPATIENT
Start: 2021-09-03 | End: 2022-02-28

## 2021-09-03 RX ORDER — PROCHLORPERAZINE 25 MG/1
1 SUPPOSITORY RECTAL
Qty: 1 EACH | Refills: 1 | Status: SHIPPED | OUTPATIENT
Start: 2021-09-03 | End: 2022-04-26

## 2021-09-07 ENCOUNTER — OFFICE VISIT (OUTPATIENT)
Dept: TRANSPLANT | Facility: CLINIC | Age: 51
End: 2021-09-07
Attending: NURSE PRACTITIONER
Payer: COMMERCIAL

## 2021-09-07 VITALS
DIASTOLIC BLOOD PRESSURE: 83 MMHG | BODY MASS INDEX: 22.39 KG/M2 | WEIGHT: 118.5 LBS | SYSTOLIC BLOOD PRESSURE: 118 MMHG | OXYGEN SATURATION: 94 % | HEART RATE: 103 BPM

## 2021-09-07 DIAGNOSIS — Z90.410 ACQUIRED TOTAL ABSENCE OF PANCREAS: Primary | ICD-10-CM

## 2021-09-07 PROCEDURE — 99024 POSTOP FOLLOW-UP VISIT: CPT | Performed by: TRANSPLANT SURGERY

## 2021-09-07 ASSESSMENT — PAIN SCALES - GENERAL: PAINLEVEL: MODERATE PAIN (4)

## 2021-09-07 NOTE — PROGRESS NOTES
Chronic Pancreatitis Group Progress Note    I had the pleasure of seeing Ms. Meme Robins today. She is 29 days status post total pancreatectomy with islet autotransplant.    Interval events since last visit with me:   ER visits = 0, reasons: 0  Inpatient admissions = 0, reasons 0  The patient reports their current symptoms to be:   GI function:   Nausea:   [x]  none    []  rare    []  often    []  daily  Comment:   Vomiting: [x]  none    []  rare    []  often    []  daily   Comment:   Last BM: Today.  Stools are soft  Appetite: good    Pancreatic exocrine insufficiency:     Greasy stools: [x]  none   []  rarely    []  several times/wk   []  daily      Diabetes management:     Lab Results   Component Value Date    A1C 5.5 08/04/2021    A1C 6.0 05/19/2021      Pain management:   Fentanyl 75 mcg patc  Dilaudid 2-4 mg q4h prn  Prescription Medications as of 9/7/2021       Rx Number Disp Refills Start End Last Dispensed Date Next Fill Date Owning Pharmacy    acetaminophen (TYLENOL) 325 MG tablet  120 tablet 0 8/20/2021    [address]    Sig: Take 2 tablets (650 mg) by mouth every 6 hours    Class: E-Prescribe    Route: Oral    Renewals     Renewal requests to authorizing provider (Aliza Ridley NP) <b>prohibited</b>          Alcohol Swabs PADS  100 each 0 8/18/2021    [address]    Sig: Use to swab the area of the injection or pamella as directed   Per insurance coverage    Class: Local Print    amylase-lipase-protease (ZENPEP) 83534-65814 units CPEP  330 capsule 1 8/20/2021 9/19/2021   [address]    Sig: Take 3 capsules by mouth 3 times daily (with meals) And take 1 capsule by mouth with snacks or supplements    Class: E-Prescribe    Route: Oral    Renewals     Renewal requests to authorizing provider (Aliza Ridley NP)  <b>prohibited</b>          aspirin (ASA) 325 MG tablet  30 tablet 1 2021    [address]    Sig: Take 1 tablet (325 mg) by mouth daily    Class: E-Prescribe    Route: Oral    Renewals     Renewal requests to authorizing provider (Aliza Ridley NP) <b>prohibited</b>          blood glucose (NO BRAND SPECIFIED) lancets standard  100 each 0 2021    [address]    Sig: To use to test glucose level in the blood  Use to test blood sugar  6  times daily as directed. To accompany glucose monitor brands per insurance coverage.  One touch Delica lancets    Class: Local Print    blood glucose (NO BRAND SPECIFIED) test strip  100 strip 3 2021    [address]    Sig: To use to test glucose level in the blood  Use to test blood sugar  6  times daily as directed. To accompany glucose monitor brands per insurance coverage.  One touch Verio strip    Class: Local Print    clonazePAM (KLONOPIN) 1 MG tablet    2021        Simg morning and 1.5mg every evening    Class: Historical    Continuous Blood Gluc Sensor (DEXCOM G6 SENSOR) MISC  3 each 5 9/3/2021    [id number] [place], [place] - [address]    Si each every 10 days Change every 10 days.    Class: E-Prescribe    Route: Does not apply    Continuous Blood Gluc Transmit (DEXCOM G6 TRANSMITTER) MISC  1 each 1 9/3/2021    [id number] [place] [place] - [address]    Si each every 3 months Change every 3 months.    Class: E-Prescribe    Route: Does not apply    fentaNYL (DURAGESIC) 50 mcg/hr 72 hr patch  5 patch 0 2021    [id number] - [place], [place] - [address] AT [facility]    Sig: Place 1 patch onto the skin every 72 hours remove old patch.    Class: Local Print    Earliest Fill Date:  2021    Route: Transdermal    gabapentin (NEURONTIN) 300 MG capsule  60 capsule 1 2021    [id number] [place] [place] - [address]    Sig: Take 1 capsule (300 mg) by mouth 2 times daily    Class: E-Prescribe    Route: Oral    Glucagon (GVOKE HYPOPEN 2-PACK) 1 MG/0.2ML SOAJ  0.4 mL 1 2021    [address]    Sig: Inject 1 each Subcutaneous once as needed (for severe hypoglycemia) Use x1 dose and then call seek emergent care for treatment    Class: E-Prescribe    Notes to Pharmacy: Please call Maddi Almaraz RN Transplant Coordinator, with fill/formulary issues: [phone]    Route: Subcutaneous    Guar Gum (FIBER MODULAR, NUTRISOURCE FIBER,) packet  60 packet 1 2021    [address]    Si packet by Per Feeding Tube route 2 times daily    Class: E-Prescribe    Route: Per Feeding Tube    Renewals     Renewal requests to authorizing provider (Aliza Ridley NP) <b>prohibited</b>          HYDROmorphone (DILAUDID) 2 MG tablet  60 tablet 0 2021    [id number] - [place], [place] - [address]    Sig: Take 1-2 tablets (2-4 mg) by mouth every 6 hours as needed for severe pain    Class: Local Print    Earliest Fill Date: 2021    Route: Oral    insulin glargine (LANTUS PEN) 100 UNIT/ML pen  6 mL 1 2021    [address]    Sig: Inject 18 Units Subcutaneous 2 times daily    Class: E-Prescribe    Notes to Pharmacy: If Lantus is not covered by insurance, may substitute Basaglar at same dose and frequency.      Route: Subcutaneous    insulin lispro (HUMALOG KWIKPEN) 100 UNIT/ML (1 unit dial) KWIKPEN  5 mL 1 2021    [address]    Sig: For  - 169 give 1  unit.   For  - 219 give 2 units.   For  - 269 give 3 units.   For  - 319 give 4 units.   For BG >320 give 5 units.    Class: Local Print    insulin lispro (HUMALOG KWIKPEN) 100 UNIT/ML (1 unit dial) KWIKPEN  3 mL 1 2021    [address]    Si unit per 12g Carb with meals and snacks/supplement    Class: E-Prescribe    Renewals     Renewal requests to authorizing provider (Aliza Ridley, NP) <b>prohibited</b>          insulin lispro (HUMALOG) 100 UNIT/ML Cartridge  15 mL 3 9/3/2021    [id number] - [place], [place] - [address]    Sig: Take 0.5- 5 units pre meal using scale prescribed, up to 30 unit per day supply.    Class: E-Prescribe    insulin pen needle (32G X 4 MM) 32G X 4 MM miscellaneous  200 each 3 2021    [address]    Sig: Use 4-6 pen needles daily to inject subcutaneous insulin    Class: E-Prescribe    insulin pen needle (32G X 4 MM) 32G X 4 MM miscellaneous  100 each 0 2021    [address]    Sig: Use as directed by provider  Per insurance coverage    Class: Local Print    magnesium hydroxide (MILK OF MAGNESIA) 400 MG/5ML suspension  1800 mL 1 2021    [address]    Si mLs by Per J Tube route 2 times daily as needed for constipation    Class: E-Prescribe    Route: Per J Tube    Renewals     Renewal requests to authorizing provider (Aliza Ridley NP) <b>prohibited</b>          Melatonin 10 MG TABS tablet            Sig: Take 10 mg by mouth nightly as needed for sleep    Class: Historical    Route: Oral    methocarbamol (ROBAXIN) 500 MG tablet  90 tablet 0 2021    [address]    Sig: Take 2 tablets (1,000 mg) by mouth 3 times daily     Class: E-Prescribe    Route: Oral    multivitamin w/minerals (THERA-VIT-M) tablet  30 tablet 1 2021    [address]    Sig: Take 1 tablet by mouth daily    Class: E-Prescribe    Route: Oral    Renewals     Renewal requests to authorizing provider (Aliza Ridley NP) <b>prohibited</b>          omeprazole (PRILOSEC) 40 MG DR capsule  60 capsule 3 2021    [address]    Sig: Take 1 tablet twice daily    Class: E-Prescribe    protein modular (PROSOURCE TF) LIQD  180 packet 1 2021    [address]    Si packet by Per Feeding Tube route 3 times daily    Class: E-Prescribe    Route: Per Feeding Tube    Renewals     Renewal requests to authorizing provider (Aliza Ridley NP) <b>prohibited</b>          sertraline (ZOLOFT) 100 MG tablet    2021        Sig: Take 150 mg by mouth At Bedtime    Class: Historical    Route: Oral    Sharps Container MISC  1 each 0 2021    [address]    Sig: Use as directed to dispose of needles, lancets and other sharps  Per Insurance coverage    Class: Local Print    traZODone (DESYREL) 100 MG tablet    2021        Sig: Take 100 mg by mouth At Bedtime    Class: Historical    Route: Oral    bisacodyl (DULCOLAX) 10 MG suppository  5 suppository 0 2021    [address]    Sig: Place 1 suppository (10 mg) rectally daily as needed for constipation    Class: E-Prescribe    Route: Rectal    diphenhydrAMINE (BENADRYL) 25 MG tablet            Sig: Take 25 mg by mouth every 6 hours as needed for itching or allergies    Class: Historical    Route: Oral    docusate sodium (COLACE) 100 MG capsule  60 capsule 1 2021    [facility]  [address] [facility]    Sig: Take 1 capsule (100 mg) by mouth 2 times daily as needed for constipation    Class: Historical    Route: Oral    Renewals     Renewal requests to authorizing provider (Aliza Ridley NP) <b>prohibited</b>          glucagon 1 MG kit  1 each 0 8/18/2021    [address]    Sig: Inject 1 mg Subcutaneous once as needed for low blood sugar (Use as directed by provider)    Class: E-Prescribe    Route: Subcutaneous    HYDROmorphone (DILAUDID) 4 MG tablet  56 tablet 0 8/26/2021    [address]    Sig: Take 1-2 tablets (4-6 mg) every 4 hours as needed for severe abdominal pain    Class: E-Prescribe    Earliest Fill Date: 8/26/2021    ondansetron (ZOFRAN-ODT) 4 MG ODT tab            Sig: Take 4 mg by mouth as needed    Class: Historical    Route: Oral    senna-docusate (SENOKOT-S/PERICOLACE) 8.6-50 MG tablet  60 tablet 0 8/20/2021    [address]    Sig: Take 1 tablet by mouth 2 times daily as needed for constipation    Class: E-Prescribe    Route: Oral    Renewals     Renewal requests to authorizing provider (Aliza Ridley NP) <b>prohibited</b>              Physical exam:   General Appearance: in no apparent distress.   Pulse:  [103] 103  BP: (118)/(83) 118/83  SpO2:  [94 %] 94 %   Skin: Normal, no rashes or jaundice  Heart: Regular rhythm.  Lungs: no audible wheezes or increased work of breathing.  Abdomen: The abdomen is flat, and the wound is Healing well, w/out hernia.  Tube exit site is clean. The abdomen is non-tender.    Edema: absent.    Chronic Pancreatitis Latest Ref Rng & Units 8/26/2021 9/2/2021 9/2/2021 9/2/2021 9/7/2021   Weight - - - 56.881 kg 56.9 kg 53.751 kg   AMYLASE 30 - 110 U/L - - - - -   LIPASE 73 - 393 U/L - - - - -   A1C 0.0 - 5.6 % - - - - -   C PEPTIDE 0.9 - 6.9 ng/mL - - - - -   GLUCOSE 70  - 99 mg/dL 128(H) 127(H) - - -   GLUCOSE BY METER POCT 70 - 99 mg/dL - - - - -   GLUCOSE WHOLE BLOOD POCT 70 - 99 mg/dL - - - - -   HEMOGLOBIN 11.7 - 15.7 g/dL 8.5(L) 8.7(L) - - -    - 450 10e3/uL 1,658(HH) 1,239(HH) - - -   ALBUMIN 3.4 - 5.0 g/dL 2.4(L) 2.4(L) - - -   PREALBUMIN 15 - 45 mg/dL - - - - -       Assessment and Plan: She is doing well s/p TP-IAT.  Interval progress has been good.  Postoperative gastric ileus: resolved  Pancreatic exocrine insufficiency: controled   Malnutrition: resolved, TFs - stop  Diabetes mellitus: per Dr. Bermeo  Abdominal pain management: decreasing fentanyl patch to 50; dilaudid 2-6 mg q6h prn  Followup: I will see her again in clinic in 2 weeks

## 2021-09-07 NOTE — LETTER
9/7/2021         RE: Meme Robins  [id number] [facility] [address]        Dear Colleague,    Thank you for referring your patient, Meme Robins, to the [facility]. Please see a copy of my visit note below.    Chronic Pancreatitis Group Progress Note    I had the pleasure of seeing Ms. Meme Robins today. 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    Interval events since last visit with me:   ER visits = 0, reasons: NA  Inpatient admissions = 0, reasons NA  The patient reports their current symptoms to be:   GI function:   Nausea:   [x]  none    []  rare    []  often    []  daily  Comment:   Vomiting: []  none    []  rare    []  often    []  daily   Comment:   Last BM: 1-2 days.  Stools are soft, .   Appetite: good  Tube feeds: na  Oral food intake: 4-6 per day  Pancreatic exocrine insufficiency:   Takes (Zenpep), 3 with meals, 1 with snacks.  Greasy stools: [x]  none   []  rarely    []  several times/wk   []  daily        Diabetes management:   Long acting insulin: Humalog--  Short acting insulin: Lantus--18 BID units/day  Blood sugars typically range from 60 to 140.   Lab Results   Component Value Date    A1C 5.5 08/04/2021    A1C 6.0 05/19/2021      Pain management:   Pain rating (0=no pain, 10=unbearable): 4  Long acting agent: meperidine fentanyl 44mmx94 hours.  Short acting agent: diluadid 4 mg every 6 hours.  Daily 'Uptime':  Most of the day  Walking: distance a few miles during the week  Prescription Medications as of 9/13/2021       Rx Number Disp Refills Start End Last Dispensed Date Next Fill Date Owning Pharmacy    acetaminophen (TYLENOL) 325 MG tablet  120 tablet 0 8/20/2021    [facility] - [place], [place] - [address] [facility]    Sig: Take 2 tablets (650 mg) by mouth every 6 hours    Class: E-Prescribe    Route: Oral    Renewals     Renewal requests to authorizing provider (Aliza Ridley NP)  <b>prohibited</b>          Alcohol Swabs PADS  100 each 0 8/18/2021    [address]    Sig: Use to swab the area of the injection or pamella as directed   Per insurance coverage    Class: Local Print    amylase-lipase-protease (ZENPEP) 08761-28484 units CPEP  330 capsule 1 8/20/2021 9/19/2021   [address]    Sig: Take 3 capsules by mouth 3 times daily (with meals) And take 1 capsule by mouth with snacks or supplements    Class: E-Prescribe    Route: Oral    Renewals     Renewal requests to authorizing provider (Aliza Ridley NP) <b>prohibited</b>          aspirin (ASA) 325 MG tablet  30 tablet 1 8/20/2021    [address]    Sig: Take 1 tablet (325 mg) by mouth daily    Class: E-Prescribe    Route: Oral    Renewals     Renewal requests to authorizing provider (Aliza Ridley NP) <b>prohibited</b>          bisacodyl (DULCOLAX) 10 MG suppository  5 suppository 0 8/20/2021    [address]    Sig: Place 1 suppository (10 mg) rectally daily as needed for constipation    Class: E-Prescribe    Route: Rectal    blood glucose (NO BRAND SPECIFIED) lancets standard  100 each 0 8/18/2021    [address]    Sig: To use to test glucose level in the blood  Use to test blood sugar  6  times daily as directed. To accompany glucose monitor brands per insurance coverage.  One touch Delica lancets    Class: Local Print    blood glucose (NO BRAND SPECIFIED) test strip  100 strip 3 9/10/2021    [id number] - [place], [place] - [address] AT [facility] [address]    Sig: Use to test blood sugar  6  times daily as directed. To accompany glucose monitor brands per insurance coverage: One Touch Verio strip    Class:  E-Prescribe    Notes to Pharmacy: Please call Maddi Almaraz, RN Transplant Coordinator with formulary/fill issues: [phone]    clonazePAM (KLONOPIN) 1 MG tablet    2021        Simg morning and 1.5mg every evening    Class: Historical    Continuous Blood Gluc Sensor (DEXCOM G6 SENSOR) MISC  3 each 5 9/3/2021    [id number] - [place], [place] - [address]    Si each every 10 days Change every 10 days.    Class: E-Prescribe    Route: Does not apply    Continuous Blood Gluc Transmit (DEXCOM G6 TRANSMITTER) MISC  1 each 1 9/3/2021    [id number] - [place], [place] - [address]    Si each every 3 months Change every 3 months.    Class: E-Prescribe    Route: Does not apply    diphenhydrAMINE (BENADRYL) 25 MG tablet            Sig: Take 25 mg by mouth every 6 hours as needed for itching or allergies    Class: Historical    Route: Oral    docusate sodium (COLACE) 100 MG capsule  60 capsule 1 2021    [facility] - [place], [place] - [address] [place] [address]    Sig: Take 1 capsule (100 mg) by mouth 2 times daily as needed for constipation    Class: Historical    Route: Oral    Renewals     Renewal requests to authorizing provider (Aliza Ridley NP) <b>prohibited</b>          fentaNYL (DURAGESIC) 50 mcg/hr 72 hr patch  5 patch 0 2021    [id number] - [place], [place] - [address] AT [facility]    Sig: Place 1 patch onto the skin every 72 hours remove old patch.    Class: Local Print    Earliest Fill Date: 2021    Route: Transdermal    gabapentin (NEURONTIN) 300 MG capsule  60 capsule 1 2021    [id number] - [place], [place] - [address] [facility]    Sig: Take 1 capsule (300 mg) by mouth 2 times daily    Class: E-Prescribe    Route: Oral    Glucagon (GVOKE HYPOPEN 2-PACK) 1 MG/0.2ML SOAJ  0.4 mL 1 2021    [facility] -  [address]    Sig: Inject 1 each Subcutaneous once as needed (for severe hypoglycemia) Use x1 dose and then call seek emergent care for treatment    Class: E-Prescribe    Notes to Pharmacy: Please call Maddi Almaraz RN Transplant Coordinator, with fill/formulary issues: [phone]    Route: Subcutaneous    glucagon 1 MG kit  1 each 0 2021    [address]    Sig: Inject 1 mg Subcutaneous once as needed for low blood sugar (Use as directed by provider)    Class: E-Prescribe    Route: Subcutaneous    Guar Gum (FIBER MODULAR, NUTRISOURCE FIBER,) packet  60 packet 1 2021    [address]    Si packet by Per Feeding Tube route 2 times daily    Class: E-Prescribe    Route: Per Feeding Tube    Renewals     Renewal requests to authorizing provider (Aliza Ridley NP) <b>prohibited</b>          HYDROmorphone (DILAUDID) 2 MG tablet  60 tablet 0 2021    [id number] - [place], [place] - [address] & [facility]    Sig: Take 1-2 tablets (2-4 mg) by mouth every 6 hours as needed for severe pain    Class: Local Print    Earliest Fill Date: 2021    Route: Oral    HYDROmorphone (DILAUDID) 4 MG tablet  56 tablet 0 2021    [address]    Sig: Take 1-2 tablets (4-6 mg) every 4 hours as needed for severe abdominal pain    Class: E-Prescribe    Earliest Fill Date: 2021    insulin glargine (LANTUS PEN) 100 UNIT/ML pen  6 mL 1 2021    [address]    Sig: Inject 18 Units Subcutaneous 2 times daily    Class: E-Prescribe    Notes to Pharmacy: If Lantus is not covered by insurance, may substitute Basaglar at same dose and frequency.      Route: Subcutaneous    insulin lispro (HUMALOG KWIKPEN) 100 UNIT/ML (1  unit dial) KWIKPEN  5 mL 1 2021    [address]    Sig: For  - 169 give 1 unit.   For  - 219 give 2 units.   For  - 269 give 3 units.   For  - 319 give 4 units.   For BG >320 give 5 units.    Class: Local Print    insulin lispro (HUMALOG KWIKPEN) 100 UNIT/ML (1 unit dial) KWIKPEN  3 mL 1 2021    [address]    Si unit per 12g Carb with meals and snacks/supplement    Class: E-Prescribe    Renewals     Renewal requests to authorizing provider (Aliza Ridley NP) <b>prohibited</b>          insulin lispro (HUMALOG) 100 UNIT/ML Cartridge  15 mL 3 9/3/2021    [id number] - [place], [place] - [address]    Sig: Take 0.5- 5 units pre meal using scale prescribed, up to 30 unit per day supply.    Class: E-Prescribe    insulin pen needle (32G X 4 MM) 32G X 4 MM miscellaneous  200 each 3 2021    [address]    Sig: Use 4-6 pen needles daily to inject subcutaneous insulin    Class: E-Prescribe    insulin pen needle (32G X 4 MM) 32G X 4 MM miscellaneous  100 each 0 2021    [address]    Sig: Use as directed by provider  Per insurance coverage    Class: Local Print    magnesium hydroxide (MILK OF MAGNESIA) 400 MG/5ML suspension  1800 mL 1 2021    [address]    Si mLs by Per J Tube route 2 times daily as needed for constipation    Class: E-Prescribe    Route: Per J Tube    Renewals     Renewal requests to authorizing provider (Aliza Ridley NP) <b>prohibited</b>          Melatonin 10 MG TABS tablet            Sig: Take 10 mg by mouth nightly as needed for sleep    Class: Historical    Route: Oral    methocarbamol (ROBAXIN) 500 MG tablet  90 tablet 0  2021    [address]    Sig: Take 2 tablets (1,000 mg) by mouth 3 times daily    Class: E-Prescribe    Route: Oral    multivitamin w/minerals (THERA-VIT-M) tablet  30 tablet 1 2021    [address]    Sig: Take 1 tablet by mouth daily    Class: E-Prescribe    Route: Oral    Renewals     Renewal requests to authorizing provider (Aliza Ridley NP) <b>prohibited</b>          omeprazole (PRILOSEC) 40 MG DR capsule  60 capsule 3 2021    [address]    Sig: Take 1 tablet twice daily    Class: E-Prescribe    ondansetron (ZOFRAN-ODT) 4 MG ODT tab            Sig: Take 4 mg by mouth as needed    Class: Historical    Route: Oral    protein modular (PROSOURCE TF) LIQD  180 packet 1 2021    [address]    Si packet by Per Feeding Tube route 3 times daily    Class: E-Prescribe    Route: Per Feeding Tube    Renewals     Renewal requests to authorizing provider (Aliza Ridley NP) <b>prohibited</b>          senna-docusate (SENOKOT-S/PERICOLACE) 8.6-50 MG tablet  60 tablet 0 2021    [address]    Sig: Take 1 tablet by mouth 2 times daily as needed for constipation    Class: E-Prescribe    Route: Oral    Renewals     Renewal requests to authorizing provider (Aliza Ridley NP) <b>prohibited</b>          sertraline (ZOLOFT) 100 MG tablet    2021        Sig: Take 150 mg by mouth At Bedtime    Class: Historical    Route: Oral    Sharps Container MISC  1 each 0 2021    [address]    Sig: Use as directed to dispose of needles, lancets and other sharps  Per Insurance coverage    Class: Local Print    traZODone (DESYREL) 100 MG tablet     2/1/2021        Sig: Take 100 mg by mouth At Bedtime    Class: Historical    Route: Oral        Physical exam:   General Appearance: in no apparent distress.       Skin: Normal, no rashes or jaundice  Heart: Regular rhythm.  Lungs: no audible wheezes or increased work of breathing.  Abdomen: The abdomen is flat, and the wound is Healing well, without hernia.  G/J Tube exit site is clean. Removed The abdomen is non-tender, generalized.    Edema: absent.    Chronic Pancreatitis Latest Ref Rng & Units 8/26/2021 9/2/2021 9/2/2021 9/2/2021 9/7/2021   Weight - - - 56.881 kg 56.9 kg 53.751 kg   AMYLASE 30 - 110 U/L - - - - -   LIPASE 73 - 393 U/L - - - - -   A1C 0.0 - 5.6 % - - - - -   C PEPTIDE 0.9 - 6.9 ng/mL - - - - -   GLUCOSE 70 - 99 mg/dL 128(H) 127(H) - - -   GLUCOSE BY METER POCT 70 - 99 mg/dL - - - - -   GLUCOSE WHOLE BLOOD POCT 70 - 99 mg/dL - - - - -   HEMOGLOBIN 11.7 - 15.7 g/dL 8.5(L) 8.7(L) - - -    - 450 10e3/uL 1,658(HH) 1,239(HH) - - -   ALBUMIN 3.4 - 5.0 g/dL 2.4(L) 2.4(L) - - -   PREALBUMIN 15 - 45 mg/dL - - - - -       Assessment and Plan: She is doing well s/p TP-IAT.  Interval progress has been good.  Postoperative gastric ileus: eating well  Pancreatic exocrine insufficiency:  No change  Malnutrition: eating well  Diabetes mellitus:  Appreciate endo input  Abdominal pain management: will see next week.  controlled  Followup: I will see her again in clinic in 1 week with surgeon    Total time: 25 min, Counselling Time: 15 min.    Esperanza Araujo CNP          Again, thank you for allowing me to participate in the care of your patient.        Sincerely,        Esperanza Araujo NP

## 2021-09-07 NOTE — NURSING NOTE
Chief Complaint   Patient presents with     RECHECK     Feeding tube removal     Blood pressure 118/83, pulse 103, weight 53.8 kg (118 lb 8 oz), SpO2 94 %, not currently breastfeeding.    Meme Orellana, CMA

## 2021-09-09 ENCOUNTER — VIRTUAL VISIT (OUTPATIENT)
Dept: EDUCATION SERVICES | Facility: CLINIC | Age: 51
End: 2021-09-09
Payer: COMMERCIAL

## 2021-09-09 DIAGNOSIS — Z90.410 POST-PANCREATECTOMY DIABETES (H): Primary | ICD-10-CM

## 2021-09-09 DIAGNOSIS — E13.9 POST-PANCREATECTOMY DIABETES (H): Primary | ICD-10-CM

## 2021-09-09 DIAGNOSIS — E89.1 POST-PANCREATECTOMY DIABETES (H): Primary | ICD-10-CM

## 2021-09-09 PROCEDURE — G0108 DIAB MANAGE TRN  PER INDIV: HCPCS | Performed by: REGISTERED NURSE

## 2021-09-10 ENCOUNTER — VIRTUAL VISIT (OUTPATIENT)
Dept: EDUCATION SERVICES | Facility: CLINIC | Age: 51
End: 2021-09-10
Payer: COMMERCIAL

## 2021-09-10 DIAGNOSIS — E89.1 POST-PANCREATECTOMY DIABETES (H): ICD-10-CM

## 2021-09-10 DIAGNOSIS — E13.9 POST-PANCREATECTOMY DIABETES (H): ICD-10-CM

## 2021-09-10 DIAGNOSIS — Z90.410 POST-PANCREATECTOMY DIABETES (H): ICD-10-CM

## 2021-09-10 DIAGNOSIS — E13.9 POST-PANCREATECTOMY DIABETES (H): Primary | ICD-10-CM

## 2021-09-10 DIAGNOSIS — E89.1 POST-PANCREATECTOMY DIABETES (H): Primary | ICD-10-CM

## 2021-09-10 DIAGNOSIS — Z90.410 POST-PANCREATECTOMY DIABETES (H): Primary | ICD-10-CM

## 2021-09-10 PROCEDURE — 97803 MED NUTRITION INDIV SUBSEQ: CPT | Mod: 95 | Performed by: NUTRITIONIST

## 2021-09-10 NOTE — PROGRESS NOTES
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  Prior to this, she had been experiencing frequent hypoglycemia.    Referring provider:  Jean-Claude Gomez MD.  Living Situation: Lives with her  in their home in [place].  Employment: Unknown.    PATIENT CONCERNS RELATED TO DIABETES SELF MANAGEMENT: Feels good.  States she will be returning to [place] next week, about 3 weeks ahead of the time predicted.  She is now using a Dexcom sensor and was just trained on the In-Pen dosing calculator system.        ASSESSMENT:    Taking Medication:     Current Diabetes Management per Patient:  Taking diabetes medications?   yes:     Diabetes Medication(s)     Diabetic Other       Glucagon (GVOKE HYPOPEN 2-PACK) 1 MG/0.2ML SOAJ    Inject 1 each Subcutaneous once as needed (for severe hypoglycemia) Use x1 dose and then call seek emergent care for treatment     glucagon 1 MG kit    Inject 1 mg Subcutaneous once as needed for low blood sugar (Use as directed by provider)     Patient not taking: Reported on 8/21/2021    Insulin       insulin glargine (LANTUS PEN) 100 UNIT/ML pen    Inject 18 Units Subcutaneous 2 times daily     insulin lispro (HUMALOG KWIKPEN) 100 UNIT/ML (1 unit dial) KWIKPEN    For  - 169 give 1 unit.   For  - 219 give 2 units.   For  - 269 give 3 units.   For  - 319 give 4 units.   For BG >320 give 5 units.     insulin lispro (HUMALOG KWIKPEN) 100 UNIT/ML (1 unit dial) KWIKPEN    1 unit per 12g Carb with meals and snacks/supplement     insulin lispro (HUMALOG) 100 UNIT/ML Cartridge    Take 0.5- 5 units pre meal using scale prescribed, up to 30 unit per day supply.          Monitoring    Patient glucose self monitoring as follows:  continuously using a continuous glucose monitor (CGM)  BG meter: Dexcom   meter  Dexcom Report:          Patient's most recent   Lab Results   Component Value Date    A1C 5.5 08/04/2021    A1C 6.0 05/19/2021      Patient's A1C goal: <7.0    EDUCATION and INSTRUCTION PROVIDED AT THIS VISIT:       She had a number of questions as to how to use the Dexcom.  She has been wearing the sensor on her arm, and having no problems with connection or sensors not lasting.  Discussed managing hypoglycemia while wearing the sensor.      She is currently communicating with Dr. Bermeo on a regular basis regarding insulin dosing.  She just finished her In-Pen training right before our visit.     Discussed reporting.  She had been thinking that she would need to keep independent records, however explained that her carb entries, insulin dosing, dexcom tracings, etc will be included in the In-Pen report.      She will be connecting with her endocrinologist back in [place].  Until then, Dr. Bermeo will be managing.        Patient-stated goal written and given to Meme Robins.  Verbalized and demonstrated understanding of instructions.     PLAN:  See patient instructions  AVS printed and given to patient    FOLLOW-UP:        Time spent with patient at today's visit was 45 minutes.      Any diabetes medication dose changes were made via the CDE Protocol and Collaborative Practice Agreement [facility].  A copy of this encounter was provided to patient's referring provider.

## 2021-09-13 ENCOUNTER — MEDICAL CORRESPONDENCE (OUTPATIENT)
Dept: HEALTH INFORMATION MANAGEMENT | Facility: CLINIC | Age: 51
End: 2021-09-13

## 2021-09-13 NOTE — PROGRESS NOTES
[facility] Progress Note    I had the pleasure of seeing Ms. Meme Robins today. 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    Interval events since last visit with me:   ER visits = 0, reasons: NA  Inpatient admissions = 0, reasons NA  The patient reports their current symptoms to be:   GI function:   Nausea:   [x]  none    []  rare    []  often    []  daily  Comment:   Vomiting: []  none    []  rare    []  often    []  daily   Comment:   Last BM: 1-2 days.  Stools are soft, .   Appetite: good  Tube feeds: na  Oral food intake: 4-6 per day  Pancreatic exocrine insufficiency:   Takes (Zenpep), 3 with meals, 1 with snacks.  Greasy stools: [x]  none   []  rarely    []  several times/wk   []  daily        Diabetes management:   Long acting insulin: Humalog--  Short acting insulin: Lantus--18 BID units/day  Blood sugars typically range from 60 to 140.   Lab Results   Component Value Date    A1C 5.5 08/04/2021    A1C 6.0 05/19/2021      Pain management:   Pain rating (0=no pain, 10=unbearable): 4  Long acting agent: meperidine fentanyl [id number] hours.  Short acting agent: diluadid 4 mg every 6 hours.  Daily 'Uptime':  Most of the day  Walking: distance a few miles during the week  Prescription Medications as of 9/13/2021       Rx Number Disp Refills Start End Last Dispensed Date Next Fill Date Owning Pharmacy    acetaminophen (TYLENOL) 325 MG tablet  120 tablet 0 8/20/2021    [address]    Sig: Take 2 tablets (650 mg) by mouth every 6 hours    Class: E-Prescribe    Route: Oral    Renewals     Renewal requests to authorizing provider (Aliza Ridley NP) <b>prohibited</b>          Alcohol Swabs PADS  100 each 0 8/18/2021    [address]    Sig: Use to swab the area of the injection or pamella as directed   Per insurance coverage    Class: Local Print     amylase-lipase-protease (ZENPEP) 51088-28374 units CPEP  330 capsule 1 2021   [address]    Sig: Take 3 capsules by mouth 3 times daily (with meals) And take 1 capsule by mouth with snacks or supplements    Class: E-Prescribe    Route: Oral    Renewals     Renewal requests to authorizing provider (Aliza Ridley NP) <b>prohibited</b>          aspirin (ASA) 325 MG tablet  30 tablet 1 2021    [address]    Sig: Take 1 tablet (325 mg) by mouth daily    Class: E-Prescribe    Route: Oral    Renewals     Renewal requests to authorizing provider (Aliza Ridley NP) <b>prohibited</b>          bisacodyl (DULCOLAX) 10 MG suppository  5 suppository 0 2021    [address]    Sig: Place 1 suppository (10 mg) rectally daily as needed for constipation    Class: E-Prescribe    Route: Rectal    blood glucose (NO BRAND SPECIFIED) lancets standard  100 each 0 2021    [address]    Sig: To use to test glucose level in the blood  Use to test blood sugar  6  times daily as directed. To accompany glucose monitor brands per insurance coverage.  One touch Delica lancets    Class: Local Print    blood glucose (NO BRAND SPECIFIED) test strip  100 strip 3 9/10/2021    [id number] [place], [place] - [address] AT [facility] OF [address]    Sig: Use to test blood sugar  6  times daily as directed. To accompany glucose monitor brands per insurance coverage: One Touch Verio strip    Class: E-Prescribe    Notes to Pharmacy: Please call Maddi Almaraz RN Transplant Coordinator with formulary/fill issues: [phone]    clonazePAM (KLONOPIN) 1 MG tablet    2021        Simg morning and 1.5mg every evening    Class: Historical     Continuous Blood Gluc Sensor (DEXCOM G6 SENSOR) MISC  3 each 5 9/3/2021    [id number] - [place], [place] - [address]    Si each every 10 days Change every 10 days.    Class: E-Prescribe    Route: Does not apply    Continuous Blood Gluc Transmit (DEXCOM G6 TRANSMITTER) MISC  1 each 1 9/3/2021    [id number] - [place], [place] - [address]    Si each every 3 months Change every 3 months.    Class: E-Prescribe    Route: Does not apply    diphenhydrAMINE (BENADRYL) 25 MG tablet            Sig: Take 25 mg by mouth every 6 hours as needed for itching or allergies    Class: Historical    Route: Oral    docusate sodium (COLACE) 100 MG capsule  60 capsule 1 2021    [facility] [address]    Sig: Take 1 capsule (100 mg) by mouth 2 times daily as needed for constipation    Class: Historical    Route: Oral    Renewals     Renewal requests to authorizing provider (Aliza Ridley NP) <b>prohibited</b>          fentaNYL (DURAGESIC) 50 mcg/hr 72 hr patch  5 patch 0 2021    [facility][id number] - [place], [place] - [address]    Sig: Place 1 patch onto the skin every 72 hours remove old patch.    Class: Local Print    Earliest Fill Date: 2021    Route: Transdermal    gabapentin (NEURONTIN) 300 MG capsule  60 capsule 1 2021    [id number] [place], [place] - [address]    Sig: Take 1 capsule (300 mg) by mouth 2 times daily    Class: E-Prescribe    Route: Oral    Glucagon (GVOKE HYPOPEN 2-PACK) 1 MG/0.2ML SOAJ  0.4 mL 1 2021    [facility] [address]    Sig: Inject 1 each Subcutaneous once as needed (for severe hypoglycemia) Use x1 dose and then call seek emergent care for treatment    Class: E-Prescribe    Notes to Pharmacy: Please call Maddi Almaraz RN Transplant  Coordinator, with fill/formulary issues: [phone]    Route: Subcutaneous    glucagon 1 MG kit  1 each 0 2021    [address]    Sig: Inject 1 mg Subcutaneous once as needed for low blood sugar (Use as directed by provider)    Class: E-Prescribe    Route: Subcutaneous    Guar Gum (FIBER MODULAR, NUTRISOURCE FIBER,) packet  60 packet 1 2021    [address]    Si packet by Per Feeding Tube route 2 times daily    Class: E-Prescribe    Route: Per Feeding Tube    Renewals     Renewal requests to authorizing provider (Aliza Ridley NP) <b>prohibited</b>          HYDROmorphone (DILAUDID) 2 MG tablet  60 tablet 0 2021    [id number] - [place], [place] - [address] AT [facility]    Sig: Take 1-2 tablets (2-4 mg) by mouth every 6 hours as needed for severe pain    Class: Local Print    Earliest Fill Date: 2021    Route: Oral    HYDROmorphone (DILAUDID) 4 MG tablet  56 tablet 0 2021    [address]    Sig: Take 1-2 tablets (4-6 mg) every 4 hours as needed for severe abdominal pain    Class: E-Prescribe    Earliest Fill Date: 2021    insulin glargine (LANTUS PEN) 100 UNIT/ML pen  6 mL 1 2021    [address]    Sig: Inject 18 Units Subcutaneous 2 times daily    Class: E-Prescribe    Notes to Pharmacy: If Lantus is not covered by insurance, may substitute Basaglar at same dose and frequency.      Route: Subcutaneous    insulin lispro (HUMALOG KWIKPEN) 100 UNIT/ML (1 unit dial) KWIKPEN  5 mL 1 2021    [address]    Sig: For  - 169 give 1 unit.   For  - 219 give 2 units.   For  - 269 give 3 units.   For  - 319 give 4 units.   For BG  >320 give 5 units.    Class: Local Print    insulin lispro (HUMALOG KWIKPEN) 100 UNIT/ML (1 unit dial) KWIKPEN  3 mL 1 2021    [address]    Si unit per 12g Carb with meals and snacks/supplement    Class: E-Prescribe    Renewals     Renewal requests to authorizing provider (Aliza Ridley, NP) <b>prohibited</b>          insulin lispro (HUMALOG) 100 UNIT/ML Cartridge  15 mL 3 9/3/2021    [id number] - [place], [place] - [address] [place] [address]    Sig: Take 0.5- 5 units pre meal using scale prescribed, up to 30 unit per day supply.    Class: E-Prescribe    insulin pen needle (32G X 4 MM) 32G X 4 MM miscellaneous  200 each 3 2021    [address]    Sig: Use 4-6 pen needles daily to inject subcutaneous insulin    Class: E-Prescribe    insulin pen needle (32G X 4 MM) 32G X 4 MM miscellaneous  100 each 0 2021    [address]    Sig: Use as directed by provider  Per insurance coverage    Class: Local Print    magnesium hydroxide (MILK OF MAGNESIA) 400 MG/5ML suspension  1800 mL 1 2021    [address]    Si mLs by Per J Tube route 2 times daily as needed for constipation    Class: E-Prescribe    Route: Per J Tube    Renewals     Renewal requests to authorizing provider (Aliza Ridley, NP) <b>prohibited</b>          Melatonin 10 MG TABS tablet            Sig: Take 10 mg by mouth nightly as needed for sleep    Class: Historical    Route: Oral    methocarbamol (ROBAXIN) 500 MG tablet  90 tablet 0 2021    [address]    Sig: Take 2 tablets (1,000 mg) by mouth 3 times daily    Class: E-Prescribe    Route: Oral    multivitamin w/minerals (THERA-VIT-M) tablet  30 tablet 1 2021     [address]    Sig: Take 1 tablet by mouth daily    Class: E-Prescribe    Route: Oral    Renewals     Renewal requests to authorizing provider (Aliza Ridley NP) <b>prohibited</b>          omeprazole (PRILOSEC) 40 MG DR capsule  60 capsule 3 2021    [address]    Sig: Take 1 tablet twice daily    Class: E-Prescribe    ondansetron (ZOFRAN-ODT) 4 MG ODT tab            Sig: Take 4 mg by mouth as needed    Class: Historical    Route: Oral    protein modular (PROSOURCE TF) LIQD  180 packet 1 2021    [address]    Si packet by Per Feeding Tube route 3 times daily    Class: E-Prescribe    Route: Per Feeding Tube    Renewals     Renewal requests to authorizing provider (Aliza Ridley NP) <b>prohibited</b>          senna-docusate (SENOKOT-S/PERICOLACE) 8.6-50 MG tablet  60 tablet 0 2021    [address]    Sig: Take 1 tablet by mouth 2 times daily as needed for constipation    Class: E-Prescribe    Route: Oral    Renewals     Renewal requests to authorizing provider (Aliza Ridley NP) <b>prohibited</b>          sertraline (ZOLOFT) 100 MG tablet    2021        Sig: Take 150 mg by mouth At Bedtime    Class: Historical    Route: Oral    Sharps Container MISC  1 each 0 2021    [address]    Sig: Use as directed to dispose of needles, lancets and other sharps  Per Insurance coverage    Class: Local Print    traZODone (DESYREL) 100 MG tablet    2021        Sig: Take 100 mg by mouth At Bedtime    Class: Historical    Route: Oral        Physical exam:   General Appearance: in no apparent distress.       Skin: Normal, no rashes or jaundice  Heart: Regular rhythm.  Lungs: no audible wheezes  or increased work of breathing.  Abdomen: The abdomen is flat, and the wound is Healing well, without hernia.  G/J Tube exit site is clean. Removed The abdomen is non-tender, generalized.    Edema: absent.    Chronic Pancreatitis Latest Ref Rng & Units 8/26/2021 9/2/2021 9/2/2021 9/2/2021 9/7/2021   Weight - - - 56.881 kg 56.9 kg 53.751 kg   AMYLASE 30 - 110 U/L - - - - -   LIPASE 73 - 393 U/L - - - - -   A1C 0.0 - 5.6 % - - - - -   C PEPTIDE 0.9 - 6.9 ng/mL - - - - -   GLUCOSE 70 - 99 mg/dL 128(H) 127(H) - - -   GLUCOSE BY METER POCT 70 - 99 mg/dL - - - - -   GLUCOSE WHOLE BLOOD POCT 70 - 99 mg/dL - - - - -   HEMOGLOBIN 11.7 - 15.7 g/dL 8.5(L) 8.7(L) - - -    - 450 10e3/uL 1,658(HH) 1,239(HH) - - -   ALBUMIN 3.4 - 5.0 g/dL 2.4(L) 2.4(L) - - -   PREALBUMIN 15 - 45 mg/dL - - - - -       Assessment and Plan: She is doing well s/p TP-IAT.  Interval progress has been good.  Postoperative gastric ileus: eating well  Pancreatic exocrine insufficiency:  No change  Malnutrition: eating well  Diabetes mellitus:  Appreciate endo input  Abdominal pain management: will see next week.  controlled  Followup: I will see her again in clinic in 1 week with surgeon    Total time: 25 min, Counselling Time: 15 min.    Esperanza Araujo, CNP

## 2021-09-14 ENCOUNTER — OFFICE VISIT (OUTPATIENT)
Dept: TRANSPLANT | Facility: CLINIC | Age: 51
End: 2021-09-14
Attending: TRANSPLANT SURGERY
Payer: COMMERCIAL

## 2021-09-14 VITALS
DIASTOLIC BLOOD PRESSURE: 73 MMHG | HEART RATE: 118 BPM | OXYGEN SATURATION: 96 % | BODY MASS INDEX: 22.64 KG/M2 | WEIGHT: 119.8 LBS | SYSTOLIC BLOOD PRESSURE: 123 MMHG

## 2021-09-14 DIAGNOSIS — Z90.410 ACQUIRED TOTAL ABSENCE OF PANCREAS: Primary | ICD-10-CM

## 2021-09-14 PROCEDURE — 99024 POSTOP FOLLOW-UP VISIT: CPT | Performed by: TRANSPLANT SURGERY

## 2021-09-14 RX ORDER — HYDROMORPHONE HYDROCHLORIDE 2 MG/1
2-4 TABLET ORAL EVERY 6 HOURS PRN
Qty: 60 TABLET | Refills: 0 | Status: SHIPPED | OUTPATIENT
Start: 2021-09-14 | End: 2022-03-06

## 2021-09-14 RX ORDER — FENTANYL 25 UG/1
1 PATCH TRANSDERMAL
Qty: 10 PATCH | Refills: 0 | Status: SHIPPED | OUTPATIENT
Start: 2021-09-14 | End: 2022-03-06

## 2021-09-14 NOTE — LETTER
9/14/2021         RE: Meme Robins  [id number] [facility] [address]        Dear Colleague,    Thank you for referring your patient, Meme Robins, to the [facility]. Please see a copy of my visit note below.    Chronic Pancreatitis Group Progress Note    I had the pleasure of seeing Ms. Meme Robins today. 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    Interval events since last visit with me:   ER visits = 0, reasons: 0  Inpatient admissions = 0, reasons 0  The patient reports their current symptoms to be:   GI function:   Nausea:   [x]  none    []  rare    []  often    []  daily  Comment:   Vomiting: [x]  none    []  rare    []  often    []  daily   Comment:   Last BM: Today.  Stools are soft.   Appetite: good    Oral food intake: 1-3 per day  Pancreatic exocrine insufficiency:     Greasy stools: [x]  none   []  rarely    []  several times/wk   []  daily      Comment:   Diabetes management:       Lab Results   Component Value Date    A1C 5.5 08/04/2021    A1C 6.0 05/19/2021      Pain management:   Fentanyl patch plus dilaudid prn  Prescription Medications as of 11/3/2021       Rx Number Disp Refills Start End Last Dispensed Date Next Fill Date Owning Pharmacy    acetaminophen (TYLENOL) 325 MG tablet  120 tablet 0 8/20/2021    [address]    Sig: Take 2 tablets (650 mg) by mouth every 6 hours    Class: E-Prescribe    Route: Oral    Renewals     Renewal requests to authorizing provider (Aliza Ridley NP) <b>prohibited</b>          Alcohol Swabs PADS  100 each 0 8/18/2021    [address]    Sig: Use to swab the area of the injection or pamella as directed   Per insurance coverage    Class: Local Print    aspirin (ASA) 325 MG tablet  30 tablet 1 8/20/2021    [address]    Sig: Take 1 tablet (325 mg) by mouth daily    Class: E-Prescribe    Route: Oral    Renewals     Renewal requests to authorizing provider (Aliza Ridley NP) <b>prohibited</b>          bisacodyl (DULCOLAX) 10 MG suppository  5 suppository 0 2021    [address]    Sig: Place 1 suppository (10 mg) rectally daily as needed for constipation    Class: E-Prescribe    Route: Rectal    blood glucose (NO BRAND SPECIFIED) lancets standard  100 each 0 2021    [address]    Sig: To use to test glucose level in the blood  Use to test blood sugar  6  times daily as directed. To accompany glucose monitor brands per insurance coverage.  One touch Delica lancets    Class: Local Print    blood glucose (NO BRAND SPECIFIED) test strip  100 strip 3 2021    [id number] - [facility] [address] AT [facility] [address]    Sig: Use to test blood sugar  6  times daily as directed. To accompany glucose monitor brands per insurance coverage: One Touch Verio strip    Class: E-Prescribe    clonazePAM (KLONOPIN) 1 MG tablet    2021        Simg morning and 1.5mg every evening    Class: Historical    Continuous Blood Gluc Sensor (DEXCOM G6 SENSOR) MISC  3 each 5 9/3/2021    [id number] - [place], [place] - [address]    Si each every 10 days Change every 10 days.    Class: E-Prescribe    Route: Does not apply    Continuous Blood Gluc Transmit (DEXCOM G6 TRANSMITTER) MISC  1 each 1 9/3/2021    [facility] [id number] - [place], [place] - [address]    Si each every 3 months Change every 3 months.    Class: E-Prescribe    Route: Does not apply    diphenhydrAMINE (BENADRYL) 25 MG tablet            Sig: Take 25 mg by mouth every 6 hours as needed for itching or allergies     Class: Historical    Route: Oral    docusate sodium  (COLACE) 100 MG capsule  60 capsule 1 2021    [address]    Sig: Take 100 mg by mouth 2 times daily as needed for constipation     Class: Historical    Route: Oral    Renewals     Renewal requests to authorizing provider (Aliza Ridley NP) <b>prohibited</b>          fentaNYL (DURAGESIC) 25 mcg/hr 72 hr patch  10 patch 0 2021        Sig: Place 1 patch onto the skin every 72 hours remove old patch.    Class: Local Print    Earliest Fill Date: 2021    Route: Transdermal    gabapentin (NEURONTIN) 300 MG capsule  60 capsule 1 2021    [facility][id number] - [place], [place] - [address] AT [facility]    Sig: Take 1 capsule (300 mg) by mouth 2 times daily    Class: E-Prescribe    Route: Oral    Glucagon (GVOKE HYPOPEN 2-PACK) 1 MG/0.2ML SOAJ  0.4 mL 1 2021    [address]    Sig: Inject 1 each Subcutaneous once as needed (for severe hypoglycemia) Use x1 dose and then call seek emergent care for treatment    Class: E-Prescribe    Notes to Pharmacy: Please call Maddi Almaraz RN Transplant Coordinator, with fill/formulary issues: [phone]    Route: Subcutaneous    glucagon 1 MG kit  1 each 0 2021    [address]    Sig: Inject 1 mg Subcutaneous once as needed for low blood sugar (Use as directed by provider)    Class: E-Prescribe    Route: Subcutaneous    Guar Gum (FIBER MODULAR, NUTRISOURCE FIBER,) packet  60 packet 1 2021    [address]    Si packet by Per Feeding Tube route 2 times daily    Class: E-Prescribe    Route: Per Feeding Tube    Renewals     Renewal requests to authorizing provider (Aliza Ridley NP) <b>prohibited</b>          HYDROmorphone (DILAUDID) 2 MG tablet  60 tablet 0 2021        Sig:  Take 1-2 tablets (2-4 mg) by mouth every 6 hours as needed for severe pain    Class: Local Print    Earliest Fill Date: 2021    Route: Oral    HYDROmorphone (DILAUDID) 4 MG tablet  56 tablet 0 2021    [address]    Sig: Take 1-2 tablets (4-6 mg) every 4 hours as needed for severe abdominal pain    Class: E-Prescribe    Earliest Fill Date: 2021    insulin degludec (TRESIBA FLEXTOUCH) 100 UNIT/ML pen  15 mL 3 10/18/2021    [facility] - [place], [place] - [address] [place] [address]    Sig: Take 8 units daily    Class: E-Prescribe    insulin glargine (LANTUS PEN) 100 UNIT/ML pen  6 mL 1 2021    [address]    Sig: Inject 18 Units Subcutaneous 2 times daily    Class: E-Prescribe    Notes to Pharmacy: If Lantus is not covered by insurance, may substitute Basaglar at same dose and frequency.      Route: Subcutaneous    insulin lispro (HUMALOG KWIKPEN) 100 UNIT/ML (1 unit dial) KWIKPEN  5 mL 1 2021    [address]    Sig: For  - 169 give 1 unit.   For  - 219 give 2 units.   For  - 269 give 3 units.   For  - 319 give 4 units.   For BG >320 give 5 units.    Class: Local Print    insulin lispro (HUMALOG KWIKPEN) 100 UNIT/ML (1 unit dial) KWIKPEN  3 mL 1 2021    [address]    Si unit per 12g Carb with meals and snacks/supplement    Class: E-Prescribe    Renewals     Renewal requests to authorizing provider (Aliza Ridley NP) <b>prohibited</b>          insulin lispro (HUMALOG) 100 UNIT/ML Cartridge  15 mL 3 9/3/2021    [id number] - [place], [place] - [address] [place] [address]    Sig: Take 0.5- 5 units pre meal using scale prescribed, up to 30 unit per day supply.    Class: E-Prescribe    insulin pen needle (32G X  4 MM) 32G X 4 MM miscellaneous  200 each 3 10/13/2021    [facility] #[id number] - [place], [place] - [address] AT [facility] OF [address]    Sig: Use 4-6 pen needles daily to inject subcutaneous insulin    Class: E-Prescribe    insulin pen needle (32G X 4 MM) 32G X 4 MM miscellaneous  100 each 0 2021    [address]    Sig: Use as directed by provider  Per insurance coverage    Class: Local Print    magnesium hydroxide (MILK OF MAGNESIA) 400 MG/5ML suspension  1800 mL 1 2021    [address]    Si mLs by Per J Tube route 2 times daily as needed for constipation    Class: E-Prescribe    Route: Per J Tube    Renewals     Renewal requests to authorizing provider (Aliza Ridley, NP) <b>prohibited</b>          Melatonin 10 MG TABS tablet            Sig: Take 10 mg by mouth nightly as needed for sleep    Class: Historical    Route: Oral    methocarbamol (ROBAXIN) 500 MG tablet  90 tablet 0 2021    [address]     Sig: Take 2 tablets (1,000 mg) by mouth 3 times daily    Class: E-Prescribe    Route: Oral    multivitamin w/minerals (THERA-VIT-M) tablet  30 tablet 1 2021    [address]    Sig: Take 1 tablet by mouth daily    Class: E-Prescribe    Route: Oral    Renewals     Renewal requests to authorizing provider (Aliza Ridley, NP) <b>prohibited</b>          omeprazole (PRILOSEC) 40 MG DR capsule  60 capsule 3 2021    [address]    Sig: Take 1 tablet twice daily    Class: E-Prescribe    ondansetron (ZOFRAN-ODT) 4 MG ODT tab            Sig: Take 4 mg by mouth as needed     Class: Historical    Route: Oral    protein modular (PROSOURCE TF) LIQD  180 packet 1 2021    [facility]  [address]    Si packet by Per Feeding Tube route 3 times daily    Class: E-Prescribe    Route: Per Feeding Tube    Renewals     Renewal requests to authorizing provider (Aliza Ridley NP) <b>prohibited</b>          senna-docusate (SENOKOT-S/PERICOLACE) 8.6-50 MG tablet  60 tablet 0 2021    [address]    Sig: Take 1 tablet by mouth 2 times daily as needed for constipation    Class: E-Prescribe    Route: Oral    Renewals     Renewal requests to authorizing provider (Aliza Ridley NP) <b>prohibited</b>          sertraline (ZOLOFT) 100 MG tablet    2021        Sig: Take 150 mg by mouth At Bedtime    Class: Historical    Route: Oral    Sharps Container MISC  1 each 0 2021    [address]    Sig: Use as directed to dispose of needles, lancets and other sharps  Per Insurance coverage    Class: Local Print    traZODone (DESYREL) 100 MG tablet    2021        Sig: Take 100 mg by mouth At Bedtime    Class: Historical    Route: Oral        Physical exam:   General Appearance: in no apparent distress.       Skin: Normal, no rashes or jaundice  Heart: Regular rhythm.  Lungs: no audible wheezes or increased work of breathing.  Abdomen: The abdomen is flat, and the wound is Healing well, without hernia.  Tube exit site is clean. The abdomen is non-tender  Edema: absent.    Chronic Pancreatitis Latest Ref Rng & Units 2021   Weight - - 56.881 kg 56.9 kg 53.751 kg 54.341 kg   AMYLASE 30 - 110 U/L - - - - -   LIPASE 73 - 393 U/L - - - - -   A1C 0.0 - 5.6 % - - - - -   C PEPTIDE 0.9 - 6.9 ng/mL - - - - -   GLUCOSE 70 - 99 mg/dL 127(H) - - - -   GLUCOSE BY METER POCT 70 - 99 mg/dL - - - - -   GLUCOSE WHOLE BLOOD POCT 70 - 99 mg/dL - - - - -   HEMOGLOBIN 11.7 - 15.7 g/dL 8.7(L) - - - -    - 450  10e3/uL 1,239(HH) - - - -   ALBUMIN 3.4 - 5.0 g/dL 2.4(L) - - - -   PREALBUMIN 15 - 45 mg/dL - - - - -       Assessment and Plan: She is doing well s/p TP-IAT.  Interval progress has been good.  Postoperative gastric ileus: resolved  Pancreatic exocrine insufficiency: controlled  Malnutrition: improving  Diabetes mellitus: controlled  Abdominal pain management: weaning off narcotics      Total time: 30 min    Elfego Shirley MD

## 2021-09-14 NOTE — NURSING NOTE
Chief Complaint   Patient presents with     RECHECK     Follow up     Blood pressure 123/73, pulse 118, weight 54.3 kg (119 lb 12.8 oz), SpO2 96 %, not currently breastfeeding.    Adela Rodriguez on 9/14/2021 at 10:25 AM

## 2021-09-21 NOTE — PROGRESS NOTES
"Meme is a 51 year old who is being evaluated via a billable video visit.      How would you like to obtain your AVS? MyChart  If the video visit is dropped, the invitation should be resent by: Send to e-mail at: [email or web address]  Will anyone else be joining your video visit? No       Video-Visit Details    Type of service:  Video Visit    Originating Location (pt. Location): Home    Distant Location (provider location):   "[employer] DIABETES EDUCATION [place]     Platform used for Video Visit: PHARMAJET    Diabetes Self-Management Education & Support    Presents for:  MNT for post pancreatectomy diabetes    SUBJECTIVE/OBJECTIVE:     Cultural Influences/Ethnic Background:  Not  or     Diabetes Symptoms & Complications:          Patient Problem List and Family Medical History reviewed for relevant medical history, current medical status, and diabetes risk factors.    Vitals:  There were no vitals taken for this visit.  Estimated body mass index is 22.64 kg/m  as calculated from the following:    Height as of 8/9/21: 1.549 m (5' 1\").    Weight as of 9/14/21: 54.3 kg (119 lb 12.8 oz).   Last 3 BP:   BP Readings from Last 3 Encounters:   09/14/21 123/73   09/07/21 118/83   09/02/21 112/75       History   Smoking Status     Never Smoker   Smokeless Tobacco     Never Used       Labs:  Lab Results   Component Value Date    A1C 5.5 08/04/2021    A1C 6.0 05/19/2021     Lab Results   Component Value Date     09/02/2021     05/19/2021     Lab Results   Component Value Date     08/04/2021     05/19/2021     HDL Cholesterol   Date Value Ref Range Status   05/19/2021 62 >49 mg/dL Final     Direct Measure HDL   Date Value Ref Range Status   08/04/2021 74 >=50 mg/dL Final     Comment:     0-19 years:       Greater than or equal to 45 mg/dL   Low: Less than 40 mg/dL   Borderline low: 40-44 mg/dL     20 years and older:   Female: Greater than or equal to 50 mg/dL   Male:   " Greater than or equal to 40 mg/dL        ]  GFR Estimate   Date Value Ref Range Status   09/02/2021 >90 >60 mL/min/1.73m2 Final     Comment:     As of July 11, 2021, eGFR is calculated by the CKD-EPI creatinine equation, without race adjustment. eGFR can be influenced by muscle mass, exercise, and diet. The reported eGFR is an estimation only and is only applicable if the renal function is stable.   05/19/2021 >90 >60 mL/min/[1.73_m2] Final     Comment:     Non  GFR Calc  Starting 12/18/2018, serum creatinine based estimated GFR (eGFR) will be   calculated using the Chronic Kidney Disease Epidemiology Collaboration   (CKD-EPI) equation.       GFR Estimate If Black   Date Value Ref Range Status   05/19/2021 >90 >60 mL/min/[1.73_m2] Final     Comment:      GFR Calc  Starting 12/18/2018, serum creatinine based estimated GFR (eGFR) will be   calculated using the Chronic Kidney Disease Epidemiology Collaboration   (CKD-EPI) equation.       Lab Results   Component Value Date    CR 0.47 09/02/2021    CR 0.67 05/19/2021     No results found for: MICROALBUMIN    Healthy Eating:  Lantus 9 units in the am  Humalog 1:15g plus correction  Going home next week  Eating small portions and using plate method  Eating a very healthy nutritious diet  Weight is stable and normal for her  Is doing great with carb counting  Using labels  Measuring foods  Using very well fit to analyze recipes  Rounds carbs down for insulin dosing    Monitoring:  Reviewed daily by surgery team    Taking Medications:  Diabetes Medication(s)     Diabetic Other       Glucagon (GVOKE HYPOPEN 2-PACK) 1 MG/0.2ML SOAJ    Inject 1 each Subcutaneous once as needed (for severe hypoglycemia) Use x1 dose and then call seek emergent care for treatment     glucagon 1 MG kit    Inject 1 mg Subcutaneous once as needed for low blood sugar (Use as directed by provider)    Insulin       insulin glargine (LANTUS PEN) 100 UNIT/ML pen    Inject  18 Units Subcutaneous 2 times daily     insulin lispro (HUMALOG KWIKPEN) 100 UNIT/ML (1 unit dial) KWIKPEN    For  - 169 give 1 unit.   For  - 219 give 2 units.   For  - 269 give 3 units.   For  - 319 give 4 units.   For BG >320 give 5 units.     insulin lispro (HUMALOG KWIKPEN) 100 UNIT/ML (1 unit dial) KWIKPEN    1 unit per 12g Carb with meals and snacks/supplement     insulin lispro (HUMALOG) 100 UNIT/ML Cartridge    Take 0.5- 5 units pre meal using scale prescribed, up to 30 unit per day supply.            Healthy Coping:     Patient Activation Measure Survey Score:  ZACHARIAH Score (Last Two) 8/4/2021   ZACHARIAH Raw Score 34   Activation Score 68.9   ZACHARIAH Level 3       Diabetes knowledge and skills assessment:   Patient is knowledgeable in diabetes management concepts related to: Healthy Eating    Patient needs further education on the following diabetes management concepts: Healthy Eating    Based on learning assessment above, most appropriate setting for further diabetes education would be: Individual setting.      INTERVENTIONS:    Education provided today on:  AADE Self-Care Behaviors:  Healthy Eating: carbohydrate counting, consistency in amount, composition, and timing of food intake, label reading   The relationship between carbohydrate and glucose, identifying carbohydrate containing foods, how to use a nutrition facts label, reviewed written and online or francois based resources, how to estimate carbs and how to count carbs when eating out, measuring foods and estimating portions, what is an insulin to carbohydrate ratio and how is it used.     Opportunities for ongoing education and support in diabetes-self management were discussed.    Pt verbalized understanding of concepts discussed and recommendations provided today.       Education Materials Provided:  Carbohydrate Counting      ASSESSMENT:  Patient's most recent   Lab Results   Component Value Date    A1C 5.5 08/04/2021    A1C 6.0  05/19/2021    is meeting goal of <7.0      Time Spent: 60 minutes  Encounter Type: Individual    Any diabetes medication dose changes were made via the CDE Protocol and Collaborative Practice Agreement with the patient's referring provider. A copy of this encounter was shared with the provider.

## 2021-09-23 NOTE — PROGRESS NOTES
This is a recent snapshot of the patient's [facility] medical record.  For current drug dose and complete information and questions, call [phone] or In Basket pool, [facility] home infusion ([id number])  CSN Number:  [id number]

## 2021-09-28 DIAGNOSIS — Z90.410 POST-PANCREATECTOMY DIABETES (H): ICD-10-CM

## 2021-09-28 DIAGNOSIS — E13.9 POST-PANCREATECTOMY DIABETES (H): ICD-10-CM

## 2021-09-28 DIAGNOSIS — E89.1 POST-PANCREATECTOMY DIABETES (H): ICD-10-CM

## 2021-10-11 ENCOUNTER — HEALTH MAINTENANCE LETTER (OUTPATIENT)
Age: 51
End: 2021-10-11

## 2021-10-13 DIAGNOSIS — Z90.410 POST-PANCREATECTOMY DIABETES (H): ICD-10-CM

## 2021-10-13 DIAGNOSIS — E13.9 POST-PANCREATECTOMY DIABETES (H): ICD-10-CM

## 2021-10-13 DIAGNOSIS — E89.1 POST-PANCREATECTOMY DIABETES (H): ICD-10-CM

## 2021-10-18 DIAGNOSIS — E10.9 TYPE 1 DIABETES MELLITUS WITHOUT COMPLICATION (H): Primary | ICD-10-CM

## 2021-10-18 RX ORDER — INSULIN DEGLUDEC 100 U/ML
INJECTION, SOLUTION SUBCUTANEOUS
Qty: 15 ML | Refills: 3 | Status: SHIPPED | OUTPATIENT
Start: 2021-10-18 | End: 2022-09-16

## 2021-11-03 NOTE — PROGRESS NOTES
Chronic Pancreatitis [facility] Progress Note    I had the pleasure of seeing Ms. Meme Robins today. 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    Interval events since last visit with me:   ER visits = 0, reasons: 0  Inpatient admissions = 0, reasons 0  The patient reports their current symptoms to be:   GI function:   Nausea:   [x]  none    []  rare    []  often    []  daily  Comment:   Vomiting: [x]  none    []  rare    []  often    []  daily   Comment:   Last BM: Today.  Stools are soft.   Appetite: good    Oral food intake: 1-3 per day  Pancreatic exocrine insufficiency:     Greasy stools: [x]  none   []  rarely    []  several times/wk   []  daily      Comment:   Diabetes management:       Lab Results   Component Value Date    A1C 5.5 08/04/2021    A1C 6.0 05/19/2021      Pain management:   Fentanyl patch plus dilaudid prn  Prescription Medications as of 11/3/2021       Rx Number Disp Refills Start End Last Dispensed Date Next Fill Date Owning Pharmacy    acetaminophen (TYLENOL) 325 MG tablet  120 tablet 0 8/20/2021    [address]    Sig: Take 2 tablets (650 mg) by mouth every 6 hours    Class: E-Prescribe    Route: Oral    Renewals     Renewal requests to authorizing provider (Aliza Ridley NP) <b>prohibited</b>          Alcohol Swabs PADS  100 each 0 8/18/2021    [address]    Sig: Use to swab the area of the injection or pamella as directed   Per insurance coverage    Class: Local Print    aspirin (ASA) 325 MG tablet  30 tablet 1 8/20/2021    [address]    Sig: Take 1 tablet (325 mg) by mouth daily    Class: E-Prescribe    Route: Oral    Renewals     Renewal requests to authorizing provider (Aliza Ridley NP) <b>prohibited</b>          bisacodyl (DULCOLAX) 10 MG suppository  5 suppository 0  2021    [address]    Sig: Place 1 suppository (10 mg) rectally daily as needed for constipation    Class: E-Prescribe    Route: Rectal    blood glucose (NO BRAND SPECIFIED) lancets standard  100 each 0 2021    [address]    Sig: To use to test glucose level in the blood  Use to test blood sugar  6  times daily as directed. To accompany glucose monitor brands per insurance coverage.  One touch Delica lancets    Class: Local Print    blood glucose (NO BRAND SPECIFIED) test strip  100 strip 3 2021    [id number] - [place], [place] - [address] AT [facility] OF [address]    Sig: Use to test blood sugar  6  times daily as directed. To accompany glucose monitor brands per insurance coverage: One Touch Verio strip    Class: E-Prescribe    clonazePAM (KLONOPIN) 1 MG tablet    2021        Simg morning and 1.5mg every evening    Class: Historical    Continuous Blood Gluc Sensor (DEXCOM G6 SENSOR) MISC  3 each 5 9/3/2021    [id number] - [place], [place] - [address]    Si each every 10 days Change every 10 days.    Class: E-Prescribe    Route: Does not apply    Continuous Blood Gluc Transmit (DEXCOM G6 TRANSMITTER) MISC  1 each 1 9/3/2021    [id number] - [place], [place] - [address]    Si each every 3 months Change every 3 months.    Class: E-Prescribe    Route: Does not apply    diphenhydrAMINE (BENADRYL) 25 MG tablet            Sig: Take 25 mg by mouth every 6 hours as needed for itching or allergies     Class: Historical    Route: Oral    docusate sodium (COLACE) 100 MG capsule  60 capsule 1 2021    [address]    Sig: Take 100 mg by mouth 2 times daily as needed for constipation     Class: Historical    Route: Oral    Renewals     Renewal  requests to authorizing provider (Aliza Ridley NP) <b>prohibited</b>          fentaNYL (DURAGESIC) 25 mcg/hr 72 hr patch  10 patch 0 2021        Sig: Place 1 patch onto the skin every 72 hours remove old patch.    Class: Local Print    Earliest Fill Date: 2021    Route: Transdermal    gabapentin (NEURONTIN) 300 MG capsule  60 capsule 1 2021    [id number] - [place], [place] - [address] AT [facility]    Sig: Take 1 capsule (300 mg) by mouth 2 times daily    Class: E-Prescribe    Route: Oral    Glucagon (GVOKE HYPOPEN 2-PACK) 1 MG/0.2ML SOAJ  0.4 mL 1 2021    [address]    Sig: Inject 1 each Subcutaneous once as needed (for severe hypoglycemia) Use x1 dose and then call seek emergent care for treatment    Class: E-Prescribe    Notes to Pharmacy: Please call Maddi Almaraz RN Transplant Coordinator, with fill/formulary issues: [phone]    Route: Subcutaneous    glucagon 1 MG kit  1 each 0 2021    [address]    Sig: Inject 1 mg Subcutaneous once as needed for low blood sugar (Use as directed by provider)    Class: E-Prescribe    Route: Subcutaneous    Guar Gum (FIBER MODULAR, NUTRISOURCE FIBER,) packet  60 packet 1 2021    [address]    Si packet by Per Feeding Tube route 2 times daily    Class: E-Prescribe    Route: Per Feeding Tube    Renewals     Renewal requests to authorizing provider (Aliza Ridley NP) <b>prohibited</b>          HYDROmorphone (DILAUDID) 2 MG tablet  60 tablet 0 2021        Sig: Take 1-2 tablets (2-4 mg) by mouth every 6 hours as needed for severe pain    Class: Local Print    Earliest Fill Date: 2021    Route: Oral    HYDROmorphone (DILAUDID) 4 MG tablet  56 tablet 0 2021    [facility]  [address]    Sig: Take 1-2 tablets (4-6 mg) every 4 hours as needed for severe abdominal pain    Class: E-Prescribe    Earliest Fill Date: 2021    insulin degludec (TRESIBA FLEXTOUCH) 100 UNIT/ML pen  15 mL 3 10/18/2021    [facility] - [place], [place] - [facility]    Sig: Take 8 units daily    Class: E-Prescribe    insulin glargine (LANTUS PEN) 100 UNIT/ML pen  6 mL 1 2021    [address]    Sig: Inject 18 Units Subcutaneous 2 times daily    Class: E-Prescribe    Notes to Pharmacy: If Lantus is not covered by insurance, may substitute Basaglar at same dose and frequency.      Route: Subcutaneous    insulin lispro (HUMALOG KWIKPEN) 100 UNIT/ML (1 unit dial) KWIKPEN  5 mL 1 2021    [address]    Sig: For  - 169 give 1 unit.   For  - 219 give 2 units.   For  - 269 give 3 units.   For  - 319 give 4 units.   For BG >320 give 5 units.    Class: Local Print    insulin lispro (HUMALOG KWIKPEN) 100 UNIT/ML (1 unit dial) KWIKPEN  3 mL 1 2021    [address]    Si unit per 12g Carb with meals and snacks/supplement    Class: E-Prescribe    Renewals     Renewal requests to authorizing provider (Aliza Ridley NP) <b>prohibited</b>          insulin lispro (HUMALOG) 100 UNIT/ML Cartridge  15 mL 3 9/3/2021    [id number] - [place], [place] - [address]    Sig: Take 0.5- 5 units pre meal using scale prescribed, up to 30 unit per day supply.    Class: E-Prescribe    insulin pen needle (32G X 4 MM) 32G X 4 MM miscellaneous  200 each 3 10/13/2021    [id number] - [place] - [address] AT [facility] OF [address]    Sig: Use 4-6 pen needles daily to inject subcutaneous insulin    Class: E-Prescribe    insulin  pen needle (32G X 4 MM) 32G X 4 MM miscellaneous  100 each 0 2021    [address]    Sig: Use as directed by provider  Per insurance coverage    Class: Local Print    magnesium hydroxide (MILK OF MAGNESIA) 400 MG/5ML suspension  1800 mL 1 2021    [address]    Si mLs by Per J Tube route 2 times daily as needed for constipation    Class: E-Prescribe    Route: Per J Tube    Renewals     Renewal requests to authorizing provider (Aliza Ridley NP) <b>prohibited</b>          Melatonin 10 MG TABS tablet            Sig: Take 10 mg by mouth nightly as needed for sleep    Class: Historical    Route: Oral    methocarbamol (ROBAXIN) 500 MG tablet  90 tablet 0 2021    [address]    Sig: Take 2 tablets (1,000 mg) by mouth 3 times daily    Class: E-Prescribe    Route: Oral    multivitamin w/minerals (THERA-VIT-M) tablet  30 tablet 1 2021    [address]    Sig: Take 1 tablet by mouth daily    Class: E-Prescribe    Route: Oral    Renewals     Renewal requests to authorizing provider (Aliza Ridley NP) <b>prohibited</b>          omeprazole (PRILOSEC) 40 MG DR capsule  60 capsule 3 2021    [address]    Sig: Take 1 tablet twice daily    Class: E-Prescribe    ondansetron (ZOFRAN-ODT) 4 MG ODT tab            Sig: Take 4 mg by mouth as needed     Class: Historical    Route: Oral    protein modular (PROSOURCE TF) LIQD  180 packet 1 2021    [address]    Si packet by Per Feeding Tube route 3 times daily    Class: E-Prescribe    Route: Per Feeding Tube    Renewals     Renewal requests to authorizing provider (Aliza Ridley NP)  <b>prohibited</b>          senna-docusate (SENOKOT-S/PERICOLACE) 8.6-50 MG tablet  60 tablet 0 8/20/2021    [place], [place] - [address] [place] [address]    Sig: Take 1 tablet by mouth 2 times daily as needed for constipation    Class: E-Prescribe    Route: Oral    Renewals     Renewal requests to authorizing provider (Aliaz Ridley NP) <b>prohibited</b>          sertraline (ZOLOFT) 100 MG tablet    2/1/2021        Sig: Take 150 mg by mouth At Bedtime    Class: Historical    Route: Oral    Sharps Container MISC  1 each 0 8/18/2021    [facility] - [place], [place] - [address] [place] [address]    Sig: Use as directed to dispose of needles, lancets and other sharps  Per Insurance coverage    Class: Local Print    traZODone (DESYREL) 100 MG tablet    2/1/2021        Sig: Take 100 mg by mouth At Bedtime    Class: Historical    Route: Oral        Physical exam:   General Appearance: in no apparent distress.       Skin: Normal, no rashes or jaundice  Heart: Regular rhythm.  Lungs: no audible wheezes or increased work of breathing.  Abdomen: The abdomen is flat, and the wound is Healing well, without hernia.  Tube exit site is clean. The abdomen is non-tender  Edema: absent.    Chronic Pancreatitis Latest Ref Rng & Units 9/2/2021 9/2/2021 9/2/2021 9/7/2021 9/14/2021   Weight - - 56.881 kg 56.9 kg 53.751 kg 54.341 kg   AMYLASE 30 - 110 U/L - - - - -   LIPASE 73 - 393 U/L - - - - -   A1C 0.0 - 5.6 % - - - - -   C PEPTIDE 0.9 - 6.9 ng/mL - - - - -   GLUCOSE 70 - 99 mg/dL 127(H) - - - -   GLUCOSE BY METER POCT 70 - 99 mg/dL - - - - -   GLUCOSE WHOLE BLOOD POCT 70 - 99 mg/dL - - - - -   HEMOGLOBIN 11.7 - 15.7 g/dL 8.7(L) - - - -    - 450 10e3/uL 1,239(HH) - - - -   ALBUMIN 3.4 - 5.0 g/dL 2.4(L) - - - -   PREALBUMIN 15 - 45 mg/dL - - - - -       Assessment and Plan: She is doing well s/p TP-IAT.  Interval progress has been good.  Postoperative gastric ileus: resolved  Pancreatic  exocrine insufficiency: controlled  Malnutrition: improving  Diabetes mellitus: controlled  Abdominal pain management: weaning off narcotics      Total time: 30 min    Elfego Shirley MD

## 2021-11-12 DIAGNOSIS — K86.89 PANCREATIC INSUFFICIENCY: Primary | ICD-10-CM

## 2021-11-18 ENCOUNTER — LAB (OUTPATIENT)
Dept: LAB | Facility: CLINIC | Age: 51
End: 2021-11-18
Attending: PEDIATRICS
Payer: COMMERCIAL

## 2021-11-18 ENCOUNTER — OFFICE VISIT (OUTPATIENT)
Dept: TRANSPLANT | Facility: CLINIC | Age: 51
End: 2021-11-18
Attending: TRANSPLANT SURGERY
Payer: COMMERCIAL

## 2021-11-18 ENCOUNTER — ALLIED HEALTH/NURSE VISIT (OUTPATIENT)
Dept: TRANSPLANT | Facility: CLINIC | Age: 51
End: 2021-11-18
Attending: PEDIATRICS
Payer: COMMERCIAL

## 2021-11-18 ENCOUNTER — APPOINTMENT (OUTPATIENT)
Dept: LAB | Facility: CLINIC | Age: 51
End: 2021-11-18
Attending: PEDIATRICS
Payer: COMMERCIAL

## 2021-11-18 VITALS — BODY MASS INDEX: 21.92 KG/M2 | SYSTOLIC BLOOD PRESSURE: 112 MMHG | DIASTOLIC BLOOD PRESSURE: 77 MMHG | WEIGHT: 116 LBS

## 2021-11-18 VITALS
BODY MASS INDEX: 21.99 KG/M2 | SYSTOLIC BLOOD PRESSURE: 112 MMHG | HEART RATE: 97 BPM | DIASTOLIC BLOOD PRESSURE: 77 MMHG | TEMPERATURE: 98.2 F | OXYGEN SATURATION: 98 % | WEIGHT: 116.4 LBS

## 2021-11-18 DIAGNOSIS — K86.81 EXOCRINE PANCREATIC INSUFFICIENCY: Primary | ICD-10-CM

## 2021-11-18 DIAGNOSIS — K86.81 EXOCRINE PANCREATIC INSUFFICIENCY: ICD-10-CM

## 2021-11-18 DIAGNOSIS — E89.1 POST-PANCREATECTOMY DIABETES (H): ICD-10-CM

## 2021-11-18 DIAGNOSIS — Z90.410 POST-PANCREATECTOMY DIABETES (H): ICD-10-CM

## 2021-11-18 DIAGNOSIS — E13.9 POST-PANCREATECTOMY DIABETES (H): ICD-10-CM

## 2021-11-18 DIAGNOSIS — K86.89 PANCREATIC INSUFFICIENCY: Primary | ICD-10-CM

## 2021-11-18 DIAGNOSIS — Z90.410 POST-PANCREATECTOMY DIABETES (H): Primary | ICD-10-CM

## 2021-11-18 DIAGNOSIS — E13.9 POST-PANCREATECTOMY DIABETES (H): Primary | ICD-10-CM

## 2021-11-18 DIAGNOSIS — Z90.410 ACQUIRED TOTAL ABSENCE OF PANCREAS: Primary | ICD-10-CM

## 2021-11-18 DIAGNOSIS — E89.1 POST-PANCREATECTOMY DIABETES (H): Primary | ICD-10-CM

## 2021-11-18 LAB
ALBUMIN SERPL-MCNC: 3.1 G/DL (ref 3.4–5)
ALP SERPL-CCNC: 165 U/L (ref 40–150)
ALT SERPL W P-5'-P-CCNC: 80 U/L (ref 0–50)
ANION GAP SERPL CALCULATED.3IONS-SCNC: 6 MMOL/L (ref 3–14)
AST SERPL W P-5'-P-CCNC: 64 U/L (ref 0–45)
BASOPHILS # BLD AUTO: 0 10E3/UL (ref 0–0.2)
BASOPHILS NFR BLD AUTO: 1 %
BILIRUB SERPL-MCNC: 0.2 MG/DL (ref 0.2–1.3)
BUN SERPL-MCNC: 13 MG/DL (ref 7–30)
CALCIUM SERPL-MCNC: 8.7 MG/DL (ref 8.5–10.1)
CHLORIDE BLD-SCNC: 103 MMOL/L (ref 94–109)
CHOLEST SERPL-MCNC: 141 MG/DL
CO2 SERPL-SCNC: 30 MMOL/L (ref 20–32)
CREAT SERPL-MCNC: 0.45 MG/DL (ref 0.52–1.04)
EOSINOPHIL # BLD AUTO: 0.1 10E3/UL (ref 0–0.7)
EOSINOPHIL NFR BLD AUTO: 1 %
ERYTHROCYTE [DISTWIDTH] IN BLOOD BY AUTOMATED COUNT: 18.6 % (ref 10–15)
FASTING STATUS PATIENT QL REPORTED: YES
FERRITIN SERPL-MCNC: 14 NG/ML (ref 8–252)
GFR SERPL CREATININE-BSD FRML MDRD: >90 ML/MIN/1.73M2
GLUCOSE BLD-MCNC: 112 MG/DL (ref 70–99)
GLUCOSE BLD-MCNC: 139 MG/DL (ref 70–99)
GLUCOSE BLD-MCNC: 92 MG/DL (ref 70–99)
HBA1C MFR BLD: 6.4 % (ref 0–5.6)
HCT VFR BLD AUTO: 33.6 % (ref 35–47)
HDLC SERPL-MCNC: 63 MG/DL
HGB BLD-MCNC: 10.6 G/DL (ref 11.7–15.7)
IMM GRANULOCYTES # BLD: 0 10E3/UL
IMM GRANULOCYTES NFR BLD: 0 %
IRON SATN MFR SERPL: 5 % (ref 15–46)
IRON SERPL-MCNC: 15 UG/DL (ref 35–180)
LDLC SERPL CALC-MCNC: 55 MG/DL
LYMPHOCYTES # BLD AUTO: 1.4 10E3/UL (ref 0.8–5.3)
LYMPHOCYTES NFR BLD AUTO: 16 %
MCH RBC QN AUTO: 24 PG (ref 26.5–33)
MCHC RBC AUTO-ENTMCNC: 31.5 G/DL (ref 31.5–36.5)
MCV RBC AUTO: 76 FL (ref 78–100)
MONOCYTES # BLD AUTO: 1.2 10E3/UL (ref 0–1.3)
MONOCYTES NFR BLD AUTO: 14 %
NEUTROPHILS # BLD AUTO: 5.7 10E3/UL (ref 1.6–8.3)
NEUTROPHILS NFR BLD AUTO: 68 %
NONHDLC SERPL-MCNC: 78 MG/DL
NRBC # BLD AUTO: 0 10E3/UL
NRBC BLD AUTO-RTO: 0 /100
PLATELET # BLD AUTO: 387 10E3/UL (ref 150–450)
POTASSIUM BLD-SCNC: 4 MMOL/L (ref 3.4–5.3)
PREALB SERPL IA-MCNC: 14 MG/DL (ref 15–45)
PROT SERPL-MCNC: 6.5 G/DL (ref 6.8–8.8)
RBC # BLD AUTO: 4.41 10E6/UL (ref 3.8–5.2)
SODIUM SERPL-SCNC: 139 MMOL/L (ref 133–144)
TIBC SERPL-MCNC: 310 UG/DL (ref 240–430)
TRIGL SERPL-MCNC: 113 MG/DL
VIT B12 SERPL-MCNC: 797 PG/ML (ref 193–986)
WBC # BLD AUTO: 8.5 10E3/UL (ref 4–11)

## 2021-11-18 PROCEDURE — 83036 HEMOGLOBIN GLYCOSYLATED A1C: CPT

## 2021-11-18 PROCEDURE — 82747 ASSAY OF FOLIC ACID RBC: CPT

## 2021-11-18 PROCEDURE — 250N000011 HC RX IP 250 OP 636: Performed by: PEDIATRICS

## 2021-11-18 PROCEDURE — 99215 OFFICE O/P EST HI 40 MIN: CPT | Performed by: PEDIATRICS

## 2021-11-18 PROCEDURE — 80061 LIPID PANEL: CPT

## 2021-11-18 PROCEDURE — 36591 DRAW BLOOD OFF VENOUS DEVICE: CPT

## 2021-11-18 PROCEDURE — 82947 ASSAY GLUCOSE BLOOD QUANT: CPT

## 2021-11-18 PROCEDURE — 99213 OFFICE O/P EST LOW 20 MIN: CPT | Performed by: TRANSPLANT SURGERY

## 2021-11-18 PROCEDURE — 82306 VITAMIN D 25 HYDROXY: CPT

## 2021-11-18 PROCEDURE — 82728 ASSAY OF FERRITIN: CPT

## 2021-11-18 PROCEDURE — 82607 VITAMIN B-12: CPT

## 2021-11-18 PROCEDURE — 85025 COMPLETE CBC W/AUTO DIFF WBC: CPT

## 2021-11-18 PROCEDURE — 84681 ASSAY OF C-PEPTIDE: CPT

## 2021-11-18 PROCEDURE — 84134 ASSAY OF PREALBUMIN: CPT

## 2021-11-18 PROCEDURE — 80053 COMPREHEN METABOLIC PANEL: CPT

## 2021-11-18 PROCEDURE — 84630 ASSAY OF ZINC: CPT

## 2021-11-18 PROCEDURE — 83550 IRON BINDING TEST: CPT

## 2021-11-18 PROCEDURE — 84446 ASSAY OF VITAMIN E: CPT

## 2021-11-18 PROCEDURE — 84590 ASSAY OF VITAMIN A: CPT

## 2021-11-18 PROCEDURE — 82542 COL CHROMOTOGRAPHY QUAL/QUAN: CPT

## 2021-11-18 RX ORDER — HEPARIN SODIUM (PORCINE) LOCK FLUSH IV SOLN 100 UNIT/ML 100 UNIT/ML
5 SOLUTION INTRAVENOUS
Status: COMPLETED | OUTPATIENT
Start: 2021-11-18 | End: 2021-11-18

## 2021-11-18 RX ORDER — HEPARIN SODIUM (PORCINE) LOCK FLUSH IV SOLN 100 UNIT/ML 100 UNIT/ML
5 SOLUTION INTRAVENOUS ONCE
Status: COMPLETED | OUTPATIENT
Start: 2021-11-18 | End: 2021-11-18

## 2021-11-18 RX ADMIN — Medication 5 ML: at 09:39

## 2021-11-18 RX ADMIN — Medication 5 ML: at 12:05

## 2021-11-18 RX ADMIN — SODIUM CHLORIDE, PRESERVATIVE FREE 5 ML: 5 INJECTION INTRAVENOUS at 13:09

## 2021-11-18 NOTE — NURSING NOTE
Chief Complaint   Patient presents with     Lab Only     labs drawn from port by rn.      Port Draw     Labs drawn via port by RN in lab.      Labs drawn by RN from previously accessed port. Port flushed with saline and heparin. Port then de-accessed. Pt tolerated well.      Antoinette Ortiz RN

## 2021-11-18 NOTE — PROGRESS NOTES
[facility]  Islet Autotransplant, Diabetes Follow Up    Problem List:  Patient Active Problem List   Diagnosis     Abdominal pain, RUQ (right upper quadrant)     Acute upper respiratory infection     Anemia     Cellulitis and abscess     Chronic abdominal pain     Chronic pancreatitis (H)     Dysfunctional sphincter of Oddi     Diarrhea     Degeneration of cervical intervertebral disc     Dysthymic disorder     Iron deficiency anemia     Glucocorticoid deficiency (H)     Joint pain, hip     Long term current use of anticoagulant therapy     Other symptoms involving urinary system(788.99)     Nausea alone     Myalgia and myositis     Migraine     Malaise and fatigue     Major depressive disorder, single episode     Urinary tract infection     Pulmonary embolism (H)     Primary hypercoagulable state (H)     Hypoglycemia after GI (gastrointestinal) surgery     Postoperative abdominal pain     S/P pancreatic islet cell transplantation (H)       HPI:  Meme is a 51 year old female here for follow up of Open total pancreatectomy with splenectomy; Adhesiolysis for more than 120 minutes; Open Gastrojejunostomy tube placement (Sergio's technique); Bowel & Biliary reconstruction using Bonilla-en Y choledochojejunostomy (retrocolic) over 8-Fr pediatric feeding tube stent and Duodenojejunostomy (retrocolic); Interval Appendectomy; Infusion of islet cells in portal circulation through splenic vein; Wedge biopsy of Liver performed on 2021.  At the time of the procedure, the patient received 131,600 IEQ, or 2,386 IEQ/kg body weight.   Other endocrine history includes a distant preceding history of adrenal insufficiency, but an AM cortisol even before cosyntropin at presurgical baseline was 35, demonstrating adequate cortisol response to physiologic stress.     Autotransplant data:   Patient weight: 55.2 kg  Tissue mass: 9 ml  Total Islet number: 130,900  Total Islet number/k  Islet  equivalents: 131,600  Islet equivalents/kilogram: 2386  Pre-infusion portal pressure: 1 cm/H2O  Peak portal pressure: 9 cm/H2O  Post-rinse (final) portal pressure: 9 cm/H2O    At today's visit, She is now 101 days post op.    Overall she seems to be progressing well for surgical recovery.  She has had intermittent achiness, feeling flu like which may be related to opioid weaning.  She has been able to eat well. She eats low carb by choice. She was losing weight but also was on too low of an enzyme dose and reports more normal stools now with an increased dose for her PERT.  She does have iron deficiency anemia.     She is using InPen for diabetes management.  She has some lows that are mainly in the evenings after dinner but also had some a couple days ago in early AM hours.  She had with mixed meal today a very rapid peak to 297 mg/dL after boost and then a rapid drop in glucose. She did however also take an insulin bolus due to confusion in our instructions here at just after the 1 hour draw.      Did note iron deficiency anemia in lab studies.     Diabetes history:  Current insulin regimen:  Tresiba 8 units  Humalog settings in InPen  ICR 12a 20g, 7a 15g, 11a 20g, 6p 20g  ISF 60 at all times  Target 125 at all times  Uses 3.4 u.day on average of Humalog in past 2 weeks  TDD = 11.4 unit.day     Patient is good candidate for CGM therapy.  Meets these criteria:  -- Has a diagnosis of diabetes,: This patient has type 1 diabetes secondary to pancreatectomy (surgical type 1)    --  Uses a home blood glucose monitor (BGM) and conduct four or more daily BGM tests, or is on CGM therapy:  On CGM or tests 6 times per day  --- Treated with insulin with multiple daily injections or a continuous subcutaneous insulin infusion (CSII) pump:  This patient is on MDI (3-4 injections per day on avg)  --  Require frequent adjustments of the insulin treatment regimen, based on therapeutic CGM test results:  yes      Recent hemoglobin  A1c levels:  Lab Results   Component Value Date    A1C 6.4 11/18/2021    A1C 5.5 08/04/2021    A1C 6.0 05/19/2021       Dexcom Data:  dates 11/5- 11/18  Avg 126, SD 31 mg/dL  Time in  is 65.9%   Time  is 92%  Time <70 is 3.4%, time <54 is 1.3%  Time >140 is 30.6%    Hypoglycemia history:  Mild to moderate.  The patient has had no episodes of severe hypoglycemia (seizure, coma, or neuroglycopenic symptoms severe enough to require assistance from another person).  Blood sugars were reviewed from the patient records and/or the meter download.    Reviewed BG from In Pen and Dexcom    Review of systems:  A complete ROS is performed and is negative except as noted in the HPI or below:    Past Medical and Surgical History:  Past Medical History:   Diagnosis Date     Adrenal insufficiency (H)     iatrogenic, around 2013, off treatment for several years as of 2021 visit     Anemia      Depression      Esophageal reflux      Idiopathic chronic pancreatitis (H)      Pre-diabetes      Pulmonary embolism (H)      Past Surgical History:   Procedure Laterality Date     CELIAC PLEXUS BLOCK  05/29/2014    with alcohol x 1     ENDOMETRIAL ABLATION  03/29/2014     ENDOSCOPIC RETROGRADE CHOLANGIOPANCREATOGRAM      about 30, most with PD stentes     ENDOSCOPIC ULTRASOUND, ESOPHAGOSCOPY, GASTROSCOPY, DUODENOSCOPY (EGD), COMBINED        SHOULDER ARTHROSCOPY,SURGICAL BICEPS TENODESIS  02/09/2017     JEJUNOSTOMY CARE      multiple feeding tubes x 3, at least one was direct J     LAPAROSCOPIC PANCREATECTOMY, TRANSPLANT AUTO ISLET CELL N/A 8/9/2021    Procedure: Open total pancreatectomy with autologous islet cell transplantation, splenectomy, and incidental appendectomy;  Surgeon: Elfego Shirley MD;  Location: [facility] OR     PANCREAS SURGERY  05/29/2014    John procedure revision     PANCREAS SURGERY  02/2011    John       Family History:  New changes since last visit:  none  Family History   Problem Relation Age of Onset      Breast Cancer Mother      Diabetes No family hx of      Pancreatitis No family hx of        Social History:  Social History     Social History Narrative    Meme is  and has three children.       Staying locally.     Physical Exam:  Vitals: /77   Wt 52.6 kg (116 lb)   Breastfeeding No   BMI 21.92 kg/m    BMI= Body mass index is 21.92 kg/m .  General:  Appearance is normal, no acute distress  HEENT:  NC/AT, sclera appear white  Neck:  No obvious thyromegaly  CV/Lungs:  Non distressed breathing  Skin:  No apparent rashes  Neuro:  Normal mental status  Psych:  Normal affect      Results  Mixed Meal Test:  C Peptide   Date Value Ref Range Status   08/04/2021 3.0 0.9 - 6.9 ng/mL Final   08/04/2021 3.4 0.9 - 6.9 ng/mL Final   08/04/2021 1.1 0.9 - 6.9 ng/mL Final   05/19/2021 1.8 0.9 - 6.9 ng/mL Final   05/19/2021 2.5 0.9 - 6.9 ng/mL Final   05/19/2021 1.6 0.9 - 6.9 ng/mL Final     Glucose   Date Value Ref Range Status   11/18/2021 92 70 - 99 mg/dL Final   11/18/2021 139 (H) 70 - 99 mg/dL Final   11/18/2021 112 (H) 70 - 99 mg/dL Final   09/02/2021 127 (H) 70 - 99 mg/dL Final   08/26/2021 128 (H) 70 - 99 mg/dL Final   05/19/2021 124 (H) 70 - 99 mg/dL Final   05/19/2021 103 (H) 70 - 99 mg/dL Final   05/19/2021 112 (H) 70 - 99 mg/dL Final       Hemoglobin A1c  Lab Results   Component Value Date    A1C 6.4 11/18/2021    A1C 5.5 08/04/2021    A1C 6.0 05/19/2021       Liver enzymes  Lab Results   Component Value Date    AST 64 11/18/2021    AST 16 05/19/2021     Lab Results   Component Value Date    ALT 80 11/18/2021    ALT 20 05/19/2021     No results found for: BILICONJ   Lab Results   Component Value Date    BILITOTAL 0.2 11/18/2021    BILITOTAL 0.5 05/19/2021     Lab Results   Component Value Date    ALBUMIN 3.1 11/18/2021    ALBUMIN 3.9 05/19/2021     Lab Results   Component Value Date    PROTTOTAL 6.5 11/18/2021    PROTTOTAL 7.5 05/19/2021      Lab Results   Component Value Date    ALKPHOS 165 11/18/2021     ALKPHOS 82 05/19/2021       Creatinine:  Creatinine   Date Value Ref Range Status   11/18/2021 0.45 (L) 0.52 - 1.04 mg/dL Final   05/19/2021 0.67 0.52 - 1.04 mg/dL Final   ]    Complete Blood Count:  CBC RESULTS:   Recent Labs   Lab Test 11/18/21  0948   WBC 8.5   RBC 4.41   HGB 10.6*   HCT 33.6*   MCV 76*   MCH 24.0*   MCHC 31.5   RDW 18.6*          Cholesterol  Lab Results   Component Value Date    CHOL 141 11/18/2021    CHOL 212 05/19/2021     Lab Results   Component Value Date    HDL 63 11/18/2021    HDL 62 05/19/2021     Lab Results   Component Value Date    LDL 55 11/18/2021     05/19/2021     Lab Results   Component Value Date    TRIG 113 11/18/2021    TRIG 151 05/19/2021     No results found for: CHOLHDLRATIO        Assessment:  1.  Post pancreatectomy diabetes mellitus, s/p total pancreatectomy and islet autotransplant.  (ICD-10 E89.1)  2.  Type 1 diabetes secondary to pancreatectomy, as outlined in #1 above (Surgical type 1 DM, ICD-10 E10.9)    Meme is a 51 year old with history of chronic pancreatitis who is s/p total pancreatectomy and islet autotransplant.  She received a low-moderate mass of about 2300 IEQ/kg but is doing quite well on low insulin doses.  She does eat a low carb diet which likely accounts for her low dose of rapid acting insulin.    Main ongoing issues in review of BG trends have included highs overnight, and sometimes still drops low in the evenings.  We tried reducing the evening rapid acting doses and increasing the Tresiba per instructions below.  We also reviewed and answer general questions about post-op management, alcohol consumption (OK to have a drink in moderation after 6 mos post op), ongoing achy/flu like symptoms probably from opioid wean as we see this relatively often.       Plan:  1.  Changes to current diabetes regimen:  Patient Instructions   1)  We are increasing the Tresiba to 9 units to see if this helps with the overnight highs.    2)  Because you  still have some lows however in the evening we made this change to the InPen setting at 6pm:  I:C changed from 20 to 25 grams  ISF changed from 60 to 75 mg/dL    3)  Please keep me updated on numbers on these new doses.    4)  Will watch for lab results.  The last C-peptide and glucose at 2 hours will be a little impacted by the insulin dose but the 0 and 1 hour values will reflect your own body's function.  Will also watch for nutritional studies.    5)  OK to continue with current low carb diet but it is also OK to have a higher carb meal sometimes that is covered with insulin.  We need to watch weight-- I think this was related to the enzyme dose but if you continue to lose weight on a higher dose of enzyme then you just need to get more calories in.        2.  Frequency of blood sugar checks:  6 times per day if not wearing Dexcom  3.  Continue routine follow up for autoislet transplant patients:  Mixed meal test (6 mL/kg BoostHP to max of 360 mL) at 3 months, 6 months, and once a year post transplant.  Hemoglobin A1c levels at these time points and quarterly.  4.  Other issues addressed today:  None.      Follow up:  TBD    Contact me for questions at [phone] or [phone].  Emergency number to reach pediatric endocrinology after hours is [phone].    Dr. Patricia Bermeo MD  [facility] acute [facility] [place]  Director of Research, Islet Auto-transplant Program  Phone:  [phone]  Electronically signed: November 19, 2021 at 8:30 AM          Review of the result(s) of each unique test - as above  Prescription drug management  45 minutes spent on the date of the encounter doing chart review, history and exam, documentation and further activities per the note

## 2021-11-18 NOTE — NURSING NOTE
Chief Complaint   Patient presents with     Lab Only     labs drawn from port by rn.      Good blood return noted from previously-accessed port.  Labs drawn from port by rn.  Port flushed with NS and heparin.  Pt tolerated well.      Macey Fernandes RN

## 2021-11-18 NOTE — LETTER
11/18/2021    RE: Meme Robins  [id number] [facility] [address]    Dear Colleague,    Thank you for referring your patient, Meme Robins, to the [facility]. Please see a copy of my visit note below.    Chronic Pancreatitis Group Progress Note    I had the pleasure of seeing Ms. Meme Robins today. She is 101 days status post total pancreatectomy with islet autotransplant.  Reports on/off fevers over the past month.    Interval events since last visit with me:   ER visits = 0, reasons: 0  Inpatient admissions = 0, reasons 0  The patient reports their current symptoms to be:   GI function:   Nausea:   [x]  none    []  rare    []  often    []  daily  Comment:   Vomiting: [x]  none    []  rare    []  often    []  daily   Comment:   Last BM: 1-2 days.  Stools are soft.   Appetite: good    Pancreatic exocrine insufficiency:     Greasy stools: [x]  none   []  rarely    []  several times/wk   []  daily      Comment:   Diabetes management:     Lab Results   Component Value Date    A1C 6.4 11/18/2021    A1C 5.5 08/04/2021    A1C 6.0 05/19/2021      Pain management:   Just finished fentanyl patch 12.5 mcg  Prescription Medications as of 11/18/2021       Rx Number Disp Refills Start End Last Dispensed Date Next Fill Date Owning Pharmacy    acetaminophen (TYLENOL) 325 MG tablet  120 tablet 0 8/20/2021    [address]    Sig: Take 2 tablets (650 mg) by mouth every 6 hours    Class: E-Prescribe    Route: Oral    Renewals     Renewal requests to authorizing provider (Aliza Ridley NP) <b>prohibited</b>          Alcohol Swabs PADS  100 each 0 8/18/2021    [address]    Sig: Use to swab the area of the injection or pamella as directed   Per insurance coverage    Class: Local Print    amylase-lipase-protease (ZENPEP) 14469-38228 units CPEP  450 capsule 0 11/12/2021     [facility] - [address]    Sig: Take 4-5 with meals and 2 with snacks    Class: E-Prescribe    Notes to Pharmacy: Please call Tenisha Pearce  [phone] with any questions re this prescription,    aspirin (ASA) 325 MG tablet  30 tablet 1 2021    [address]    Sig: Take 1 tablet (325 mg) by mouth daily    Class: E-Prescribe    Route: Oral    Renewals     Renewal requests to authorizing provider (Aliza Ridley NP) <b>prohibited</b>          bisacodyl (DULCOLAX) 10 MG suppository  5 suppository 0 2021    [address]    Sig: Place 1 suppository (10 mg) rectally daily as needed for constipation    Class: E-Prescribe    Route: Rectal    blood glucose (NO BRAND SPECIFIED) lancets standard  100 each 0 2021    [address]    Sig: To use to test glucose level in the blood  Use to test blood sugar  6  times daily as directed. To accompany glucose monitor brands per insurance coverage.  One touch Delica lancets    Class: Local Print    blood glucose (NO BRAND SPECIFIED) test strip  100 strip 3 2021    [id number] - [place], [place] - [address] AT [facility] [address]    Sig: Use to test blood sugar  6  times daily as directed. To accompany glucose monitor brands per insurance coverage: One Touch Verio strip    Class: E-Prescribe    clonazePAM (KLONOPIN) 1 MG tablet    2021        Simg morning and 1.5mg every evening    Class: Historical    Continuous Blood Gluc Sensor (DEXCOM G6 SENSOR) MISC  3 each 5 9/3/2021    [id number] - [place], [place] - [address]    Si each every 10 days Change every 10 days.    Class: E-Prescribe    Route: Does not apply    Continuous Blood Gluc Transmit (DEXCOM G6 TRANSMITTER) MISC  1  each 1 9/3/2021    [id number] - [place], [place] - [address] [facility]    Si each every 3 months Change every 3 months.    Class: E-Prescribe    Route: Does not apply    diphenhydrAMINE (BENADRYL) 25 MG tablet            Sig: Take 25 mg by mouth every 6 hours as needed for itching or allergies     Class: Historical    Route: Oral    docusate sodium (COLACE) 100 MG capsule  60 capsule 1 2021    [address]    Sig: Take 100 mg by mouth 2 times daily as needed for constipation     Class: Historical    Route: Oral    Renewals     Renewal requests to authorizing provider (Aliza Ridley NP) <b>prohibited</b>          fentaNYL (DURAGESIC) 25 mcg/hr 72 hr patch  10 patch 0 2021        Sig: Place 1 patch onto the skin every 72 hours remove old patch.    Class: Local Print    Earliest Fill Date: 2021    Route: Transdermal    gabapentin (NEURONTIN) 300 MG capsule  60 capsule 1 2021    [id number] - [place], [place] - [address] AT [facility] [address] [facility]    Sig: Take 1 capsule (300 mg) by mouth 2 times daily    Class: E-Prescribe    Route: Oral    Glucagon (GVOKE HYPOPEN 2-PACK) 1 MG/0.2ML SOAJ  0.4 mL 1 2021    [address]    Sig: Inject 1 each Subcutaneous once as needed (for severe hypoglycemia) Use x1 dose and then call seek emergent care for treatment    Class: E-Prescribe    Notes to Pharmacy: Please call Maddi Almaraz RN Transplant Coordinator, with fill/formulary issues: [phone]    Route: Subcutaneous    glucagon 1 MG kit  1 each 0 2021    [address]    Sig: Inject 1 mg Subcutaneous once as needed for low blood sugar (Use as directed by provider)    Class: E-Prescribe    Route: Subcutaneous    Guar Gum (FIBER MODULAR, NUTRISOURCE FIBER,) packet  60 packet 1  2021    [address]    Si packet by Per Feeding Tube route 2 times daily    Class: E-Prescribe    Route: Per Feeding Tube    Renewals     Renewal requests to authorizing provider (Aliza Ridley NP) <b>prohibited</b>          HYDROmorphone (DILAUDID) 2 MG tablet  60 tablet 0 2021        Sig: Take 1-2 tablets (2-4 mg) by mouth every 6 hours as needed for severe pain    Class: Local Print    Earliest Fill Date: 2021    Route: Oral    HYDROmorphone (DILAUDID) 4 MG tablet  56 tablet 0 2021    [address]    Sig: Take 1-2 tablets (4-6 mg) every 4 hours as needed for severe abdominal pain    Class: E-Prescribe    Earliest Fill Date: 2021    insulin degludec (TRESIBA FLEXTOUCH) 100 UNIT/ML pen  15 mL 3 10/18/2021    [facility] [address]    Sig: Take 8 units daily    Class: E-Prescribe    insulin glargine (LANTUS PEN) 100 UNIT/ML pen  6 mL 1 2021    [address]    Sig: Inject 18 Units Subcutaneous 2 times daily    Class: E-Prescribe    Notes to Pharmacy: If Lantus is not covered by insurance, may substitute Basaglar at same dose and frequency.      Route: Subcutaneous    insulin lispro (HUMALOG KWIKPEN) 100 UNIT/ML (1 unit dial) KWIKPEN  5 mL 1 2021    [address]    Sig: For  - 169 give 1 unit.   For  - 219 give 2 units.   For  - 269 give 3 units.   For  - 319 give 4 units.   For BG >320 give 5 units.    Class: Local Print    insulin lispro (HUMALOG KWIKPEN) 100 UNIT/ML (1 unit dial) KWIKPEN  3 mL 1 2021    [address]    Si unit per 12g Carb with meals and snacks/supplement    Class: E-Prescribe    Renewals      Renewal requests to authorizing provider (Aliza Ridley, NP) <b>prohibited</b>          insulin lispro (HUMALOG) 100 UNIT/ML Cartridge  15 mL 3 9/3/2021    [id number] - [place], [place] - [address]    Sig: Take 0.5- 5 units pre meal using scale prescribed, up to 30 unit per day supply.    Class: E-Prescribe    insulin pen needle (32G X 4 MM) 32G X 4 MM miscellaneous  200 each 3 10/13/2021    [id number] - [place], [place] - [address] AT [facility] [address]    Sig: Use 4-6 pen needles daily to inject subcutaneous insulin    Class: E-Prescribe    insulin pen needle (32G X 4 MM) 32G X 4 MM miscellaneous  100 each 0 2021    [address]    Sig: Use as directed by provider  Per insurance coverage    Class: Local Print    magnesium hydroxide (MILK OF MAGNESIA) 400 MG/5ML suspension  1800 mL 1 2021    [address]    Si mLs by Per J Tube route 2 times daily as needed for constipation    Class: E-Prescribe    Route: Per J Tube    Renewals     Renewal requests to authorizing provider (Aliza Ridley NP) <b>prohibited</b>          Melatonin 10 MG TABS tablet            Sig: Take 10 mg by mouth nightly as needed for sleep    Class: Historical    Route: Oral    methocarbamol (ROBAXIN) 500 MG tablet  90 tablet 0 2021    [address]    Sig: Take 2 tablets (1,000 mg) by mouth 3 times daily    Class: E-Prescribe    Route: Oral    multivitamin w/minerals (THERA-VIT-M) tablet  30 tablet 1 2021    [address]    Sig: Take 1 tablet by mouth daily    Class: E-Prescribe    Route: Oral    Renewals     Renewal requests to authorizing provider (Aliza Ridley, NP) <b>prohibited</b>          Nutritional Supplements  (BOOST HIGH PROTEIN) LIQD   0 2021        Sig: After above baseline labs are drawn, give: 6 mL/kg to maximum of 360 mL; the beverage is to be consumed within 5 minutes.    Class: No Print Out    Notes to Pharmacy: If on pancreatic enzymes, patient may take home enzymes with Boost beverage.      Patients may take long acting insulin (Levemir and Lantus).      Patient should NOT cover Boost with Novolog, Humalog, Apidra, or regular insulin.    omeprazole (PRILOSEC) 40 MG DR capsule  60 capsule 3 2021    [address]    Sig: Take 1 tablet twice daily    Class: E-Prescribe    ondansetron (ZOFRAN-ODT) 4 MG ODT tab            Sig: Take 4 mg by mouth as needed     Class: Historical    Route: Oral    protein modular (PROSOURCE TF) LIQD  180 packet 1 2021    [address]    Si packet by Per Feeding Tube route 3 times daily    Class: E-Prescribe    Route: Per Feeding Tube    Renewals     Renewal requests to authorizing provider (Aliza Ridley NP) <b>prohibited</b>          senna-docusate (SENOKOT-S/PERICOLACE) 8.6-50 MG tablet  60 tablet 0 2021    [address]    Sig: Take 1 tablet by mouth 2 times daily as needed for constipation    Class: E-Prescribe    Route: Oral    Renewals     Renewal requests to authorizing provider (Aliza Ridley NP) <b>prohibited</b>          sertraline (ZOLOFT) 100 MG tablet    2021        Sig: Take 150 mg by mouth At Bedtime    Class: Historical    Route: Oral    Sharps Container MISC  1 each 0 2021    [address]    Sig: Use as directed to dispose of needles, lancets and other sharps  Per Insurance coverage    Class: Local Print    traZODone (DESYREL) 100 MG tablet    2021        Sig: Take 100 mg by mouth At Bedtime     Class: Historical    Route: Oral      Clinic-Administered Medications as of 11/18/2021       Dose Frequency Start End    heparin 100 UNIT/ML injection 5 mL (Completed) 5 mL ONCE 11/18/2021 11/18/2021    Route: Intracatheter    heparin 100 UNIT/ML injection 5 mL (Completed) 5 mL ONCE PRN 11/18/2021 11/18/2021    Route: Intracatheter    heparin 100 UNIT/ML injection 5 mL (Completed) 5 mL ONCE 11/18/2021 11/18/2021    Route: Intracatheter    saline flush for lab use ONLY (Completed) 20 mL ONCE 11/18/2021 11/18/2021    Route: Intracatheter    sodium chloride (PF) 0.9% PF flush 20 mL (Completed) 20 mL ONCE 11/18/2021 11/18/2021    Route: Intracatheter        Physical exam:   General Appearance: in no apparent distress.   Temp:  [98.2  F (36.8  C)] 98.2  F (36.8  C)  Pulse:  [97] 97  BP: (112)/(77) 112/77  SpO2:  [98 %] 98 %   Skin: Normal, no rashes or jaundice  Heart: Regular rhythm.  Lungs: no audible wheezes or increased work of breathing.  Abdomen: The abdomen is flat, and the wound is Healing well, without hernia.  Tube exit site is clean. The abdomen is non-tender  Edema: absent.    Chronic Pancreatitis Latest Ref Rng & Units 9/14/2021 11/18/2021 11/18/2021 11/18/2021 11/18/2021   Weight - 54.341 kg - 52.8 kg - 52.617 kg   AMYLASE 30 - 110 U/L - - - - -   LIPASE 73 - 393 U/L - - - - -   A1C 0.0 - 5.6 % - 6.4(H) - - -   C PEPTIDE 0.9 - 6.9 ng/mL - - - - -   GLUCOSE 70 - 99 mg/dL - 112(H) - 139(H) -   GLUCOSE BY METER POCT 70 - 99 mg/dL - - - - -   GLUCOSE WHOLE BLOOD POCT 70 - 99 mg/dL - - - - -   HEMOGLOBIN 11.7 - 15.7 g/dL - 10.6(L) - - -    - 450 10e3/uL - 387 - - -   ALBUMIN 3.4 - 5.0 g/dL - 3.1(L) - - -   PREALBUMIN 15 - 45 mg/dL - 14(L) - - -       Assessment and Plan: She is doing well s/p TP-IAT.  Interval progress has been good.  Postoperative gastric ileus: resolved  Pancreatic exocrine insufficiency: controlled  Malnutrition: resolved  Diabetes mellitus: controlled  Abdominal pain management:  controlled  Needs to remove port ASAP  Total time: 30 min    Elfego Shirley MD

## 2021-11-18 NOTE — NURSING NOTE
Chief Complaint   Patient presents with     Port Draw     Labs drawn via port by rn in lab.     Port accessed with 20g 3/4 inch gripper needle by RN, labs collected, line flushed with saline and heparin.      Waqar Kwong RN

## 2021-11-18 NOTE — LETTER
11/18/2021     RE: Meme Robins  [id number] [facility] [address]    Dear Colleague,    Thank you for referring your patient, Meme Robins, to the [facility]. Please see a copy of my visit note below.    [facility]  Islet Autotransplant, Diabetes Follow Up    Problem List:  Patient Active Problem List   Diagnosis     Abdominal pain, RUQ (right upper quadrant)     Acute upper respiratory infection     Anemia     Cellulitis and abscess     Chronic abdominal pain     Chronic pancreatitis (H)     Dysfunctional sphincter of Oddi     Diarrhea     Degeneration of cervical intervertebral disc     Dysthymic disorder     Iron deficiency anemia     Glucocorticoid deficiency (H)     Joint pain, hip     Long term current use of anticoagulant therapy     Other symptoms involving urinary system(788.99)     Nausea alone     Myalgia and myositis     Migraine     Malaise and fatigue     Major depressive disorder, single episode     Urinary tract infection     Pulmonary embolism (H)     Primary hypercoagulable state (H)     Hypoglycemia after GI (gastrointestinal) surgery     Postoperative abdominal pain     S/P pancreatic islet cell transplantation (H)       HPI:  Meme is a 51 year old female here for follow up of Open total pancreatectomy with splenectomy; Adhesiolysis for more than 120 minutes; Open Gastrojejunostomy tube placement (Sergio's technique); Bowel & Biliary reconstruction using Bonilla-en Y choledochojejunostomy (retrocolic) over 8-Fr pediatric feeding tube stent and Duodenojejunostomy (retrocolic); Interval Appendectomy; Infusion of islet cells in portal circulation through splenic vein; Wedge biopsy of Liver performed on 8/9/2021.  At the time of the procedure, the patient received 131,600 IEQ, or 2,386 IEQ/kg body weight.   Other endocrine history includes a distant preceding history of adrenal insufficiency, but an AM cortisol even before  cosyntropin at presurgical baseline was 35, demonstrating adequate cortisol response to physiologic stress.     Autotransplant data:   Patient weight: 55.2 kg  Tissue mass: 9 ml  Total Islet number: 130,900  Total Islet number/k  Islet equivalents: 131,600  Islet equivalents/kilogram: 2386  Pre-infusion portal pressure: 1 cm/H2O  Peak portal pressure: 9 cm/H2O  Post-rinse (final) portal pressure: 9 cm/H2O    At today's visit, She is now 101 days post op.    Overall she seems to be progressing well for surgical recovery.  She has had intermittent achiness, feeling flu like which may be related to opioid weaning.  She has been able to eat well. She eats low carb by choice. She was losing weight but also was on too low of an enzyme dose and reports more normal stools now with an increased dose for her PERT.  She does have iron deficiency anemia.     She is using InPen for diabetes management.  She has some lows that are mainly in the evenings after dinner but also had some a couple days ago in early AM hours.  She had with mixed meal today a very rapid peak to 297 mg/dL after boost and then a rapid drop in glucose. She did however also take an insulin bolus due to confusion in our instructions here at just after the 1 hour draw.      Did note iron deficiency anemia in lab studies.     Diabetes history:  Current insulin regimen:  Tresiba 8 units  Humalog settings in InPen  ICR 12a 20g, 7a 15g, 11a 20g, 6p 20g  ISF 60 at all times  Target 125 at all times  Uses 3.4 u.day on average of Humalog in past 2 weeks  TDD = 11.4 unit.day     Patient is good candidate for CGM therapy.  Meets these criteria:  -- Has a diagnosis of diabetes,: This patient has type 1 diabetes secondary to pancreatectomy (surgical type 1)    --  Uses a home blood glucose monitor (BGM) and conduct four or more daily BGM tests, or is on CGM therapy:  On CGM or tests 6 times per day  --- Treated with insulin with multiple daily injections or a  continuous subcutaneous insulin infusion (CSII) pump:  This patient is on MDI (3-4 injections per day on avg)  --  Require frequent adjustments of the insulin treatment regimen, based on therapeutic CGM test results:  yes      Recent hemoglobin A1c levels:  Lab Results   Component Value Date    A1C 6.4 11/18/2021    A1C 5.5 08/04/2021    A1C 6.0 05/19/2021       Dexcom Data:  dates 11/5- 11/18  Avg 126, SD 31 mg/dL  Time in  is 65.9%   Time  is 92%  Time <70 is 3.4%, time <54 is 1.3%  Time >140 is 30.6%    Hypoglycemia history:  Mild to moderate.  The patient has had no episodes of severe hypoglycemia (seizure, coma, or neuroglycopenic symptoms severe enough to require assistance from another person).  Blood sugars were reviewed from the patient records and/or the meter download.    Reviewed BG from In Pen and Dexcom    Review of systems:  A complete ROS is performed and is negative except as noted in the HPI or below:    Past Medical and Surgical History:  Past Medical History:   Diagnosis Date     Adrenal insufficiency (H)     iatrogenic, around 2013, off treatment for several years as of 2021 visit     Anemia      Depression      Esophageal reflux      Idiopathic chronic pancreatitis (H)      Pre-diabetes      Pulmonary embolism (H)      Past Surgical History:   Procedure Laterality Date     CELIAC PLEXUS BLOCK  05/29/2014    with alcohol x 1     ENDOMETRIAL ABLATION  03/29/2014     ENDOSCOPIC RETROGRADE CHOLANGIOPANCREATOGRAM      about 30, most with PD stentes     ENDOSCOPIC ULTRASOUND, ESOPHAGOSCOPY, GASTROSCOPY, DUODENOSCOPY (EGD), COMBINED        SHOULDER ARTHROSCOPY,SURGICAL BICEPS TENODESIS  02/09/2017     JEJUNOSTOMY CARE      multiple feeding tubes x 3, at least one was direct J     LAPAROSCOPIC PANCREATECTOMY, TRANSPLANT AUTO ISLET CELL N/A 8/9/2021    Procedure: Open total pancreatectomy with autologous islet cell transplantation, splenectomy, and incidental appendectomy;  Surgeon:  Elfego Shirley MD;  Location: [facility] OR     PANCREAS SURGERY  05/29/2014    John procedure revision     PANCREAS SURGERY  02/2011    John       Family History:  New changes since last visit:  none  Family History   Problem Relation Age of Onset     Breast Cancer Mother      Diabetes No family hx of      Pancreatitis No family hx of        Social History:  Social History     Social History Narrative    Meme is  and has three children.       Staying locally.     Physical Exam:  Vitals: /77   Wt 52.6 kg (116 lb)   Breastfeeding No   BMI 21.92 kg/m    BMI= Body mass index is 21.92 kg/m .  General:  Appearance is normal, no acute distress  HEENT:  NC/AT, sclera appear white  Neck:  No obvious thyromegaly  CV/Lungs:  Non distressed breathing  Skin:  No apparent rashes  Neuro:  Normal mental status  Psych:  Normal affect      Results  Mixed Meal Test:  C Peptide   Date Value Ref Range Status   08/04/2021 3.0 0.9 - 6.9 ng/mL Final   08/04/2021 3.4 0.9 - 6.9 ng/mL Final   08/04/2021 1.1 0.9 - 6.9 ng/mL Final   05/19/2021 1.8 0.9 - 6.9 ng/mL Final   05/19/2021 2.5 0.9 - 6.9 ng/mL Final   05/19/2021 1.6 0.9 - 6.9 ng/mL Final     Glucose   Date Value Ref Range Status   11/18/2021 92 70 - 99 mg/dL Final   11/18/2021 139 (H) 70 - 99 mg/dL Final   11/18/2021 112 (H) 70 - 99 mg/dL Final   09/02/2021 127 (H) 70 - 99 mg/dL Final   08/26/2021 128 (H) 70 - 99 mg/dL Final   05/19/2021 124 (H) 70 - 99 mg/dL Final   05/19/2021 103 (H) 70 - 99 mg/dL Final   05/19/2021 112 (H) 70 - 99 mg/dL Final       Hemoglobin A1c  Lab Results   Component Value Date    A1C 6.4 11/18/2021    A1C 5.5 08/04/2021    A1C 6.0 05/19/2021       Liver enzymes  Lab Results   Component Value Date    AST 64 11/18/2021    AST 16 05/19/2021     Lab Results   Component Value Date    ALT 80 11/18/2021    ALT 20 05/19/2021     No results found for: BILICONJ   Lab Results   Component Value Date    BILITOTAL 0.2 11/18/2021    BILITOTAL 0.5 05/19/2021      Lab Results   Component Value Date    ALBUMIN 3.1 11/18/2021    ALBUMIN 3.9 05/19/2021     Lab Results   Component Value Date    PROTTOTAL 6.5 11/18/2021    PROTTOTAL 7.5 05/19/2021      Lab Results   Component Value Date    ALKPHOS 165 11/18/2021    ALKPHOS 82 05/19/2021       Creatinine:  Creatinine   Date Value Ref Range Status   11/18/2021 0.45 (L) 0.52 - 1.04 mg/dL Final   05/19/2021 0.67 0.52 - 1.04 mg/dL Final   ]    Complete Blood Count:  CBC RESULTS:   Recent Labs   Lab Test 11/18/21  0948   WBC 8.5   RBC 4.41   HGB 10.6*   HCT 33.6*   MCV 76*   MCH 24.0*   MCHC 31.5   RDW 18.6*          Cholesterol  Lab Results   Component Value Date    CHOL 141 11/18/2021    CHOL 212 05/19/2021     Lab Results   Component Value Date    HDL 63 11/18/2021    HDL 62 05/19/2021     Lab Results   Component Value Date    LDL 55 11/18/2021     05/19/2021     Lab Results   Component Value Date    TRIG 113 11/18/2021    TRIG 151 05/19/2021     No results found for: CHOLHDLRATIO        Assessment:  1.  Post pancreatectomy diabetes mellitus, s/p total pancreatectomy and islet autotransplant.  (ICD-10 E89.1)  2.  Type 1 diabetes secondary to pancreatectomy, as outlined in #1 above (Surgical type 1 DM, ICD-10 E10.9)    Meme is a 51 year old with history of chronic pancreatitis who is s/p total pancreatectomy and islet autotransplant.  She received a low-moderate mass of about 2300 IEQ/kg but is doing quite well on low insulin doses.  She does eat a low carb diet which likely accounts for her low dose of rapid acting insulin.    Main ongoing issues in review of BG trends have included highs overnight, and sometimes still drops low in the evenings.  We tried reducing the evening rapid acting doses and increasing the Tresiba per instructions below.  We also reviewed and answer general questions about post-op management, alcohol consumption (OK to have a drink in moderation after 6 mos post op), ongoing achy/flu like  symptoms probably from opioid wean as we see this relatively often.       Plan:  1.  Changes to current diabetes regimen:  Patient Instructions   1)  We are increasing the Tresiba to 9 units to see if this helps with the overnight highs.    2)  Because you still have some lows however in the evening we made this change to the InPen setting at 6pm:  I:C changed from 20 to 25 grams  ISF changed from 60 to 75 mg/dL    3)  Please keep me updated on numbers on these new doses.    4)  Will watch for lab results.  The last C-peptide and glucose at 2 hours will be a little impacted by the insulin dose but the 0 and 1 hour values will reflect your own body's function.  Will also watch for nutritional studies.    5)  OK to continue with current low carb diet but it is also OK to have a higher carb meal sometimes that is covered with insulin.  We need to watch weight-- I think this was related to the enzyme dose but if you continue to lose weight on a higher dose of enzyme then you just need to get more calories in.        2.  Frequency of blood sugar checks:  6 times per day if not wearing Dexcom  3.  Continue routine follow up for autoislet transplant patients:  Mixed meal test (6 mL/kg BoostHP to max of 360 mL) at 3 months, 6 months, and once a year post transplant.  Hemoglobin A1c levels at these time points and quarterly.  4.  Other issues addressed today:  None.      Follow up:  TBD    Contact me for questions at [phone] or [phone].  Emergency number to reach pediatric endocrinology after hours is [phone].    Dr. Patricia Bermeo MD  [facility]  Director of Research, Islet Auto-transplant Program  Phone:  [phone]  Electronically signed: November 19, 2021 at 8:30 AM    Review of the result(s) of each unique test - as above  Prescription drug management  45 minutes spent on the date of the encounter doing chart review, history and exam, documentation  and further activities per the note

## 2021-11-18 NOTE — PROGRESS NOTES
Chronic Pancreatitis Group Progress Note    I had the pleasure of seeing Ms. Meme Robins today. She is 101 days status post total pancreatectomy with islet autotransplant.  Reports on/off fevers over the past month.    Interval events since last visit with me:   ER visits = 0, reasons: 0  Inpatient admissions = 0, reasons 0  The patient reports their current symptoms to be:   GI function:   Nausea:   [x]  none    []  rare    []  often    []  daily  Comment:   Vomiting: [x]  none    []  rare    []  often    []  daily   Comment:   Last BM: 1-2 days.  Stools are soft.   Appetite: good    Pancreatic exocrine insufficiency:     Greasy stools: [x]  none   []  rarely    []  several times/wk   []  daily      Comment:   Diabetes management:     Lab Results   Component Value Date    A1C 6.4 11/18/2021    A1C 5.5 08/04/2021    A1C 6.0 05/19/2021      Pain management:   Just finished fentanyl patch 12.5 mcg  Prescription Medications as of 11/18/2021       Rx Number Disp Refills Start End Last Dispensed Date Next Fill Date Owning Pharmacy    acetaminophen (TYLENOL) 325 MG tablet  120 tablet 0 8/20/2021    [address]    Sig: Take 2 tablets (650 mg) by mouth every 6 hours    Class: E-Prescribe    Route: Oral    Renewals     Renewal requests to authorizing provider (Aliza Ridley NP) <b>prohibited</b>          Alcohol Swabs PADS  100 each 0 8/18/2021    [address]    Sig: Use to swab the area of the injection or pamella as directed   Per insurance coverage    Class: Local Print    amylase-lipase-protease (ZENPEP) 02072-14016 units CPEP  450 capsule 0 11/12/2021    [facility] - [address]    Sig: Take 4-5 with meals and 2 with snacks    Class: E-Prescribe    Notes to Pharmacy: Please call Tenisha CARLOS  with any questions re this prescription,     aspirin (ASA) 325 MG tablet  30 tablet 1 2021    [address]    Sig: Take 1 tablet (325 mg) by mouth daily    Class: E-Prescribe    Route: Oral    Renewals     Renewal requests to authorizing provider (Aliza Ridley NP) <b>prohibited</b>          bisacodyl (DULCOLAX) 10 MG suppository  5 suppository 0 2021    [address]    Sig: Place 1 suppository (10 mg) rectally daily as needed for constipation    Class: E-Prescribe    Route: Rectal    blood glucose (NO BRAND SPECIFIED) lancets standard  100 each 0 2021    [address]    Sig: To use to test glucose level in the blood  Use to test blood sugar  6  times daily as directed. To accompany glucose monitor brands per insurance coverage.  One touch Delica lancets    Class: Local Print    blood glucose (NO BRAND SPECIFIED) test strip  100 strip 3 2021    [id number] - [place], [place] - [address] AT [facility] [address]    Sig: Use to test blood sugar  6  times daily as directed. To accompany glucose monitor brands per insurance coverage: One Touch Verio strip    Class: E-Prescribe    clonazePAM (KLONOPIN) 1 MG tablet    2021        Simg morning and 1.5mg every evening    Class: Historical    Continuous Blood Gluc Sensor (DEXCOM G6 SENSOR) MISC  3 each 5 9/3/2021    [id number] [place], [place] - [address]    Si each every 10 days Change every 10 days.    Class: E-Prescribe    Route: Does not apply    Continuous Blood Gluc Transmit (DEXCOM G6 TRANSMITTER) MISC  1 each 1 9/3/2021    [id number] - [place], [place] - [address]    Si each every 3 months Change every 3 months.    Class: E-Prescribe    Route: Does not apply    diphenhydrAMINE (BENADRYL) 25 MG tablet            Sig: Take 25  mg by mouth every 6 hours as needed for itching or allergies     Class: Historical    Route: Oral    docusate sodium (COLACE) 100 MG capsule  60 capsule 1 2021    [address]    Sig: Take 100 mg by mouth 2 times daily as needed for constipation     Class: Historical    Route: Oral    Renewals     Renewal requests to authorizing provider (Aliza Ridley, NP) <b>prohibited</b>          fentaNYL (DURAGESIC) 25 mcg/hr 72 hr patch  10 patch 0 2021        Sig: Place 1 patch onto the skin every 72 hours remove old patch.    Class: Local Print    Earliest Fill Date: 2021    Route: Transdermal    gabapentin (NEURONTIN) 300 MG capsule  60 capsule 1 2021    [id number] - [place], [place] - [address] AT [facility] & [facility]    Sig: Take 1 capsule (300 mg) by mouth 2 times daily    Class: E-Prescribe    Route: Oral    Glucagon (GVOKE HYPOPEN 2-PACK) 1 MG/0.2ML SOAJ  0.4 mL 1 2021    [address]    Sig: Inject 1 each Subcutaneous once as needed (for severe hypoglycemia) Use x1 dose and then call seek emergent care for treatment    Class: E-Prescribe    Notes to Pharmacy: Please call Maddi Almaraz RN Transplant Coordinator, with fill/formulary issues: [phone]    Route: Subcutaneous    glucagon 1 MG kit  1 each 0 2021    [address]    Sig: Inject 1 mg Subcutaneous once as needed for low blood sugar (Use as directed by provider)    Class: E-Prescribe    Route: Subcutaneous    Guar Gum (FIBER MODULAR, NUTRISOURCE FIBER,) packet  60 packet 1 2021    [address]    Si packet by Per Feeding Tube route 2 times daily    Class: E-Prescribe    Route: Per Feeding Tube    Renewals     Renewal requests to authorizing provider ([place]  Aliza Marlow NP) <b>prohibited</b>          HYDROmorphone (DILAUDID) 2 MG tablet  60 tablet 0 2021        Sig: Take 1-2 tablets (2-4 mg) by mouth every 6 hours as needed for severe pain    Class: Local Print    Earliest Fill Date: 2021    Route: Oral    HYDROmorphone (DILAUDID) 4 MG tablet  56 tablet 0 2021    [address]    Sig: Take 1-2 tablets (4-6 mg) every 4 hours as needed for severe abdominal pain    Class: E-Prescribe    Earliest Fill Date: 2021    insulin degludec (TRESIBA FLEXTOUCH) 100 UNIT/ML pen  15 mL 3 10/18/2021    [facility] [address]    Sig: Take 8 units daily    Class: E-Prescribe    insulin glargine (LANTUS PEN) 100 UNIT/ML pen  6 mL 1 2021    [address]    Sig: Inject 18 Units Subcutaneous 2 times daily    Class: E-Prescribe    Notes to Pharmacy: If Lantus is not covered by insurance, may substitute Basaglar at same dose and frequency.      Route: Subcutaneous    insulin lispro (HUMALOG KWIKPEN) 100 UNIT/ML (1 unit dial) KWIKPEN  5 mL 1 2021    [address]    Sig: For  - 169 give 1 unit.   For  - 219 give 2 units.   For  - 269 give 3 units.   For  - 319 give 4 units.   For BG >320 give 5 units.    Class: Local Print    insulin lispro (HUMALOG KWIKPEN) 100 UNIT/ML (1 unit dial) KWIKPEN  3 mL 1 2021    [address]    Si unit per 12g Carb with meals and snacks/supplement    Class: E-Prescribe    Renewals     Renewal requests to authorizing provider (Aliza Ridley NP) <b>prohibited</b>          insulin lispro (HUMALOG) 100 UNIT/ML Cartridge  15 mL 3 9/3/2021    [id number] - [place], [place] - [address]    Sig: Take 0.5- 5  units pre meal using scale prescribed, up to 30 unit per day supply.    Class: E-Prescribe    insulin pen needle (32G X 4 MM) 32G X 4 MM miscellaneous  200 each 3 10/13/2021    [id number] - [place] - [address] AT [facility] OF [address]    Sig: Use 4-6 pen needles daily to inject subcutaneous insulin    Class: E-Prescribe    insulin pen needle (32G X 4 MM) 32G X 4 MM miscellaneous  100 each 0 2021    [address]    Sig: Use as directed by provider  Per insurance coverage    Class: Local Print    magnesium hydroxide (MILK OF MAGNESIA) 400 MG/5ML suspension  1800 mL 1 2021    [address]    Si mLs by Per J Tube route 2 times daily as needed for constipation    Class: E-Prescribe    Route: Per J Tube    Renewals     Renewal requests to authorizing provider (Aliza Ridley NP) <b>prohibited</b>          Melatonin 10 MG TABS tablet            Sig: Take 10 mg by mouth nightly as needed for sleep    Class: Historical    Route: Oral    methocarbamol (ROBAXIN) 500 MG tablet  90 tablet 0 2021    [address]    Sig: Take 2 tablets (1,000 mg) by mouth 3 times daily    Class: E-Prescribe    Route: Oral    multivitamin w/minerals (THERA-VIT-M) tablet  30 tablet 1 2021    [address]    Sig: Take 1 tablet by mouth daily    Class: E-Prescribe    Route: Oral    Renewals     Renewal requests to authorizing provider (Aliza Ridley NP) <b>prohibited</b>          Nutritional Supplements (BOOST HIGH PROTEIN) LIQD   0 2021        Sig: After above baseline labs are drawn, give: 6 mL/kg to maximum of 360 mL; the beverage is to be consumed within 5 minutes.    Class: No Print Out    Notes to Pharmacy: If on pancreatic enzymes, patient  may take home enzymes with Boost beverage.      Patients may take long acting insulin (Levemir and Lantus).      Patient should NOT cover Boost with Novolog, Humalog, Apidra, or regular insulin.    omeprazole (PRILOSEC) 40 MG DR capsule  60 capsule 3 2021    [address]    Sig: Take 1 tablet twice daily    Class: E-Prescribe    ondansetron (ZOFRAN-ODT) 4 MG ODT tab            Sig: Take 4 mg by mouth as needed     Class: Historical    Route: Oral    protein modular (PROSOURCE TF) LIQD  180 packet 1 2021    [address]    Si packet by Per Feeding Tube route 3 times daily    Class: E-Prescribe    Route: Per Feeding Tube    Renewals     Renewal requests to authorizing provider (Aliza Ridley NP) <b>prohibited</b>          senna-docusate (SENOKOT-S/PERICOLACE) 8.6-50 MG tablet  60 tablet 0 2021    [address]    Sig: Take 1 tablet by mouth 2 times daily as needed for constipation    Class: E-Prescribe    Route: Oral    Renewals     Renewal requests to authorizing provider (Aliza Ridley NP) <b>prohibited</b>          sertraline (ZOLOFT) 100 MG tablet    2021        Sig: Take 150 mg by mouth At Bedtime    Class: Historical    Route: Oral    Sharps Container MISC  1 each 0 2021    [address]    Sig: Use as directed to dispose of needles, lancets and other sharps  Per Insurance coverage    Class: Local Print    traZODone (DESYREL) 100 MG tablet    2021        Sig: Take 100 mg by mouth At Bedtime    Class: Historical    Route: Oral      Clinic-Administered Medications as of 2021       Dose Frequency Start End    heparin 100 UNIT/ML injection 5 mL (Completed) 5 mL ONCE 2021    Route: Intracatheter    heparin 100 UNIT/ML  injection 5 mL (Completed) 5 mL ONCE PRN 11/18/2021 11/18/2021    Route: Intracatheter    heparin 100 UNIT/ML injection 5 mL (Completed) 5 mL ONCE 11/18/2021 11/18/2021    Route: Intracatheter    saline flush for lab use ONLY (Completed) 20 mL ONCE 11/18/2021 11/18/2021    Route: Intracatheter    sodium chloride (PF) 0.9% PF flush 20 mL (Completed) 20 mL ONCE 11/18/2021 11/18/2021    Route: Intracatheter        Physical exam:   General Appearance: in no apparent distress.   Temp:  [98.2  F (36.8  C)] 98.2  F (36.8  C)  Pulse:  [97] 97  BP: (112)/(77) 112/77  SpO2:  [98 %] 98 %   Skin: Normal, no rashes or jaundice  Heart: Regular rhythm.  Lungs: no audible wheezes or increased work of breathing.  Abdomen: The abdomen is flat, and the wound is Healing well, without hernia.  Tube exit site is clean. The abdomen is non-tender  Edema: absent.    Chronic Pancreatitis Latest Ref Rng & Units 9/14/2021 11/18/2021 11/18/2021 11/18/2021 11/18/2021   Weight - 54.341 kg - 52.8 kg - 52.617 kg   AMYLASE 30 - 110 U/L - - - - -   LIPASE 73 - 393 U/L - - - - -   A1C 0.0 - 5.6 % - 6.4(H) - - -   C PEPTIDE 0.9 - 6.9 ng/mL - - - - -   GLUCOSE 70 - 99 mg/dL - 112(H) - 139(H) -   GLUCOSE BY METER POCT 70 - 99 mg/dL - - - - -   GLUCOSE WHOLE BLOOD POCT 70 - 99 mg/dL - - - - -   HEMOGLOBIN 11.7 - 15.7 g/dL - 10.6(L) - - -    - 450 10e3/uL - 387 - - -   ALBUMIN 3.4 - 5.0 g/dL - 3.1(L) - - -   PREALBUMIN 15 - 45 mg/dL - 14(L) - - -       Assessment and Plan: She is doing well s/p TP-IAT.  Interval progress has been good.  Postoperative gastric ileus: resolved  Pancreatic exocrine insufficiency: controlled  Malnutrition: resolved  Diabetes mellitus: controlled  Abdominal pain management: controlled  Needs to remove port ASAP    Total time: 30 min    Elfego Shirley MD

## 2021-11-18 NOTE — PROGRESS NOTES
Administered Boost:11:04am    FBS: 112    Called lab with boost time :   12:06pm    1:06pm    Patient is aware and given time to return to lab    Meme Orellana CMA

## 2021-11-19 LAB
C PEPTIDE SERPL-MCNC: 0.8 NG/ML (ref 0.9–6.9)
C PEPTIDE SERPL-MCNC: 1.3 NG/ML (ref 0.9–6.9)
C PEPTIDE SERPL-MCNC: 2.7 NG/ML (ref 0.9–6.9)

## 2021-11-19 NOTE — PATIENT INSTRUCTIONS
1)  We are increasing the Tresiba to 9 units to see if this helps with the overnight highs.    2)  Because you still have some lows however in the evening we made this change to the InPen setting at 6pm:  I:C changed from 20 to 25 grams  ISF changed from 60 to 75 mg/dL    3)  Please keep me updated on numbers on these new doses.    4)  Will watch for lab results.  The last C-peptide and glucose at 2 hours will be a little impacted by the insulin dose but the 0 and 1 hour values will reflect your own body's function.  Will also watch for nutritional studies.    5)  OK to continue with current low carb diet but it is also OK to have a higher carb meal sometimes that is covered with insulin.  We need to watch weight-- I think this was related to the enzyme dose but if you continue to lose weight on a higher dose of enzyme then you just need to get more calories in.

## 2021-11-20 LAB — FOLATE RBC-MCNC: 656 NG/ML

## 2021-11-21 LAB
A-TOCOPHEROL VIT E SERPL-MCNC: 8.8 MG/L
ANNOTATION COMMENT IMP: ABNORMAL
BETA+GAMMA TOCOPHEROL SERPL-MCNC: 0.9 MG/L
RETINYL PALMITATE SERPL-MCNC: <0.02 MG/L
VIT A SERPL-MCNC: 0.28 MG/L
ZINC SERPL-MCNC: 61.6 UG/DL

## 2021-11-22 LAB
DEPRECATED CALCIDIOL+CALCIFEROL SERPL-MC: <55 UG/L (ref 20–75)
VITAMIN D2 SERPL-MCNC: <5 UG/L
VITAMIN D3 SERPL-MCNC: 50 UG/L

## 2021-11-23 ENCOUNTER — DOCUMENTATION ONLY (OUTPATIENT)
Dept: TRANSPLANT | Facility: CLINIC | Age: 51
End: 2021-11-23
Payer: COMMERCIAL

## 2021-11-23 LAB
A-LINOLENATE SERPL-SCNC: 81 NMOL/ML (ref 50–130)
AA SERPL-SCNC: 1043 NMOL/ML (ref 520–1490)
ARACHIDATE SERPL-SCNC: 35 NMOL/ML (ref 50–90)
CLINICAL BIOCHEMIST REVIEW: ABNORMAL
DHA SERPL-SCNC: 80 NMOL/ML (ref 30–250)
DOCOSAPENTAENATE W6 SERPL-SCNC: 35 NMOL/ML (ref 10–70)
DOCOSATETRAENOATE SERPL-SCNC: 38 NMOL/ML (ref 10–80)
DOCOSENOATE SERPL-SCNC: 6 NMOL/ML (ref 4–13)
DPA SERPL-SCNC: 51 NMOL/ML (ref 20–210)
EPA SERPL-SCNC: 49 NMOL/ML (ref 14–100)
FA SERPL-SCNC: 11.2 MMOL/L (ref 7.3–16.8)
G-LINOLENATE SERPL-SCNC: 44 NMOL/ML (ref 16–150)
HEXADECENOATE SERPL-SCNC: 45 NMOL/ML (ref 25–105)
HOMO-G LINOLENATE SERPL-SCNC: 184 NMOL/ML (ref 50–250)
LAURATE SERPL-SCNC: 65 NMOL/ML (ref 6–90)
LINOLEATE SERPL-SCNC: 3544 NMOL/ML (ref 2270–3850)
MEAD ACID SERPL-SCNC: 28 NMOL/ML (ref 7–30)
MONOUNSAT FA SERPL-SCNC: 2.6 MMOL/L (ref 1.3–5.8)
MYRISTATE SERPL-SCNC: 214 NMOL/ML (ref 30–450)
NERVONATE SERPL-SCNC: 99 NMOL/ML (ref 60–100)
OCTADECANOATE SERPL-SCNC: 809 NMOL/ML (ref 590–1170)
OLEATE SERPL-SCNC: 2017 NMOL/ML (ref 650–3500)
PALMITATE SERPL-SCNC: 2151 NMOL/ML (ref 1480–3730)
PALMITOLEATE SERPL-SCNC: 254 NMOL/ML (ref 110–1130)
POLYUNSAT FA SERPL-SCNC: 5.2 MMOL/L (ref 3.2–5.8)
SAT FA SERPL-SCNC: 3.4 MMOL/L (ref 2.5–5.5)
TRIENOATE/AA SERPL-SRTO: 0.03 {RATIO} (ref 0.01–0.04)
VACCENATE SERPL-SCNC: 166 NMOL/ML (ref 280–740)
W3 FA SERPL-SCNC: 0.3 MMOL/L (ref 0.2–0.5)
W6 FA SERPL-SCNC: 4.9 MMOL/L (ref 3–5.4)

## 2021-11-23 NOTE — PROGRESS NOTES
Faxed patient's most recent lab values from last week's Boost/MMT and Total Pancreatectomy-Islet Auto Transplant team clinical guidelines for treatment of iron deficiency anemia to patient's PCP. Provided patient with a copy of both documents as well.

## 2022-02-06 ENCOUNTER — TRANSFERRED RECORDS (OUTPATIENT)
Dept: MULTI SPECIALTY CLINIC | Facility: CLINIC | Age: 52
End: 2022-02-06

## 2022-02-10 ENCOUNTER — DOCUMENTATION ONLY (OUTPATIENT)
Dept: ENDOCRINOLOGY | Facility: CLINIC | Age: 52
End: 2022-02-10
Payer: COMMERCIAL

## 2022-02-15 NOTE — PROGRESS NOTES
POST 6 mos follow up    Meme reports that she is currently taking 2mg of hydromorphone twice a day. She said that her doctors have been gradually weaning down her hydromorphone dosage, and she's actually hoping to be completely weaned off by the time she goes on vacation with her family next month. She's currently taking 3 units of Humalog daily on average, and 8 units of Lantus. Denies taking any other medication besides insulin to treat high blood sugars or diabetes.

## 2022-02-18 ENCOUNTER — TELEPHONE (OUTPATIENT)
Dept: TRANSPLANT | Facility: CLINIC | Age: 52
End: 2022-02-18
Payer: COMMERCIAL

## 2022-02-18 NOTE — TELEPHONE ENCOUNTER
"Call from Meme . She has the following symptoms  1Very foul smelling stools and gas  2 Loud Gurgling bowel sounds  3 Crampy intestines which make her feel slightly \"sick \"  4 Bloating    She has a very good GI locally so I suggested she discuss the following to try and address the above issues    1 Possible treatment for SIBO ( small intestinal bowel overgrowth)  2 Possible increase in enzymes  3 Any other cause of these symptoms which have been present for about a month.    She has her paperwork for 6 months labs . Over all she says she is 100 % better and very thankful she had the her Total Pancreatectomy -Islet Auto Transplant.She is on a low stable does of Insulin , and will send in her numbers to Dr Bermeo later today.  "

## 2022-02-24 ENCOUNTER — TRANSFERRED RECORDS (OUTPATIENT)
Dept: HEALTH INFORMATION MANAGEMENT | Facility: CLINIC | Age: 52
End: 2022-02-24
Payer: COMMERCIAL

## 2022-02-25 ENCOUNTER — TELEPHONE (OUTPATIENT)
Dept: TRANSPLANT | Facility: CLINIC | Age: 52
End: 2022-02-25

## 2022-02-25 NOTE — TELEPHONE ENCOUNTER
Patient Call: General  Route to LPN    Reason for call: Facility will be sending images to the transplant team to review. In taking care of patient they noticed a wire coal from their radiology department and is wondering if this is normal.     Call back needed? No, unless necassary

## 2022-02-26 ENCOUNTER — TELEPHONE (OUTPATIENT)
Dept: TRANSPLANT | Facility: CLINIC | Age: 52
End: 2022-02-26
Payer: COMMERCIAL

## 2022-02-26 DIAGNOSIS — E10.9 TYPE 1 DIABETES MELLITUS WITHOUT COMPLICATION (H): ICD-10-CM

## 2022-02-27 ENCOUNTER — TRANSFERRED RECORDS (OUTPATIENT)
Dept: HEALTH INFORMATION MANAGEMENT | Facility: CLINIC | Age: 52
End: 2022-02-27
Payer: COMMERCIAL

## 2022-02-27 NOTE — TELEPHONE ENCOUNTER
Meme, paged that she has been talking to [facility] over the past few weeks. Had increased diarrhea, trying rule out with local GI doctor. Has had increased abdominal pain, side pain, lower back pain.   Friday had KUB locally. KUB showed no blockage, but has coiled wire in right side of abdomen.   No fevers on friday. Spiked fever this evening 100.5, COVID test negative, weakness, chills and body aches. c diff negative. Pt has no appetite.   Pt weight is 110 pounds. Pt has lost 5 pounds in last    Blood sugar 184-190 currently. Pt over the last 24 hours has had an increase in insulin needs despite minimal PO intake.   Pt reports that dr cowan was her surgeon.  Pt is going in for evaluation.

## 2022-02-28 RX ORDER — PROCHLORPERAZINE 25 MG/1
SUPPOSITORY RECTAL
Qty: 9 EACH | Refills: 3 | Status: SHIPPED | OUTPATIENT
Start: 2022-02-28

## 2022-03-04 ENCOUNTER — PATIENT OUTREACH (OUTPATIENT)
Dept: GASTROENTEROLOGY | Facility: CLINIC | Age: 52
End: 2022-03-04
Payer: COMMERCIAL

## 2022-03-04 ENCOUNTER — PREP FOR PROCEDURE (OUTPATIENT)
Dept: GASTROENTEROLOGY | Facility: CLINIC | Age: 52
End: 2022-03-04
Payer: COMMERCIAL

## 2022-03-04 DIAGNOSIS — Z46.89 ENCOUNTER FOR REMOVAL OF BILIARY STENT: Primary | ICD-10-CM

## 2022-03-04 NOTE — TELEPHONE ENCOUNTER
Called pt to discuss stent removal via enteroscopy     Please assist in scheduling:     Procedure/Imaging/Clinic: enteroscopy  Physician: Dr. Coley  Timing: 3/8  Procedure length:provider average  Anesthesia:gen  Dx: biliary stent removal  Tier:2  Location: [facility]       Called to discuss with patient. Explained they can expect a call from  for date and time of procedure, will need a , someone to stay with them for 24 hours and should stay in town for 24 hours (within 45 min of Hospital) post procedure    Patient needs to get pre-op physical completed. If outside [facility] system will need physical faxed to number [phone]   If you do not get a preop physical, your procedure could be cancelled, patient voiced understanding*    Preop Plan: Dr. Coley will do H&P DOS    Med Review    Blood thinner -  no  ASA - no  Diabetic - no    COVID test discussed:will get locally no earlier than 3-4    Patient Education r/t procedure:done    Does patient have any history of gastric bypass/gastric surgery/altered panc/bili anatomy?yes, MD aware    A pre-op nurse will call 1-2 days prior to the procedure. I    NPO/Prep: CLD 1 days prior with 2 doses of Miralax night prior, discussed    Other specific details/comments:pt willing to come last minute on Monday-Tuesday- likely cancellation on 3/8- orders placed to prepare for that.      Verbalized understanding of all instructions. All questions answered.

## 2022-03-05 ENCOUNTER — TELEPHONE (OUTPATIENT)
Dept: TRANSPLANT | Facility: CLINIC | Age: 52
End: 2022-03-05
Payer: COMMERCIAL

## 2022-03-06 ENCOUNTER — HOSPITAL ENCOUNTER (INPATIENT)
Facility: CLINIC | Age: 52
LOS: 3 days | Discharge: HOME | End: 2022-03-09
Attending: INTERNAL MEDICINE | Admitting: SURGERY
Payer: COMMERCIAL

## 2022-03-06 ENCOUNTER — APPOINTMENT (OUTPATIENT)
Dept: CT IMAGING | Facility: CLINIC | Age: 52
End: 2022-03-06
Attending: INTERNAL MEDICINE
Payer: COMMERCIAL

## 2022-03-06 DIAGNOSIS — Z20.822 CONTACT WITH AND (SUSPECTED) EXPOSURE TO COVID-19: ICD-10-CM

## 2022-03-06 DIAGNOSIS — R10.9 ACUTE ABDOMINAL PAIN: ICD-10-CM

## 2022-03-06 DIAGNOSIS — R19.7 VOMITING AND DIARRHEA: ICD-10-CM

## 2022-03-06 DIAGNOSIS — T18.3XXA FOREIGN BODY IN INTESTINE, INITIAL ENCOUNTER: ICD-10-CM

## 2022-03-06 DIAGNOSIS — R19.7 DIARRHEA, UNSPECIFIED TYPE: ICD-10-CM

## 2022-03-06 DIAGNOSIS — Z94.83 PANCREAS REPLACED BY TRANSPLANT (H): ICD-10-CM

## 2022-03-06 DIAGNOSIS — T18.3XXA FOREIGN BODY IN SMALL INTESTINE, INITIAL ENCOUNTER: ICD-10-CM

## 2022-03-06 DIAGNOSIS — R11.2 NAUSEA AND VOMITING, INTRACTABILITY OF VOMITING NOT SPECIFIED, UNSPECIFIED VOMITING TYPE: ICD-10-CM

## 2022-03-06 DIAGNOSIS — R10.13 ABDOMINAL PAIN, EPIGASTRIC: ICD-10-CM

## 2022-03-06 DIAGNOSIS — Z98.84 BARIATRIC SURGERY STATUS: ICD-10-CM

## 2022-03-06 DIAGNOSIS — R11.10 VOMITING AND DIARRHEA: ICD-10-CM

## 2022-03-06 DIAGNOSIS — K90.89 BILE SALT-INDUCED DIARRHEA: Primary | ICD-10-CM

## 2022-03-06 PROBLEM — K86.89 PANCREATIC INSUFFICIENCY: Status: ACTIVE | Noted: 2022-03-06

## 2022-03-06 LAB
ALBUMIN SERPL-MCNC: 4 G/DL (ref 3.4–5)
ALBUMIN UR-MCNC: 50 MG/DL
ALP SERPL-CCNC: 171 U/L (ref 40–150)
ALT SERPL W P-5'-P-CCNC: 37 U/L (ref 0–50)
ANION GAP SERPL CALCULATED.3IONS-SCNC: 3 MMOL/L (ref 3–14)
APPEARANCE UR: CLEAR
AST SERPL W P-5'-P-CCNC: 22 U/L (ref 0–45)
BASOPHILS # BLD AUTO: 0.1 10E3/UL (ref 0–0.2)
BASOPHILS NFR BLD AUTO: 2 %
BILIRUB SERPL-MCNC: 0.5 MG/DL (ref 0.2–1.3)
BILIRUB UR QL STRIP: NEGATIVE
BUN SERPL-MCNC: 16 MG/DL (ref 7–30)
CALCIUM SERPL-MCNC: 9.3 MG/DL (ref 8.5–10.1)
CAOX CRY #/AREA URNS HPF: ABNORMAL /HPF
CHLORIDE BLD-SCNC: 102 MMOL/L (ref 94–109)
CO2 SERPL-SCNC: 30 MMOL/L (ref 20–32)
COLOR UR AUTO: ABNORMAL
CREAT SERPL-MCNC: 0.67 MG/DL (ref 0.52–1.04)
CRP SERPL-MCNC: <2.9 MG/L (ref 0–8)
EOSINOPHIL # BLD AUTO: 0.1 10E3/UL (ref 0–0.7)
EOSINOPHIL NFR BLD AUTO: 2 %
ERYTHROCYTE [DISTWIDTH] IN BLOOD BY AUTOMATED COUNT: 17.2 % (ref 10–15)
GFR SERPL CREATININE-BSD FRML MDRD: >90 ML/MIN/1.73M2
GLUCOSE BLD-MCNC: 105 MG/DL (ref 70–99)
GLUCOSE BLDC GLUCOMTR-MCNC: 65 MG/DL (ref 70–99)
GLUCOSE BLDC GLUCOMTR-MCNC: 74 MG/DL (ref 70–99)
GLUCOSE BLDC GLUCOMTR-MCNC: 78 MG/DL (ref 70–99)
GLUCOSE BLDC GLUCOMTR-MCNC: 99 MG/DL (ref 70–99)
GLUCOSE UR STRIP-MCNC: NEGATIVE MG/DL
HBA1C MFR BLD: 6 % (ref 0–5.6)
HCG SERPL QL: NEGATIVE
HCO3 BLDV-SCNC: 31 MMOL/L (ref 21–28)
HCT VFR BLD AUTO: 39.5 % (ref 35–47)
HGB BLD-MCNC: 12.6 G/DL (ref 11.7–15.7)
HGB UR QL STRIP: NEGATIVE
HOLD SPECIMEN: NORMAL
IMM GRANULOCYTES # BLD: 0 10E3/UL
IMM GRANULOCYTES NFR BLD: 0 %
INR PPP: 1.01 (ref 0.85–1.15)
KETONES UR STRIP-MCNC: NEGATIVE MG/DL
LACTATE BLD-SCNC: 0.7 MMOL/L
LEUKOCYTE ESTERASE UR QL STRIP: NEGATIVE
LIPASE SERPL-CCNC: 24 U/L (ref 73–393)
LYMPHOCYTES # BLD AUTO: 1.9 10E3/UL (ref 0.8–5.3)
LYMPHOCYTES NFR BLD AUTO: 37 %
MAGNESIUM SERPL-MCNC: 2.4 MG/DL (ref 1.6–2.3)
MCH RBC QN AUTO: 26 PG (ref 26.5–33)
MCHC RBC AUTO-ENTMCNC: 31.9 G/DL (ref 31.5–36.5)
MCV RBC AUTO: 82 FL (ref 78–100)
MONOCYTES # BLD AUTO: 0.7 10E3/UL (ref 0–1.3)
MONOCYTES NFR BLD AUTO: 13 %
MUCOUS THREADS #/AREA URNS LPF: PRESENT /LPF
NEUTROPHILS # BLD AUTO: 2.4 10E3/UL (ref 1.6–8.3)
NEUTROPHILS NFR BLD AUTO: 46 %
NITRATE UR QL: NEGATIVE
NRBC # BLD AUTO: 0 10E3/UL
NRBC BLD AUTO-RTO: 0 /100
PCO2 BLDV: 54 MM HG (ref 40–50)
PH BLDV: 7.37 [PH] (ref 7.32–7.43)
PH UR STRIP: 5.5 [PH] (ref 5–7)
PLATELET # BLD AUTO: 518 10E3/UL (ref 150–450)
PO2 BLDV: 28 MM HG (ref 25–47)
POTASSIUM BLD-SCNC: 4.2 MMOL/L (ref 3.4–5.3)
PROT SERPL-MCNC: 8.1 G/DL (ref 6.8–8.8)
RBC # BLD AUTO: 4.84 10E6/UL (ref 3.8–5.2)
RBC URINE: 2 /HPF
SAO2 % BLDV: 50 % (ref 94–100)
SARS-COV-2 RNA RESP QL NAA+PROBE: NEGATIVE
SODIUM SERPL-SCNC: 135 MMOL/L (ref 133–144)
SP GR UR STRIP: 1.03 (ref 1–1.03)
SQUAMOUS EPITHELIAL: 1 /HPF
TRANSITIONAL EPI: <1 /HPF
UROBILINOGEN UR STRIP-MCNC: NORMAL MG/DL
WBC # BLD AUTO: 5.2 10E3/UL (ref 4–11)
WBC URINE: 1 /HPF

## 2022-03-06 PROCEDURE — 96374 THER/PROPH/DIAG INJ IV PUSH: CPT | Mod: 59 | Performed by: INTERNAL MEDICINE

## 2022-03-06 PROCEDURE — 258N000003 HC RX IP 258 OP 636: Performed by: INTERNAL MEDICINE

## 2022-03-06 PROCEDURE — 85025 COMPLETE CBC W/AUTO DIFF WBC: CPT | Performed by: INTERNAL MEDICINE

## 2022-03-06 PROCEDURE — 250N000013 HC RX MED GY IP 250 OP 250 PS 637: Performed by: SURGERY

## 2022-03-06 PROCEDURE — 250N000013 HC RX MED GY IP 250 OP 250 PS 637: Performed by: STUDENT IN AN ORGANIZED HEALTH CARE EDUCATION/TRAINING PROGRAM

## 2022-03-06 PROCEDURE — 250N000011 HC RX IP 250 OP 636: Performed by: INTERNAL MEDICINE

## 2022-03-06 PROCEDURE — 120N000011 HC R&B TRANSPLANT UMMC

## 2022-03-06 PROCEDURE — 36415 COLL VENOUS BLD VENIPUNCTURE: CPT | Performed by: STUDENT IN AN ORGANIZED HEALTH CARE EDUCATION/TRAINING PROGRAM

## 2022-03-06 PROCEDURE — 80053 COMPREHEN METABOLIC PANEL: CPT | Performed by: INTERNAL MEDICINE

## 2022-03-06 PROCEDURE — 96376 TX/PRO/DX INJ SAME DRUG ADON: CPT | Performed by: INTERNAL MEDICINE

## 2022-03-06 PROCEDURE — 250N000011 HC RX IP 250 OP 636: Performed by: STUDENT IN AN ORGANIZED HEALTH CARE EDUCATION/TRAINING PROGRAM

## 2022-03-06 PROCEDURE — 250N000011 HC RX IP 250 OP 636

## 2022-03-06 PROCEDURE — C9803 HOPD COVID-19 SPEC COLLECT: HCPCS | Performed by: INTERNAL MEDICINE

## 2022-03-06 PROCEDURE — 99285 EMERGENCY DEPT VISIT HI MDM: CPT | Mod: 25 | Performed by: INTERNAL MEDICINE

## 2022-03-06 PROCEDURE — U0003 INFECTIOUS AGENT DETECTION BY NUCLEIC ACID (DNA OR RNA); SEVERE ACUTE RESPIRATORY SYNDROME CORONAVIRUS 2 (SARS-COV-2) (CORONAVIRUS DISEASE [COVID-19]), AMPLIFIED PROBE TECHNIQUE, MAKING USE OF HIGH THROUGHPUT TECHNOLOGIES AS DESCRIBED BY CMS-2020-01-R: HCPCS | Performed by: INTERNAL MEDICINE

## 2022-03-06 PROCEDURE — 96361 HYDRATE IV INFUSION ADD-ON: CPT | Performed by: INTERNAL MEDICINE

## 2022-03-06 PROCEDURE — 84703 CHORIONIC GONADOTROPIN ASSAY: CPT | Performed by: INTERNAL MEDICINE

## 2022-03-06 PROCEDURE — 74177 CT ABD & PELVIS W/CONTRAST: CPT

## 2022-03-06 PROCEDURE — 81001 URINALYSIS AUTO W/SCOPE: CPT | Performed by: INTERNAL MEDICINE

## 2022-03-06 PROCEDURE — 82040 ASSAY OF SERUM ALBUMIN: CPT | Performed by: INTERNAL MEDICINE

## 2022-03-06 PROCEDURE — 83605 ASSAY OF LACTIC ACID: CPT

## 2022-03-06 PROCEDURE — 83690 ASSAY OF LIPASE: CPT | Performed by: INTERNAL MEDICINE

## 2022-03-06 PROCEDURE — 74177 CT ABD & PELVIS W/CONTRAST: CPT | Mod: 26 | Performed by: RADIOLOGY

## 2022-03-06 PROCEDURE — 250N000013 HC RX MED GY IP 250 OP 250 PS 637: Performed by: INTERNAL MEDICINE

## 2022-03-06 PROCEDURE — 99253 IP/OBS CNSLTJ NEW/EST LOW 45: CPT | Performed by: INTERNAL MEDICINE

## 2022-03-06 PROCEDURE — 258N000003 HC RX IP 258 OP 636: Performed by: STUDENT IN AN ORGANIZED HEALTH CARE EDUCATION/TRAINING PROGRAM

## 2022-03-06 PROCEDURE — 83036 HEMOGLOBIN GLYCOSYLATED A1C: CPT | Performed by: STUDENT IN AN ORGANIZED HEALTH CARE EDUCATION/TRAINING PROGRAM

## 2022-03-06 PROCEDURE — 85610 PROTHROMBIN TIME: CPT | Performed by: INTERNAL MEDICINE

## 2022-03-06 PROCEDURE — 99285 EMERGENCY DEPT VISIT HI MDM: CPT | Performed by: INTERNAL MEDICINE

## 2022-03-06 PROCEDURE — 86140 C-REACTIVE PROTEIN: CPT | Performed by: INTERNAL MEDICINE

## 2022-03-06 PROCEDURE — 83735 ASSAY OF MAGNESIUM: CPT | Performed by: INTERNAL MEDICINE

## 2022-03-06 PROCEDURE — 36415 COLL VENOUS BLD VENIPUNCTURE: CPT | Performed by: INTERNAL MEDICINE

## 2022-03-06 PROCEDURE — 96375 TX/PRO/DX INJ NEW DRUG ADDON: CPT | Performed by: INTERNAL MEDICINE

## 2022-03-06 PROCEDURE — 82803 BLOOD GASES ANY COMBINATION: CPT

## 2022-03-06 RX ORDER — CIPROFLOXACIN 500 MG/1
500 TABLET, FILM COATED ORAL 2 TIMES DAILY
Status: ON HOLD | COMMUNITY
End: 2022-03-09

## 2022-03-06 RX ORDER — LIDOCAINE 40 MG/G
CREAM TOPICAL
Status: DISCONTINUED | OUTPATIENT
Start: 2022-03-06 | End: 2022-03-09 | Stop reason: HOSPADM

## 2022-03-06 RX ORDER — DIPHENHYDRAMINE HCL 50 MG
50 CAPSULE ORAL ONCE
Status: COMPLETED | OUTPATIENT
Start: 2022-03-06 | End: 2022-03-06

## 2022-03-06 RX ORDER — DIPHENHYDRAMINE HYDROCHLORIDE 50 MG/ML
12.5 INJECTION INTRAMUSCULAR; INTRAVENOUS EVERY 6 HOURS PRN
Status: DISCONTINUED | OUTPATIENT
Start: 2022-03-06 | End: 2022-03-07

## 2022-03-06 RX ORDER — SODIUM CHLORIDE, SODIUM LACTATE, POTASSIUM CHLORIDE, CALCIUM CHLORIDE 600; 310; 30; 20 MG/100ML; MG/100ML; MG/100ML; MG/100ML
INJECTION, SOLUTION INTRAVENOUS CONTINUOUS
Status: DISCONTINUED | OUTPATIENT
Start: 2022-03-06 | End: 2022-03-07

## 2022-03-06 RX ORDER — NICOTINE POLACRILEX 4 MG
15-30 LOZENGE BUCCAL
Status: DISCONTINUED | OUTPATIENT
Start: 2022-03-06 | End: 2022-03-09 | Stop reason: HOSPADM

## 2022-03-06 RX ORDER — ONDANSETRON 2 MG/ML
4 INJECTION INTRAMUSCULAR; INTRAVENOUS EVERY 6 HOURS PRN
Status: DISCONTINUED | OUTPATIENT
Start: 2022-03-06 | End: 2022-03-08

## 2022-03-06 RX ORDER — HYDROMORPHONE HYDROCHLORIDE 1 MG/ML
0.5 INJECTION, SOLUTION INTRAMUSCULAR; INTRAVENOUS; SUBCUTANEOUS ONCE
Status: COMPLETED | OUTPATIENT
Start: 2022-03-06 | End: 2022-03-06

## 2022-03-06 RX ORDER — ONDANSETRON 2 MG/ML
4 INJECTION INTRAMUSCULAR; INTRAVENOUS ONCE
Status: COMPLETED | OUTPATIENT
Start: 2022-03-06 | End: 2022-03-06

## 2022-03-06 RX ORDER — HYDROMORPHONE HCL IN WATER/PF 6 MG/30 ML
0.2 PATIENT CONTROLLED ANALGESIA SYRINGE INTRAVENOUS
Status: DISCONTINUED | OUTPATIENT
Start: 2022-03-06 | End: 2022-03-08

## 2022-03-06 RX ORDER — DEXTROSE MONOHYDRATE 25 G/50ML
25-50 INJECTION, SOLUTION INTRAVENOUS
Status: DISCONTINUED | OUTPATIENT
Start: 2022-03-06 | End: 2022-03-09 | Stop reason: HOSPADM

## 2022-03-06 RX ORDER — IOPAMIDOL 755 MG/ML
72 INJECTION, SOLUTION INTRAVASCULAR ONCE
Status: COMPLETED | OUTPATIENT
Start: 2022-03-06 | End: 2022-03-06

## 2022-03-06 RX ORDER — SODIUM CHLORIDE 9 MG/ML
INJECTION, SOLUTION INTRAVENOUS CONTINUOUS
Status: DISCONTINUED | OUTPATIENT
Start: 2022-03-06 | End: 2022-03-07

## 2022-03-06 RX ORDER — TRAZODONE HYDROCHLORIDE 50 MG/1
100 TABLET, FILM COATED ORAL AT BEDTIME
Status: DISCONTINUED | OUTPATIENT
Start: 2022-03-06 | End: 2022-03-09 | Stop reason: HOSPADM

## 2022-03-06 RX ORDER — CLONAZEPAM 1 MG/1
1 TABLET ORAL 2 TIMES DAILY
Status: DISCONTINUED | OUTPATIENT
Start: 2022-03-06 | End: 2022-03-09 | Stop reason: HOSPADM

## 2022-03-06 RX ORDER — PANTOPRAZOLE SODIUM 40 MG/1
40 TABLET, DELAYED RELEASE ORAL 2 TIMES DAILY
Status: DISCONTINUED | OUTPATIENT
Start: 2022-03-06 | End: 2022-03-09 | Stop reason: HOSPADM

## 2022-03-06 RX ADMIN — PANTOPRAZOLE SODIUM 40 MG: 40 TABLET, DELAYED RELEASE ORAL at 21:04

## 2022-03-06 RX ADMIN — SODIUM CHLORIDE 1000 ML: 9 INJECTION, SOLUTION INTRAVENOUS at 08:52

## 2022-03-06 RX ADMIN — DIPHENHYDRAMINE HYDROCHLORIDE 50 MG: 50 CAPSULE ORAL at 09:07

## 2022-03-06 RX ADMIN — HYDROMORPHONE HYDROCHLORIDE 0.2 MG: 0.2 INJECTION, SOLUTION INTRAMUSCULAR; INTRAVENOUS; SUBCUTANEOUS at 13:40

## 2022-03-06 RX ADMIN — SERTRALINE HYDROCHLORIDE 150 MG: 50 TABLET ORAL at 22:35

## 2022-03-06 RX ADMIN — HYDROMORPHONE HYDROCHLORIDE 0.2 MG: 0.2 INJECTION, SOLUTION INTRAMUSCULAR; INTRAVENOUS; SUBCUTANEOUS at 20:39

## 2022-03-06 RX ADMIN — ONDANSETRON 4 MG: 2 INJECTION INTRAMUSCULAR; INTRAVENOUS at 20:40

## 2022-03-06 RX ADMIN — IOPAMIDOL 72 ML: 755 INJECTION, SOLUTION INTRAVENOUS at 09:55

## 2022-03-06 RX ADMIN — HYDROMORPHONE HYDROCHLORIDE 0.2 MG: 0.2 INJECTION, SOLUTION INTRAMUSCULAR; INTRAVENOUS; SUBCUTANEOUS at 15:52

## 2022-03-06 RX ADMIN — HYDROMORPHONE HYDROCHLORIDE 0.5 MG: 1 INJECTION, SOLUTION INTRAMUSCULAR; INTRAVENOUS; SUBCUTANEOUS at 09:08

## 2022-03-06 RX ADMIN — SODIUM CHLORIDE, POTASSIUM CHLORIDE, SODIUM LACTATE AND CALCIUM CHLORIDE: 600; 310; 30; 20 INJECTION, SOLUTION INTRAVENOUS at 13:39

## 2022-03-06 RX ADMIN — DIPHENHYDRAMINE HYDROCHLORIDE 12.5 MG: 50 INJECTION, SOLUTION INTRAMUSCULAR; INTRAVENOUS at 13:40

## 2022-03-06 RX ADMIN — DIPHENHYDRAMINE HYDROCHLORIDE 12.5 MG: 50 INJECTION, SOLUTION INTRAMUSCULAR; INTRAVENOUS at 20:39

## 2022-03-06 RX ADMIN — HYDROMORPHONE HYDROCHLORIDE 0.5 MG: 1 INJECTION, SOLUTION INTRAMUSCULAR; INTRAVENOUS; SUBCUTANEOUS at 12:14

## 2022-03-06 RX ADMIN — HYDROMORPHONE HYDROCHLORIDE 0.2 MG: 0.2 INJECTION, SOLUTION INTRAMUSCULAR; INTRAVENOUS; SUBCUTANEOUS at 18:26

## 2022-03-06 RX ADMIN — ONDANSETRON 4 MG: 2 INJECTION INTRAMUSCULAR; INTRAVENOUS at 08:54

## 2022-03-06 RX ADMIN — TRAZODONE HYDROCHLORIDE 100 MG: 50 TABLET ORAL at 22:35

## 2022-03-06 RX ADMIN — HYDROMORPHONE HYDROCHLORIDE 0.2 MG: 0.2 INJECTION, SOLUTION INTRAMUSCULAR; INTRAVENOUS; SUBCUTANEOUS at 22:45

## 2022-03-06 RX ADMIN — CLONAZEPAM 1 MG: 1 TABLET ORAL at 21:04

## 2022-03-06 RX ADMIN — ONDANSETRON 4 MG: 2 INJECTION INTRAMUSCULAR; INTRAVENOUS at 13:41

## 2022-03-06 ASSESSMENT — ENCOUNTER SYMPTOMS
LIGHT-HEADEDNESS: 1
DYSURIA: 0
ABDOMINAL PAIN: 1
SORE THROAT: 0
APPETITE CHANGE: 1
FATIGUE: 1
SHORTNESS OF BREATH: 0
NUMBNESS: 0
CONFUSION: 0
HEADACHES: 0
NAUSEA: 1
NECK PAIN: 0
VOMITING: 1
COUGH: 0
DIARRHEA: 1
TROUBLE SWALLOWING: 0
WHEEZING: 0
FEVER: 1
BLOOD IN STOOL: 0
BACK PAIN: 0
ADENOPATHY: 0
CHILLS: 0
WEAKNESS: 0

## 2022-03-06 ASSESSMENT — ACTIVITIES OF DAILY LIVING (ADL)
DIFFICULTY_EATING/SWALLOWING: NO
VISION_MANAGEMENT: GLASSES
DRESSING/BATHING_DIFFICULTY: NO
ADLS_ACUITY_SCORE: 4
ADLS_ACUITY_SCORE: 8
ADLS_ACUITY_SCORE: 8
DOING_ERRANDS_INDEPENDENTLY_DIFFICULTY: NO
ADLS_ACUITY_SCORE: 4
CONCENTRATING,_REMEMBERING_OR_MAKING_DECISIONS_DIFFICULTY: NO
CHANGE_IN_FUNCTIONAL_STATUS_SINCE_ONSET_OF_CURRENT_ILLNESS/INJURY: YES
WEAR_GLASSES_OR_BLIND: YES
DIFFICULTY_COMMUNICATING: NO
ADLS_ACUITY_SCORE: 4
WALKING_OR_CLIMBING_STAIRS_DIFFICULTY: NO
FALL_HISTORY_WITHIN_LAST_SIX_MONTHS: NO
HEARING_DIFFICULTY_OR_DEAF: NO
ADLS_ACUITY_SCORE: 4
ADLS_ACUITY_SCORE: 8
TOILETING_ISSUES: NO
ADLS_ACUITY_SCORE: 4

## 2022-03-06 NOTE — TELEPHONE ENCOUNTER
Meme called stating she has increased right-sided abdominal pain she said that she has been feeling ill for 3+ weeks and has been trying to figure out with a local GI provider what is going on.  She had a KUB last Friday which showed a stent coiled on the right side of her abdomen she was subsequently then in the hospital with an elevated fever.    She denies any current symptoms of fever however she states that she feels very ill and she has been dry heaving.    She states that she feels so ill she is unsure if she should drive appear to the ER or wait to be admitted with a bed.    Surgeon was contacted and agreed with plan of care patient coming to [facility] ED for further evaluation and treatment

## 2022-03-06 NOTE — ED PROVIDER NOTES
ED Provider Note  [facility]      History     Chief Complaint   Patient presents with     Nausea, Vomiting, & Diarrhea     Abdominal Pain     HPI  Meme Robins is a 51 year old female who presents with nausea, vomiting, diarrhea, abdominal pain and 10 lb weight loss. She has a history of idiopathic chronic pancreatitis, pancreatectomy with auto islet transplant in 8/2021, chronic pain, pulmonary embolus, multiple feeding tubes and adrenal insufficiency. She has been having diarrhea 4-5 times/ day, nausea, anorexia and abdominal pain. She states that imaging in [place] recently showed retained biliary stent. She had a fever up to 101 about 1 week ago. Her local gastroenterologist has treated her with oral ciprofloxacin for the past 7 days for possible bacterial overgrowth syndrome. Fever and diarrhea started prior to antibiotic exposure. Stool was tested for c diff about 1 week ago. She has no URI symptoms, sore throat, cough, shortness of breath, chest pain, leg pain or swelling. She states she was told to come here from [place] for possible removal of the retained stent given her symptoms and febrile episode.    Past Medical History:   Diagnosis Date     Adrenal insufficiency (H)     iatrogenic, around 2013, off treatment for several years as of 2021 visit     Anemia      Depression      Esophageal reflux      Idiopathic chronic pancreatitis (H)      Pre-diabetes      Pulmonary embolism (H)        Past Surgical History:   Procedure Laterality Date     CELIAC PLEXUS BLOCK  05/29/2014    with alcohol x 1     ENDOMETRIAL ABLATION  03/29/2014     ENDOSCOPIC RETROGRADE CHOLANGIOPANCREATOGRAM      about 30, most with PD stentes     ENDOSCOPIC ULTRASOUND, ESOPHAGOSCOPY, GASTROSCOPY, DUODENOSCOPY (EGD), COMBINED        SHOULDER ARTHROSCOPY,SURGICAL BICEPS TENODESIS  02/09/2017     JEJUNOSTOMY CARE      multiple feeding tubes x 3, at least one was direct J     LAPAROSCOPIC PANCREATECTOMY,  TRANSPLANT AUTO ISLET CELL N/A 8/9/2021    Procedure: Open total pancreatectomy with autologous islet cell transplantation, splenectomy, and incidental appendectomy;  Surgeon: Elfego Shirley MD;  Location: [facility]     PANCREAS SURGERY  05/29/2014    John procedure revision     PANCREAS SURGERY  02/2011    John       Family History   Problem Relation Age of Onset     Breast Cancer Mother      Diabetes No family hx of      Pancreatitis No family hx of        Social History     Tobacco Use     Smoking status: Never Smoker     Smokeless tobacco: Never Used   Substance Use Topics     Alcohol use: Not Currently          Review of Systems   Constitutional: Positive for appetite change, fatigue and fever. Negative for chills.   HENT: Negative for congestion, sore throat and trouble swallowing.    Eyes: Negative for visual disturbance.   Respiratory: Negative for cough, shortness of breath and wheezing.    Cardiovascular: Negative for chest pain.   Gastrointestinal: Positive for abdominal pain, diarrhea, nausea and vomiting. Negative for blood in stool.   Genitourinary: Negative for dysuria.   Musculoskeletal: Negative for back pain and neck pain.   Skin: Negative for rash.   Neurological: Positive for light-headedness. Negative for weakness, numbness and headaches.   Hematological: Negative for adenopathy.   Psychiatric/Behavioral: Negative for confusion.         Physical Exam   BP: 111/70  Pulse: 85  Temp: 98.4  F (36.9  C)  Resp: 16  SpO2: 97 %  Physical Exam  Vitals and nursing note reviewed.   Constitutional:       General: She is not in acute distress.     Appearance: She is underweight.   HENT:      Head: Normocephalic and atraumatic.      Right Ear: External ear normal.      Left Ear: External ear normal.      Nose: Nose normal.      Mouth/Throat:      Mouth: Mucous membranes are moist.   Eyes:      General: No scleral icterus.     Extraocular Movements: Extraocular movements intact.      Pupils: Pupils are  equal, round, and reactive to light.   Cardiovascular:      Rate and Rhythm: Normal rate and regular rhythm.      Heart sounds: No murmur heard.  Pulmonary:      Effort: Pulmonary effort is normal.      Breath sounds: Normal breath sounds. No wheezing or rales.   Abdominal:      Tenderness: There is no abdominal tenderness. There is no guarding.   Musculoskeletal:      Cervical back: Normal range of motion and neck supple.      Right lower leg: No edema.      Left lower leg: No edema.   Skin:     General: Skin is warm and dry.   Neurological:      General: No focal deficit present.      Mental Status: She is alert and oriented to person, place, and time.      Cranial Nerves: No cranial nerve deficit.      Sensory: No sensory deficit.      Motor: No weakness.   Psychiatric:         Mood and Affect: Mood normal.         Behavior: Behavior normal.         ED Course      Procedures         Labs/Imaging    Results for orders placed or performed during the hospital encounter of 03/06/22 (from the past 24 hour(s))   CBC with platelets differential    Narrative    The following orders were created for panel order CBC with platelets differential.  Procedure                               Abnormality         Status                     ---------                               -----------         ------                     CBC with platelets and d...[[id number]]  Abnormal            Final result                 Please view results for these tests on the individual orders.   INR   Result Value Ref Range    INR 1.01 0.85 - 1.15   Comprehensive metabolic panel   Result Value Ref Range    Sodium 135 133 - 144 mmol/L    Potassium 4.2 3.4 - 5.3 mmol/L    Chloride 102 94 - 109 mmol/L    Carbon Dioxide (CO2) 30 20 - 32 mmol/L    Anion Gap 3 3 - 14 mmol/L    Urea Nitrogen 16 7 - 30 mg/dL    Creatinine 0.67 0.52 - 1.04 mg/dL    Calcium 9.3 8.5 - 10.1 mg/dL    Glucose 105 (H) 70 - 99 mg/dL    Alkaline Phosphatase 171 (H) 40 - 150 U/L    AST  22 0 - 45 U/L    ALT 37 0 - 50 U/L    Protein Total 8.1 6.8 - 8.8 g/dL    Albumin 4.0 3.4 - 5.0 g/dL    Bilirubin Total 0.5 0.2 - 1.3 mg/dL    GFR Estimate >90 >60 mL/min/1.73m2   Lipase   Result Value Ref Range    Lipase 24 (L) 73 - 393 U/L   CRP inflammation   Result Value Ref Range    CRP Inflammation <2.9 0.0 - 8.0 mg/L   Magnesium   Result Value Ref Range    Magnesium 2.4 (H) 1.6 - 2.3 mg/dL   HCG qualitative Blood   Result Value Ref Range    hCG Serum Qualitative Negative Negative   CBC with platelets and differential   Result Value Ref Range    WBC Count 5.2 4.0 - 11.0 10e3/uL    RBC Count 4.84 3.80 - 5.20 10e6/uL    Hemoglobin 12.6 11.7 - 15.7 g/dL    Hematocrit 39.5 35.0 - 47.0 %    MCV 82 78 - 100 fL    MCH 26.0 (L) 26.5 - 33.0 pg    MCHC 31.9 31.5 - 36.5 g/dL    RDW 17.2 (H) 10.0 - 15.0 %    Platelet Count 518 (H) 150 - 450 10e3/uL    % Neutrophils 46 %    % Lymphocytes 37 %    % Monocytes 13 %    % Eosinophils 2 %    % Basophils 2 %    % Immature Granulocytes 0 %    NRBCs per 100 WBC 0 <1 /100    Absolute Neutrophils 2.4 1.6 - 8.3 10e3/uL    Absolute Lymphocytes 1.9 0.8 - 5.3 10e3/uL    Absolute Monocytes 0.7 0.0 - 1.3 10e3/uL    Absolute Eosinophils 0.1 0.0 - 0.7 10e3/uL    Absolute Basophils 0.1 0.0 - 0.2 10e3/uL    Absolute Immature Granulocytes 0.0 <=0.4 10e3/uL    Absolute NRBCs 0.0 10e3/uL   iStat Gases (lactate) venous, POCT   Result Value Ref Range    Lactic Acid POCT 0.7 <=2.0 mmol/L    Bicarbonate Venous POCT 31 (H) 21 - 28 mmol/L    O2 Sat, Venous POCT 50 (L) 94 - 100 %    pCO2V Venous POCT 54 (H) 40 - 50 mm Hg    pH Venous POCT 7.37 7.32 - 7.43    pO2 Venous POCT 28 25 - 47 mm Hg   UA with Microscopic reflex to Culture    Specimen: Urine, Midstream   Result Value Ref Range    Color Urine Dark Yellow (A) Colorless, Straw, Light Yellow, Yellow    Appearance Urine Clear Clear    Glucose Urine Negative Negative mg/dL    Bilirubin Urine Negative Negative    Ketones Urine Negative Negative mg/dL     Specific Gravity Urine 1.035 1.003 - 1.035    Blood Urine Negative Negative    pH Urine 5.5 5.0 - 7.0    Protein Albumin Urine 50  (A) Negative mg/dL    Urobilinogen Urine Normal Normal, 2.0 mg/dL    Nitrite Urine Negative Negative    Leukocyte Esterase Urine Negative Negative    Mucus Urine Present (A) None Seen /LPF    Calcium Oxalate Crystals Urine Few (A) None Seen /HPF    RBC Urine 2 <=2 /HPF    WBC Urine 1 <=5 /HPF    Squamous Epithelials Urine 1 <=1 /HPF    Transitional Epithelials Urine <1 <=1 /HPF    Narrative    Urine Culture not indicated   CT Abdomen Pelvis w Contrast    Narrative    EXAMINATION: CT ABDOMEN PELVIS W CONTRAST, 3/6/2022 10:04 AM    INDICATION: Abdominal pain, vomiting, history of pancreatectomy, islet  cell transplant.    COMPARISON STUDY: CT 5/20/2021    TECHNIQUE: CT scan of the abdomen and pelvis was performed on  multidetector CT scanner using volumetric acquisition technique and  images were reconstructed in multiple planes with variable thickness  and reviewed on dedicated workstations.     CONTRAST: sodium chloride (PF) 0.9% PF flush 68 mL injected IV without  oral contrast    CT scan radiation dose is optimized to minimum requisite dose using  automated dose modulation techniques.    FINDINGS:    Lower thorax: Unremarkable.    Abdomen:   Postoperative changes of pancreatectomy, splenectomy, retrocolic  Bonilla-en-Y choledochojejunostomy, and appendectomy. There is a  stent/tube looped within the jejunum (series 3 images 34-24) with no  portions involving the biliary tree. This involves the jejunum used  for the hepaticojejunostomy. Mild thickening of the distal  stomach/proximal duodenum wall. Moderate pneumobilia increased  compared to prior. No discrete fluid collections identified. No  abnormally dilated loops of bowel. No pneumoperitoneum or portal  venous gas. No focal hepatic lesions.    The kidneys, bladder, and pelvic organs are unremarkable. No  lymphadenopathy. Unremarkable  vasculature.    No acute or suspicious osseous lesions.      Impression    IMPRESSION:   1. Postsurgical changes of pancreatectomy with splenectomy, Bonilla-en-Y  choledochojejunostomy, and appendectomy.  2. There is a long, small caliber stent/tube looped in the jejunum  presumably representing biliary stent passed from the biliary tree.  3. Mild circumferential thickening of the proximal duodenum suggestive  of duodenitis.  4. Slightly increased pneumobilia and prior.  5. No discrete abdominal fluid collections. No pneumatosis or portal  venous gas.    I have personally reviewed the examination and initial interpretation  and I agree with the findings.    HAZEL KIDD MD         SYSTEM ID:  [id number]   Asymptomatic COVID-19 Virus (Coronavirus) by PCR Nasopharyngeal    Specimen: Nasopharyngeal; Swab   Result Value Ref Range    SARS CoV2 PCR Negative Negative, Testing sent to reference lab. Results will be returned via unsolicited result    Narrative    Testing was performed using the Xpert Xpress SARS-CoV-2 Assay on the  Cepheid Gene-Xpert Instrument Systems. Additional information about  this Emergency Use Authorization (EUA) assay can be found via the Lab  Guide. This test should be ordered for the detection of SARS-CoV-2 in  individuals who meet SARS-CoV-2 clinical and/or epidemiological  criteria. Test performance is unknown in asymptomatic patients. This  test is for in vitro diagnostic use under the FDA EUA for  laboratories certified under CLIA to perform high complexity testing.  This test has not been FDA cleared or approved. A negative result  does not rule out the presence of PCR inhibitors in the specimen or  target RNA in concentration below the limit of detection for the  assay. The possibility of a false negative should be considered if  the patient's recent exposure or clinical presentation suggests  COVID-19. This test was validated by the [facility]  [facility]. This laboratory is certified under  the Clinical Laboratory Improvement Amendments of 1988 (CLIA-88) as  qualified to perform high complexity laboratory testing.             Medications   0.9% sodium chloride BOLUS (0 mLs Intravenous Stopped 3/6/22 1214)     Followed by   sodium chloride 0.9% infusion ( Intravenous Not Given 3/6/22 1336)   lidocaine 1 % 0.1-1 mL (has no administration in time range)   lidocaine (LMX4) cream (has no administration in time range)   sodium chloride (PF) 0.9% PF flush 3 mL (3 mLs Intracatheter Not Given 3/6/22 1341)   sodium chloride (PF) 0.9% PF flush 3 mL (has no administration in time range)   melatonin tablet 1 mg (has no administration in time range)   lactated ringers infusion ( Intravenous New Bag 3/6/22 1339)   ondansetron (ZOFRAN) injection 4 mg (4 mg Intravenous Given 3/6/22 1341)   clonazePAM (klonoPIN) tablet 1 mg (has no administration in time range)   insulin degludec (TRESIBA) 100 UNIT/ML injection 8 Units (has no administration in time range)   lipase-protease-amylase (ZENPEP) 67665-736558 units delayed release capsule 3 capsule (has no administration in time range)   omeprazole (priLOSEC) CR capsule 40 mg (has no administration in time range)   sertraline (ZOLOFT) tablet 150 mg (has no administration in time range)   traZODone (DESYREL) tablet 100 mg (has no administration in time range)   HYDROmorphone (DILAUDID) injection 0.2 mg (0.2 mg Intravenous Given 3/6/22 1340)   diphenhydrAMINE (BENADRYL) injection 12.5 mg (12.5 mg Intravenous Given 3/6/22 1340)   ondansetron (ZOFRAN) injection 4 mg (4 mg Intravenous Given 3/6/22 0854)   HYDROmorphone (PF) (DILAUDID) injection 0.5 mg (0.5 mg Intravenous Given 3/6/22 0908)   diphenhydrAMINE (BENADRYL) capsule 50 mg (50 mg Oral Given 3/6/22 0907)   iopamidol (ISOVUE-370) solution 72 mL (72 mLs Intravenous Given 3/6/22 0955)   sodium chloride (PF) 0.9% PF flush 68 mL (68 mLs Intravenous Given 3/6/22 0955)    HYDROmorphone (PF) (DILAUDID) injection 0.5 mg (0.5 mg Intravenous Given 3/6/22 1214)        Assessments & Plan (with Medical Decision Making)   Impression:  Middle aged woman with a history of  Pancreatitis, pancreatectomy with auto-islet transplant, Bonilla-en-Y, chronic pain and multiple past abdominal procedures presents with 3 weeks of nausea, vomiting, diarrhea, increased abdominal pain. She has been treated with oral antibiotics for blind loop syndrome. On CT scan she has a looped over tube in small bowel. On reviewing her imaging, there has been a similar radio opaque object in similar position since at least last fall. It is unclear if this is an old stent of jejunostomy tube but it appears to be lodged in a static position on imaging. It is unclear if it is directly related to her current symptoms. CT otherwise has appearance suggestive of obstipation. Labs are unremarkable. She was seen in the ED by the pancreas transplant service and will be admitted to the transplant service.    I have reviewed the nursing notes. I have reviewed the findings, diagnosis, plan and need for follow up with the patient.    New Prescriptions    No medications on file       Final diagnoses:   Abdominal pain, epigastric   Vomiting and diarrhea   Foreign body in intestine, initial encounter       --  Alonso Jackson  [facility] EMERGENCY DEPARTMENT  3/6/2022     Alonso Jackson MD  03/06/22 [id number]

## 2022-03-06 NOTE — CONSULTS
GASTROENTEROLOGY CONSULTATION      Date of Admission:  3/6/2022           Reason for Consultation:   We were asked by Dr. Burt of surgery to evaluate this patient with retained stent post-TPIAT            ASSESSMENT AND RECOMMENDATIONS:   Assessment:  Meme Robins is a 51 year old female with a history of idiopathic chronic pancreatitis s/p PD sphincterotomy and multiple PD stents (10-15) in [place] and at [facility], cholecystectomy, TPIAT at this facility in 8/2021, and diabetes who is presenting w/ several weeks of diarrhea, abdominal pain, weight loss, and nausea.     Imaging shows a retained stent from the TPIAT (unlikely to be accounting for all of her sxs). ERCP will be performed to r/o anastomotic ulcerations or other pathology which might be contributing, eval the nuris limb, and also to remove the stent. Other considerations include bile acid diarrhea in the setting of prior cholecystectomy.     Recommendations  - Agree w/ Cdiff/enteric panel   - Continue Creon   - NPO at midnight for likely ERCP tomorrow     Thank you for involving us in this patient's care. Please do not hesitate to contact the GI service with any questions or concerns.     Pt care plan discussed with Dr. Mitchell, GI staff physician.    Delilah Ross MD  -------------------------------------------------------------------------------------------------------------------         History of Present Illness:   Meme Robins is a 51 year old female with a history of idiopathic chronic pancreatitis s/p PD sphincterotomy and multiple PD stents (10-15) in [place] and at [facility], cholecystectomy, TPIAT at this facility in 8/2021, and diabetes who is presenting w/ several weeks of diarrhea, abdominal pain, weight loss, and nausea.     The patient reports that initially after her TPIAT, she felt great (similar to how she felt prior to when she first developed pancreatitis in 2003). Three weeks ago however,  she developed abdominal pain in a band-like pattern after eating which has progressed to being constant but worse w/ eating, poor appetite, nausea (no vomiting), and non-bloody diarrhea. She reports that in the mornings she will have several multiple episodes of watery stools, whereas prior to this time she would have 1 formed BM/day. She has lost 10lbs. Her gastroenterologist in [place] has tried increasing her Creon dose and treating for SIBO w/ cipro w/o any improvement. 1wk ago, she presented to a nearby ED for these sxs and a temp of 100.5. Imaging at an OSH reportedly showed that a stent from the TPIAT was looped in the jejunum. The evening prior to admission, he sxs were so bad that her and her  drove here from [place] in order to be evaluated.          Data:   Key relevant imaging:  CT ABDOMEN PELVIS W CONTRAST, 3/6/2022 10:04 AM   IMPRESSION:   1. Postsurgical changes of pancreatectomy with splenectomy, Bonilla-en-Y  choledochojejunostomy, and appendectomy.  2. There is a long, small caliber stent/tube looped in the jejunum  presumably representing biliary stent passed from the biliary tree.  3. Mild circumferential thickening of the proximal duodenum suggestive  of duodenitis.  4. Slightly increased pneumobilia and prior.  5. No discrete abdominal fluid collections. No pneumatosis or portal  venous gas.       Previous Endoscopy:   See scan from 1/6/21         Medications:      Medications Prior to Admission   Medication Sig Dispense Refill Last Dose     ciprofloxacin (CIPRO) 500 MG tablet Take 500 mg by mouth 2 times daily   3/5/2022 at 0500     clonazePAM (KLONOPIN) 1 MG tablet 1mg morning and 1.5mg every evening   3/5/2022     gabapentin (NEURONTIN) 300 MG capsule Take 1 capsule (300 mg) by mouth 2 times daily (Patient taking differently: Take 300mg by mouth in the morning and take 600mg by mouth at bedtime.) 60 capsule 1 3/5/2022 at hs     Glucagon (GVOKE HYPOPEN 2-PACK) 1 MG/0.2ML SOAJ  Inject 1 each Subcutaneous once as needed (for severe hypoglycemia) Use x1 dose and then call seek emergent care for treatment 0.4 mL 1      HYDROmorphone (DILAUDID) 4 MG tablet Take 1-2 tablets (4-6 mg) every 4 hours as needed for severe abdominal pain 56 tablet 0  at prn     insulin degludec (TRESIBA FLEXTOUCH) 100 UNIT/ML pen Take 8 units daily 15 mL 3 3/5/2022 at 1000     insulin lispro (HUMALOG) 100 UNIT/ML Cartridge Take 0.5- 5 units pre meal using scale prescribed, up to 30 unit per day supply. 15 mL 3 3/5/2022     lipase-protease-amylase (ZENPEP) 79842-244877 units CPEP Take by mouth 3 times daily (with meals) Take up to 140,000 units with meals and take up to 80,000 with snacks        Melatonin 10 MG TABS tablet Take 10 mg by mouth nightly as needed for sleep    at prn     methocarbamol (ROBAXIN) 500 MG tablet Take 2 tablets (1,000 mg) by mouth 3 times daily 90 tablet 0 3/5/2022     multivitamin w/minerals (THERA-VIT-M) tablet Take 1 tablet by mouth daily 30 tablet 1 3/5/2022     omeprazole (PRILOSEC) 40 MG DR capsule Take 1 tablet twice daily 60 capsule 3 3/5/2022 at pm     ondansetron (ZOFRAN-ODT) 4 MG ODT tab Take 4 mg by mouth as needed     at prn     sertraline (ZOLOFT) 100 MG tablet Take 150 mg by mouth At Bedtime   3/5/2022 at hs     traZODone (DESYREL) 100 MG tablet Take 100 mg by mouth At Bedtime   3/5/2022 at hs     blood glucose (NO BRAND SPECIFIED) lancets standard To use to test glucose level in the blood  Use to test blood sugar  6  times daily as directed. To accompany glucose monitor brands per insurance coverage.  One touch Delica lancets 100 each 0      blood glucose (NO BRAND SPECIFIED) test strip Use to test blood sugar  6  times daily as directed. To accompany glucose monitor brands per insurance coverage: One Touch Verio strip 100 strip 3      Continuous Blood Gluc Sensor (DEXCOM G6 SENSOR) MISC USE AS DIRECTED FOR CONTINUOUS GLUCOSE MONITORING, CHANGE EVERY 10 DAYS 9 each 3       Continuous Blood Gluc Transmit (DEXCOM G6 TRANSMITTER) MISC 1 each every 3 months Change every 3 months. 1 each 1              Physical Exam:   /72 (BP Location: Left arm)   Pulse 73   Temp 98.3  F (36.8  C) (Oral)   Resp 18   Wt 49 kg (108 lb 1.6 oz)   SpO2 96%   BMI 20.43 kg/m    Wt:   Wt Readings from Last 2 Encounters:   03/06/22 49 kg (108 lb 1.6 oz)   11/18/21 52.6 kg (116 lb)      Constitutional: thin  Eyes: Sclera anicteric/injected  CV: No edema  Respiratory: Unlabored breathing on RA  Abd:Nondistended, soft, no peritoneal signs  Skin: warm, no jaundice  Neuro: AAO x 3  MSK: No gross deformities          Past Medical History:   Reviewed and edited as appropriate  Past Medical History:   Diagnosis Date     Adrenal insufficiency (H)     iatrogenic, around 2013, off treatment for several years as of 2021 visit     Anemia      Depression      Esophageal reflux      Idiopathic chronic pancreatitis (H)      Pre-diabetes      Pulmonary embolism (H)             Past Surgical History:   Reviewed and edited as appropriate   Past Surgical History:   Procedure Laterality Date     CELIAC PLEXUS BLOCK  05/29/2014    with alcohol x 1     ENDOMETRIAL ABLATION  03/29/2014     ENDOSCOPIC RETROGRADE CHOLANGIOPANCREATOGRAM      about 30, most with PD stentes     ENDOSCOPIC ULTRASOUND, ESOPHAGOSCOPY, GASTROSCOPY, DUODENOSCOPY (EGD), COMBINED       HC SHOULDER ARTHROSCOPY,SURGICAL BICEPS TENODESIS  02/09/2017     JEJUNOSTOMY CARE      multiple feeding tubes x 3, at least one was direct J     LAPAROSCOPIC PANCREATECTOMY, TRANSPLANT AUTO ISLET CELL N/A 8/9/2021    Procedure: Open total pancreatectomy with autologous islet cell transplantation, splenectomy, and incidental appendectomy;  Surgeon: Elfego Shirley MD;  Location: [facility] OR     PANCREAS SURGERY  05/29/2014    John procedure revision     PANCREAS SURGERY  02/2011    John            Social History:   Alcohol use: denied  Smoking history: denied  Living  situation: independent w/ ; 3 kids          Family History:   Reviewed and edited as appropriate  Family History   Problem Relation Age of Onset     Breast Cancer Mother      Diabetes No family hx of      Pancreatitis No family hx of       No known history of colorectal cancer, liver disease, or inflammatory bowel disease.         Allergies:   Reviewed and edited as appropriate     Allergies   Allergen Reactions     Metoclopramide      Morphine      Penicillins      Prochlorperazine      Promethazine               Review of Systems:     A complete 10 point review of systems was performed and is negative except as noted in the HPI

## 2022-03-06 NOTE — ED TRIAGE NOTES
C/o +nausea/vomiting/diarrhea, abdominal pain, weight loss of 10 lbs over 3 wks, dry heaving, weakness  Had recent CT and there is a stent still in place that was not known to be there, per patient    Drove w/  from [place] overnight     Txp patient

## 2022-03-07 ENCOUNTER — ANESTHESIA EVENT (OUTPATIENT)
Dept: SURGERY | Facility: CLINIC | Age: 52
End: 2022-03-07
Payer: COMMERCIAL

## 2022-03-07 ENCOUNTER — APPOINTMENT (OUTPATIENT)
Dept: GENERAL RADIOLOGY | Facility: CLINIC | Age: 52
End: 2022-03-07
Attending: INTERNAL MEDICINE
Payer: COMMERCIAL

## 2022-03-07 ENCOUNTER — ANESTHESIA (OUTPATIENT)
Dept: SURGERY | Facility: CLINIC | Age: 52
End: 2022-03-07
Payer: COMMERCIAL

## 2022-03-07 LAB
ANION GAP SERPL CALCULATED.3IONS-SCNC: 4 MMOL/L (ref 3–14)
BUN SERPL-MCNC: 8 MG/DL (ref 7–30)
CALCIUM SERPL-MCNC: 8.8 MG/DL (ref 8.5–10.1)
CHLORIDE BLD-SCNC: 106 MMOL/L (ref 94–109)
CMV DNA SPEC NAA+PROBE-ACNC: NOT DETECTED IU/ML
CO2 SERPL-SCNC: 30 MMOL/L (ref 20–32)
CREAT SERPL-MCNC: 0.63 MG/DL (ref 0.52–1.04)
ERYTHROCYTE [DISTWIDTH] IN BLOOD BY AUTOMATED COUNT: 17.2 % (ref 10–15)
GFR SERPL CREATININE-BSD FRML MDRD: >90 ML/MIN/1.73M2
GLUCOSE BLD-MCNC: 91 MG/DL (ref 70–99)
GLUCOSE BLDC GLUCOMTR-MCNC: 102 MG/DL (ref 70–99)
GLUCOSE BLDC GLUCOMTR-MCNC: 107 MG/DL (ref 70–99)
GLUCOSE BLDC GLUCOMTR-MCNC: 116 MG/DL (ref 70–99)
GLUCOSE BLDC GLUCOMTR-MCNC: 116 MG/DL (ref 70–99)
GLUCOSE BLDC GLUCOMTR-MCNC: 119 MG/DL (ref 70–99)
GLUCOSE BLDC GLUCOMTR-MCNC: 168 MG/DL (ref 70–99)
GLUCOSE BLDC GLUCOMTR-MCNC: 172 MG/DL (ref 70–99)
GLUCOSE BLDC GLUCOMTR-MCNC: 210 MG/DL (ref 70–99)
GLUCOSE BLDC GLUCOMTR-MCNC: 35 MG/DL (ref 70–99)
GLUCOSE BLDC GLUCOMTR-MCNC: 63 MG/DL (ref 70–99)
HCT VFR BLD AUTO: 33.7 % (ref 35–47)
HGB BLD-MCNC: 10.5 G/DL (ref 11.7–15.7)
MAGNESIUM SERPL-MCNC: 2 MG/DL (ref 1.6–2.3)
MCH RBC QN AUTO: 26.1 PG (ref 26.5–33)
MCHC RBC AUTO-ENTMCNC: 31.2 G/DL (ref 31.5–36.5)
MCV RBC AUTO: 84 FL (ref 78–100)
PHOSPHATE SERPL-MCNC: 4.4 MG/DL (ref 2.5–4.5)
PLATELET # BLD AUTO: 406 10E3/UL (ref 150–450)
POTASSIUM BLD-SCNC: 4.1 MMOL/L (ref 3.4–5.3)
RBC # BLD AUTO: 4.02 10E6/UL (ref 3.8–5.2)
SODIUM SERPL-SCNC: 140 MMOL/L (ref 133–144)
WBC # BLD AUTO: 4.3 10E3/UL (ref 4–11)

## 2022-03-07 PROCEDURE — 258N000001 HC RX 258: Performed by: STUDENT IN AN ORGANIZED HEALTH CARE EDUCATION/TRAINING PROGRAM

## 2022-03-07 PROCEDURE — 0DP08DZ REMOVAL OF INTRALUMINAL DEVICE FROM UPPER INTESTINAL TRACT, VIA NATURAL OR ARTIFICIAL OPENING ENDOSCOPIC: ICD-10-PCS | Performed by: INTERNAL MEDICINE

## 2022-03-07 PROCEDURE — 250N000011 HC RX IP 250 OP 636: Performed by: ANESTHESIOLOGY

## 2022-03-07 PROCEDURE — 370N000017 HC ANESTHESIA TECHNICAL FEE, PER MIN: Performed by: INTERNAL MEDICINE

## 2022-03-07 PROCEDURE — 250N000013 HC RX MED GY IP 250 OP 250 PS 637: Performed by: ANESTHESIOLOGY

## 2022-03-07 PROCEDURE — 80048 BASIC METABOLIC PNL TOTAL CA: CPT | Performed by: STUDENT IN AN ORGANIZED HEALTH CARE EDUCATION/TRAINING PROGRAM

## 2022-03-07 PROCEDURE — 250N000024 HC ISOFLURANE, PER MIN: Performed by: INTERNAL MEDICINE

## 2022-03-07 PROCEDURE — 250N000013 HC RX MED GY IP 250 OP 250 PS 637: Performed by: STUDENT IN AN ORGANIZED HEALTH CARE EDUCATION/TRAINING PROGRAM

## 2022-03-07 PROCEDURE — 120N000011 HC R&B TRANSPLANT UMMC

## 2022-03-07 PROCEDURE — 250N000009 HC RX 250: Performed by: NURSE ANESTHETIST, CERTIFIED REGISTERED

## 2022-03-07 PROCEDURE — 84100 ASSAY OF PHOSPHORUS: CPT | Performed by: PHYSICIAN ASSISTANT

## 2022-03-07 PROCEDURE — 250N000011 HC RX IP 250 OP 636: Performed by: PHYSICIAN ASSISTANT

## 2022-03-07 PROCEDURE — 250N000013 HC RX MED GY IP 250 OP 250 PS 637: Performed by: SURGERY

## 2022-03-07 PROCEDURE — 85027 COMPLETE CBC AUTOMATED: CPT | Performed by: STUDENT IN AN ORGANIZED HEALTH CARE EDUCATION/TRAINING PROGRAM

## 2022-03-07 PROCEDURE — 250N000011 HC RX IP 250 OP 636: Performed by: NURSE ANESTHETIST, CERTIFIED REGISTERED

## 2022-03-07 PROCEDURE — 258N000003 HC RX IP 258 OP 636: Performed by: NURSE ANESTHETIST, CERTIFIED REGISTERED

## 2022-03-07 PROCEDURE — 250N000013 HC RX MED GY IP 250 OP 250 PS 637: Performed by: PHYSICIAN ASSISTANT

## 2022-03-07 PROCEDURE — 36415 COLL VENOUS BLD VENIPUNCTURE: CPT | Performed by: STUDENT IN AN ORGANIZED HEALTH CARE EDUCATION/TRAINING PROGRAM

## 2022-03-07 PROCEDURE — 83735 ASSAY OF MAGNESIUM: CPT | Performed by: PHYSICIAN ASSISTANT

## 2022-03-07 PROCEDURE — 999N000141 HC STATISTIC PRE-PROCEDURE NURSING ASSESSMENT: Performed by: INTERNAL MEDICINE

## 2022-03-07 PROCEDURE — 250N000011 HC RX IP 250 OP 636: Performed by: STUDENT IN AN ORGANIZED HEALTH CARE EDUCATION/TRAINING PROGRAM

## 2022-03-07 PROCEDURE — 360N000082 HC SURGERY LEVEL 2 W/ FLUORO, PER MIN: Performed by: INTERNAL MEDICINE

## 2022-03-07 PROCEDURE — 999N000179 XR SURGERY CARM FLUORO LESS THAN 5 MIN W STILLS: Mod: TC

## 2022-03-07 PROCEDURE — 710N000010 HC RECOVERY PHASE 1, LEVEL 2, PER MIN: Performed by: INTERNAL MEDICINE

## 2022-03-07 PROCEDURE — 999N000127 HC STATISTIC PERIPHERAL IV START W US GUIDANCE

## 2022-03-07 PROCEDURE — 272N000001 HC OR GENERAL SUPPLY STERILE: Performed by: INTERNAL MEDICINE

## 2022-03-07 RX ORDER — OXYCODONE HYDROCHLORIDE 5 MG/1
5 TABLET ORAL EVERY 4 HOURS PRN
Status: DISCONTINUED | OUTPATIENT
Start: 2022-03-07 | End: 2022-03-07 | Stop reason: HOSPADM

## 2022-03-07 RX ORDER — FENTANYL CITRATE 50 UG/ML
25 INJECTION, SOLUTION INTRAMUSCULAR; INTRAVENOUS EVERY 5 MIN PRN
Status: DISCONTINUED | OUTPATIENT
Start: 2022-03-07 | End: 2022-03-07 | Stop reason: HOSPADM

## 2022-03-07 RX ORDER — SODIUM CHLORIDE, SODIUM LACTATE, POTASSIUM CHLORIDE, CALCIUM CHLORIDE 600; 310; 30; 20 MG/100ML; MG/100ML; MG/100ML; MG/100ML
INJECTION, SOLUTION INTRAVENOUS CONTINUOUS
Status: DISCONTINUED | OUTPATIENT
Start: 2022-03-07 | End: 2022-03-07 | Stop reason: HOSPADM

## 2022-03-07 RX ORDER — HYDROMORPHONE HCL IN WATER/PF 6 MG/30 ML
0.2 PATIENT CONTROLLED ANALGESIA SYRINGE INTRAVENOUS EVERY 5 MIN PRN
Status: DISCONTINUED | OUTPATIENT
Start: 2022-03-07 | End: 2022-03-07 | Stop reason: HOSPADM

## 2022-03-07 RX ORDER — DIPHENHYDRAMINE HCL 25 MG
25 CAPSULE ORAL EVERY 6 HOURS PRN
Status: DISCONTINUED | OUTPATIENT
Start: 2022-03-07 | End: 2022-03-07

## 2022-03-07 RX ORDER — DIPHENHYDRAMINE HCL 25 MG
25 CAPSULE ORAL EVERY 6 HOURS PRN
Status: DISCONTINUED | OUTPATIENT
Start: 2022-03-07 | End: 2022-03-09 | Stop reason: HOSPADM

## 2022-03-07 RX ORDER — ONDANSETRON 2 MG/ML
INJECTION INTRAMUSCULAR; INTRAVENOUS PRN
Status: DISCONTINUED | OUTPATIENT
Start: 2022-03-07 | End: 2022-03-07

## 2022-03-07 RX ORDER — ONDANSETRON 2 MG/ML
4 INJECTION INTRAMUSCULAR; INTRAVENOUS EVERY 30 MIN PRN
Status: DISCONTINUED | OUTPATIENT
Start: 2022-03-07 | End: 2022-03-07 | Stop reason: HOSPADM

## 2022-03-07 RX ORDER — DIPHENHYDRAMINE HYDROCHLORIDE 50 MG/ML
25 INJECTION INTRAMUSCULAR; INTRAVENOUS EVERY 6 HOURS PRN
Status: DISCONTINUED | OUTPATIENT
Start: 2022-03-07 | End: 2022-03-07

## 2022-03-07 RX ORDER — DEXTROSE, SODIUM CHLORIDE, SODIUM LACTATE, POTASSIUM CHLORIDE, AND CALCIUM CHLORIDE 5; .6; .31; .03; .02 G/100ML; G/100ML; G/100ML; G/100ML; G/100ML
INJECTION, SOLUTION INTRAVENOUS CONTINUOUS
Status: DISCONTINUED | OUTPATIENT
Start: 2022-03-07 | End: 2022-03-08

## 2022-03-07 RX ORDER — INDOMETHACIN 50 MG/1
100 SUPPOSITORY RECTAL
Status: CANCELLED | OUTPATIENT
Start: 2022-03-07

## 2022-03-07 RX ORDER — MEPERIDINE HYDROCHLORIDE 25 MG/ML
12.5 INJECTION INTRAMUSCULAR; INTRAVENOUS; SUBCUTANEOUS
Status: DISCONTINUED | OUTPATIENT
Start: 2022-03-07 | End: 2022-03-07 | Stop reason: HOSPADM

## 2022-03-07 RX ORDER — LIDOCAINE HYDROCHLORIDE 20 MG/ML
INJECTION, SOLUTION INFILTRATION; PERINEURAL PRN
Status: DISCONTINUED | OUTPATIENT
Start: 2022-03-07 | End: 2022-03-07

## 2022-03-07 RX ORDER — ONDANSETRON 4 MG/1
4 TABLET, ORALLY DISINTEGRATING ORAL EVERY 30 MIN PRN
Status: DISCONTINUED | OUTPATIENT
Start: 2022-03-07 | End: 2022-03-07 | Stop reason: HOSPADM

## 2022-03-07 RX ORDER — DIPHENHYDRAMINE HYDROCHLORIDE 50 MG/ML
12.5 INJECTION INTRAMUSCULAR; INTRAVENOUS EVERY 10 MIN PRN
Status: DISCONTINUED | OUTPATIENT
Start: 2022-03-07 | End: 2022-03-07 | Stop reason: HOSPADM

## 2022-03-07 RX ORDER — GABAPENTIN 300 MG/1
300 CAPSULE ORAL EVERY MORNING
Status: DISCONTINUED | OUTPATIENT
Start: 2022-03-07 | End: 2022-03-09 | Stop reason: HOSPADM

## 2022-03-07 RX ORDER — ONDANSETRON 4 MG/1
4 TABLET, ORALLY DISINTEGRATING ORAL EVERY 6 HOURS PRN
Status: DISCONTINUED | OUTPATIENT
Start: 2022-03-07 | End: 2022-03-09 | Stop reason: HOSPADM

## 2022-03-07 RX ORDER — FENTANYL CITRATE 50 UG/ML
25 INJECTION, SOLUTION INTRAMUSCULAR; INTRAVENOUS
Status: DISCONTINUED | OUTPATIENT
Start: 2022-03-07 | End: 2022-03-07 | Stop reason: HOSPADM

## 2022-03-07 RX ORDER — LIDOCAINE 40 MG/G
CREAM TOPICAL
Status: CANCELLED | OUTPATIENT
Start: 2022-03-07

## 2022-03-07 RX ORDER — FENTANYL CITRATE 50 UG/ML
INJECTION, SOLUTION INTRAMUSCULAR; INTRAVENOUS PRN
Status: DISCONTINUED | OUTPATIENT
Start: 2022-03-07 | End: 2022-03-07

## 2022-03-07 RX ORDER — LIDOCAINE 40 MG/G
CREAM TOPICAL
Status: DISCONTINUED | OUTPATIENT
Start: 2022-03-07 | End: 2022-03-07 | Stop reason: HOSPADM

## 2022-03-07 RX ORDER — PROPOFOL 10 MG/ML
INJECTION, EMULSION INTRAVENOUS PRN
Status: DISCONTINUED | OUTPATIENT
Start: 2022-03-07 | End: 2022-03-07

## 2022-03-07 RX ORDER — ALBUTEROL SULFATE 0.83 MG/ML
2.5 SOLUTION RESPIRATORY (INHALATION) EVERY 4 HOURS PRN
Status: DISCONTINUED | OUTPATIENT
Start: 2022-03-07 | End: 2022-03-07 | Stop reason: HOSPADM

## 2022-03-07 RX ORDER — GABAPENTIN 300 MG/1
600 CAPSULE ORAL EVERY EVENING
Status: DISCONTINUED | OUTPATIENT
Start: 2022-03-07 | End: 2022-03-09 | Stop reason: HOSPADM

## 2022-03-07 RX ADMIN — ONDANSETRON 4 MG: 2 INJECTION INTRAMUSCULAR; INTRAVENOUS at 08:28

## 2022-03-07 RX ADMIN — TRAZODONE HYDROCHLORIDE 100 MG: 50 TABLET ORAL at 21:56

## 2022-03-07 RX ADMIN — CLONAZEPAM 1 MG: 1 TABLET ORAL at 20:27

## 2022-03-07 RX ADMIN — SODIUM CHLORIDE, SODIUM LACTATE, POTASSIUM CHLORIDE, CALCIUM CHLORIDE AND DEXTROSE MONOHYDRATE: 5; 600; 310; 30; 20 INJECTION, SOLUTION INTRAVENOUS at 19:59

## 2022-03-07 RX ADMIN — SODIUM CHLORIDE, SODIUM LACTATE, POTASSIUM CHLORIDE, CALCIUM CHLORIDE AND DEXTROSE MONOHYDRATE: 5; 600; 310; 30; 20 INJECTION, SOLUTION INTRAVENOUS at 06:48

## 2022-03-07 RX ADMIN — FENTANYL CITRATE 100 MCG: 50 INJECTION, SOLUTION INTRAMUSCULAR; INTRAVENOUS at 16:45

## 2022-03-07 RX ADMIN — ONDANSETRON 4 MG: 2 INJECTION INTRAMUSCULAR; INTRAVENOUS at 21:30

## 2022-03-07 RX ADMIN — HYDROMORPHONE HYDROCHLORIDE 0.2 MG: 0.2 INJECTION, SOLUTION INTRAMUSCULAR; INTRAVENOUS; SUBCUTANEOUS at 08:28

## 2022-03-07 RX ADMIN — PANTOPRAZOLE SODIUM 40 MG: 40 TABLET, DELAYED RELEASE ORAL at 08:28

## 2022-03-07 RX ADMIN — ONDANSETRON 4 MG: 2 INJECTION INTRAMUSCULAR; INTRAVENOUS at 17:01

## 2022-03-07 RX ADMIN — GABAPENTIN 300 MG: 300 CAPSULE ORAL at 10:57

## 2022-03-07 RX ADMIN — FENTANYL CITRATE 25 MCG: 50 INJECTION INTRAMUSCULAR; INTRAVENOUS at 18:14

## 2022-03-07 RX ADMIN — GABAPENTIN 600 MG: 300 CAPSULE ORAL at 20:26

## 2022-03-07 RX ADMIN — FENTANYL CITRATE 25 MCG: 50 INJECTION INTRAMUSCULAR; INTRAVENOUS at 18:06

## 2022-03-07 RX ADMIN — HYDROMORPHONE HYDROCHLORIDE 0.2 MG: 0.2 INJECTION, SOLUTION INTRAMUSCULAR; INTRAVENOUS; SUBCUTANEOUS at 02:51

## 2022-03-07 RX ADMIN — LIDOCAINE HYDROCHLORIDE 40 MG: 20 INJECTION, SOLUTION INFILTRATION; PERINEURAL at 16:58

## 2022-03-07 RX ADMIN — DEXTROSE MONOHYDRATE 25 ML: 25 INJECTION, SOLUTION INTRAVENOUS at 05:36

## 2022-03-07 RX ADMIN — HYDROMORPHONE HYDROCHLORIDE 0.2 MG: 0.2 INJECTION, SOLUTION INTRAMUSCULAR; INTRAVENOUS; SUBCUTANEOUS at 06:53

## 2022-03-07 RX ADMIN — FENTANYL CITRATE 25 MCG: 50 INJECTION INTRAMUSCULAR; INTRAVENOUS at 17:48

## 2022-03-07 RX ADMIN — HYDROMORPHONE HYDROCHLORIDE 0.2 MG: 0.2 INJECTION, SOLUTION INTRAMUSCULAR; INTRAVENOUS; SUBCUTANEOUS at 21:30

## 2022-03-07 RX ADMIN — DIPHENHYDRAMINE HYDROCHLORIDE 25 MG: 25 CAPSULE ORAL at 21:50

## 2022-03-07 RX ADMIN — HYDROMORPHONE HYDROCHLORIDE 0.2 MG: 0.2 INJECTION, SOLUTION INTRAMUSCULAR; INTRAVENOUS; SUBCUTANEOUS at 04:58

## 2022-03-07 RX ADMIN — HYDROMORPHONE HYDROCHLORIDE 0.2 MG: 0.2 INJECTION, SOLUTION INTRAMUSCULAR; INTRAVENOUS; SUBCUTANEOUS at 15:06

## 2022-03-07 RX ADMIN — HYDROMORPHONE HYDROCHLORIDE 0.2 MG: 0.2 INJECTION, SOLUTION INTRAMUSCULAR; INTRAVENOUS; SUBCUTANEOUS at 13:09

## 2022-03-07 RX ADMIN — OXYCODONE HYDROCHLORIDE 5 MG: 5 TABLET ORAL at 18:11

## 2022-03-07 RX ADMIN — SERTRALINE HYDROCHLORIDE 150 MG: 50 TABLET ORAL at 21:55

## 2022-03-07 RX ADMIN — HYDROMORPHONE HYDROCHLORIDE 0.2 MG: 0.2 INJECTION, SOLUTION INTRAMUSCULAR; INTRAVENOUS; SUBCUTANEOUS at 10:57

## 2022-03-07 RX ADMIN — MIDAZOLAM 2 MG: 1 INJECTION INTRAMUSCULAR; INTRAVENOUS at 16:22

## 2022-03-07 RX ADMIN — FENTANYL CITRATE 50 MCG: 50 INJECTION, SOLUTION INTRAMUSCULAR; INTRAVENOUS at 17:03

## 2022-03-07 RX ADMIN — HYDROMORPHONE HYDROCHLORIDE 0.2 MG: 0.2 INJECTION, SOLUTION INTRAMUSCULAR; INTRAVENOUS; SUBCUTANEOUS at 18:23

## 2022-03-07 RX ADMIN — Medication 80 MG: at 16:45

## 2022-03-07 RX ADMIN — ONDANSETRON 4 MG: 2 INJECTION INTRAMUSCULAR; INTRAVENOUS at 14:59

## 2022-03-07 RX ADMIN — PANCRELIPASE LIPASE, PANCRELIPASE PROTEASE, PANCRELIPASE AMYLASE 3 CAPSULE: 20000; 63000; 84000 CAPSULE, DELAYED RELEASE ORAL at 20:27

## 2022-03-07 RX ADMIN — PROPOFOL 100 MG: 10 INJECTION, EMULSION INTRAVENOUS at 16:45

## 2022-03-07 RX ADMIN — DIPHENHYDRAMINE HYDROCHLORIDE 25 MG: 50 INJECTION INTRAMUSCULAR; INTRAVENOUS at 08:34

## 2022-03-07 RX ADMIN — HYDROMORPHONE HYDROCHLORIDE 0.2 MG: 0.2 INJECTION, SOLUTION INTRAMUSCULAR; INTRAVENOUS; SUBCUTANEOUS at 00:51

## 2022-03-07 RX ADMIN — ONDANSETRON 4 MG: 2 INJECTION INTRAMUSCULAR; INTRAVENOUS at 02:51

## 2022-03-07 RX ADMIN — PROPOFOL 100 MG: 10 INJECTION, EMULSION INTRAVENOUS at 16:49

## 2022-03-07 RX ADMIN — PANTOPRAZOLE SODIUM 40 MG: 40 TABLET, DELAYED RELEASE ORAL at 20:27

## 2022-03-07 RX ADMIN — DIPHENHYDRAMINE HYDROCHLORIDE 12.5 MG: 50 INJECTION, SOLUTION INTRAMUSCULAR; INTRAVENOUS at 18:10

## 2022-03-07 RX ADMIN — DEXTROSE MONOHYDRATE 25 ML: 25 INJECTION, SOLUTION INTRAVENOUS at 02:47

## 2022-03-07 RX ADMIN — FENTANYL CITRATE 25 MCG: 50 INJECTION INTRAMUSCULAR; INTRAVENOUS at 17:59

## 2022-03-07 RX ADMIN — PHENYLEPHRINE HYDROCHLORIDE 100 MCG: 10 INJECTION INTRAVENOUS at 16:52

## 2022-03-07 RX ADMIN — LIDOCAINE HYDROCHLORIDE 60 MG: 20 INJECTION, SOLUTION INFILTRATION; PERINEURAL at 16:43

## 2022-03-07 RX ADMIN — DEXTROSE MONOHYDRATE 25 ML: 25 INJECTION, SOLUTION INTRAVENOUS at 12:01

## 2022-03-07 RX ADMIN — ONDANSETRON 4 MG: 2 INJECTION INTRAMUSCULAR; INTRAVENOUS at 17:48

## 2022-03-07 RX ADMIN — CLONAZEPAM 1 MG: 1 TABLET ORAL at 08:28

## 2022-03-07 RX ADMIN — DIPHENHYDRAMINE HYDROCHLORIDE 12.5 MG: 50 INJECTION, SOLUTION INTRAMUSCULAR; INTRAVENOUS at 02:51

## 2022-03-07 RX ADMIN — ROCURONIUM BROMIDE 5 MG: 50 INJECTION, SOLUTION INTRAVENOUS at 16:43

## 2022-03-07 ASSESSMENT — ACTIVITIES OF DAILY LIVING (ADL)
ADLS_ACUITY_SCORE: 4

## 2022-03-07 NOTE — PROGRESS NOTES
Pancreatitis Service - Daily Progress Note  03/07/2022    Assessment & Plan: 51 year old female with a history of idiopathic chronic pancreatitis s/p PD sphincterotomy and multiple PD stents (10-15) in [place] and at [facility], cholecystectomy, TPIAT at this facility in 8/2021, and diabetes who is presenting w/ several weeks of diarrhea, abdominal pain, weight loss, and nausea. Patient has a retained biliary stent that needs to be removed.      GI: Diarrhea, abdominal pain, nausea, and malnutrition secondary to poor intake. Low grade fever prior to admission (over a week ago). Work up initially by primary gastroenterologist. Pancreatic enzymes adjusted and treated for possible SIBO with cipro x 1 week. Has retained biliary stent, placed at time of TPAIT (8/9/21). GI consulted. Plan for ERCP today to evalaute for anastomotic ulcerations or other pathology, eval the nuris limb, and stent removal.   Cardiorespiratory: Stable  Nutrition: NPO for procedure.   Malnutrition due to poor intake: Nutrition consulted   Fluid/Electrolytes: D5 100 ml/hr. Check Mg and Phos.   : No issues.  Post-pancreatectomy diabetes: Hgb A1C 6. PTA Tresiba 8 units, hold due to NPO status. and sliding scale correction every 4 hours.  Has CGM.   Infection: afebrile. WBC 4.3  Prophylaxis: DVT, consider heparin subcutaneous if patient not ambulating.   Pain control:   Acute abdominal pain: Dilaudid IV PRN. Switch to oral dilaudid after ERCP. Benadryl PO PRN pruritis.  Chronic neck pain: Neurontin 300 mg AM/600 mg PM.   Psych:   Hx anxiety, depression, insomnia: PTA trazodone, zoloft, and clonazepam ordered.  Activity: Ambulate QID minimum.   Anticipated LOS/Discharge: 1-2 days    Medical Decision Making: Medium  Subsequent visit 21219 (moderate level decision making)    JOSE ALBERTO/Fellow/Resident Provider: Lyn Norman PA-C     Faculty: Dane Burt MD    Attestation: I saw and examined this patient with Lyn Norman PA-C, and the  transplant team. I independently reviewed all pertinent laboratory and imaging results and made independent management decisions including immunosuppression management. I agree with the findings and plan as detailed in the note above.  -Dane Burt MD  __________________________________________________________________  Transplant History: Admitted 3/6/2022 for abdominal pain,nasuea, vomiting, weight loss. Retained stent  8/9/2021 ([place]), Postoperative day: 210     Interval History: History is obtained from the patient  Overnight events: Generalized abdominal pain. No emesis or BM. Pruritis secondary to pain medication.     ROS:   A 10-point review of systems was negative except as noted above.    Curent Meds:    amylase-lipase-protease  6 capsule Oral TID w/meals     clonazePAM  1 mg Oral BID     gabapentin  300 mg Oral QAM     gabapentin  600 mg Oral QPM     insulin aspart  1-3 Units Subcutaneous TID AC     insulin aspart  1-3 Units Subcutaneous At Bedtime     [Held by provider] insulin degludec  8 Units Subcutaneous Daily     pantoprazole  40 mg Oral BID     sertraline  150 mg Oral At Bedtime     sodium chloride (PF)  3 mL Intracatheter Q8H     traZODone  100 mg Oral At Bedtime       Physical Exam:     Admit Weight: 49 kg (108 lb 1.6 oz)    Current Vitals:   /72 (BP Location: Left arm)   Pulse 62   Temp 98.2  F (36.8  C) (Oral)   Resp 18   Wt 49 kg (108 lb 1.6 oz)   SpO2 94%   BMI 20.43 kg/m           Vital sign ranges:    Temp:  [97.8  F (36.6  C)-98.3  F (36.8  C)] 98.2  F (36.8  C)  Pulse:  [59-73] 62  Resp:  [16-18] 18  BP: ()/(52-72) 111/72  SpO2:  [94 %-100 %] 94 %  Patient Vitals for the past 24 hrs:   BP Temp Temp src Pulse Resp SpO2 Weight   03/07/22 1037 111/72 98.2  F (36.8  C) Oral 62 18 94 % --   03/07/22 0100 91/52 97.8  F (36.6  C) Oral 61 18 95 % --   03/06/22 2218 101/71 98.2  F (36.8  C) Oral 63 16 98 % --   03/06/22 1700 116/70 98  F (36.7  C) Oral 59 16 100 % --    03/06/22 1541 110/72 98.3  F (36.8  C) Oral 73 18 96 % 49 kg (108 lb 1.6 oz)     General Appearance: in no apparent distress.   Skin: normal, dry, no suspicious lesions or rashes  Heart: well perfused  Lungs: NLB on RA  Abdomen: The abdomen is flat, and  mildly tender generalized. The wound is Healing well.   : shook is not present.    Extremities: edema: absent.    Data:   CMP  Recent Labs   Lab 03/07/22  1238 03/07/22  1156 03/07/22  0834 03/07/22  0640 03/06/22  1712 03/06/22  0848   NA  --   --   --  140  --  135   POTASSIUM  --   --   --  4.1  --  4.2   CHLORIDE  --   --   --  106  --  102   CO2  --   --   --  30  --  30   * 63*   < > 91   < > 105*   BUN  --   --   --  8  --  16   CR  --   --   --  0.63  --  0.67   GFRESTIMATED  --   --   --  >90  --  >90   VIKAS  --   --   --  8.8  --  9.3   MAG  --   --   --   --   --  2.4*   LIPASE  --   --   --   --   --  24*   ALBUMIN  --   --   --   --   --  4.0   BILITOTAL  --   --   --   --   --  0.5   ALKPHOS  --   --   --   --   --  171*   AST  --   --   --   --   --  22   ALT  --   --   --   --   --  37    < > = values in this interval not displayed.     CBC  Recent Labs   Lab 03/07/22  0640 03/06/22  1633 03/06/22  0848   HGB 10.5*  --  12.6   WBC 4.3  --  5.2     --  518*   A1C  --  6.0*  --      Coags  Recent Labs   Lab 03/06/22  0848   INR 1.01      Urinalysis  Recent Labs   Lab Test 03/06/22  0851 08/19/21  0158   COLOR Dark Yellow* Straw   APPEARANCE Clear Clear   URINEGLC Negative Negative   URINEBILI Negative Negative   URINEKETONE Negative Negative   SG 1.035 1.009   UBLD Negative Negative   URINEPH 5.5 7.0   PROTEIN 50 * Negative   NITRITE Negative Negative   LEUKEST Negative Negative   RBCU 2 1   WBCU 1 <1

## 2022-03-07 NOTE — ANESTHESIA CARE TRANSFER NOTE
Patient: Meme Robins    Procedure: Procedure(s):  ENTEROSCOPY, WITH STENT REMOVAL       Diagnosis: Disorder of bile duct stent [T85.9XXA]  Diagnosis Additional Information: No value filed.    Anesthesia Type:   General     Note:    Oropharynx: oropharynx clear of all foreign objects  Level of Consciousness: drowsy  Oxygen Supplementation: face mask  Level of Supplemental Oxygen (L/min / FiO2): 8  Independent Airway: airway patency satisfactory and stable  Dentition: dentition unchanged  Vital Signs Stable: post-procedure vital signs reviewed and stable  Report to RN Given: handoff report given  Patient transferred to: PACU    Handoff Report: Identifed the Patient, Identified the Reponsible Provider, Reviewed the pertinent medical history, Discussed the surgical course, Reviewed Intra-OP anesthesia mangement and issues during anesthesia, Set expectations for post-procedure period and Allowed opportunity for questions and acknowledgement of understanding      Vitals:  Vitals Value Taken Time   BP     Temp     Pulse     Resp     SpO2         Electronically Signed By: ABBEY Quinonez CRNA  March 7, 2022  5:34 PM

## 2022-03-07 NOTE — PROGRESS NOTES
"CLINICAL NUTRITION SERVICES - ASSESSMENT NOTE     Nutrition Prescription    RECOMMENDATIONS FOR MDs/PROVIDERS TO ORDER:  Continue with current regimen of 6 Zenpep 67783-- within theraputic range of 24,500 units- 122,500 units per meal at pt's dosing weight of 49kg.     Malnutrition Status:    Severe in the context of acute illness.     Recommendations already ordered by Registered Dietitian (RD):  Continue regular diet.     Medical food supplement therapy-- glucerna vanilla and strawberry TID with meals     Future/Additional Recommendations:  Follow for diet advancement, add supplements as detailed above.      REASON FOR ASSESSMENT  Meme Robins is a/an 51 year old female assessed by the dietitian for Admission Nutrition Risk Screen for positive with weight loss of 2-13lb and eating poorly d/t decreased appetite.     NUTRITION HISTORY  3/6 GI note: \"Three weeks ago, she developed abdominal pain in a band-like pattern after eating which has progressed to being constant but worse w/ eating, poor appetite, nausea (no vomiting), and non-bloody diarrhea.\" \"Her gastroenterologist in [place] has tried increasing her Creon dose and treating for SIBO w/ cipro w/o any improvement.\"     In past 3 weeks, pt has only managed to eat a couple food items a day. Examples are plain greek yogurt, half a banana, grilled chicken breast, plain toast, Krispex with low fat milk, a fried egg once. Will have coffee around noon and drink water as she is able throughout the day. She has been following a low fat, high protein diet and watching her carb intake. She has been staying away from fruit (besides bananas) and vegetables because of her diarrhea and nausea.  Before the past three weeks, she would snack thoughout the day on carrots, yogurt, handful pretzels, oranges, string cheese; and have a protein, vegetable, and fruit for dinner. She was taking 4-5 Zenpep 20,000 with meals (80,000 - 100,000 units of enzymes per meal) and was " "increased to 3 Zenpep 40,000 during meals and 1 Zenpep 20,000 at the end of the meal (140,000 units of enzymes per meal).   Therapeutic enzyme levels at her dosing weight (49 kg) are 24,500 units- 122,500 units per meal. She is slightly above this at 140,000 units currently.      She was also on a multivitamin supplement regimen- taking two in the morning and two at night of a \"New Beginnings\" multivitamin and an immune boosting vitamin (with vitamin C and antioxidants) once a day. She is has not taken them for about a week.     CURRENT NUTRITION ORDERS  Diet: Regular, advanced from NPO 3/8, on clears prior to this for about a day  Intake/Tolerance:   3/7: Two jellos and a juice. Had a couple bites of one jello.    3/8: Two meals (entree + two sides). Pt reports eating 75% of trays.   Pt reported it digested alright and she says her appetite is back up, still a little less than normal.     LABS  Labs reviewed  Hb A1c 6.0- 3/6/22  BG 35- 170  Vit A: 0.28 (low- 11/18/21)  Vit B12: 767 (WNL- 11/18/21)  Vit D3: 50 (WNL- 11/18/21)  Vit E: 8.8 (WNL- 11/18/21)  Zinc: 61.6 (WNL- 11/18/21)    MEDICATIONS  Medications reviewed  Thera-vit-m  Zenpep 15038-79679- 6 capsules TID-- within theraputic range  LR w D5 at 100mL/hr-- provides 408kcal   Insulin: Novolog- low sliding scale, with meals, and bedtime correction; degludec-- 8 units in morning    ANTHROPOMETRICS  Height: 154.9 cm (5' 1\")  Most Recent Weight: 49 kg (108 lb 1.6 oz)    IBW: 48 kg (105 lb)  BMI: Normal BMI  Weight History:    Weight loss of 13% body weight from pre TPAIT weight of 56.4kg 8/5/21 and current weight of 49kg 3/6/22 -- clinically significant.   Wt Readings from Last 10 Encounters:   03/06/22 49 kg (108 lb 1.6 oz)   02/01/22 51.2 kg (112 lb)   11/18/21 52.8 kg (116 lb 6.5 oz)   09/14/21 54.3 kg (119 lb 12.8 oz)   09/07/21 53.8 kg (118 lb 8 oz)   09/02/21 56.9 kg (125 lb 7.1 oz)   09/02/21 56.9 kg (125 lb 6.4 oz)   08/21/21 54.1 kg (119 lb 3.2 oz) "   08/20/21 55.1 kg (121 lb 8 oz)   08/05/21 56.4 kg (124 lb 4.8 oz)     Dosing Weight: 49 kg-- actual weight    ASSESSED NUTRITION NEEDS  Estimated Energy Needs: 1225- 1470 kcals/day (25 - 30 kcals/kg)  Justification: Maintenance  Estimated Protein Needs: 49- 59 grams protein/day (1-1.2 grams of pro/kg)  Justification: Hypercatabolism with acute illness and Repletion  Estimated Fluid Needs: 1225- 1470 mL/day (1 mL/kcal)   Justification: Maintenance and Per provider pending fluid status    PHYSICAL FINDINGS  Post TPIAT 8/9/2021, Bonilla-en-Y choledochojejunostomy, and appendectomy  ERCP 3/7   Biliary stent in afferent limb, removed 3/7    See malnutrition section below.    MALNUTRITION  % Intake: </=50% for >/= 1 month (severe)  % Weight Loss: > 10% in 6 months (severe)  Subcutaneous Fat Loss: Facial region:  moderate, Upper arm: moderate and Thoracic/intercostal: moderate  Muscle Loss: Temporal:  moderate, Facial & jaw region: moderate, Thoracic region (clavicle, acromium bone, deltoid, trapezius, pectoral): moderate and Upper arm (bicep, tricep): moderate  Fluid Accumulation/Edema: None noted per charting  Malnutrition Diagnosis: Severe malnutrition in the context of acute illness      NUTRITION DIAGNOSIS  Inadequate oral intake related to nausea and diarrhea as evidenced by intake < 50% for 3 weeks, weight loss of 13% body weight in 7 months.     INTERVENTIONS  Implementation  Nutrition Education: RD role in nutrition care process, talked about foods that are easier to digest (helped order dinner).   Medical food supplement therapy-- glucerna vanilla and strawberry TID with meals     Goals  Diet adv v nutrition support within 2-3 days.     Monitoring/Evaluation  Progress toward goals will be monitored and evaluated per protocol.      Kaur Paige  Dietetic Intern    I have read and agree with the above assessment, evaluation, interventions and recommendations.    Effie Ramon MS, RD, LD, CCTD  7A/Obs units, pager  [phone]

## 2022-03-07 NOTE — ANESTHESIA PROCEDURE NOTES
Airway       Patient location during procedure: OR       Procedure Start/Stop Times: 3/7/2022 4:46 PM  Staff -        CRNA: Gema Luther APRN CRNA       Performed By: CRNA  Consent for Airway        Urgency: elective  Indications and Patient Condition       Indications for airway management: yasmeen-procedural       Induction type:RSI       Mask difficulty assessment: 0 - not attempted    Final Airway Details       Final airway type: endotracheal airway       Successful airway: ETT - single  Endotracheal Airway Details        ETT size (mm): 7.0       Cuffed: yes       Successful intubation technique: direct laryngoscopy       DL Blade Type: MAC 4       Grade View of Cords: 1       Adjucts: stylet       Position: Center       Measured from: gums/teeth       Secured at (cm): 21       Bite block used: None    Post intubation assessment        Placement verified by: capnometry        Number of attempts at approach: 1       Secured with: pink tape       Ease of procedure: easy       Dentition: Intact and Unchanged

## 2022-03-07 NOTE — PLAN OF CARE
Status: Pt has hx pancreatitis underwent multiple surgery that placed stents at different facility. So ended up having pancreatic auto illet transplant in 8/2021, Admitted for several weeks of diarrhea, abd pain, weight loss and nausea. Found to have stent looped in jejunum.   Vitals: VSS, RA  Neuros: Intactx4   IV: R PIV infusing LR w D5 at 100mL/hr  Labs/Electrolytes: BG levels in the low 70's dropping down to 35 BSthroughout shift..25mL of D5 given via IV x2. BG levels at 163. Fish bowl made aware. 2 doses of D5 given. Needs stool sample to rule C.diff.   Resp/trach: WNL   Diet: NPO as of 0000 c/o constant nausea zofran given x1  Bowel status: Has not had BM.   : Voids spontaneously without problem.   Skin: Intact.   Pain: 7/10 abd pain, partially relieved by PRN Dilaudid. Benadryl given x1 d/t itchiness from Dilaudid.   Activity: Up ad rona.   Plan: ERCP most likely being done today- unsure of time.   Updates this shift: Pt pleasant continued managing pain. NPO as of 0000. 25mL of D5 given for low BG levels x2. LR with D5 ordered.

## 2022-03-07 NOTE — UTILIZATION REVIEW
"  Admission Status; Secondary Review Determination     Admission Date: 3/6/2022  8:01 AM      Under the authority of the Utilization Management Committee, the utilization review process indicated a secondary review on the above patient.  The review outcome is based on review of the medical records, discussions with staff, and applying clinical experience noted on the date of the review.        (x)      Inpatient Status Appropriate - This patient's medical care is consistent with medical management for inpatient care and reasonable inpatient medical practice.      () Observation Status Appropriate - This patient does not meet hospital inpatient criteria and is placed in observation status. If this patient's primary payer is Medicare and was admitted as an inpatient, Condition Code 44 should be used and patient status changed to \"observation\".   () Admission Status NOT Appropriate - This patient's medical care is not consistent with medical management for Inpatient or Observation Status.          RATIONALE FOR DETERMINATION   Meme Robins is a 51 year old female with a history of idiopathic chronic pancreatitis s/p PD sphincterotomy and multiple PD stents (10-15) in [place] and at [facility], cholecystectomy, TPIAT at this facility in 8/2021, and diabetes.  She presented to the hospital with nausea, diarrhea, weight loss, and abdominal pain.  There is retained biliary stent from previous procedure noted in the jejunum on CT scan of the abdomen.  She was placed in the hospital for planned ERCP by gastroenterology and symptom management.  She is requiring very frequent dosing of IV Dilaudid for pain control.  Also required multiple doses of IV Zofran for nausea management.  She is expected to require a greater than 2 midnight stay at the hospital.  Due to this patient's complex past medical history, retained biliary stent, need for multiple doses of IV Dilaudid, need for multiple doses of IV Zofran, and " expectation of a greater than 2 midnight stay at the hospital, it is appropriate to admit this patient to the hospital as an inpatient.      The severity of illness, intensity of service provided, expected LOS and risk for adverse outcome make the care complex, high risk and appropriate for hospital admission.        The information on this document is developed by the utilization review team in order for the business office to ensure compliance.  This only denotes the appropriateness of proper admission status and does not reflect the quality of care rendered.         The definitions of Inpatient Status and Observation Status used in making the determination above are those provided in the CMS Coverage Manual, Chapter 1 and Chapter 6, section 70.4.      Sincerely,     Camilo Wu D.O.  Utilization Review/ Case Management  [facility].

## 2022-03-07 NOTE — ANESTHESIA PREPROCEDURE EVALUATION
Anesthesia Pre-Procedure Evaluation    Patient: Meme Robins   MRN: [id number] : 1970        Preoperative Diagnosis: Chronic pancreatitis (H) [K86.1]   Procedure : Procedure(s):  Laparoscopic hand-assisted total pancreatectomy with autologous islet cell transplantation, possible cholecystectomy, splenectomy, and incidental appendectomy     Past Medical History:   Diagnosis Date     Adrenal insufficiency (H)     iatrogenic, around , off treatment for several years as of  visit     Anemia      Depression      Esophageal reflux      Idiopathic chronic pancreatitis (H)      Pre-diabetes      Pulmonary embolism (H)       Past Surgical History:   Procedure Laterality Date     CELIAC PLEXUS BLOCK  2014    with alcohol x 1     ENDOMETRIAL ABLATION  2014     ENDOSCOPIC RETROGRADE CHOLANGIOPANCREATOGRAM      about 30, most with PD stentes     ENDOSCOPIC ULTRASOUND, ESOPHAGOSCOPY, GASTROSCOPY, DUODENOSCOPY (EGD), COMBINED       HC SHOULDER ARTHROSCOPY,SURGICAL BICEPS TENODESIS  2017     JEJUNOSTOMY CARE      multiple feeding tubes x 3, at least one was direct J     LAPAROSCOPIC PANCREATECTOMY, TRANSPLANT AUTO ISLET CELL N/A 2021    Procedure: Open total pancreatectomy with autologous islet cell transplantation, splenectomy, and incidental appendectomy;  Surgeon: Elfego Shirley MD;  Location: [facility] OR     PANCREAS SURGERY  2014    John procedure revision     PANCREAS SURGERY  2011    John      Allergies   Allergen Reactions     Metoclopramide      Morphine      Penicillins      Prochlorperazine      Promethazine       Social History     Tobacco Use     Smoking status: Never Smoker     Smokeless tobacco: Never Used   Substance Use Topics     Alcohol use: Not Currently      Wt Readings from Last 1 Encounters:   22 49 kg (108 lb 1.6 oz)        Anesthesia Evaluation   Pt has had prior anesthetic. Type: General.    History of anesthetic complications (done well with  scopolamine patch and zofran)  - PONV.      ROS/MED HX  ENT/Pulmonary:  - neg pulmonary ROS     Neurologic: Comment: c-spine DDD; numbness in fingers with neck flexion      (+) migraines (hasn't had any in years),     Cardiovascular:       METS/Exercise Tolerance: 4 - Raking leaves, gardening    Hematologic:     (+) History of blood clots (PE 2012 2/2 horomone therapy for perimenopausal sxs; hematology recommends normal ppx perioperatively),     Musculoskeletal:       GI/Hepatic: Comment: Chronic pancreatitis s/p John procedure, multiple ERCPs  S/p islet cell transplant    (+) GERD, Asymptomatic on medication,     Renal/Genitourinary:  - neg Renal ROS     Endo: Comment: Adrenal insufficiency -> off cortisol; recent cortisol testing normal per endocrinologist      Psychiatric/Substance Use:     (+) H/O chronic opiod use .     Infectious Disease:  - neg infectious disease ROS     Malignancy:  - neg malignancy ROS     Other:            Physical Exam    Airway        Mallampati: II   TM distance: > 3 FB   Neck ROM: full   Mouth opening: > 3 cm    Respiratory Devices and Support         Dental  no notable dental history         Cardiovascular          Rhythm and rate: regular and normal     Pulmonary           breath sounds clear to auscultation           OUTSIDE LABS:  CBC:   Lab Results   Component Value Date    WBC 4.3 03/07/2022    WBC 5.2 03/06/2022    HGB 10.5 (L) 03/07/2022    HGB 12.6 03/06/2022    HCT 33.7 (L) 03/07/2022    HCT 39.5 03/06/2022     03/07/2022     (H) 03/06/2022     BMP:   Lab Results   Component Value Date     03/07/2022     03/06/2022    POTASSIUM 4.1 03/07/2022    POTASSIUM 4.2 03/06/2022    CHLORIDE 106 03/07/2022    CHLORIDE 102 03/06/2022    CO2 30 03/07/2022    CO2 30 03/06/2022    BUN 8 03/07/2022    BUN 16 03/06/2022    CR 0.63 03/07/2022    CR 0.67 03/06/2022     (H) 03/07/2022    GLC 63 (L) 03/07/2022     COAGS:   Lab Results   Component Value Date     PTT 34 08/15/2021    INR 1.01 03/06/2022    FIBR 160 (L) 08/09/2021     POC:   Lab Results   Component Value Date    HCG Negative 08/04/2021    HCGS Negative 03/06/2022     HEPATIC:   Lab Results   Component Value Date    ALBUMIN 4.0 03/06/2022    PROTTOTAL 8.1 03/06/2022    ALT 37 03/06/2022    AST 22 03/06/2022    ALKPHOS 171 (H) 03/06/2022    BILITOTAL 0.5 03/06/2022     OTHER:   Lab Results   Component Value Date    PH 7.37 03/06/2022    LACT 0.7 03/06/2022    A1C 6.0 (H) 03/06/2022    VIKAS 8.8 03/07/2022    PHOS 4.4 03/07/2022    MAG 2.0 03/07/2022    LIPASE 24 (L) 03/06/2022    AMYLASE 63 08/11/2021    CRP <2.9 03/06/2022       Anesthesia Plan    ASA Status:  3   NPO Status:  NPO Appropriate    Anesthesia Type: General.     - Airway: ETT   Induction: RSI, Intravenous.   Maintenance: Inhalation.   Techniques and Equipment:     - Lines/Monitors: Port in situ     Consents    Anesthesia Plan(s) and associated risks, benefits, and realistic alternatives discussed. Questions answered and patient/representative(s) expressed understanding.    - Discussed:     - Discussed with:  Patient      - Extended Intubation/Ventilatory Support Discussed: No.      - Patient is DNR/DNI Status: No    Use of blood products discussed: No .     Postoperative Care    Pain management: Oral pain medications, IV analgesics.   PONV prophylaxis: Ondansetron (or other 5HT-3)     Comments:    Other Comments: Patient seen and examined.  Risks, benefits and alternatives to GETA discussed.  Questions answered and patient wishes to proceed  NO steroids.                Reese Rowan MD

## 2022-03-07 NOTE — BRIEF OP NOTE
[facility] Brief Operative Note    Pre-operative diagnosis: Disorder of bile duct stent [T85.9XXA]   Post-operative diagnosis As prior    Procedure: Procedure(s):  ENTEROSCOPY, WITH STENT REMOVAL   Surgeon(s): Surgeon(s) and Role:     * Toro Williamson MD - Primary     * Nasir Cox MD - Fellow - Assisting   Estimated blood loss: * No values recorded between 3/7/2022  4:56 PM and 3/7/2022  5:30 PM *    Specimens: * No specimens in log *   Findings:    - Post TPIAT anatomy with widely patent and healthy appearing duodenoenterotomy and jejuno-jejunal anastomosis.   - Afferent limb with retained distally migrated plastic biliary stent. Extracted.  - Widely patent choledochojejunostomy, characterized by healthy appearing mucosa with normal air cholangiogram.         Plan  - Return patient to hospital cao for ongoing care.   - Resume previous diet and medications.   - Suspect bile salt diarrhea in the setting of prior cholecystectomy contributing to patient's symptoms. Further evaluation and recommendations per inpatient GI team.   - The findings and recommendations were discussed with the patient and their family.

## 2022-03-08 PROBLEM — K90.89 BILE SALT-INDUCED DIARRHEA: Status: ACTIVE | Noted: 2022-03-08

## 2022-03-08 PROBLEM — E43 SEVERE MALNUTRITION (H): Status: ACTIVE | Noted: 2022-03-08

## 2022-03-08 PROBLEM — R10.9 ACUTE ABDOMINAL PAIN: Status: ACTIVE | Noted: 2022-03-08

## 2022-03-08 LAB
ANION GAP SERPL CALCULATED.3IONS-SCNC: 2 MMOL/L (ref 3–14)
BUN SERPL-MCNC: 5 MG/DL (ref 7–30)
CALCIUM SERPL-MCNC: 8.6 MG/DL (ref 8.5–10.1)
CHLORIDE BLD-SCNC: 106 MMOL/L (ref 94–109)
CO2 SERPL-SCNC: 31 MMOL/L (ref 20–32)
CREAT SERPL-MCNC: 0.65 MG/DL (ref 0.52–1.04)
ERYTHROCYTE [DISTWIDTH] IN BLOOD BY AUTOMATED COUNT: 17.3 % (ref 10–15)
GFR SERPL CREATININE-BSD FRML MDRD: >90 ML/MIN/1.73M2
GLUCOSE BLD-MCNC: 128 MG/DL (ref 70–99)
GLUCOSE BLDC GLUCOMTR-MCNC: 119 MG/DL (ref 70–99)
GLUCOSE BLDC GLUCOMTR-MCNC: 121 MG/DL (ref 70–99)
GLUCOSE BLDC GLUCOMTR-MCNC: 138 MG/DL (ref 70–99)
GLUCOSE BLDC GLUCOMTR-MCNC: 145 MG/DL (ref 70–99)
GLUCOSE BLDC GLUCOMTR-MCNC: 152 MG/DL (ref 70–99)
GLUCOSE BLDC GLUCOMTR-MCNC: 154 MG/DL (ref 70–99)
GLUCOSE BLDC GLUCOMTR-MCNC: 165 MG/DL (ref 70–99)
GLUCOSE BLDC GLUCOMTR-MCNC: 180 MG/DL (ref 70–99)
GLUCOSE BLDC GLUCOMTR-MCNC: 188 MG/DL (ref 70–99)
HCT VFR BLD AUTO: 34.9 % (ref 35–47)
HGB BLD-MCNC: 11.1 G/DL (ref 11.7–15.7)
MCH RBC QN AUTO: 26.9 PG (ref 26.5–33)
MCHC RBC AUTO-ENTMCNC: 31.8 G/DL (ref 31.5–36.5)
MCV RBC AUTO: 85 FL (ref 78–100)
PLATELET # BLD AUTO: 395 10E3/UL (ref 150–450)
POTASSIUM BLD-SCNC: 4.3 MMOL/L (ref 3.4–5.3)
RBC # BLD AUTO: 4.13 10E6/UL (ref 3.8–5.2)
SMALL BOWEL ENTEROSCOPY: NORMAL
SODIUM SERPL-SCNC: 139 MMOL/L (ref 133–144)
WBC # BLD AUTO: 5.1 10E3/UL (ref 4–11)

## 2022-03-08 PROCEDURE — 250N000012 HC RX MED GY IP 250 OP 636 PS 637: Performed by: STUDENT IN AN ORGANIZED HEALTH CARE EDUCATION/TRAINING PROGRAM

## 2022-03-08 PROCEDURE — 250N000011 HC RX IP 250 OP 636: Performed by: NURSE PRACTITIONER

## 2022-03-08 PROCEDURE — 99232 SBSQ HOSP IP/OBS MODERATE 35: CPT | Performed by: SURGERY

## 2022-03-08 PROCEDURE — 250N000011 HC RX IP 250 OP 636: Performed by: STUDENT IN AN ORGANIZED HEALTH CARE EDUCATION/TRAINING PROGRAM

## 2022-03-08 PROCEDURE — 250N000013 HC RX MED GY IP 250 OP 250 PS 637: Performed by: SURGERY

## 2022-03-08 PROCEDURE — 250N000013 HC RX MED GY IP 250 OP 250 PS 637: Performed by: STUDENT IN AN ORGANIZED HEALTH CARE EDUCATION/TRAINING PROGRAM

## 2022-03-08 PROCEDURE — 250N000013 HC RX MED GY IP 250 OP 250 PS 637: Performed by: NURSE PRACTITIONER

## 2022-03-08 PROCEDURE — 250N000013 HC RX MED GY IP 250 OP 250 PS 637: Performed by: PHYSICIAN ASSISTANT

## 2022-03-08 PROCEDURE — 250N000011 HC RX IP 250 OP 636: Performed by: PHYSICIAN ASSISTANT

## 2022-03-08 PROCEDURE — 85027 COMPLETE CBC AUTOMATED: CPT | Performed by: NURSE PRACTITIONER

## 2022-03-08 PROCEDURE — 36415 COLL VENOUS BLD VENIPUNCTURE: CPT | Performed by: NURSE PRACTITIONER

## 2022-03-08 PROCEDURE — 120N000011 HC R&B TRANSPLANT UMMC

## 2022-03-08 PROCEDURE — 80048 BASIC METABOLIC PNL TOTAL CA: CPT | Performed by: NURSE PRACTITIONER

## 2022-03-08 RX ORDER — NALOXONE HYDROCHLORIDE 0.4 MG/ML
0.4 INJECTION, SOLUTION INTRAMUSCULAR; INTRAVENOUS; SUBCUTANEOUS
Status: DISCONTINUED | OUTPATIENT
Start: 2022-03-08 | End: 2022-03-09 | Stop reason: HOSPADM

## 2022-03-08 RX ORDER — NALOXONE HYDROCHLORIDE 0.4 MG/ML
0.2 INJECTION, SOLUTION INTRAMUSCULAR; INTRAVENOUS; SUBCUTANEOUS
Status: DISCONTINUED | OUTPATIENT
Start: 2022-03-08 | End: 2022-03-09 | Stop reason: HOSPADM

## 2022-03-08 RX ORDER — CHOLESTYRAMINE 4 G/9G
1 POWDER, FOR SUSPENSION ORAL 2 TIMES DAILY
Status: DISCONTINUED | OUTPATIENT
Start: 2022-03-08 | End: 2022-03-09 | Stop reason: HOSPADM

## 2022-03-08 RX ORDER — HYDROMORPHONE HYDROCHLORIDE 2 MG/1
2-4 TABLET ORAL EVERY 4 HOURS PRN
Status: DISCONTINUED | OUTPATIENT
Start: 2022-03-08 | End: 2022-03-09

## 2022-03-08 RX ORDER — ACETAMINOPHEN 325 MG/1
650 TABLET ORAL EVERY 6 HOURS PRN
Status: DISCONTINUED | OUTPATIENT
Start: 2022-03-08 | End: 2022-03-09 | Stop reason: HOSPADM

## 2022-03-08 RX ORDER — ONDANSETRON 2 MG/ML
4 INJECTION INTRAMUSCULAR; INTRAVENOUS ONCE
Status: COMPLETED | OUTPATIENT
Start: 2022-03-08 | End: 2022-03-08

## 2022-03-08 RX ORDER — MULTIPLE VITAMINS W/ MINERALS TAB 9MG-400MCG
1 TAB ORAL DAILY
Status: DISCONTINUED | OUTPATIENT
Start: 2022-03-08 | End: 2022-03-09 | Stop reason: HOSPADM

## 2022-03-08 RX ORDER — HYDROMORPHONE HYDROCHLORIDE 2 MG/1
4 TABLET ORAL EVERY 4 HOURS PRN
Status: DISCONTINUED | OUTPATIENT
Start: 2022-03-08 | End: 2022-03-08

## 2022-03-08 RX ORDER — OXYCODONE HYDROCHLORIDE 5 MG/1
5 TABLET ORAL EVERY 4 HOURS PRN
Status: DISCONTINUED | OUTPATIENT
Start: 2022-03-08 | End: 2022-03-08

## 2022-03-08 RX ADMIN — PANTOPRAZOLE SODIUM 40 MG: 40 TABLET, DELAYED RELEASE ORAL at 20:03

## 2022-03-08 RX ADMIN — ONDANSETRON 4 MG: 2 INJECTION INTRAMUSCULAR; INTRAVENOUS at 09:09

## 2022-03-08 RX ADMIN — ACETAMINOPHEN 650 MG: 325 TABLET, FILM COATED ORAL at 09:04

## 2022-03-08 RX ADMIN — HYDROMORPHONE HYDROCHLORIDE 0.2 MG: 0.2 INJECTION, SOLUTION INTRAMUSCULAR; INTRAVENOUS; SUBCUTANEOUS at 02:16

## 2022-03-08 RX ADMIN — TRAZODONE HYDROCHLORIDE 100 MG: 50 TABLET ORAL at 21:36

## 2022-03-08 RX ADMIN — ONDANSETRON 4 MG: 2 INJECTION INTRAMUSCULAR; INTRAVENOUS at 03:17

## 2022-03-08 RX ADMIN — HYDROMORPHONE HYDROCHLORIDE 4 MG: 2 TABLET ORAL at 07:53

## 2022-03-08 RX ADMIN — PANTOPRAZOLE SODIUM 40 MG: 40 TABLET, DELAYED RELEASE ORAL at 09:04

## 2022-03-08 RX ADMIN — INSULIN ASPART 1 UNITS: 100 INJECTION, SOLUTION INTRAVENOUS; SUBCUTANEOUS at 03:33

## 2022-03-08 RX ADMIN — HYDROMORPHONE HYDROCHLORIDE 4 MG: 2 TABLET ORAL at 20:03

## 2022-03-08 RX ADMIN — ACETAMINOPHEN 650 MG: 325 TABLET, FILM COATED ORAL at 15:31

## 2022-03-08 RX ADMIN — GABAPENTIN 600 MG: 300 CAPSULE ORAL at 20:03

## 2022-03-08 RX ADMIN — CHOLESTYRAMINE 4 G: 4 POWDER, FOR SUSPENSION ORAL at 09:09

## 2022-03-08 RX ADMIN — PANCRELIPASE LIPASE, PANCRELIPASE PROTEASE, PANCRELIPASE AMYLASE 6 CAPSULE: 20000; 63000; 84000 CAPSULE, DELAYED RELEASE ORAL at 12:44

## 2022-03-08 RX ADMIN — CLONAZEPAM 1 MG: 1 TABLET ORAL at 09:04

## 2022-03-08 RX ADMIN — INSULIN DEGLUDEC INJECTION 8 UNITS: 100 INJECTION, SOLUTION SUBCUTANEOUS at 10:37

## 2022-03-08 RX ADMIN — ONDANSETRON 4 MG: 4 TABLET, ORALLY DISINTEGRATING ORAL at 15:31

## 2022-03-08 RX ADMIN — SERTRALINE HYDROCHLORIDE 150 MG: 50 TABLET ORAL at 21:36

## 2022-03-08 RX ADMIN — ONDANSETRON 4 MG: 2 INJECTION INTRAMUSCULAR; INTRAVENOUS at 07:01

## 2022-03-08 RX ADMIN — HYDROMORPHONE HYDROCHLORIDE 4 MG: 2 TABLET ORAL at 12:05

## 2022-03-08 RX ADMIN — CLONAZEPAM 1 MG: 1 TABLET ORAL at 20:03

## 2022-03-08 RX ADMIN — INSULIN ASPART 1 UNITS: 100 INJECTION, SOLUTION INTRAVENOUS; SUBCUTANEOUS at 18:19

## 2022-03-08 RX ADMIN — Medication 1 TABLET: at 12:44

## 2022-03-08 RX ADMIN — GABAPENTIN 300 MG: 300 CAPSULE ORAL at 09:04

## 2022-03-08 RX ADMIN — ONDANSETRON 4 MG: 4 TABLET, ORALLY DISINTEGRATING ORAL at 22:44

## 2022-03-08 RX ADMIN — PANCRELIPASE LIPASE, PANCRELIPASE PROTEASE, PANCRELIPASE AMYLASE 4 CAPSULE: 20000; 63000; 84000 CAPSULE, DELAYED RELEASE ORAL at 18:22

## 2022-03-08 RX ADMIN — CHOLESTYRAMINE 4 G: 4 POWDER, FOR SUSPENSION ORAL at 20:02

## 2022-03-08 RX ADMIN — PANCRELIPASE LIPASE, PANCRELIPASE PROTEASE, PANCRELIPASE AMYLASE 5 CAPSULE: 20000; 63000; 84000 CAPSULE, DELAYED RELEASE ORAL at 09:05

## 2022-03-08 RX ADMIN — SODIUM CHLORIDE, SODIUM LACTATE, POTASSIUM CHLORIDE, CALCIUM CHLORIDE AND DEXTROSE MONOHYDRATE: 5; 600; 310; 30; 20 INJECTION, SOLUTION INTRAVENOUS at 03:18

## 2022-03-08 RX ADMIN — HYDROMORPHONE HYDROCHLORIDE 4 MG: 2 TABLET ORAL at 15:59

## 2022-03-08 ASSESSMENT — ACTIVITIES OF DAILY LIVING (ADL)
ADLS_ACUITY_SCORE: 4

## 2022-03-08 NOTE — PROGRESS NOTES
Pancreatitis Service - Daily Progress Note  03/08/2022    Assessment & Plan: Meme Robins is a 51 year old female with a history of idiopathic chronic pancreatitis s/p PD sphincterotomy and multiple PD stents (10-15) in [place] and at [facility], cholecystectomy, TPIAT at this facility in 8/2021, and diabetes who is presenting w/ several weeks of diarrhea, abdominal pain, weight loss, and nausea.      GI/Nutrition:   Diarrhea, abdominal pain, nausea: Low grade fever prior to admission (over a week ago). Work up initially by primary gastroenterologist. Pancreatic enzymes adjusted and treated for possible SIBO with cipro x 1 week. Has retained biliary stent, placed at time of TPAIT (8/9/21). GI consulted. ERCP 3/7/22 with widely patent and healthy appearing duodenoenterotomy and J-J anastomosis, biliary stent removed, normal air cholangiogram. GI suspects bile salt diarrhea, cholestyramine started.  Severe malnutrition in the context of acute illness: Nutrition consulted. Diet advanced to regular with supplements. Start multivitamin.  Cardiorespiratory: Stable  Fluid/Electrolytes: No acute issues.  : No issues.  Endocrine:  Post-pancreatectomy diabetes: Hgb A1C 6. PTA Tresiba 8 units, sliding scale correction every 4 hours, and carb coverage. Has CGM.   Infection: No acute issues. Afebrile since admission. C diff and EP ordered due to diarrhea pre-admission.   Pain control:   Acute abdominal pain: Dilaudid PO PRN. Benadryl PO PRN pruritis. Add PRN Tylenol.  Chronic neck pain: Neurontin 300 mg AM/600 mg PM.   Psych:   Hx anxiety, depression, insomnia: PTA trazodone, zoloft, and clonazepam ordered.  Activity: Ambulate QID minimum.   Prophylaxis: DVT (SCDs)   Anticipated LOS/Discharge: 1-2 days    Medical Decision Making: Medium  Subsequent visit 30802 (moderate level decision making)    JOSE ALBERTO/Fellow/Resident Provider: Effie Gutierrez NP [id number]    Faculty: Daen Burt MD    Attestation: I saw and  examined this patient with Effie Gutierrez NP, and the transplant team. I independently reviewed all pertinent laboratory and imaging results and made independent management decisions. I agree with the findings and plan as detailed in the note above.  -Dane Burt MD  __________________________________________________________________  Transplant History: Admitted 3/6/2022 for abdominal pain,nasuea, vomiting, weight loss. Retained stent  8/9/2021 ([facility]), Postoperative day: 211     Interval History: History is obtained from the patient  Overnight events: Patient c/o nausea overnight, received additional 1x dose of Zofran; also requested pain medications be changed to PO. This morning she is feeling better. She tolerated breakfast and is drinking good amounts of water. Continue to report intermitted nausea. Has not had BM.      ROS:   A 10-point review of systems was negative except as noted above.    Curent Meds:    amylase-lipase-protease  6 capsule Oral TID w/meals     cholestyramine  1 packet Oral BID     clonazePAM  1 mg Oral BID     gabapentin  300 mg Oral QAM     gabapentin  600 mg Oral QPM     insulin aspart   Subcutaneous Daily with breakfast     insulin aspart   Subcutaneous Daily with lunch     insulin aspart   Subcutaneous Daily with supper     insulin aspart  1-3 Units Subcutaneous TID AC     insulin aspart  1-3 Units Subcutaneous At Bedtime     insulin degludec  8 Units Subcutaneous Daily     pantoprazole  40 mg Oral BID     sertraline  150 mg Oral At Bedtime     sodium chloride (PF)  3 mL Intracatheter Q8H     traZODone  100 mg Oral At Bedtime       Physical Exam:     Admit Weight: 49 kg (108 lb 1.6 oz)    Current Vitals:   BP 91/58 (BP Location: Left arm)   Pulse 74   Temp 98.1  F (36.7  C) (Axillary)   Resp 16   Wt 49.1 kg (108 lb 3.2 oz)   SpO2 99%   BMI 20.44 kg/m           Vital sign ranges:    Temp:  [97.9  F (36.6  C)-98.4  F (36.9  C)] 98.1  F (36.7  C)  Pulse:  [57-89] 74  Resp:   [16-20] 16  BP: ()/(58-91) 91/58  SpO2:  [94 %-100 %] 99 %  Patient Vitals for the past 24 hrs:   BP Temp Temp src Pulse Resp SpO2 Weight   03/08/22 0833 -- -- -- -- -- -- 49.1 kg (108 lb 3.2 oz)   03/08/22 0556 91/58 98.1  F (36.7  C) Axillary 74 16 99 % --   03/08/22 0212 100/66 98  F (36.7  C) Oral 64 16 100 % --   03/07/22 2232 94/79 98.1  F (36.7  C) Oral 72 16 100 % --   03/07/22 2011 104/65 -- -- 57 -- 100 % --   03/07/22 2003 102/67 -- -- 59 -- -- --   03/07/22 1958 98/63 -- -- 58 -- -- --   03/07/22 1936 96/73 98.1  F (36.7  C) Oral 67 18 100 % --   03/07/22 1845 116/70 -- -- 65 -- 100 % --   03/07/22 1830 134/79 98.1  F (36.7  C) Core 69 18 99 % --   03/07/22 1815 130/77 -- -- 75 16 98 % --   03/07/22 1800 124/85 -- -- 75 18 96 % --   03/07/22 1745 131/82 -- -- 89 20 100 % --   03/07/22 1732 (!) 140/91 97.9  F (36.6  C) Core 80 18 100 % --   03/07/22 1526 127/77 98.4  F (36.9  C) Oral 66 16 94 % --     General Appearance: in no apparent distress.   Skin: normal, dry, no suspicious lesions or rashes  Heart: well perfused  Lungs: NLB on RA  Abdomen: The abdomen is flat, and  mildly tender generalized. The wound is healed.   : shook is not present.    Extremities: edema: absent.    Data:   CMP  Recent Labs   Lab 03/08/22  0831 03/08/22  0600 03/07/22  0834 03/07/22  0640 03/06/22  1712 03/06/22  0848   NA  --   --   --  140  --  135   POTASSIUM  --   --   --  4.1  --  4.2   CHLORIDE  --   --   --  106  --  102   CO2  --   --   --  30  --  30   * 154*   < > 91   < > 105*   BUN  --   --   --  8  --  16   CR  --   --   --  0.63  --  0.67   GFRESTIMATED  --   --   --  >90  --  >90   VIKAS  --   --   --  8.8  --  9.3   MAG  --   --   --  2.0  --  2.4*   PHOS  --   --   --  4.4  --   --    LIPASE  --   --   --   --   --  24*   ALBUMIN  --   --   --   --   --  4.0   BILITOTAL  --   --   --   --   --  0.5   ALKPHOS  --   --   --   --   --  171*   AST  --   --   --   --   --  22   ALT  --   --   --   --    --  37    < > = values in this interval not displayed.     CBC  Recent Labs   Lab 03/07/22  0640 03/06/22  1633 03/06/22  0848   HGB 10.5*  --  12.6   WBC 4.3  --  5.2     --  518*   A1C  --  6.0*  --      Coags  Recent Labs   Lab 03/06/22  0848   INR 1.01      Urinalysis  Recent Labs   Lab Test 03/06/22  0851 08/19/21  0158   COLOR Dark Yellow* Straw   APPEARANCE Clear Clear   URINEGLC Negative Negative   URINEBILI Negative Negative   URINEKETONE Negative Negative   SG 1.035 1.009   UBLD Negative Negative   URINEPH 5.5 7.0   PROTEIN 50 * Negative   NITRITE Negative Negative   LEUKEST Negative Negative   RBCU 2 1   WBCU 1 <1

## 2022-03-08 NOTE — PLAN OF CARE
VS: stable, on 1l/O2  B.  Notified MD of high blood sugar.  D5LR decreased to 50cc/hr.    Labs: none  Pain/Nausea: Pt complains of ongoing abdominal pain.  Dilaudid IV X 1.  Pt requested to go back to her home dose of oral dilaudid.  Provider notified.  Dilaudid 4mg PO given this am.    Nausea continues.  No emesis.  Zofran IV X 2 w/ fair relief.    Diet: Regular.   LDA: PIV infusing D5LR at 50cc/hr.    GI: Pt's last BM was 3/6.  Enteric panel and c-diff pending collection.    : Voiding with no problems.  Skin: no new issues noted  Mobility: Up ad rona in room.    Plan: Pt looking at menu this morning.  Wanting to start eating and gain weight back that she lost.

## 2022-03-08 NOTE — PROGRESS NOTES
SPIRITUAL HEALTH SERVICES  [facility]  Unit: 7A    Referral Source: Staff Referal     Illness Narrative: Pt shared that she is recovering from complication from a recent transplant.    Distress: Not mentioned. Pt is close to being able to be discharged home with complications resolved.    Coping: Pt mentioned how important her relationship with Domo has been throughout many years of health issues.   Pt expressed great appreciation for being given her life back as a result of the transplant that she was able to receive this past fall.   Pt named her  and three son's as her support network.  Pt takes comfort in prayer and the practice of her [employer] sharon.    Prayer was shared with the pt.    Plan: No plan to follow pt unless pt isn't discharged as planned.         Duane F Bauer  Chaplain Resident  Pager number: [phone]  * [facility] remains available 24/7 for emergent requests/referrals, either by having the switchboard page the on-call  or by entering an ASAP/STAT consult in Epic (this will also page the on-call ).*

## 2022-03-08 NOTE — PROGRESS NOTES
Gastroenterology Follow up Note         Assessment and Plan:   Meme Robins is a 51 year old female 51 year old female with a history of idiopathic chronic pancreatitis s/p PD sphincterotomy and multiple PD stents (10-15) in [place] and at [facility], cholecystectomy, TPIAT at this facility in 8/2021, and diabetes who is presenting w/ several weeks of diarrhea, abdominal pain, weight loss, and nausea.   Imaging shows a retained stent from the TPIAT (unlikely to be accounting for all of her sxs). Plan for enteroscopy to r/o anastomotic ulcerations or other pathology which might be contributing, eval the nuris limb, and also to remove the stent.   Patient is s/p enteroscopy with stent removal on 3/7  with Post TPIAT anatomy with widely patent and healthy appearing duodenoenterotomy and jejuno-jejunal anastomosis and afferent limb with retained distally migrated plastic biliary stent. Extracted,   There is some concern for bile salt diarrhea in the setting of prior cholecystectomy contributing to patient's symptoms  Patient states she is feeling well post procedure. She reports improvement in her symptoms and states she feels better overall  She is afebrile an d stable         Recommendations:   - Continue cholestyramine for suspected bile salt diarrhea  -Continue Creon  -Continue pain control per primary team  Cont to monitor         It has been a pleasure to participate in the care of this patient.  Patient discussed with GI staff, Dr. Mitchell  Please do not hesitate to contact the GI service with any questions or concerns.     Carlo Black MD  Gastroenterology Fellow  Pager [phone]         Subjective/Objective:   - No acute events overnight  -Patient reports overall improvement in symptoms. Still mild abdominal pain but better overall.        Physical Exam:   VS:  BP 91/58 (BP Location: Left arm)   Pulse 74   Temp 98.1  F (36.7  C) (Axillary)   Resp 16   Wt 49.1 kg (108 lb 3.2 oz)   SpO2 99%    BMI 20.44 kg/m      Wt:   Wt Readings from Last 2 Encounters:   03/08/22 49.1 kg (108 lb 3.2 oz)   11/18/21 52.6 kg (116 lb)      Constitutional: cooperative, pleasant, no acute distress  Eyes: Conjunctiva anicteric  HEENT: Mucus membranes moist  CV: No LE Edema  Respiratory: Non labored on room air   Abd: Nondistended, +bs, no hepatosplenomegaly, nontender, no rebound or guarding   Skin: warm, perfused, no jaundice  Neuro: AAO x 3, No asterixis         Laboratory:   BMP  Recent Labs   Lab 03/08/22  0831 03/08/22  0600 03/08/22  0401 03/08/22  0328 03/07/22  0834 03/07/22  0640 03/06/22  1712 03/06/22  0848   NA  --   --   --   --   --  140  --  135   POTASSIUM  --   --   --   --   --  4.1  --  4.2   CHLORIDE  --   --   --   --   --  106  --  102   VIKAS  --   --   --   --   --  8.8  --  9.3   CO2  --   --   --   --   --  30  --  30   BUN  --   --   --   --   --  8  --  16   CR  --   --   --   --   --  0.63  --  0.67   * 154* 180* 188*   < > 91   < > 105*    < > = values in this interval not displayed.     CBC  Recent Labs   Lab 03/07/22  0640 03/06/22  0848   WBC 4.3 5.2   RBC 4.02 4.84   HGB 10.5* 12.6   HCT 33.7* 39.5   MCV 84 82   MCH 26.1* 26.0*   MCHC 31.2* 31.9   RDW 17.2* 17.2*    518*     INR  Recent Labs   Lab 03/06/22  0848   INR 1.01     LFTs  Recent Labs   Lab 03/06/22  0848   ALKPHOS 171*   AST 22   ALT 37   BILITOTAL 0.5   PROTTOTAL 8.1   ALBUMIN 4.0      PANC  Recent Labs   Lab 03/06/22  0848   LIPASE 24*            Imaging/Procedures/Studies:   No results found for this or any previous visit.

## 2022-03-08 NOTE — PROVIDER NOTIFICATION
7a [id number] brian santos  can u clarify on iv bernadryl order, she refused PO bernadryl. says she can have it when she is npo.   [phone] RN

## 2022-03-08 NOTE — PLAN OF CARE
Vitals: stable on 2 L, capno on, stable.   Blood glucose: achs 116. 210. Sliding scale.   Pain/nausea: abdominal pain, given IV dilaudid x 1, zofran IV x 1 (refused PO). Refusing PO bernadryl paged doc. She requested IV bernadryl. On call stated no iv bernardyl with iv dilaudid while she can eat. Oral bernadryl x 1.   Diet: regular.fair appetite.   Lines: PIVR removed. PIV L infusing [id number].   : voiding well.   GI: Last BM yesterday. Needs stool sample.   Drains: NA  Skin: WDL, 7a skin check completed.   Mobility: up ad rona.

## 2022-03-08 NOTE — PLAN OF CARE
Admitted/transferred from:   Time of arrival on unit 2059  2 RN full  skin assessment completed by Rose Mary Mclaughlin   Skin assessment finding: skin intact, no problems   Interventions/actions: no issues noted       Will continue to monitor.

## 2022-03-08 NOTE — PLAN OF CARE
NEURO: alert and oriented x4.   RESPIRATORY: LS clear. SpO2>92%on RA.   CARDIO: VSS.   GI/: BS active. Still waiting for stool sample. Voiding without difficulty. NPO prior to procedure. Pt's BG dropped to 63 when off D5LR for shower. Given dextrose 50%. BGs 100s. Reported nausea- given zofran x1.   SKIN: Intact. Given IV benadryl for itching.   ACTIVITY: Up with SBA.   PAIN: Reported pain in abdomen- given dilaudid q 2hours. Reported some relief.   LINES: 2 PIVs- Right PIV- painful per patient- no phlebitis observed, not infiltrated- saline locked. Left PIV- infusing- site WNL- patent.   PLAN:  Pt went down to ERCP.

## 2022-03-08 NOTE — DISCHARGE SUMMARY
[facility]    Discharge Summary  Transplant Surgery    Date of Admission:  3/6/2022  Date of Discharge:  3/9/2022  Discharging Provider: Effie Gutierrez NP / Dane Burt MD    Attestation: I saw and examined the patient with Effie Gutierrez NP, and the transplant team. I independently reviewed pertinent laboratory and imaging results and made management and immunosuppression decisions. I agree with the findings and plan as documented in the discharge summary below.  -Dane Burt MD    Discharge Diagnoses   Active Problems:    Pancreatic insufficiency    Abdominal pain, epigastric    Vomiting and diarrhea    Foreign body in intestine, initial encounter    Bile salt-induced diarrhea    Severe malnutrition (H)    Post-pancreatectomy diabetes (H)    Acute abdominal pain    Procedure/Surgery Information   Procedure: Procedure(s):  ENTEROSCOPY, WITH STENT REMOVAL   Surgeon(s): Surgeon(s) and Role:     * Toro Williamson MD - Primary     * Nasir Cox MD - Fellow - Assisting       Non-operative procedures None performed     History of Present Illness   Meme Robins is a 51 year old female with a history of idiopathic chronic pancreatitis s/p PD sphincterotomy and multiple PD stents (10-15) in [place] and at [facility], cholecystectomy, TPIAT at this facility in 8/2021, and diabetes who is presenting w/ several weeks of diarrhea, abdominal pain, weight loss, and nausea.     Hospital Course   Meme Robins was admitted on 3/6/2022.  The following problems were addressed during her hospitalization:    Bile salt diarrhea, abdominal pain, nausea: Low grade fever prior to admission (over a week ago). Work up initially by primary gastroenterologist. Pancreatic enzymes adjusted and treated for possible SIBO with cipro x 1 week. Had retained biliary stent, placed at time of TPAIT (8/9/21). GI consulted. ERCP 3/7/22 with widely patent and healthy  appearing duodenoenterotomy and J-J anastomosis, biliary stent removed, normal air cholangiogram. GI suspects bile salt diarrhea, cholestyramine started. Continue Creon, PPI, and PRN Zofran.     Severe malnutrition in the context of acute illness: Nutrition consulted. Diet advanced to regular with supplements. Continue multivitamin.     Post-pancreatectomy diabetes: Hgb A1c 6%. Continue PTA Tresiba 8 units, sliding scale correction every 4 hours, and carb coverage. Has CGM.     Acute abdominal pain: Off opioids prior to admission. Continue Dilaudid PO PRN and wean as able.    Discharge Disposition   Discharged to home   Condition at discharge: Stable    Pending Results   These results will be followed up by coordinator  Unresulted Labs Ordered in the Past 30 Days of this Admission     Date and Time Order Name Status Description    3/8/2022 11:30 PM Fatty Acid Essential Test In process     3/8/2022 11:30 PM Folate RBC In process     3/8/2022 11:30 PM 25 Hydroxyvitamin D2 and D3 In process     3/8/2022 11:30 PM Vitamin A In process     3/8/2022 11:30 PM Vitamin E In process         Final pathology results: No pathology submitted  Primary Care Physician   Padmini Hatch    Physical Exam   Temp: 98  F (36.7  C) Temp src: Oral BP: 99/62 Pulse: 69   Resp: 16 SpO2: 93 % O2 Device: None (Room air)    Vitals:    03/06/22 1541 03/08/22 0833   Weight: 49 kg (108 lb 1.6 oz) 49.1 kg (108 lb 3.2 oz)     Vital Signs with Ranges  Temp:  [97.5  F (36.4  C)-98.2  F (36.8  C)] 98  F (36.7  C)  Pulse:  [59-78] 69  Resp:  [16-18] 16  BP: ()/(52-79) 99/62  SpO2:  [93 %-99 %] 93 %  No intake/output data recorded.    Constitutional: in no apparent distress  Eyes: EOMI  Respiratory: unlabored on RA  Cardiovascular: perfused  GI: abdomen flat, soft, mildly tender mid epigastric region. Incision healed.  Genitourinary: No Miller present  Skin: warm, dry  Musculoskeletal: no edema noted; strength 5/5  Neurologic: A&Ox4  Neuropsychiatric:  behavior appropriate to situation, pleasant    Consultations This Hospital Stay   GI PANCREATICOBILIARY ADULT IP CONSULT  VASCULAR ACCESS CARE ADULT IP CONSULT    Time Spent on this Encounter   I have spent greater than 30 minutes on this discharge.    Discharge Orders   Discharge Medications   Current Discharge Medication List      START taking these medications    Details   cholestyramine (QUESTRAN) 4 g packet Take 1 packet (4 g) by mouth 2 times daily  Qty: 60 each, Refills: 2    Associated Diagnoses: Bile salt-induced diarrhea      magnesium hydroxide (MILK OF MAGNESIA) 400 MG/5ML suspension Take 30 mLs by mouth daily as needed for constipation  Qty: 473 mL, Refills: 0    Associated Diagnoses: Acute abdominal pain         CONTINUE these medications which have NOT CHANGED    Details   clonazePAM (KLONOPIN) 1 MG tablet 1mg morning and 1.5mg every evening      gabapentin (NEURONTIN) 300 MG capsule Take 1 capsule (300 mg) by mouth 2 times daily  Qty: 60 capsule, Refills: 1    Associated Diagnoses: S/P pancreatic islet cell transplantation (H)      Glucagon (GVOKE HYPOPEN 2-PACK) 1 MG/0.2ML SOAJ Inject 1 each Subcutaneous once as needed (for severe hypoglycemia) Use x1 dose and then call seek emergent care for treatment  Qty: 0.4 mL, Refills: 1    Comments: Please call Maddi Almaraz, RN Transplant Coordinator, with fill/formulary issues: [phone]  Associated Diagnoses: Post-pancreatectomy diabetes (H)      HYDROmorphone (DILAUDID) 4 MG tablet Take 1-2 tablets (4-6 mg) every 4 hours as needed for severe abdominal pain  Qty: 56 tablet, Refills: 0    Associated Diagnoses: Chronic abdominal pain      insulin degludec (TRESIBA FLEXTOUCH) 100 UNIT/ML pen Take 8 units daily  Qty: 15 mL, Refills: 3    Associated Diagnoses: Type 1 diabetes mellitus without complication (H)      insulin lispro (HUMALOG) 100 UNIT/ML Cartridge Take 0.5- 5 units pre meal using scale prescribed, up to 30 unit per day supply.  Qty: 15 mL,  Refills: 3    Associated Diagnoses: Type 1 diabetes mellitus without complication (H)      lipase-protease-amylase (ZENPEP) 93373-241998 units CPEP Take by mouth 3 times daily (with meals) Take up to 140,000 units with meals and take up to 80,000 with snacks      Melatonin 10 MG TABS tablet Take 10 mg by mouth nightly as needed for sleep      methocarbamol (ROBAXIN) 500 MG tablet Take 2 tablets (1,000 mg) by mouth 3 times daily  Qty: 90 tablet, Refills: 0    Associated Diagnoses: S/P pancreatic islet cell transplantation (H)      multivitamin w/minerals (THERA-VIT-M) tablet Take 1 tablet by mouth daily  Qty: 30 tablet, Refills: 1    Associated Diagnoses: S/P pancreatic islet cell transplantation (H)      omeprazole (PRILOSEC) 40 MG DR capsule Take 1 tablet twice daily  Qty: 60 capsule, Refills: 3    Associated Diagnoses: Pancreatic insufficiency      ondansetron (ZOFRAN-ODT) 4 MG ODT tab Take 4 mg by mouth as needed       sertraline (ZOLOFT) 100 MG tablet Take 150 mg by mouth At Bedtime      traZODone (DESYREL) 100 MG tablet Take 100 mg by mouth At Bedtime      blood glucose (NO BRAND SPECIFIED) lancets standard To use to test glucose level in the blood  Use to test blood sugar  6  times daily as directed. To accompany glucose monitor brands per insurance coverage.  One touch Delica lancets  Qty: 100 each, Refills: 0    Associated Diagnoses: Post-pancreatectomy diabetes (H)      blood glucose (NO BRAND SPECIFIED) test strip Use to test blood sugar  6  times daily as directed. To accompany glucose monitor brands per insurance coverage: One Touch Verio strip  Qty: 100 strip, Refills: 3    Associated Diagnoses: Post-pancreatectomy diabetes (H)      Continuous Blood Gluc Sensor (DEXCOM G6 SENSOR) MISC USE AS DIRECTED FOR CONTINUOUS GLUCOSE MONITORING, CHANGE EVERY 10 DAYS  Qty: 9 each, Refills: 3    Associated Diagnoses: Type 1 diabetes mellitus without complication (H)      Continuous Blood Gluc Transmit (DEXCOM G6  TRANSMITTER) MISC 1 each every 3 months Change every 3 months.  Qty: 1 each, Refills: 1    Associated Diagnoses: Type 1 diabetes mellitus without complication (H)         STOP taking these medications       ciprofloxacin (CIPRO) 500 MG tablet Comments:   Reason for Stopping:                  Reason for your hospital stay    You were admitted for abdominal pain, nausea, and diarrhea. An ERCP was done and your stent was removed. Your pain, nausea, and diarrhea improved on discharge.     Activity    Your activity upon discharge: activity as tolerated     [facility]ult [facility] Follow-up and recommended labs and tests    Resume your previous lab and follow up schedule.     When to contact your care team    Notify your coordinator if you have worsening abdominal pain, nausea, diarrhea, fever greater than 100.5F, or decreased urine output.    Transplant Coordinator [phone]     Monitor and record    Blood glucoses     Diet    Follow this diet upon discharge: Regular diet, supplement with Glucerna         Data   Most Recent 3 CBC's:Recent Labs   Lab Test 03/09/22  0624 03/08/22  1144 03/07/22  0640   WBC 4.7 5.1 4.3   HGB 11.0* 11.1* 10.5*   MCV 84 85 84    395 406      Most Recent 3 BMP's:  Recent Labs   Lab Test 03/09/22  0747 03/09/22  0624 03/09/22  0201 03/08/22  1248 03/08/22  1144 03/07/22  0834 03/07/22  0640   NA  --  140  --   --  139  --  140   POTASSIUM  --  4.0  --   --  4.3  --  4.1   CHLORIDE  --  105  --   --  106  --  106   CO2  --  32  --   --  31  --  30   BUN  --  11  --   --  5*  --  8   CR  --  0.55  --   --  0.65  --  0.63   ANIONGAP  --  3  --   --  2*  --  4   VIKAS  --  9.0  --   --  8.6  --  8.8   * 164* 148*   < > 128*   < > 91    < > = values in this interval not displayed.     Most Recent 2 LFT's:  Recent Labs   Lab Test 03/06/22  0848 11/18/21  0948   AST 22 64*   ALT 37 80*   ALKPHOS 171* 165*   BILITOTAL 0.5 0.2     Most Recent INR's and Anticoagulation Dosing  History:  Anticoagulation Dose History     Recent Dosing and Labs Latest Ref Rng & Units 8/4/2021 8/9/2021 8/9/2021 3/6/2022    INR 0.85 - 1.15 1.00 0.99 1.45(H) 1.01        Most Recent 3 Troponin's:No lab results found.  Most Recent Cholesterol Panel:  Recent Labs   Lab Test 11/18/21  0948   CHOL 141   LDL 55   HDL 63   TRIG 113     Most Recent 6 Bacteria Isolates From Any Culture (See EPIC Reports for Culture Details):No lab results found.  Most Recent TSH, T4 and A1c Labs:  Recent Labs   Lab Test 03/06/22  1633   A1C 6.0*     Results for orders placed or performed during the hospital encounter of 03/06/22   CT Abdomen Pelvis w Contrast    Narrative    EXAMINATION: CT ABDOMEN PELVIS W CONTRAST, 3/6/2022 10:04 AM    INDICATION: Abdominal pain, vomiting, history of pancreatectomy, islet  cell transplant.    COMPARISON STUDY: CT 5/20/2021    TECHNIQUE: CT scan of the abdomen and pelvis was performed on  multidetector CT scanner using volumetric acquisition technique and  images were reconstructed in multiple planes with variable thickness  and reviewed on dedicated workstations.     CONTRAST: sodium chloride (PF) 0.9% PF flush 68 mL injected IV without  oral contrast    CT scan radiation dose is optimized to minimum requisite dose using  automated dose modulation techniques.    FINDINGS:    Lower thorax: Unremarkable.    Abdomen:   Postoperative changes of pancreatectomy, splenectomy, retrocolic  Bonilla-en-Y choledochojejunostomy, and appendectomy. There is a  stent/tube looped within the jejunum (series 3 images 34-24) with no  portions involving the biliary tree. This involves the jejunum used  for the hepaticojejunostomy. Mild thickening of the distal  stomach/proximal duodenum wall. Moderate pneumobilia increased  compared to prior. No discrete fluid collections identified. No  abnormally dilated loops of bowel. No pneumoperitoneum or portal  venous gas. No focal hepatic lesions.    The kidneys, bladder, and pelvic  organs are unremarkable. No  lymphadenopathy. Unremarkable vasculature.    No acute or suspicious osseous lesions.      Impression    IMPRESSION:   1. Postsurgical changes of pancreatectomy with splenectomy, Bonilla-en-Y  choledochojejunostomy, and appendectomy.  2. There is a long, small caliber stent/tube looped in the jejunum  presumably representing biliary stent passed from the biliary tree.  3. Mild circumferential thickening of the proximal duodenum suggestive  of duodenitis.  4. Slightly increased pneumobilia and prior.  5. No discrete abdominal fluid collections. No pneumatosis or portal  venous gas.    I have personally reviewed the examination and initial interpretation  and I agree with the findings.    HAZEL KIDD MD         SYSTEM ID:  [id number]   XR Surgery YAZMIN L/T 5 Min Fluoro w Stills    Narrative    This exam was marked as non-reportable because it will not be read by a   radiologist or a [facility] non-radiologist provider.

## 2022-03-09 VITALS
TEMPERATURE: 98 F | OXYGEN SATURATION: 93 % | BODY MASS INDEX: 20.44 KG/M2 | DIASTOLIC BLOOD PRESSURE: 62 MMHG | WEIGHT: 108.2 LBS | HEART RATE: 69 BPM | SYSTOLIC BLOOD PRESSURE: 99 MMHG | RESPIRATION RATE: 16 BRPM

## 2022-03-09 LAB
ANION GAP SERPL CALCULATED.3IONS-SCNC: 3 MMOL/L (ref 3–14)
BUN SERPL-MCNC: 11 MG/DL (ref 7–30)
C PEPTIDE SERPL-MCNC: 2.2 NG/ML (ref 0.9–6.9)
CALCIUM SERPL-MCNC: 9 MG/DL (ref 8.5–10.1)
CHLORIDE BLD-SCNC: 105 MMOL/L (ref 94–109)
CO2 SERPL-SCNC: 32 MMOL/L (ref 20–32)
CREAT SERPL-MCNC: 0.55 MG/DL (ref 0.52–1.04)
ERYTHROCYTE [DISTWIDTH] IN BLOOD BY AUTOMATED COUNT: 17.2 % (ref 10–15)
FERRITIN SERPL-MCNC: 23 NG/ML (ref 8–252)
GFR SERPL CREATININE-BSD FRML MDRD: >90 ML/MIN/1.73M2
GLUCOSE BLD-MCNC: 164 MG/DL (ref 70–99)
GLUCOSE BLDC GLUCOMTR-MCNC: 112 MG/DL (ref 70–99)
GLUCOSE BLDC GLUCOMTR-MCNC: 132 MG/DL (ref 70–99)
GLUCOSE BLDC GLUCOMTR-MCNC: 148 MG/DL (ref 70–99)
HCT VFR BLD AUTO: 35.3 % (ref 35–47)
HGB BLD-MCNC: 11 G/DL (ref 11.7–15.7)
IRON SATN MFR SERPL: 6 % (ref 15–46)
IRON SERPL-MCNC: 20 UG/DL (ref 35–180)
MCH RBC QN AUTO: 26.3 PG (ref 26.5–33)
MCHC RBC AUTO-ENTMCNC: 31.2 G/DL (ref 31.5–36.5)
MCV RBC AUTO: 84 FL (ref 78–100)
PLATELET # BLD AUTO: 411 10E3/UL (ref 150–450)
POTASSIUM BLD-SCNC: 4 MMOL/L (ref 3.4–5.3)
RBC # BLD AUTO: 4.18 10E6/UL (ref 3.8–5.2)
SODIUM SERPL-SCNC: 140 MMOL/L (ref 133–144)
TIBC SERPL-MCNC: 344 UG/DL (ref 240–430)
VIT B12 SERPL-MCNC: 1613 PG/ML (ref 193–986)
WBC # BLD AUTO: 4.7 10E3/UL (ref 4–11)

## 2022-03-09 PROCEDURE — 82747 ASSAY OF FOLIC ACID RBC: CPT | Performed by: PEDIATRICS

## 2022-03-09 PROCEDURE — 82607 VITAMIN B-12: CPT | Performed by: PEDIATRICS

## 2022-03-09 PROCEDURE — 82728 ASSAY OF FERRITIN: CPT | Performed by: PEDIATRICS

## 2022-03-09 PROCEDURE — 84446 ASSAY OF VITAMIN E: CPT | Performed by: PEDIATRICS

## 2022-03-09 PROCEDURE — 82542 COL CHROMOTOGRAPHY QUAL/QUAN: CPT | Performed by: PEDIATRICS

## 2022-03-09 PROCEDURE — 36415 COLL VENOUS BLD VENIPUNCTURE: CPT | Performed by: PEDIATRICS

## 2022-03-09 PROCEDURE — 82306 VITAMIN D 25 HYDROXY: CPT | Performed by: PEDIATRICS

## 2022-03-09 PROCEDURE — 84630 ASSAY OF ZINC: CPT | Performed by: NURSE PRACTITIONER

## 2022-03-09 PROCEDURE — 80048 BASIC METABOLIC PNL TOTAL CA: CPT | Performed by: NURSE PRACTITIONER

## 2022-03-09 PROCEDURE — 250N000013 HC RX MED GY IP 250 OP 250 PS 637: Performed by: SURGERY

## 2022-03-09 PROCEDURE — 250N000013 HC RX MED GY IP 250 OP 250 PS 637: Performed by: PHYSICIAN ASSISTANT

## 2022-03-09 PROCEDURE — 84590 ASSAY OF VITAMIN A: CPT | Performed by: PEDIATRICS

## 2022-03-09 PROCEDURE — 250N000013 HC RX MED GY IP 250 OP 250 PS 637: Performed by: NURSE PRACTITIONER

## 2022-03-09 PROCEDURE — 250N000013 HC RX MED GY IP 250 OP 250 PS 637: Performed by: STUDENT IN AN ORGANIZED HEALTH CARE EDUCATION/TRAINING PROGRAM

## 2022-03-09 PROCEDURE — 36415 COLL VENOUS BLD VENIPUNCTURE: CPT | Performed by: NURSE PRACTITIONER

## 2022-03-09 PROCEDURE — 83550 IRON BINDING TEST: CPT | Performed by: PEDIATRICS

## 2022-03-09 PROCEDURE — 85027 COMPLETE CBC AUTOMATED: CPT | Performed by: NURSE PRACTITIONER

## 2022-03-09 PROCEDURE — 84681 ASSAY OF C-PEPTIDE: CPT | Performed by: PEDIATRICS

## 2022-03-09 RX ORDER — CHOLESTYRAMINE 4 G/9G
1 POWDER, FOR SUSPENSION ORAL 2 TIMES DAILY
Qty: 60 EACH | Refills: 2 | Status: ON HOLD | OUTPATIENT
Start: 2022-03-09 | End: 2023-02-19

## 2022-03-09 RX ORDER — HYDROMORPHONE HYDROCHLORIDE 2 MG/1
2 TABLET ORAL EVERY 4 HOURS PRN
Status: DISCONTINUED | OUTPATIENT
Start: 2022-03-09 | End: 2022-03-09

## 2022-03-09 RX ORDER — HYDROMORPHONE HYDROCHLORIDE 2 MG/1
2-4 TABLET ORAL EVERY 4 HOURS PRN
Status: DISCONTINUED | OUTPATIENT
Start: 2022-03-09 | End: 2022-03-09 | Stop reason: HOSPADM

## 2022-03-09 RX ADMIN — INSULIN DEGLUDEC INJECTION 8 UNITS: 100 INJECTION, SOLUTION SUBCUTANEOUS at 09:47

## 2022-03-09 RX ADMIN — HYDROMORPHONE HYDROCHLORIDE 4 MG: 2 TABLET ORAL at 05:13

## 2022-03-09 RX ADMIN — HYDROMORPHONE HYDROCHLORIDE 4 MG: 2 TABLET ORAL at 13:42

## 2022-03-09 RX ADMIN — PANCRELIPASE LIPASE, PANCRELIPASE PROTEASE, PANCRELIPASE AMYLASE 4 CAPSULE: 20000; 63000; 84000 CAPSULE, DELAYED RELEASE ORAL at 12:05

## 2022-03-09 RX ADMIN — CHOLESTYRAMINE 4 G: 4 POWDER, FOR SUSPENSION ORAL at 09:44

## 2022-03-09 RX ADMIN — HYDROMORPHONE HYDROCHLORIDE 2 MG: 2 TABLET ORAL at 09:46

## 2022-03-09 RX ADMIN — CLONAZEPAM 1 MG: 1 TABLET ORAL at 08:20

## 2022-03-09 RX ADMIN — ACETAMINOPHEN 650 MG: 325 TABLET, FILM COATED ORAL at 08:29

## 2022-03-09 RX ADMIN — PANTOPRAZOLE SODIUM 40 MG: 40 TABLET, DELAYED RELEASE ORAL at 08:18

## 2022-03-09 RX ADMIN — GABAPENTIN 300 MG: 300 CAPSULE ORAL at 08:18

## 2022-03-09 RX ADMIN — HYDROMORPHONE HYDROCHLORIDE 4 MG: 2 TABLET ORAL at 00:23

## 2022-03-09 RX ADMIN — PANCRELIPASE LIPASE, PANCRELIPASE PROTEASE, PANCRELIPASE AMYLASE 4 CAPSULE: 20000; 63000; 84000 CAPSULE, DELAYED RELEASE ORAL at 08:21

## 2022-03-09 RX ADMIN — Medication 1 TABLET: at 08:17

## 2022-03-09 ASSESSMENT — ACTIVITIES OF DAILY LIVING (ADL)
ADLS_ACUITY_SCORE: 4

## 2022-03-09 NOTE — PLAN OF CARE
Added labs for AM for routine vitamin/ nutritional studies for 6 month TPIAT follow up.  Outpatient TPIAT team will follow up results.

## 2022-03-09 NOTE — PROGRESS NOTES
DISCHARGE:  Patient with orders to discharge to home. Discharge instructions, medications, & follow ups reviewed with pt. Copy of discharge summary given to pt. PIV  removed. All belongings packed & sent with patient. Medications filled & picked up at discharge pharmacy. Patient in stable condition. AVSS. Pt had no further questions regarding discharge instructions and medications. Patient transferred out by NST and left with .

## 2022-03-09 NOTE — PLAN OF CARE
Goal Outcome Evaluation:    Plan of Care Reviewed With: patient     Pt rested quietly all night. Requested for pain med Q4 hours. Is alert and oriented. Able to make all needs known. Skin warm and dry to touch. No skin issues noted. IV saline locked. Independent with transfers and ADL care. No acute distress noted. Will continue with current plan of care.

## 2022-03-09 NOTE — PLAN OF CARE
Afebrile, VSS on RA.  Alert and oriented x4.  Oral Zofran 4 mg given x2.  Zofran given x1.  Tylenol and Robaxin also available.  .  Tolerating regular diet appetite good.  Voiding spontaneously.  No BM.  Up independently.  Continue plan of care.

## 2022-03-09 NOTE — PROGRESS NOTES
[id number]  Soft BPs (pt baseline), OVSS on RA. ACHS blood sugar checks, 112 and 132. Abdominal pain controlled with prn dilaudid. Regular diet. PIV removed prior to discharge. Denies nausea, no BM this shift. Passing flatus. Voiding adequately. Skin intact. Up ad rona. Pts  at bedside.

## 2022-03-10 LAB — FOLATE RBC-MCNC: 643 NG/ML

## 2022-03-10 NOTE — ANESTHESIA POSTPROCEDURE EVALUATION
Patient: Meme Robins    Procedure: Procedure(s):  ENTEROSCOPY, WITH STENT REMOVAL       Anesthesia Type:  General    Note:  Disposition: Outpatient   Postop Pain Control: Uneventful            Sign Out: Well controlled pain   PONV: No   Neuro/Psych: Uneventful            Sign Out: Acceptable/Baseline neuro status   Airway/Respiratory: Uneventful            Sign Out: Acceptable/Baseline resp. status   CV/Hemodynamics: Uneventful            Sign Out: Acceptable CV status; No obvious hypovolemia; No obvious fluid overload   Other NRE: NONE   DID A NON-ROUTINE EVENT OCCUR? No           Last vitals:  Vitals Value Taken Time   /70 03/07/22 1845   Temp 36.7  C (98.1  F) 03/07/22 1830   Pulse 65 03/07/22 1845   Resp 18 03/07/22 1830   SpO2 100 % 03/07/22 1845       Electronically Signed By: Reees Rowan MD  March 10, 2022  4:07 PM

## 2022-03-11 LAB
A-TOCOPHEROL VIT E SERPL-MCNC: 10 MG/L
ANNOTATION COMMENT IMP: NORMAL
BETA+GAMMA TOCOPHEROL SERPL-MCNC: 0.7 MG/L
RETINYL PALMITATE SERPL-MCNC: 0.02 MG/L
VIT A SERPL-MCNC: 0.4 MG/L

## 2022-03-13 LAB — ZINC SERPL-MCNC: 96.3 UG/DL

## 2022-03-14 LAB
A-LINOLENATE SERPL-SCNC: 37 NMOL/ML (ref 50–130)
AA SERPL-SCNC: 1019 NMOL/ML (ref 520–1490)
ARACHIDATE SERPL-SCNC: 36 NMOL/ML (ref 50–90)
CLINICAL BIOCHEMIST REVIEW: ABNORMAL
DEPRECATED CALCIDIOL+CALCIFEROL SERPL-MC: <54 UG/L (ref 20–75)
DHA SERPL-SCNC: 91 NMOL/ML (ref 30–250)
DOCOSAPENTAENATE W6 SERPL-SCNC: 32 NMOL/ML (ref 10–70)
DOCOSATETRAENOATE SERPL-SCNC: 33 NMOL/ML (ref 10–80)
DOCOSENOATE SERPL-SCNC: 4 NMOL/ML (ref 4–13)
DPA SERPL-SCNC: 48 NMOL/ML (ref 20–210)
EPA SERPL-SCNC: 34 NMOL/ML (ref 14–100)
FA SERPL-SCNC: 10.5 MMOL/L (ref 7.3–16.8)
G-LINOLENATE SERPL-SCNC: 42 NMOL/ML (ref 16–150)
HEXADECENOATE SERPL-SCNC: 23 NMOL/ML (ref 25–105)
HOMO-G LINOLENATE SERPL-SCNC: 126 NMOL/ML (ref 50–250)
LAURATE SERPL-SCNC: 67 NMOL/ML (ref 6–90)
LINOLEATE SERPL-SCNC: 2871 NMOL/ML (ref 2270–3850)
MEAD ACID SERPL-SCNC: 25 NMOL/ML (ref 7–30)
MONOUNSAT FA SERPL-SCNC: 2.5 MMOL/L (ref 1.3–5.8)
MYRISTATE SERPL-SCNC: 150 NMOL/ML (ref 30–450)
NERVONATE SERPL-SCNC: 126 NMOL/ML (ref 60–100)
OCTADECANOATE SERPL-SCNC: 948 NMOL/ML (ref 590–1170)
OLEATE SERPL-SCNC: 1935 NMOL/ML (ref 650–3500)
PALMITATE SERPL-SCNC: 2273 NMOL/ML (ref 1480–3730)
PALMITOLEATE SERPL-SCNC: 232 NMOL/ML (ref 110–1130)
POLYUNSAT FA SERPL-SCNC: 4.4 MMOL/L (ref 3.2–5.8)
SAT FA SERPL-SCNC: 3.6 MMOL/L (ref 2.5–5.5)
TRIENOATE/AA SERPL-SRTO: 0.02 {RATIO} (ref 0.01–0.04)
VACCENATE SERPL-SCNC: 148 NMOL/ML (ref 280–740)
VITAMIN D2 SERPL-MCNC: <5 UG/L
VITAMIN D3 SERPL-MCNC: 49 UG/L
W3 FA SERPL-SCNC: 0.2 MMOL/L (ref 0.2–0.5)
W6 FA SERPL-SCNC: 4.1 MMOL/L (ref 3–5.4)

## 2022-03-21 ENCOUNTER — TELEPHONE (OUTPATIENT)
Dept: TRANSPLANT | Facility: CLINIC | Age: 52
End: 2022-03-21
Payer: COMMERCIAL

## 2022-03-21 NOTE — TELEPHONE ENCOUNTER
Spoke to Meme this am. She has been doing well since her stent removal and enjoyed her [place] vacation.  She will get lauren 6 month boost test done locally and sees her new endocrinologist later this week. Plan to fax relevant records although it appears that the physician Dr Pam Roca is on Care Everywhere.  She has my direct phone number and Epic contact for any further questions/issues.

## 2022-03-22 ENCOUNTER — TELEPHONE (OUTPATIENT)
Dept: TRANSPLANT | Facility: CLINIC | Age: 52
End: 2022-03-22
Payer: COMMERCIAL

## 2022-03-22 NOTE — TELEPHONE ENCOUNTER
E-mail sent to patient today    Hi Meme,   I have faxed information to . Here are your labs.  Things to follow up on:      Since your B12 is high I would stop taking any B12 supplements    Please discuss your low iron levels with your PCP    Since your fatty acid panel suggests deficiency you can either:  o Increase your intake of fish ( [place], Herring, Mackerel, Sardines, Tuna.Halibut ) OR Supplement with fish oil ( up to 3 grms a day if tolerated ) Look for a brand with a certification from [employer] [facility] or [employer].. [employer]et[employer] (Droppy) and retail pharmacies carry fish oil. Try looking for a burp less formula .      Let me know if there is anything else I can help with.    Best wishes,  Tenisha

## 2022-04-13 ENCOUNTER — TELEPHONE (OUTPATIENT)
Dept: TRANSPLANT | Facility: CLINIC | Age: 52
End: 2022-04-13
Payer: COMMERCIAL

## 2022-04-13 NOTE — TELEPHONE ENCOUNTER
Meme called to let me know she has Covid. She is fully vaccinated. At this time no one  In her immediate household has tested +. Her PCP has set her up for monoclonal antibodies tomorrow because of her asplenia.Blood sugars are currently normal. She will keep me up todate on her progress.

## 2022-04-26 DIAGNOSIS — E10.9 TYPE 1 DIABETES MELLITUS WITHOUT COMPLICATION (H): ICD-10-CM

## 2022-04-26 RX ORDER — PROCHLORPERAZINE 25 MG/1
1 SUPPOSITORY RECTAL
Qty: 1 EACH | Refills: 0 | Status: SHIPPED | OUTPATIENT
Start: 2022-04-26 | End: 2022-04-29

## 2022-04-29 DIAGNOSIS — E10.9 TYPE 1 DIABETES MELLITUS WITHOUT COMPLICATION (H): ICD-10-CM

## 2022-04-29 RX ORDER — PROCHLORPERAZINE 25 MG/1
1 SUPPOSITORY RECTAL
Qty: 1 EACH | Refills: 0 | Status: SHIPPED | OUTPATIENT
Start: 2022-04-29 | End: 2022-10-31

## 2022-05-16 DIAGNOSIS — E16.2 HYPOGLYCEMIA: Primary | ICD-10-CM

## 2022-05-16 RX ORDER — ACARBOSE 50 MG/1
50 TABLET ORAL
Qty: 30 TABLET | Refills: 1 | Status: SHIPPED | OUTPATIENT
Start: 2022-05-16

## 2022-05-22 ENCOUNTER — HEALTH MAINTENANCE LETTER (OUTPATIENT)
Age: 52
End: 2022-05-22

## 2022-06-01 DIAGNOSIS — K86.81 EXOCRINE PANCREATIC INSUFFICIENCY: ICD-10-CM

## 2022-06-01 DIAGNOSIS — E10.9 TYPE 1 DIABETES MELLITUS WITHOUT COMPLICATION (H): Primary | ICD-10-CM

## 2022-06-16 DIAGNOSIS — E10.9 TYPE 1 DIABETES MELLITUS WITHOUT COMPLICATION (H): ICD-10-CM

## 2022-06-17 DIAGNOSIS — E10.9 TYPE 1 DIABETES MELLITUS WITHOUT COMPLICATION (H): Primary | ICD-10-CM

## 2022-06-17 RX ORDER — INSULIN PEN,REUSABLE,BT LISPRO
INSULIN PEN (EA) SUBCUTANEOUS
OUTPATIENT
Start: 2022-06-17

## 2022-06-17 RX ORDER — INSULIN PEN,REUSABLE,BT LISPRO
INSULIN PEN (EA) SUBCUTANEOUS
Qty: 1 EACH | Refills: 1 | Status: SHIPPED | OUTPATIENT
Start: 2022-06-17

## 2022-07-15 ENCOUNTER — TELEPHONE (OUTPATIENT)
Dept: TRANSPLANT | Facility: CLINIC | Age: 52
End: 2022-07-15

## 2022-07-15 NOTE — TELEPHONE ENCOUNTER
Meme called to discuss her visit with a local GI who is conceened about her low Hgb. She was unsure what it was . However she has recently had 2 iron infusions and is feeling much less fatigued. Gi had suggested upper and lower scopes. It sounds as if she had iron deficiency anaemia so I suggested that she get her Hb rechecked when she is back from vacation. It it has dropped then proceed with GI advice.

## 2022-07-17 ENCOUNTER — HEALTH MAINTENANCE LETTER (OUTPATIENT)
Age: 52
End: 2022-07-17

## 2022-08-18 DIAGNOSIS — E10.9 TYPE 1 DIABETES MELLITUS WITHOUT COMPLICATION (H): ICD-10-CM

## 2022-08-27 DIAGNOSIS — E16.2 HYPOGLYCEMIA: ICD-10-CM

## 2022-09-15 RX ORDER — INSULIN DEGLUDEC 100 U/ML
INJECTION, SOLUTION SUBCUTANEOUS
Qty: 15 ML | Refills: 1 | OUTPATIENT
Start: 2022-09-15

## 2022-09-16 DIAGNOSIS — E10.9 TYPE 1 DIABETES MELLITUS WITHOUT COMPLICATION (H): ICD-10-CM

## 2022-09-16 RX ORDER — INSULIN DEGLUDEC 100 U/ML
INJECTION, SOLUTION SUBCUTANEOUS
Qty: 15 ML | Refills: 0 | Status: ON HOLD | OUTPATIENT
Start: 2022-09-16 | End: 2023-02-25

## 2022-09-19 RX ORDER — ACARBOSE 50 MG/1
TABLET ORAL
Qty: 30 TABLET | Refills: 1 | OUTPATIENT
Start: 2022-09-19

## 2022-09-22 ENCOUNTER — TELEPHONE (OUTPATIENT)
Dept: TRANSPLANT | Facility: CLINIC | Age: 52
End: 2022-09-22

## 2022-09-22 NOTE — TELEPHONE ENCOUNTER
Call from Meme. She had a 2 day escalation of pain which was made worse by eating pretzels. She thinks pain is over her liver/bile duct area. She was advised by her GI to go the ER for assessment. ER did a CT and US with no significant findings.LFT's were mildly elevated , she was unable to tell me exact parameters or what her WBC was. She was treated with IV fluids/toradol and antiemetics. When I spoke to her she was out driving. She said she had not yet eaten but had drunk coffee. She will try and attach her records and labs to [facility] as they are not available in care everywhere.  She is scheduled to have her mixed meal tolerance test tomorrow. I advised that if she can not eat today she should postpone this until she can eat with out pain/nausea.  Lastly she wants me to schedule her for a one year follow up with Dr Burt and Jean-Claude. Informed  that Dr Bermeo is booking into 2023.

## 2022-09-25 ENCOUNTER — HEALTH MAINTENANCE LETTER (OUTPATIENT)
Age: 52
End: 2022-09-25

## 2022-09-28 ENCOUNTER — DOCUMENTATION ONLY (OUTPATIENT)
Dept: TRANSPLANT | Facility: CLINIC | Age: 52
End: 2022-09-28

## 2022-10-07 NOTE — NURSING NOTE
Chief Complaint   Patient presents with     New Patient     TP-IAT Consult     Blood pressure 104/70, pulse 72, weight 55.2 kg (121 lb 11.1 oz), SpO2 96 %.    Meme Orellana, CMA    
1. Continue with Regular diet.   >>Monitor willingness/need for ONS pending further intake   2. Monitor %PO intake, diet tolerance.   >>Encourage PO intake as appropriate   3. Monitor lytes, replete prn. Monitor BG.   4. Trend weekly wts. Monitor skin integrity, s/sx GI distress.   5. Pain/BM regimen per team   6. RD diet edu reinforcement prn.   7. RD to remain available for additional nutrition interventions as needed.

## 2022-10-21 ENCOUNTER — TELEPHONE (OUTPATIENT)
Dept: TRANSPLANT | Facility: CLINIC | Age: 52
End: 2022-10-21

## 2022-10-21 NOTE — TELEPHONE ENCOUNTER
Spoke to Enrique. She is getting an MRCP next week to assess her GO symptoms and elevated LFT's . She does not know what these are but labs should be resulted soon from [facility] following her MMT.  Plan to have her send the images up to the U for review is there is anything of concern.

## 2022-10-27 DIAGNOSIS — E10.9 TYPE 1 DIABETES MELLITUS WITHOUT COMPLICATION (H): ICD-10-CM

## 2022-10-31 RX ORDER — PROCHLORPERAZINE 25 MG/1
SUPPOSITORY RECTAL
Qty: 1 EACH | Refills: 3 | Status: SHIPPED | OUTPATIENT
Start: 2022-10-31

## 2022-11-09 ENCOUNTER — TRANSFERRED RECORDS (OUTPATIENT)
Dept: HEALTH INFORMATION MANAGEMENT | Facility: CLINIC | Age: 52
End: 2022-11-09

## 2022-11-11 NOTE — PROGRESS NOTES
POST 1 year follow up     taking 2mg of Dilaudid about 3 times per week. Also taking 7 units of Tresiba daily with no short acting insulin and 50mg of acarbose once in the evening

## 2022-12-07 ENCOUNTER — TELEPHONE (OUTPATIENT)
Dept: TRANSPLANT | Facility: CLINIC | Age: 52
End: 2022-12-07

## 2022-12-07 NOTE — TELEPHONE ENCOUNTER
Patient called regarding an email and would like to speak to the RNCC over the phone on some miss understanding/ miss communication.

## 2022-12-09 NOTE — TELEPHONE ENCOUNTER
Called and talked with Meme on the phone, had a good conversation and I understand her frustrations as this has happened before with us not getting her images.  We have been trying to get her images from [employer] in [place] for over a week now.  They have been next day aired twice now to our film room in our hospital and still no images have been uploaded. I have talked to our film room several times and they are on the lookout for her images and will call me once uploaded.  We also called the [facility] [facility] to see if there where misplaced there and they could not find anything either.  For future images and CD requests Meme will send to our transplant office so I can personally take them over to the film room once they arrive.  Answered all questions and concerns and will send images to our providers as soon as they are upload and will call Meme back with our thoughts.   December 9, 2022 9:07 AM - Dalton Low RN:

## 2022-12-12 ENCOUNTER — MYC MEDICAL ADVICE (OUTPATIENT)
Dept: TRANSPLANT | Facility: CLINIC | Age: 52
End: 2022-12-12

## 2022-12-12 ENCOUNTER — TELEPHONE (OUTPATIENT)
Dept: TRANSPLANT | Facility: CLINIC | Age: 52
End: 2022-12-12

## 2022-12-12 NOTE — TELEPHONE ENCOUNTER
Spoke with Meme by phone.  Her UGI/ SBFT does not show any obstruction.  However, Dr. Burt would like to review with the team with regards to contrast reflux into the nuris limb before making any additional recommendations.

## 2022-12-22 ENCOUNTER — DOCUMENTATION ONLY (OUTPATIENT)
Dept: TRANSPLANT | Facility: CLINIC | Age: 52
End: 2022-12-22

## 2022-12-22 NOTE — PROGRESS NOTES
Patient's case discussed at [facility] on 12/21/22.     Reviewed patient's recent imaging upper GI series. Based on team review, patient appears to have reflux into Bonilla limb. Dr Dane Burt will contact patient to review and discuss possible treatment including possible surgical revision.     RNCC to follow up pending MD discussion with patient.     Maddi Almaraz RN BSN  Transplant coordinator   [facility] Pancreate[facility] Islet Auto Transplant program     Phone: [phone]  Toll Free: [phone]  Fax: [phone]  Pager: [phone]

## 2023-01-11 ENCOUNTER — TRANSFERRED RECORDS (OUTPATIENT)
Dept: HEALTH INFORMATION MANAGEMENT | Facility: CLINIC | Age: 53
End: 2023-01-11
Payer: COMMERCIAL

## 2023-01-20 ENCOUNTER — TELEPHONE (OUTPATIENT)
Dept: TRANSPLANT | Facility: CLINIC | Age: 53
End: 2023-01-20
Payer: COMMERCIAL

## 2023-01-20 ENCOUNTER — PREP FOR PROCEDURE (OUTPATIENT)
Dept: TRANSPLANT | Facility: CLINIC | Age: 53
End: 2023-01-20
Payer: COMMERCIAL

## 2023-01-20 DIAGNOSIS — R10.9 ABDOMINAL PAIN: Primary | ICD-10-CM

## 2023-01-20 NOTE — TELEPHONE ENCOUNTER
Meme wanted a call back to talk about getting her surgery scheduled.  The earliest we were able to do is February 17th.  She is fine with this and glad it is scheduled.

## 2023-01-30 ENCOUNTER — HEALTH MAINTENANCE LETTER (OUTPATIENT)
Age: 53
End: 2023-01-30

## 2023-01-31 ENCOUNTER — DOCUMENTATION ONLY (OUTPATIENT)
Dept: TRANSPLANT | Facility: CLINIC | Age: 53
End: 2023-01-31
Payer: COMMERCIAL

## 2023-01-31 NOTE — LETTER
"          Meme Robins  [address] [facility] [address]          Whomever this concerns    \"After assessment and group discussion this seems like reflux cholangitis.  Lengthening her biliary limb and with her ongoing symptoms, I think it's probably the right move.     Lengthening the bowel works by increasing the distance that intestinal contents have to travel to reach the liver anastomosis. In your pre-surgical anatomy, there were always food/intestinal contents flowing past your bile duct, but there is a sphincter that stops intestinal contents from going up that pathway. That gets disrupted with ERCP sometimes, but generally it stops introduction of bacteria and keeps the liver sterile. In your current anatomy, that sphincter doesn't exist because the bile duct was detached from the pancreas and sewn back onto the bowel itself. The sphincter doesn't matter because usually, with a nuris limb, there isn't food or enteric bacteria up there and the bile always flows down, flushing out the duct.     The problem here is pressure. Food moves through your bowel because the bowel squeezes behind it and pushes it forward, like squeezing a tube of toothpaste. In general, we assume food moves about 50cm per squeeze, though this is variable. When you have a nuris limb, it's like putting another hole in the toothpaste tube- when you squeeze, food will go in both directions for that distance. That's why we make the nuris limb ~70 cm- we want it to be longer than a squeeze distance and have a little redundancy to make the bile connection.    On your contrast study, I can see contrast lighting up your whole biliary tree. That means that the squeeze is definitely enough to reach your liver and then to force intestinal contents up into the bile duct. The pressure of your bowel is much stronger than the pressure of bile flowing out of your liver, so it wins and introduces bacteria into that environment, making you feel " "sick. By lengthening the limb, we make it so that the distance to reach the liver is even longer and the squeeze is much less likely to reach the liver. I would plan to double it in length just to be sure.     There are downsides to making the limb too long. Bile helps you digest fat in your diet. If you keep bile  from your food, you won't properly digest or absorb fat, leading to steatorrhea and loss of fat soluble vitamins. This is what happens in bariatric surgery- those patients need supplementation of those vitamins lifelong. It's not usually a concern here because, even doubling the nuris limb to ~100-110cm usually isn't long enough to induce something like this. It's more common at 150-200cm (bariatric surgery lengths). A longer nuris limb probably has a higher risk of SIBO also. If you start having steatorrhea after surgery we can address this but I am very optimistic it won't be a problem.\"      Surgery is scheduled for February 17th, in [place]        Dr. Dane Perea MD  Clinical Instructor  Transplantation Division  Department of Surgery  Chronic Pancreatitis/Total Pancreatectomy-Islet Auto Transplant Program  [facility]      January 31, 2023          "

## 2023-01-31 NOTE — PROGRESS NOTES
Sent fax to PCP and GI team of Meme to update them with the surgery we are performing February 17.

## 2023-02-06 LAB — HBA1C MFR BLD: 5.8 %

## 2023-02-08 ENCOUNTER — TRANSFERRED RECORDS (OUTPATIENT)
Dept: HEALTH INFORMATION MANAGEMENT | Facility: CLINIC | Age: 53
End: 2023-02-08
Payer: COMMERCIAL

## 2023-02-08 ENCOUNTER — MYC MEDICAL ADVICE (OUTPATIENT)
Dept: OTHER | Age: 53
End: 2023-02-08

## 2023-02-09 NOTE — TELEPHONE ENCOUNTER
Called Meme back and she was admitted last night to [facility].  She was having increased pain, N&V, not able to eat or drink.  Patient will send any test results they do via [employer] and I will work with Dr Burt to see if there is anything we need now before her possible surgery February 17th.

## 2023-02-10 ENCOUNTER — TELEPHONE (OUTPATIENT)
Dept: TRANSPLANT | Facility: CLINIC | Age: 53
End: 2023-02-10
Payer: COMMERCIAL

## 2023-02-10 NOTE — TELEPHONE ENCOUNTER
Called Meme to check in to see how she is doing as she is inpatient still.  She is feeling better today, pain is getting under control, she is on anti-biotics, she is able to drink a little bit but is still nauseated some. So far there is no interventions we would recommend for Meme and seems like they are doing whats right.  Will call Meme daily for updates.

## 2023-02-12 LAB
ALT SERPL-CCNC: 32 U/L (ref 10–49)
AST SERPL-CCNC: 27 U/L
CREATININE (EXTERNAL): 0.54 MG/DL (ref 0.55–1.02)
GFR ESTIMATED (EXTERNAL): >60 ML/MIN/1.73M2
GLUCOSE (EXTERNAL): 113 MG/DL (ref 74–106)
POTASSIUM (EXTERNAL): 3.8 MMOL/L (ref 3.4–4.8)

## 2023-02-15 NOTE — TELEPHONE ENCOUNTER
Talked to Meme this morning after she sent a Hopper message and to see how she is doing.  She is still inpatient in her home town.  We discussed that her current team needs to assess her condition and discharge her when they feel it is appropriate. If she is discharged and able to come for surgery this Friday she will, but that might not happen as she is still not discharged and bad weather is on the way in [place] for the next couple of days.  Meme will keep me updated daily.

## 2023-02-16 ENCOUNTER — ANESTHESIA EVENT (OUTPATIENT)
Dept: SURGERY | Facility: CLINIC | Age: 53
End: 2023-02-16
Payer: COMMERCIAL

## 2023-02-17 ENCOUNTER — HOSPITAL ENCOUNTER (INPATIENT)
Facility: CLINIC | Age: 53
LOS: 8 days | Discharge: HOME | End: 2023-02-25
Attending: SURGERY | Admitting: SURGERY
Payer: COMMERCIAL

## 2023-02-17 ENCOUNTER — ANESTHESIA (OUTPATIENT)
Dept: SURGERY | Facility: CLINIC | Age: 53
End: 2023-02-17
Payer: COMMERCIAL

## 2023-02-17 DIAGNOSIS — E10.9 TYPE 1 DIABETES MELLITUS WITHOUT COMPLICATION (H): ICD-10-CM

## 2023-02-17 DIAGNOSIS — K30 INDIGESTION: Primary | ICD-10-CM

## 2023-02-17 DIAGNOSIS — K59.03 DRUG-INDUCED CONSTIPATION: ICD-10-CM

## 2023-02-17 DIAGNOSIS — Z94.83 S/P PANCREATIC ISLET CELL TRANSPLANTATION (H): ICD-10-CM

## 2023-02-17 LAB
ABO/RH(D): NORMAL
ANION GAP SERPL CALCULATED.3IONS-SCNC: 8 MMOL/L (ref 7–15)
ANTIBODY SCREEN: NEGATIVE
BASOPHILS # BLD AUTO: 0.1 10E3/UL (ref 0–0.2)
BASOPHILS NFR BLD AUTO: 0 %
BLD PROD TYP BPU: NORMAL
BLD PROD TYP BPU: NORMAL
BLOOD COMPONENT TYPE: NORMAL
BLOOD COMPONENT TYPE: NORMAL
BUN SERPL-MCNC: 10.1 MG/DL (ref 6–20)
CALCIUM SERPL-MCNC: 7.8 MG/DL (ref 8.6–10)
CHLORIDE SERPL-SCNC: 103 MMOL/L (ref 98–107)
CODING SYSTEM: NORMAL
CODING SYSTEM: NORMAL
CREAT SERPL-MCNC: 0.59 MG/DL (ref 0.51–0.95)
CROSSMATCH: NORMAL
CROSSMATCH: NORMAL
DEPRECATED HCO3 PLAS-SCNC: 28 MMOL/L (ref 22–29)
EOSINOPHIL # BLD AUTO: 0.1 10E3/UL (ref 0–0.7)
EOSINOPHIL NFR BLD AUTO: 1 %
ERYTHROCYTE [DISTWIDTH] IN BLOOD BY AUTOMATED COUNT: 13.9 % (ref 10–15)
GFR SERPL CREATININE-BSD FRML MDRD: >90 ML/MIN/1.73M2
GLUCOSE BLDC GLUCOMTR-MCNC: 111 MG/DL (ref 70–99)
GLUCOSE BLDC GLUCOMTR-MCNC: 112 MG/DL (ref 70–99)
GLUCOSE BLDC GLUCOMTR-MCNC: 126 MG/DL (ref 70–99)
GLUCOSE BLDC GLUCOMTR-MCNC: 130 MG/DL (ref 70–99)
GLUCOSE SERPL-MCNC: 119 MG/DL (ref 70–99)
HCT VFR BLD AUTO: 38.8 % (ref 35–47)
HGB BLD-MCNC: 12.8 G/DL (ref 11.7–15.7)
IMM GRANULOCYTES # BLD: 0.1 10E3/UL
IMM GRANULOCYTES NFR BLD: 0 %
LYMPHOCYTES # BLD AUTO: 1.2 10E3/UL (ref 0.8–5.3)
LYMPHOCYTES NFR BLD AUTO: 9 %
MCH RBC QN AUTO: 29.8 PG (ref 26.5–33)
MCHC RBC AUTO-ENTMCNC: 33 G/DL (ref 31.5–36.5)
MCV RBC AUTO: 90 FL (ref 78–100)
MONOCYTES # BLD AUTO: 1.3 10E3/UL (ref 0–1.3)
MONOCYTES NFR BLD AUTO: 10 %
NEUTROPHILS # BLD AUTO: 10.2 10E3/UL (ref 1.6–8.3)
NEUTROPHILS NFR BLD AUTO: 80 %
NRBC # BLD AUTO: 0 10E3/UL
NRBC BLD AUTO-RTO: 0 /100
PLATELET # BLD AUTO: 354 10E3/UL (ref 150–450)
POTASSIUM SERPL-SCNC: 4.1 MMOL/L (ref 3.4–5.3)
RBC # BLD AUTO: 4.3 10E6/UL (ref 3.8–5.2)
SARS-COV-2 RNA RESP QL NAA+PROBE: NEGATIVE
SODIUM SERPL-SCNC: 139 MMOL/L (ref 136–145)
SPECIMEN EXPIRATION DATE: NORMAL
UNIT ABO/RH: NORMAL
UNIT ABO/RH: NORMAL
UNIT NUMBER: NORMAL
UNIT NUMBER: NORMAL
UNIT STATUS: NORMAL
UNIT STATUS: NORMAL
UNIT TYPE ISBT: 6200
UNIT TYPE ISBT: 6200
WBC # BLD AUTO: 12.8 10E3/UL (ref 4–11)

## 2023-02-17 PROCEDURE — 250N000011 HC RX IP 250 OP 636: Performed by: STUDENT IN AN ORGANIZED HEALTH CARE EDUCATION/TRAINING PROGRAM

## 2023-02-17 PROCEDURE — 258N000003 HC RX IP 258 OP 636: Performed by: STUDENT IN AN ORGANIZED HEALTH CARE EDUCATION/TRAINING PROGRAM

## 2023-02-17 PROCEDURE — 250N000013 HC RX MED GY IP 250 OP 250 PS 637: Performed by: STUDENT IN AN ORGANIZED HEALTH CARE EDUCATION/TRAINING PROGRAM

## 2023-02-17 PROCEDURE — 86901 BLOOD TYPING SEROLOGIC RH(D): CPT | Performed by: ANESTHESIOLOGY

## 2023-02-17 PROCEDURE — 250N000013 HC RX MED GY IP 250 OP 250 PS 637

## 2023-02-17 PROCEDURE — 250N000013 HC RX MED GY IP 250 OP 250 PS 637: Performed by: ANESTHESIOLOGY

## 2023-02-17 PROCEDURE — 120N000011 HC R&B TRANSPLANT UMMC

## 2023-02-17 PROCEDURE — 258N000003 HC RX IP 258 OP 636: Performed by: ANESTHESIOLOGY

## 2023-02-17 PROCEDURE — 370N000017 HC ANESTHESIA TECHNICAL FEE, PER MIN: Performed by: SURGERY

## 2023-02-17 PROCEDURE — 86923 COMPATIBILITY TEST ELECTRIC: CPT

## 2023-02-17 PROCEDURE — 250N000011 HC RX IP 250 OP 636: Performed by: NURSE PRACTITIONER

## 2023-02-17 PROCEDURE — 250N000011 HC RX IP 250 OP 636: Performed by: ANESTHESIOLOGY

## 2023-02-17 PROCEDURE — 93010 ELECTROCARDIOGRAM REPORT: CPT | Performed by: INTERNAL MEDICINE

## 2023-02-17 PROCEDURE — 85025 COMPLETE CBC W/AUTO DIFF WBC: CPT | Performed by: SURGERY

## 2023-02-17 PROCEDURE — 86850 RBC ANTIBODY SCREEN: CPT | Performed by: ANESTHESIOLOGY

## 2023-02-17 PROCEDURE — 250N000013 HC RX MED GY IP 250 OP 250 PS 637: Performed by: SURGERY

## 2023-02-17 PROCEDURE — 250N000009 HC RX 250: Performed by: ANESTHESIOLOGY

## 2023-02-17 PROCEDURE — U0005 INFEC AGEN DETEC AMPLI PROBE: HCPCS | Performed by: ANESTHESIOLOGY

## 2023-02-17 PROCEDURE — 360N000077 HC SURGERY LEVEL 4, PER MIN: Performed by: SURGERY

## 2023-02-17 PROCEDURE — 36415 COLL VENOUS BLD VENIPUNCTURE: CPT | Performed by: ANESTHESIOLOGY

## 2023-02-17 PROCEDURE — 0DN80ZZ RELEASE SMALL INTESTINE, OPEN APPROACH: ICD-10-PCS | Performed by: SURGERY

## 2023-02-17 PROCEDURE — 80048 BASIC METABOLIC PNL TOTAL CA: CPT | Performed by: SURGERY

## 2023-02-17 PROCEDURE — 250N000009 HC RX 250: Performed by: SURGERY

## 2023-02-17 PROCEDURE — 0DN60ZZ RELEASE STOMACH, OPEN APPROACH: ICD-10-PCS | Performed by: SURGERY

## 2023-02-17 PROCEDURE — 0D160ZA BYPASS STOMACH TO JEJUNUM, OPEN APPROACH: ICD-10-PCS | Performed by: SURGERY

## 2023-02-17 PROCEDURE — 272N000001 HC OR GENERAL SUPPLY STERILE: Performed by: SURGERY

## 2023-02-17 PROCEDURE — 710N000010 HC RECOVERY PHASE 1, LEVEL 2, PER MIN: Performed by: SURGERY

## 2023-02-17 PROCEDURE — 44130 BOWEL TO BOWEL FUSION: CPT | Mod: 22 | Performed by: SURGERY

## 2023-02-17 PROCEDURE — 250N000011 HC RX IP 250 OP 636: Performed by: SURGERY

## 2023-02-17 PROCEDURE — 250N000025 HC SEVOFLURANE, PER MIN: Performed by: SURGERY

## 2023-02-17 PROCEDURE — 93005 ELECTROCARDIOGRAM TRACING: CPT

## 2023-02-17 PROCEDURE — 250N000012 HC RX MED GY IP 250 OP 636 PS 637: Performed by: ANESTHESIOLOGY

## 2023-02-17 PROCEDURE — 999N000141 HC STATISTIC PRE-PROCEDURE NURSING ASSESSMENT: Performed by: SURGERY

## 2023-02-17 RX ORDER — HYDROMORPHONE HYDROCHLORIDE 1 MG/ML
0.4 INJECTION, SOLUTION INTRAMUSCULAR; INTRAVENOUS; SUBCUTANEOUS EVERY 5 MIN PRN
Status: DISCONTINUED | OUTPATIENT
Start: 2023-02-17 | End: 2023-02-17

## 2023-02-17 RX ORDER — ONDANSETRON 2 MG/ML
4 INJECTION INTRAMUSCULAR; INTRAVENOUS EVERY 30 MIN PRN
Status: DISCONTINUED | OUTPATIENT
Start: 2023-02-17 | End: 2023-02-17

## 2023-02-17 RX ORDER — FENTANYL CITRATE 50 UG/ML
50 INJECTION, SOLUTION INTRAMUSCULAR; INTRAVENOUS EVERY 5 MIN PRN
Status: DISCONTINUED | OUTPATIENT
Start: 2023-02-17 | End: 2023-02-17

## 2023-02-17 RX ORDER — SODIUM CHLORIDE, SODIUM LACTATE, POTASSIUM CHLORIDE, CALCIUM CHLORIDE 600; 310; 30; 20 MG/100ML; MG/100ML; MG/100ML; MG/100ML
INJECTION, SOLUTION INTRAVENOUS CONTINUOUS PRN
Status: DISCONTINUED | OUTPATIENT
Start: 2023-02-17 | End: 2023-02-17

## 2023-02-17 RX ORDER — ACARBOSE 25 MG/1
50 TABLET ORAL
Status: DISCONTINUED | OUTPATIENT
Start: 2023-02-17 | End: 2023-02-25 | Stop reason: HOSPADM

## 2023-02-17 RX ORDER — NALOXONE HYDROCHLORIDE 0.4 MG/ML
0.2 INJECTION, SOLUTION INTRAMUSCULAR; INTRAVENOUS; SUBCUTANEOUS
Status: DISCONTINUED | OUTPATIENT
Start: 2023-02-17 | End: 2023-02-17 | Stop reason: HOSPADM

## 2023-02-17 RX ORDER — NICOTINE POLACRILEX 4 MG
15-30 LOZENGE BUCCAL
Status: DISCONTINUED | OUTPATIENT
Start: 2023-02-17 | End: 2023-02-25 | Stop reason: HOSPADM

## 2023-02-17 RX ORDER — TRAZODONE HYDROCHLORIDE 100 MG/1
100 TABLET ORAL AT BEDTIME
Status: DISCONTINUED | OUTPATIENT
Start: 2023-02-17 | End: 2023-02-25 | Stop reason: HOSPADM

## 2023-02-17 RX ORDER — HEPARIN SODIUM 5000 [USP'U]/.5ML
5000 INJECTION, SOLUTION INTRAVENOUS; SUBCUTANEOUS EVERY 8 HOURS
Status: DISCONTINUED | OUTPATIENT
Start: 2023-02-17 | End: 2023-02-21

## 2023-02-17 RX ORDER — NALOXONE HYDROCHLORIDE 0.4 MG/ML
0.4 INJECTION, SOLUTION INTRAMUSCULAR; INTRAVENOUS; SUBCUTANEOUS
Status: DISCONTINUED | OUTPATIENT
Start: 2023-02-17 | End: 2023-02-17 | Stop reason: HOSPADM

## 2023-02-17 RX ORDER — OXYCODONE HYDROCHLORIDE 5 MG/1
5 TABLET ORAL EVERY 4 HOURS PRN
Status: DISCONTINUED | OUTPATIENT
Start: 2023-02-17 | End: 2023-02-17

## 2023-02-17 RX ORDER — ONDANSETRON 2 MG/ML
INJECTION INTRAMUSCULAR; INTRAVENOUS PRN
Status: DISCONTINUED | OUTPATIENT
Start: 2023-02-17 | End: 2023-02-17

## 2023-02-17 RX ORDER — LABETALOL HYDROCHLORIDE 5 MG/ML
5 INJECTION, SOLUTION INTRAVENOUS EVERY 5 MIN PRN
Status: DISCONTINUED | OUTPATIENT
Start: 2023-02-17 | End: 2023-02-17

## 2023-02-17 RX ORDER — ONDANSETRON 2 MG/ML
4 INJECTION INTRAMUSCULAR; INTRAVENOUS EVERY 6 HOURS PRN
Status: DISCONTINUED | OUTPATIENT
Start: 2023-02-17 | End: 2023-02-25 | Stop reason: HOSPADM

## 2023-02-17 RX ORDER — METHOCARBAMOL 500 MG/1
1000 TABLET, FILM COATED ORAL 3 TIMES DAILY
Status: DISCONTINUED | OUTPATIENT
Start: 2023-02-17 | End: 2023-02-25 | Stop reason: HOSPADM

## 2023-02-17 RX ORDER — ONDANSETRON 4 MG/1
4 TABLET, ORALLY DISINTEGRATING ORAL EVERY 30 MIN PRN
Status: DISCONTINUED | OUTPATIENT
Start: 2023-02-17 | End: 2023-02-17

## 2023-02-17 RX ORDER — HYDROMORPHONE HCL IN WATER/PF 6 MG/30 ML
0.2 PATIENT CONTROLLED ANALGESIA SYRINGE INTRAVENOUS
Status: DISCONTINUED | OUTPATIENT
Start: 2023-02-17 | End: 2023-02-18

## 2023-02-17 RX ORDER — CLINDAMYCIN PHOSPHATE 900 MG/50ML
INJECTION, SOLUTION INTRAVENOUS PRN
Status: DISCONTINUED | OUTPATIENT
Start: 2023-02-17 | End: 2023-02-17

## 2023-02-17 RX ORDER — POLYETHYLENE GLYCOL 3350 17 G/17G
17 POWDER, FOR SOLUTION ORAL DAILY
Status: DISCONTINUED | OUTPATIENT
Start: 2023-02-18 | End: 2023-02-25 | Stop reason: HOSPADM

## 2023-02-17 RX ORDER — BISACODYL 10 MG
10 SUPPOSITORY, RECTAL RECTAL DAILY PRN
Status: DISCONTINUED | OUTPATIENT
Start: 2023-02-17 | End: 2023-02-25 | Stop reason: HOSPADM

## 2023-02-17 RX ORDER — ACETAMINOPHEN 325 MG/1
975 TABLET ORAL ONCE
Status: COMPLETED | OUTPATIENT
Start: 2023-02-17 | End: 2023-02-17

## 2023-02-17 RX ORDER — HYDROMORPHONE HYDROCHLORIDE 2 MG/1
6 TABLET ORAL EVERY 4 HOURS PRN
Status: DISCONTINUED | OUTPATIENT
Start: 2023-02-17 | End: 2023-02-19

## 2023-02-17 RX ORDER — MULTIPLE VITAMINS W/ MINERALS TAB 9MG-400MCG
1 TAB ORAL DAILY
Status: DISCONTINUED | OUTPATIENT
Start: 2023-02-18 | End: 2023-02-25 | Stop reason: HOSPADM

## 2023-02-17 RX ORDER — ACETAMINOPHEN 325 MG/1
650 TABLET ORAL EVERY 4 HOURS PRN
Status: DISCONTINUED | OUTPATIENT
Start: 2023-02-20 | End: 2023-02-25 | Stop reason: HOSPADM

## 2023-02-17 RX ORDER — AMOXICILLIN 250 MG
1 CAPSULE ORAL 2 TIMES DAILY
Status: DISCONTINUED | OUTPATIENT
Start: 2023-02-17 | End: 2023-02-25 | Stop reason: HOSPADM

## 2023-02-17 RX ORDER — GABAPENTIN 300 MG/1
300 CAPSULE ORAL
Status: COMPLETED | OUTPATIENT
Start: 2023-02-17 | End: 2023-02-17

## 2023-02-17 RX ORDER — DEXTROSE, SODIUM CHLORIDE, SODIUM LACTATE, POTASSIUM CHLORIDE, AND CALCIUM CHLORIDE 5; .6; .31; .03; .02 G/100ML; G/100ML; G/100ML; G/100ML; G/100ML
INJECTION, SOLUTION INTRAVENOUS CONTINUOUS
Status: DISCONTINUED | OUTPATIENT
Start: 2023-02-17 | End: 2023-02-22

## 2023-02-17 RX ORDER — PHENYLEPHRINE HCL IN 0.9% NACL 50MG/250ML
.5-1.25 PLASTIC BAG, INJECTION (ML) INTRAVENOUS CONTINUOUS
Status: DISCONTINUED | OUTPATIENT
Start: 2023-02-17 | End: 2023-02-17 | Stop reason: HOSPADM

## 2023-02-17 RX ORDER — HYDROMORPHONE HYDROCHLORIDE 1 MG/ML
0.2 INJECTION, SOLUTION INTRAMUSCULAR; INTRAVENOUS; SUBCUTANEOUS EVERY 5 MIN PRN
Status: DISCONTINUED | OUTPATIENT
Start: 2023-02-17 | End: 2023-02-17

## 2023-02-17 RX ORDER — FENTANYL CITRATE 50 UG/ML
25-50 INJECTION, SOLUTION INTRAMUSCULAR; INTRAVENOUS
Status: DISCONTINUED | OUTPATIENT
Start: 2023-02-17 | End: 2023-02-17 | Stop reason: HOSPADM

## 2023-02-17 RX ORDER — FLUMAZENIL 0.1 MG/ML
0.2 INJECTION, SOLUTION INTRAVENOUS
Status: DISCONTINUED | OUTPATIENT
Start: 2023-02-17 | End: 2023-02-17 | Stop reason: HOSPADM

## 2023-02-17 RX ORDER — FENTANYL CITRATE 50 UG/ML
25 INJECTION, SOLUTION INTRAMUSCULAR; INTRAVENOUS EVERY 5 MIN PRN
Status: DISCONTINUED | OUTPATIENT
Start: 2023-02-17 | End: 2023-02-17

## 2023-02-17 RX ORDER — APREPITANT 40 MG/1
40 CAPSULE ORAL ONCE
Status: COMPLETED | OUTPATIENT
Start: 2023-02-17 | End: 2023-02-17

## 2023-02-17 RX ORDER — METHOCARBAMOL 500 MG/1
500 TABLET, FILM COATED ORAL ONCE
Status: COMPLETED | OUTPATIENT
Start: 2023-02-17 | End: 2023-02-17

## 2023-02-17 RX ORDER — SODIUM CHLORIDE, SODIUM LACTATE, POTASSIUM CHLORIDE, CALCIUM CHLORIDE 600; 310; 30; 20 MG/100ML; MG/100ML; MG/100ML; MG/100ML
INJECTION, SOLUTION INTRAVENOUS CONTINUOUS
Status: DISCONTINUED | OUTPATIENT
Start: 2023-02-17 | End: 2023-02-17

## 2023-02-17 RX ORDER — ALBUTEROL SULFATE 0.83 MG/ML
2.5 SOLUTION RESPIRATORY (INHALATION) EVERY 4 HOURS PRN
Status: DISCONTINUED | OUTPATIENT
Start: 2023-02-17 | End: 2023-02-17

## 2023-02-17 RX ORDER — HYDROMORPHONE HYDROCHLORIDE 2 MG/1
4 TABLET ORAL EVERY 4 HOURS PRN
Status: DISCONTINUED | OUTPATIENT
Start: 2023-02-17 | End: 2023-02-19

## 2023-02-17 RX ORDER — OXYCODONE HYDROCHLORIDE 10 MG/1
10 TABLET ORAL ONCE
Status: DISCONTINUED | OUTPATIENT
Start: 2023-02-17 | End: 2023-02-17

## 2023-02-17 RX ORDER — ONDANSETRON 4 MG/1
4 TABLET, ORALLY DISINTEGRATING ORAL EVERY 6 HOURS PRN
Status: DISCONTINUED | OUTPATIENT
Start: 2023-02-17 | End: 2023-02-25 | Stop reason: HOSPADM

## 2023-02-17 RX ORDER — HYDRALAZINE HYDROCHLORIDE 20 MG/ML
2.5-5 INJECTION INTRAMUSCULAR; INTRAVENOUS EVERY 10 MIN PRN
Status: DISCONTINUED | OUTPATIENT
Start: 2023-02-17 | End: 2023-02-17

## 2023-02-17 RX ORDER — LIDOCAINE HYDROCHLORIDE 20 MG/ML
INJECTION, SOLUTION INFILTRATION; PERINEURAL PRN
Status: DISCONTINUED | OUTPATIENT
Start: 2023-02-17 | End: 2023-02-17

## 2023-02-17 RX ORDER — SCOLOPAMINE TRANSDERMAL SYSTEM 1 MG/1
1 PATCH, EXTENDED RELEASE TRANSDERMAL ONCE
Status: COMPLETED | OUTPATIENT
Start: 2023-02-17 | End: 2023-02-18

## 2023-02-17 RX ORDER — HYDROMORPHONE HYDROCHLORIDE 1 MG/ML
0.5 INJECTION, SOLUTION INTRAMUSCULAR; INTRAVENOUS; SUBCUTANEOUS
Status: DISCONTINUED | OUTPATIENT
Start: 2023-02-17 | End: 2023-02-18

## 2023-02-17 RX ORDER — LIDOCAINE HCL/EPINEPHRINE/PF 2%-1:200K
VIAL (ML) INJECTION
Status: COMPLETED | OUTPATIENT
Start: 2023-02-17 | End: 2023-02-17

## 2023-02-17 RX ORDER — CLONAZEPAM 1 MG/1
1 TABLET ORAL 2 TIMES DAILY
Status: DISCONTINUED | OUTPATIENT
Start: 2023-02-17 | End: 2023-02-17

## 2023-02-17 RX ORDER — FENTANYL CITRATE 50 UG/ML
INJECTION, SOLUTION INTRAMUSCULAR; INTRAVENOUS PRN
Status: DISCONTINUED | OUTPATIENT
Start: 2023-02-17 | End: 2023-02-17

## 2023-02-17 RX ORDER — ACETAMINOPHEN 325 MG/1
975 TABLET ORAL EVERY 8 HOURS
Status: COMPLETED | OUTPATIENT
Start: 2023-02-17 | End: 2023-02-20

## 2023-02-17 RX ORDER — PROPOFOL 10 MG/ML
INJECTION, EMULSION INTRAVENOUS PRN
Status: DISCONTINUED | OUTPATIENT
Start: 2023-02-17 | End: 2023-02-17

## 2023-02-17 RX ORDER — DEXTROSE MONOHYDRATE 25 G/50ML
25-50 INJECTION, SOLUTION INTRAVENOUS
Status: DISCONTINUED | OUTPATIENT
Start: 2023-02-17 | End: 2023-02-25 | Stop reason: HOSPADM

## 2023-02-17 RX ORDER — CLONAZEPAM 1 MG/1
1 TABLET ORAL DAILY
Status: DISCONTINUED | OUTPATIENT
Start: 2023-02-18 | End: 2023-02-25 | Stop reason: HOSPADM

## 2023-02-17 RX ORDER — MEPERIDINE HYDROCHLORIDE 25 MG/ML
12.5 INJECTION INTRAMUSCULAR; INTRAVENOUS; SUBCUTANEOUS EVERY 5 MIN PRN
Status: DISCONTINUED | OUTPATIENT
Start: 2023-02-17 | End: 2023-02-17

## 2023-02-17 RX ORDER — CLONAZEPAM 0.5 MG/1
1.5 TABLET ORAL EVERY EVENING
Status: DISCONTINUED | OUTPATIENT
Start: 2023-02-18 | End: 2023-02-25 | Stop reason: HOSPADM

## 2023-02-17 RX ADMIN — ONDANSETRON 4 MG: 2 INJECTION INTRAMUSCULAR; INTRAVENOUS at 19:51

## 2023-02-17 RX ADMIN — PROPOFOL 20 MG: 10 INJECTION, EMULSION INTRAVENOUS at 08:06

## 2023-02-17 RX ADMIN — OXYCODONE HYDROCHLORIDE 5 MG: 5 TABLET ORAL at 18:09

## 2023-02-17 RX ADMIN — FENTANYL CITRATE 25 MCG: 50 INJECTION, SOLUTION INTRAMUSCULAR; INTRAVENOUS at 18:05

## 2023-02-17 RX ADMIN — Medication 20 MG: at 08:51

## 2023-02-17 RX ADMIN — PROPOFOL 20 MG: 10 INJECTION, EMULSION INTRAVENOUS at 12:28

## 2023-02-17 RX ADMIN — METHOCARBAMOL 500 MG: 500 TABLET ORAL at 16:01

## 2023-02-17 RX ADMIN — GABAPENTIN 300 MG: 300 CAPSULE ORAL at 06:20

## 2023-02-17 RX ADMIN — SERTRALINE HYDROCHLORIDE 150 MG: 50 TABLET ORAL at 23:12

## 2023-02-17 RX ADMIN — PHENYLEPHRINE HYDROCHLORIDE 100 MCG: 10 INJECTION INTRAVENOUS at 08:41

## 2023-02-17 RX ADMIN — SCOPALAMINE 1 PATCH: 1 PATCH, EXTENDED RELEASE TRANSDERMAL at 07:31

## 2023-02-17 RX ADMIN — PHENYLEPHRINE HYDROCHLORIDE 100 MCG: 10 INJECTION INTRAVENOUS at 08:31

## 2023-02-17 RX ADMIN — FENTANYL CITRATE 100 MCG: 50 INJECTION, SOLUTION INTRAMUSCULAR; INTRAVENOUS at 08:05

## 2023-02-17 RX ADMIN — HYDROMORPHONE HYDROCHLORIDE 0.2 MG: 1 INJECTION, SOLUTION INTRAMUSCULAR; INTRAVENOUS; SUBCUTANEOUS at 19:26

## 2023-02-17 RX ADMIN — LIDOCAINE HYDROCHLORIDE,EPINEPHRINE BITARTRATE 3 ML: 20; .005 INJECTION, SOLUTION EPIDURAL; INFILTRATION; INTRACAUDAL; PERINEURAL at 07:00

## 2023-02-17 RX ADMIN — MIDAZOLAM 2 MG: 1 INJECTION INTRAMUSCULAR; INTRAVENOUS at 07:50

## 2023-02-17 RX ADMIN — SODIUM CHLORIDE, SODIUM LACTATE, POTASSIUM CHLORIDE, CALCIUM CHLORIDE: 600; 310; 30; 20 INJECTION, SOLUTION INTRAVENOUS at 10:15

## 2023-02-17 RX ADMIN — FENTANYL CITRATE 50 MCG: 50 INJECTION, SOLUTION INTRAMUSCULAR; INTRAVENOUS at 11:55

## 2023-02-17 RX ADMIN — PROPOFOL 20 MG: 10 INJECTION, EMULSION INTRAVENOUS at 12:35

## 2023-02-17 RX ADMIN — BUPIVACAINE HYDROCHLORIDE 6 ML/HR: 7.5 INJECTION, SOLUTION EPIDURAL; RETROBULBAR at 09:46

## 2023-02-17 RX ADMIN — APREPITANT 40 MG: 40 CAPSULE ORAL at 07:29

## 2023-02-17 RX ADMIN — PHENYLEPHRINE HYDROCHLORIDE 50 MCG: 10 INJECTION INTRAVENOUS at 12:16

## 2023-02-17 RX ADMIN — SODIUM CHLORIDE, SODIUM LACTATE, POTASSIUM CHLORIDE, CALCIUM CHLORIDE: 600; 310; 30; 20 INJECTION, SOLUTION INTRAVENOUS at 08:20

## 2023-02-17 RX ADMIN — PROPOFOL 20 MG: 10 INJECTION, EMULSION INTRAVENOUS at 12:42

## 2023-02-17 RX ADMIN — FENTANYL CITRATE 25 MCG: 50 INJECTION, SOLUTION INTRAMUSCULAR; INTRAVENOUS at 14:54

## 2023-02-17 RX ADMIN — CLINDAMYCIN PHOSPHATE 900 MG: 900 INJECTION, SOLUTION INTRAVENOUS at 08:32

## 2023-02-17 RX ADMIN — METHOCARBAMOL 1000 MG: 500 TABLET ORAL at 22:43

## 2023-02-17 RX ADMIN — PROPOFOL 100 MG: 10 INJECTION, EMULSION INTRAVENOUS at 08:05

## 2023-02-17 RX ADMIN — FENTANYL CITRATE 25 MCG: 50 INJECTION, SOLUTION INTRAMUSCULAR; INTRAVENOUS at 14:43

## 2023-02-17 RX ADMIN — HYDROMORPHONE HYDROCHLORIDE 0.5 MG: 1 INJECTION, SOLUTION INTRAMUSCULAR; INTRAVENOUS; SUBCUTANEOUS at 09:35

## 2023-02-17 RX ADMIN — SUGAMMADEX 200 MG: 100 INJECTION, SOLUTION INTRAVENOUS at 12:33

## 2023-02-17 RX ADMIN — MIDAZOLAM 2 MG: 1 INJECTION INTRAMUSCULAR; INTRAVENOUS at 07:15

## 2023-02-17 RX ADMIN — Medication 20 MG: at 11:10

## 2023-02-17 RX ADMIN — SODIUM CHLORIDE, POTASSIUM CHLORIDE, SODIUM LACTATE AND CALCIUM CHLORIDE: 600; 310; 30; 20 INJECTION, SOLUTION INTRAVENOUS at 07:53

## 2023-02-17 RX ADMIN — TRAZODONE HYDROCHLORIDE 100 MG: 100 TABLET ORAL at 23:13

## 2023-02-17 RX ADMIN — HEPARIN SODIUM 5000 UNITS: 5000 INJECTION, SOLUTION INTRAVENOUS; SUBCUTANEOUS at 22:43

## 2023-02-17 RX ADMIN — SODIUM CHLORIDE, SODIUM LACTATE, POTASSIUM CHLORIDE, CALCIUM CHLORIDE AND DEXTROSE MONOHYDRATE: 5; 600; 310; 30; 20 INJECTION, SOLUTION INTRAVENOUS at 14:52

## 2023-02-17 RX ADMIN — LIDOCAINE HYDROCHLORIDE 50 MG: 20 INJECTION, SOLUTION INFILTRATION; PERINEURAL at 08:05

## 2023-02-17 RX ADMIN — PHENYLEPHRINE HYDROCHLORIDE 50 MCG: 10 INJECTION INTRAVENOUS at 12:25

## 2023-02-17 RX ADMIN — Medication 30 MG: at 10:21

## 2023-02-17 RX ADMIN — PROPOFOL 20 MG: 10 INJECTION, EMULSION INTRAVENOUS at 12:31

## 2023-02-17 RX ADMIN — FENTANYL CITRATE 50 MCG: 50 INJECTION, SOLUTION INTRAMUSCULAR; INTRAVENOUS at 07:15

## 2023-02-17 RX ADMIN — FENTANYL CITRATE 25 MCG: 50 INJECTION, SOLUTION INTRAMUSCULAR; INTRAVENOUS at 13:24

## 2023-02-17 RX ADMIN — Medication 10 MG: at 10:01

## 2023-02-17 RX ADMIN — CLONAZEPAM 1.5 MG: 0.5 TABLET ORAL at 23:12

## 2023-02-17 RX ADMIN — ACETAMINOPHEN 975 MG: 325 TABLET ORAL at 06:20

## 2023-02-17 RX ADMIN — OXYCODONE HYDROCHLORIDE 10 MG: 10 TABLET ORAL at 23:22

## 2023-02-17 RX ADMIN — ACETAMINOPHEN 975 MG: 325 TABLET ORAL at 22:42

## 2023-02-17 RX ADMIN — ONDANSETRON 4 MG: 2 INJECTION INTRAMUSCULAR; INTRAVENOUS at 12:21

## 2023-02-17 RX ADMIN — FENTANYL CITRATE 25 MCG: 50 INJECTION, SOLUTION INTRAMUSCULAR; INTRAVENOUS at 15:35

## 2023-02-17 RX ADMIN — PHENYLEPHRINE HYDROCHLORIDE 100 MCG: 10 INJECTION INTRAVENOUS at 12:01

## 2023-02-17 RX ADMIN — SENNOSIDES AND DOCUSATE SODIUM 1 TABLET: 50; 8.6 TABLET ORAL at 22:42

## 2023-02-17 RX ADMIN — PHENYLEPHRINE HYDROCHLORIDE 100 MCG: 10 INJECTION INTRAVENOUS at 08:20

## 2023-02-17 RX ADMIN — HYDROMORPHONE HYDROCHLORIDE 0.2 MG: 1 INJECTION, SOLUTION INTRAMUSCULAR; INTRAVENOUS; SUBCUTANEOUS at 19:47

## 2023-02-17 RX ADMIN — Medication 50 MG: at 08:06

## 2023-02-17 RX ADMIN — PHENYLEPHRINE HYDROCHLORIDE 100 MCG: 10 INJECTION INTRAVENOUS at 12:31

## 2023-02-17 RX ADMIN — Medication 3 ML: at 07:00

## 2023-02-17 RX ADMIN — Medication 20 MG: at 09:20

## 2023-02-17 ASSESSMENT — ACTIVITIES OF DAILY LIVING (ADL)
ADLS_ACUITY_SCORE: 37

## 2023-02-17 NOTE — ANESTHESIA POSTPROCEDURE EVALUATION
Patient: Meme Robins    Procedure: Procedure(s):  LAPAROTOMY, EXPLORATORY, lyses of adhesions  POSSIBLE REVISION OF MAYRA-EN-Y       Anesthesia Type:  General    Note:  Disposition: Admission   Postop Pain Control: Uneventful            Sign Out: Well controlled pain   PONV: No   Neuro/Psych: Uneventful            Sign Out: Acceptable/Baseline neuro status   Airway/Respiratory: Uneventful            Sign Out: Acceptable/Baseline resp. status   CV/Hemodynamics: Uneventful            Sign Out: Acceptable CV status; No obvious hypovolemia; No obvious fluid overload   Other NRE: NONE   DID A NON-ROUTINE EVENT OCCUR? No    Event details/Postop Comments:  Epidural level T6  Given bolus off pump, dermatome increased to T6-T11, pain improving. Basal rate increased to 10ml/hr  Pt had complaint of chest pressure and subjective dyspnea, EKG obtained and similar to previous study in 2021. Pain reproducible on exam. Dyspnea improved with improved pain control. Concern for acute cardiac event is low, will continue to monitor.           Last vitals:  Vitals Value Taken Time   /65 02/17/23 1515   Temp 37.2  C (98.9  F) 02/17/23 1515   Pulse 78 02/17/23 1527   Resp 12 02/17/23 1527   SpO2 97 % 02/17/23 1527   Vitals shown include unvalidated device data.    Electronically Signed By: Christi Love MD  February 17, 2023  3:28 PM

## 2023-02-17 NOTE — BRIEF OP NOTE
[facility]    Brief Operative Note    Pre-operative diagnosis: Abdominal pain [R10.9]  Post-operative diagnosis same- enteric reflux up biliary limb causing cholangiitis    Procedure: Procedure(s):  LAPAROTOMY, EXPLORATORY, lyses of adhesions  POSSIBLE REVISION OF MAYRA-EN-Y  Surgeon: Surgeon(s) and Role:     * Dane Burt MD - Primary     * Kp Sage MD - Resident - Assisting  Anesthesia: General with Block   Estimated Blood Loss: 25 mL from 2/17/2023  7:52 AM to 2/17/2023 12:54 PM      Drains: None  Specimens: * No specimens in log *  Findings:   see op note.  Complications: None.  Implants: * No implants in log *

## 2023-02-17 NOTE — ANESTHESIA CARE TRANSFER NOTE
Patient: Meme Robins    Procedure: Procedure(s):  LAPAROTOMY, EXPLORATORY, lyses of adhesions  POSSIBLE REVISION OF MAYRA-EN-Y       Diagnosis: Abdominal pain [R10.9]  Diagnosis Additional Information: No value filed.    Anesthesia Type:   General     Note:    Oropharynx: oropharynx clear of all foreign objects and spontaneously breathing  Level of Consciousness: awake  Oxygen Supplementation: face mask  Level of Supplemental Oxygen (L/min / FiO2): 6  Independent Airway: airway patency satisfactory and stable  Dentition: dentition unchanged  Vital Signs Stable: post-procedure vital signs reviewed and stable  Report to RN Given: handoff report given  Patient transferred to: PACU    Handoff Report: Identifed the Patient, Identified the Reponsible Provider, Reviewed the pertinent medical history, Discussed the surgical course, Reviewed Intra-OP anesthesia mangement and issues during anesthesia, Set expectations for post-procedure period and Allowed opportunity for questions and acknowledgement of understanding      Vitals:  Vitals Value Taken Time   /61 02/17/23 1255   Temp 37.2    Pulse 93 02/17/23 1302   Resp 10 02/17/23 1302   SpO2 100 % 02/17/23 1302   Vitals shown include unvalidated device data.    Electronically Signed By: ABBEY STOREY CRNA  February 17, 2023  1:03 PM

## 2023-02-17 NOTE — BRIEF OP NOTE
[facility] [place]    Brief Operative Note    Pre-operative diagnosis: Abdominal pain [R10.9]  Post-operative diagnosis Same as pre-operative diagnosis    Procedure: Procedure(s):  LAPAROTOMY, EXPLORATORY, lyses of adhesions  POSSIBLE REVISION OF BONILLA-EN-Y  Surgeon: Surgeon(s) and Role:     * Dane Burt MD - Primary     * Kp Sage MD - Resident - Assisting  Anesthesia: General with Block   Estimated Blood Loss: Less than 10 ml    Drains: None  Specimens: * No specimens in log *  Findings:   Extensive adhesions abutting the stomach - adhered to abdominal wall. Bowel anastamosis performed distal to prior Bonilla limb to jejunum anastomosis.   Complications: None.  Implants: * No implants in log *

## 2023-02-17 NOTE — ANESTHESIA PROCEDURE NOTES
Airway       Patient location during procedure: OR       Procedure Start/Stop Times: 2/17/2023 8:10 AM  Staff -        CRNA: Dalton Perez APRN CRNA       Performed By: CRNA  Consent for Airway        Urgency: elective  Indications and Patient Condition       Indications for airway management: yasmeen-procedural       Induction type:RSI       Mask difficulty assessment: 0 - not attempted    Final Airway Details       Final airway type: endotracheal airway       Successful airway: Oral and ETT - single  Endotracheal Airway Details        ETT size (mm): 7.0       Cuffed: yes       Successful intubation technique: video laryngoscopy       VL Blade Size: Glidescope 3       Grade View of Cords: 1       Adjucts: stylet       Position: Right       Measured from: gums/teeth       Secured at (cm): 21       Bite block used: None    Post intubation assessment        Placement verified by: capnometry, equal breath sounds and chest rise        Number of attempts at approach: 1       Number of other approaches attempted: 0       Secured with: pink tape       Ease of procedure: easy       Dentition: Intact and Unchanged    Medication(s) Administered   Medication Administration Time: 2/17/2023 8:10 AM

## 2023-02-17 NOTE — ANESTHESIA PREPROCEDURE EVALUATION
Anesthesia Pre-Procedure Evaluation    Patient: Meme Robins   MRN: [id number] : 1970        Procedure : Procedure(s):  LAPAROTOMY, EXPLORATORY WITH BOWEL RESECTION AND  POSSIBLE REVISION OF MAYRA-EN-Y          Past Medical History:   Diagnosis Date     Adrenal insufficiency (H)     iatrogenic, around , off treatment for several years as of  visit     Anemia      Depression      Esophageal reflux      Idiopathic chronic pancreatitis (H)      Pre-diabetes      Pulmonary embolism (H)       Past Surgical History:   Procedure Laterality Date     CELIAC PLEXUS BLOCK  2014    with alcohol x 1     ENDOMETRIAL ABLATION  2014     ENDOSCOPIC RETROGRADE CHOLANGIOPANCREATOGRAM      about 30, most with PD stentes     ENDOSCOPIC ULTRASOUND, ESOPHAGOSCOPY, GASTROSCOPY, DUODENOSCOPY (EGD), COMBINED       HC SHOULDER ARTHROSCOPY,SURGICAL BICEPS TENODESIS  2017     JEJUNOSTOMY CARE      multiple feeding tubes x 3, at least one was direct J     LAPAROSCOPIC PANCREATECTOMY, TRANSPLANT AUTO ISLET CELL N/A 2021    Procedure: Open total pancreatectomy with autologous islet cell transplantation, splenectomy, and incidental appendectomy;  Surgeon: Elfego Shirley MD;  Location: [facility] OR     LARYNGOSCOPY, FLEXIBLE WITH INJECTION N/A 3/7/2022    Procedure: ENTEROSCOPY, WITH STENT REMOVAL;  Surgeon: Toro Williamson MD;  Location: [facility] OR     PANCREAS SURGERY  2014    John procedure revision     PANCREAS SURGERY  2011    John      Allergies   Allergen Reactions     Metoclopramide      Morphine      Penicillins      Prochlorperazine      Promethazine       Social History     Tobacco Use     Smoking status: Never     Smokeless tobacco: Never   Substance Use Topics     Alcohol use: Not Currently      Wt Readings from Last 1 Encounters:   23 55.9 kg (123 lb 3.8 oz)        Anesthesia Evaluation   Pt has had prior anesthetic. Type: General.    History of anesthetic complications (done well with  scopolamine patch and zofran)  - PONV.      ROS/MED HX  ENT/Pulmonary:  - neg pulmonary ROS     Neurologic: Comment: c-spine DDD; numbness in fingers with neck flexion      (+) migraines (hasn't had any in years),     Cardiovascular:       METS/Exercise Tolerance: 4 - Raking leaves, gardening    Hematologic:     (+) History of blood clots (PE 2012 2/2 horomone therapy for perimenopausal sxs; hematology recommends normal ppx perioperatively),     Musculoskeletal:       GI/Hepatic: Comment: Chronic pancreatitis s/p John procedure, multiple ERCPs  S/p islet cell transplant    (+) GERD, Asymptomatic on medication,     Renal/Genitourinary:  - neg Renal ROS     Endo: Comment: Adrenal insufficiency -> off cortisol; recent cortisol testing normal per endocrinologist      Psychiatric/Substance Use:       Infectious Disease:  - neg infectious disease ROS     Malignancy:  - neg malignancy ROS     Other:            Physical Exam    Airway        Mallampati: II   TM distance: > 3 FB   Neck ROM: full   Mouth opening: > 3 cm    Respiratory Devices and Support         Dental       (+) Completely normal teeth      Cardiovascular             Pulmonary                   OUTSIDE LABS:  CBC:   Lab Results   Component Value Date    WBC 4.7 03/09/2022    WBC 5.1 03/08/2022    HGB 11.0 (L) 03/09/2022    HGB 11.1 (L) 03/08/2022    HCT 35.3 03/09/2022    HCT 34.9 (L) 03/08/2022     03/09/2022     03/08/2022     BMP:   Lab Results   Component Value Date     03/09/2022     03/08/2022    POTASSIUM 4.0 03/09/2022    POTASSIUM 4.3 03/08/2022    CHLORIDE 97 10/18/2022    CHLORIDE 105 03/09/2022    CO2 32 03/09/2022    CO2 31 03/08/2022    BUN 11 03/09/2022    BUN 5 (L) 03/08/2022    CR 0.55 03/09/2022    CR 0.65 03/08/2022     (H) 02/17/2023     (H) 03/09/2022     COAGS:   Lab Results   Component Value Date    PTT 34 08/15/2021    INR 1.01 03/06/2022    FIBR 160 (L) 08/09/2021     POC:   Lab Results    Component Value Date    HCG Negative 08/04/2021    HCGS Negative 03/06/2022     HEPATIC:   Lab Results   Component Value Date    ALBUMIN 4.0 03/06/2022    PROTTOTAL 8.1 03/06/2022    ALT 37 03/06/2022    AST 22 03/06/2022    ALKPHOS 171 (H) 03/06/2022    BILITOTAL 0.5 03/06/2022     OTHER:   Lab Results   Component Value Date    PH 7.37 03/06/2022    LACT 0.7 03/06/2022    A1C 6.0 (H) 03/06/2022    VIKAS 9.0 03/09/2022    PHOS 4.4 03/07/2022    MAG 2.0 03/07/2022    LIPASE 24 (L) 03/06/2022    AMYLASE 63 08/11/2021    CRP <2.9 03/06/2022       Anesthesia Plan    ASA Status:  3   NPO Status:  NPO Appropriate    Anesthesia Type: General.     - Airway: ETT   Induction: Intravenous, Propofol.   Maintenance: Balanced.   Techniques and Equipment:     - Lines/Monitors: 2nd IV, Arterial Line     Consents    Anesthesia Plan(s) and associated risks, benefits, and realistic alternatives discussed. Questions answered and patient/representative(s) expressed understanding.     - Discussed: Risks, Benefits and Alternatives for BOTH SEDATION and the PROCEDURE were discussed     - Discussed with:  Patient      - Extended Intubation/Ventilatory Support Discussed: No.      - Patient is DNR/DNI Status: No         Postoperative Care    Pain management: IV analgesics, Oral pain medications, Multi-modal analgesia, Postop Epidural.   PONV prophylaxis: Ondansetron (or other 5HT-3), Aprepitant, Scopolamine patch     Comments:                Long Faustin MD

## 2023-02-17 NOTE — ANESTHESIA PROCEDURE NOTES
"Epidural catheter Procedure Note    Pre-Procedure   Staff -        Anesthesiologist:  Parish Villalobos MD       Resident/Fellow: Hernan Baker MD       Performed By: resident and anesthesiologist       Location: OB       Procedure Start/Stop Times: 2/17/2023 7:00 AM and 2/17/2023 7:30 AM       Pre-Anesthestic Checklist: patient identified, IV checked, risks and benefits discussed, informed consent, monitors and equipment checked, pre-op evaluation, at physician/surgeon's request and post-op pain management  Timeout:       Correct Patient: Yes        Correct Procedure: Yes        Correct Site: Yes        Correct Position: Yes   Procedure Documentation  Procedure: epidural catheter       Patient Position: sitting       Patient Prep/Sterile Barriers: sterile gloves, mask, patient draped       Skin prep: Chloraprep       Local skin infiltrated with 5 mL of 2% lidocaine.        Insertion Site: L3-4, T9-10.       Technique: LORT saline        JAMES at 5 cm.       Needle Type: IV Cathether       Needle Gauge: 17.        Needle Length (Inches): 3.5        Catheter: 18 G.          Catheter threaded easily.         4 cm epidural space.         Threaded 9 cm at skin.         # of attempts: 1 and  # of redirects:  2    Assessment/Narrative         Paresthesias: No.       Test dose of 3 mL lidocaine 1.5% w/ 1:200,000 epinephrine at 07:15 CST.         Test dose negative, 3 minutes after injection, for signs of intravascular, subdural, or intrathecal injection.       Insertion/Infusion Method: LORT saline       Aspiration negative for Heme or CSF via Epidural Catheter.    Medication(s) Administered   0.125% bupivacaine (Epidural) - EPIDURAL   3 mL - 2/17/2023 7:00:00 AM  2% Lidocaine w/ 1:200K Epi (EPIDURAL) - EPIDURAL   3 mL - 2/17/2023 7:00:00 AM  Medication Administration Time: 2/17/2023 7:00 AM      FOR [facility] ([facility]) ONLY:   Pain Team Contact information: please page the Pain Team Via [employer]. Search \"Pain\". During " daytime hours, please page the attending first. At night please page the resident first.

## 2023-02-17 NOTE — ANESTHESIA PROCEDURE NOTES
Arterial Line Procedure Note    Pre-Procedure   Staff -        Anesthesiologist:  Long Faustin MD       Performed By: anesthesiologist       Location: OR       Pre-Anesthestic Checklist: patient identified, IV checked, risks and benefits discussed, informed consent, monitors and equipment checked, pre-op evaluation and at physician/surgeon's request  Timeout:       Correct Patient: Yes        Correct Procedure: Yes        Correct Site: Yes        Correct Position: Yes   Line Placement:   This line was placed Post Induction  Procedure   Procedure: arterial line, new line and elective       Laterality: right       Insertion Site: radial.  Sterile Prep        Standard elements of sterile barrier followed       Skin prep: Chloraprep  Insertion/Injection        Technique: Seldinger Technique        Catheter Type/Size: 20 G, 12 cm  Narrative         Secured by: suture       Tegaderm dressing used.       Complications: None apparent,        Arterial waveform: Yes        IBP within 10% of NIBP: Yes

## 2023-02-18 LAB
ANION GAP SERPL CALCULATED.3IONS-SCNC: 8 MMOL/L (ref 7–15)
BUN SERPL-MCNC: 8.2 MG/DL (ref 6–20)
CALCIUM SERPL-MCNC: 7.9 MG/DL (ref 8.6–10)
CHLORIDE SERPL-SCNC: 106 MMOL/L (ref 98–107)
CREAT SERPL-MCNC: 0.59 MG/DL (ref 0.51–0.95)
CREAT SERPL-MCNC: 0.63 MG/DL (ref 0.51–0.95)
DEPRECATED HCO3 PLAS-SCNC: 25 MMOL/L (ref 22–29)
ERYTHROCYTE [DISTWIDTH] IN BLOOD BY AUTOMATED COUNT: 14.4 % (ref 10–15)
GFR SERPL CREATININE-BSD FRML MDRD: >90 ML/MIN/1.73M2
GFR SERPL CREATININE-BSD FRML MDRD: >90 ML/MIN/1.73M2
GLUCOSE BLDC GLUCOMTR-MCNC: 109 MG/DL (ref 70–99)
GLUCOSE BLDC GLUCOMTR-MCNC: 120 MG/DL (ref 70–99)
GLUCOSE BLDC GLUCOMTR-MCNC: 131 MG/DL (ref 70–99)
GLUCOSE SERPL-MCNC: 146 MG/DL (ref 70–99)
HBA1C MFR BLD: 6.3 %
HCT VFR BLD AUTO: 37.3 % (ref 35–47)
HCT VFR BLD AUTO: 41.3 % (ref 35–47)
HGB BLD-MCNC: 11.8 G/DL (ref 11.7–15.7)
HGB BLD-MCNC: 13.2 G/DL (ref 11.7–15.7)
MAGNESIUM SERPL-MCNC: 1.9 MG/DL (ref 1.7–2.3)
MCH RBC QN AUTO: 29.4 PG (ref 26.5–33)
MCHC RBC AUTO-ENTMCNC: 31.6 G/DL (ref 31.5–36.5)
MCV RBC AUTO: 93 FL (ref 78–100)
PHOSPHATE SERPL-MCNC: 3.7 MG/DL (ref 2.5–4.5)
PLATELET # BLD AUTO: 324 10E3/UL (ref 150–450)
POTASSIUM SERPL-SCNC: 4 MMOL/L (ref 3.4–5.3)
RBC # BLD AUTO: 4.02 10E6/UL (ref 3.8–5.2)
SODIUM SERPL-SCNC: 139 MMOL/L (ref 136–145)
WBC # BLD AUTO: 12.6 10E3/UL (ref 4–11)

## 2023-02-18 PROCEDURE — 258N000003 HC RX IP 258 OP 636: Performed by: STUDENT IN AN ORGANIZED HEALTH CARE EDUCATION/TRAINING PROGRAM

## 2023-02-18 PROCEDURE — 250N000013 HC RX MED GY IP 250 OP 250 PS 637: Performed by: SURGERY

## 2023-02-18 PROCEDURE — 83036 HEMOGLOBIN GLYCOSYLATED A1C: CPT | Performed by: SURGERY

## 2023-02-18 PROCEDURE — 85027 COMPLETE CBC AUTOMATED: CPT | Performed by: STUDENT IN AN ORGANIZED HEALTH CARE EDUCATION/TRAINING PROGRAM

## 2023-02-18 PROCEDURE — 250N000011 HC RX IP 250 OP 636: Performed by: SURGERY

## 2023-02-18 PROCEDURE — 250N000011 HC RX IP 250 OP 636: Performed by: NURSE PRACTITIONER

## 2023-02-18 PROCEDURE — 120N000011 HC R&B TRANSPLANT UMMC

## 2023-02-18 PROCEDURE — 99231 SBSQ HOSP IP/OBS SF/LOW 25: CPT | Mod: GC | Performed by: ANESTHESIOLOGY

## 2023-02-18 PROCEDURE — 36415 COLL VENOUS BLD VENIPUNCTURE: CPT | Performed by: SURGERY

## 2023-02-18 PROCEDURE — 80048 BASIC METABOLIC PNL TOTAL CA: CPT | Performed by: SURGERY

## 2023-02-18 PROCEDURE — 82565 ASSAY OF CREATININE: CPT | Performed by: SURGERY

## 2023-02-18 PROCEDURE — 999N000127 HC STATISTIC PERIPHERAL IV START W US GUIDANCE

## 2023-02-18 PROCEDURE — 99024 POSTOP FOLLOW-UP VISIT: CPT | Mod: FS | Performed by: SURGERY

## 2023-02-18 PROCEDURE — 85018 HEMOGLOBIN: CPT | Performed by: SURGERY

## 2023-02-18 PROCEDURE — 84100 ASSAY OF PHOSPHORUS: CPT | Performed by: SURGERY

## 2023-02-18 PROCEDURE — 250N000011 HC RX IP 250 OP 636: Performed by: STUDENT IN AN ORGANIZED HEALTH CARE EDUCATION/TRAINING PROGRAM

## 2023-02-18 PROCEDURE — 83735 ASSAY OF MAGNESIUM: CPT | Performed by: SURGERY

## 2023-02-18 RX ORDER — NALOXONE HYDROCHLORIDE 0.4 MG/ML
0.4 INJECTION, SOLUTION INTRAMUSCULAR; INTRAVENOUS; SUBCUTANEOUS
Status: DISCONTINUED | OUTPATIENT
Start: 2023-02-18 | End: 2023-02-25 | Stop reason: HOSPADM

## 2023-02-18 RX ORDER — NALOXONE HYDROCHLORIDE 0.4 MG/ML
0.2 INJECTION, SOLUTION INTRAMUSCULAR; INTRAVENOUS; SUBCUTANEOUS
Status: DISCONTINUED | OUTPATIENT
Start: 2023-02-18 | End: 2023-02-25 | Stop reason: HOSPADM

## 2023-02-18 RX ORDER — HYDROMORPHONE HCL IN WATER/PF 6 MG/30 ML
0.2 PATIENT CONTROLLED ANALGESIA SYRINGE INTRAVENOUS
Status: DISCONTINUED | OUTPATIENT
Start: 2023-02-18 | End: 2023-02-19

## 2023-02-18 RX ORDER — HYDROMORPHONE HCL IN WATER/PF 6 MG/30 ML
0.4 PATIENT CONTROLLED ANALGESIA SYRINGE INTRAVENOUS
Status: DISCONTINUED | OUTPATIENT
Start: 2023-02-18 | End: 2023-02-19

## 2023-02-18 RX ADMIN — CLONAZEPAM 1.5 MG: 0.5 TABLET ORAL at 22:09

## 2023-02-18 RX ADMIN — HYDROMORPHONE HYDROCHLORIDE 0.4 MG: 0.2 INJECTION, SOLUTION INTRAMUSCULAR; INTRAVENOUS; SUBCUTANEOUS at 17:54

## 2023-02-18 RX ADMIN — HYDROMORPHONE HYDROCHLORIDE 0.4 MG: 0.2 INJECTION, SOLUTION INTRAMUSCULAR; INTRAVENOUS; SUBCUTANEOUS at 13:43

## 2023-02-18 RX ADMIN — TRAZODONE HYDROCHLORIDE 100 MG: 100 TABLET ORAL at 22:10

## 2023-02-18 RX ADMIN — SENNOSIDES AND DOCUSATE SODIUM 1 TABLET: 50; 8.6 TABLET ORAL at 19:55

## 2023-02-18 RX ADMIN — SENNOSIDES AND DOCUSATE SODIUM 1 TABLET: 50; 8.6 TABLET ORAL at 08:41

## 2023-02-18 RX ADMIN — BUPIVACAINE HYDROCHLORIDE: 7.5 INJECTION, SOLUTION EPIDURAL; RETROBULBAR at 15:39

## 2023-02-18 RX ADMIN — HYDROMORPHONE HYDROCHLORIDE 0.4 MG: 0.2 INJECTION, SOLUTION INTRAMUSCULAR; INTRAVENOUS; SUBCUTANEOUS at 15:50

## 2023-02-18 RX ADMIN — HEPARIN SODIUM 5000 UNITS: 5000 INJECTION, SOLUTION INTRAVENOUS; SUBCUTANEOUS at 15:44

## 2023-02-18 RX ADMIN — HYDROMORPHONE HYDROCHLORIDE 6 MG: 2 TABLET ORAL at 20:56

## 2023-02-18 RX ADMIN — SODIUM CHLORIDE 500 ML: 9 INJECTION, SOLUTION INTRAVENOUS at 06:58

## 2023-02-18 RX ADMIN — METHOCARBAMOL 1000 MG: 500 TABLET ORAL at 13:43

## 2023-02-18 RX ADMIN — HEPARIN SODIUM 5000 UNITS: 5000 INJECTION, SOLUTION INTRAVENOUS; SUBCUTANEOUS at 08:42

## 2023-02-18 RX ADMIN — METHOCARBAMOL 1000 MG: 500 TABLET ORAL at 19:55

## 2023-02-18 RX ADMIN — HYDROMORPHONE HYDROCHLORIDE 6 MG: 2 TABLET ORAL at 16:32

## 2023-02-18 RX ADMIN — ACETAMINOPHEN 975 MG: 325 TABLET ORAL at 15:41

## 2023-02-18 RX ADMIN — HYDROMORPHONE HYDROCHLORIDE 0.4 MG: 0.2 INJECTION, SOLUTION INTRAMUSCULAR; INTRAVENOUS; SUBCUTANEOUS at 23:09

## 2023-02-18 RX ADMIN — SODIUM CHLORIDE, SODIUM LACTATE, POTASSIUM CHLORIDE, CALCIUM CHLORIDE AND DEXTROSE MONOHYDRATE: 5; 600; 310; 30; 20 INJECTION, SOLUTION INTRAVENOUS at 17:55

## 2023-02-18 RX ADMIN — ACETAMINOPHEN 975 MG: 325 TABLET ORAL at 08:41

## 2023-02-18 RX ADMIN — HYDROMORPHONE HYDROCHLORIDE 6 MG: 2 TABLET ORAL at 12:38

## 2023-02-18 RX ADMIN — SERTRALINE HYDROCHLORIDE 150 MG: 50 TABLET ORAL at 22:09

## 2023-02-18 RX ADMIN — HYDROMORPHONE HYDROCHLORIDE 0.4 MG: 0.2 INJECTION, SOLUTION INTRAMUSCULAR; INTRAVENOUS; SUBCUTANEOUS at 19:55

## 2023-02-18 RX ADMIN — HYDROMORPHONE HYDROCHLORIDE 0.4 MG: 0.2 INJECTION, SOLUTION INTRAMUSCULAR; INTRAVENOUS; SUBCUTANEOUS at 11:17

## 2023-02-18 RX ADMIN — METHOCARBAMOL 1000 MG: 500 TABLET ORAL at 08:42

## 2023-02-18 RX ADMIN — SODIUM CHLORIDE, SODIUM LACTATE, POTASSIUM CHLORIDE, CALCIUM CHLORIDE AND DEXTROSE MONOHYDRATE: 5; 600; 310; 30; 20 INJECTION, SOLUTION INTRAVENOUS at 04:32

## 2023-02-18 RX ADMIN — HYDROMORPHONE HYDROCHLORIDE 4 MG: 2 TABLET ORAL at 08:53

## 2023-02-18 RX ADMIN — CLONAZEPAM 1 MG: 1 TABLET ORAL at 08:41

## 2023-02-18 ASSESSMENT — ACTIVITIES OF DAILY LIVING (ADL)
ADLS_ACUITY_SCORE: 37
ADLS_ACUITY_SCORE: 39
ADLS_ACUITY_SCORE: 37
ADLS_ACUITY_SCORE: 37
ADLS_ACUITY_SCORE: 22
ADLS_ACUITY_SCORE: 22
ADLS_ACUITY_SCORE: 37
ADLS_ACUITY_SCORE: 39
ADLS_ACUITY_SCORE: 22
ADLS_ACUITY_SCORE: 39
ADLS_ACUITY_SCORE: 39
ADLS_ACUITY_SCORE: 37

## 2023-02-18 NOTE — PROGRESS NOTES
"Pain Service Progress Note  [facility]  Date: 02/18/2023       Patient Name: Meme Robins  MRN: [id number]  Age: 52 year old  Sex: female    Assessment:  Meme Robins is a 53 y/o F with a PMHx significant for Cervical DDD (sx of numbness in her fingers when with neck flexion), migraines, h/o PE, chronic pancreatitis s/p John procedure, multiple ERCPs, and s/p islet cell transplant, chronic abdominal pain and h/o adrenal insufficiency with most recent testing being normal who is now s/p ex lap for lysis of adhesions and nuris-en-y revision for tx of abdominal pain.     Procedure: Exploratory Laparotomy, lysis of adhesions, possible nuris-en-y revision     Date of Surgery: 2/17/23    Date of Catheter Placement: 2/17/23    Plan/Recommendations:  1. Regional Anesthesia/Analgesia  -Continuous Catheter Type/Site: Epidural T9-10  Continue Bupivacaine 0.125% continuous infusion at 8 mL/hr     Plan to maintain catheter while chest tubes in place, max of 7 days     2. Anticoagulation  -Please contact Inpatient Pain Service before ordering or making any anticoagulation changes        3. Multimodal Analgesia  - multimodals per primary service orders     Pain Service will continue to follow.     Discussed with attending anesthesiologist    Please Page the Pain Team Via [employer]: \"PAIN MANAGEMENT ACUTE INPATIENT/ [facility]\"    Debbie Garcia MD  02/18/2023     Overnight Events: NAEO. Epidural was adjusted by anesthesia resident overnight and pt endorses having better pain control since. She notes she is still having aching abdominal pain, but denies any incisional pain, and has been able to ambulate in her room as well as rest comfortably.     Tubes/Drains: No    Subjective:  doing okay   Nausea: No  Vomiting: No  Pruritus: No  Symptoms of LAST: No    Pain Location:  Generalized abdominal Ache    Pain Intensity:    Pain at Rest: 4/10   Pain with Activity: 7/10  Comfort Goal: 4/10   Satisfied with " "your level of pain control: Yes    Diet: Clear Liquid Diet (diabetic); Other - please comment    Relevant Labs:  Recent Labs   Lab Test 02/18/23  0743 03/07/22  0640 03/06/22  0848 08/16/21  0638 08/15/21  0743   INR  --   --  1.01  --   --       < > 518*   < > 327   PTT  --   --   --   --  34   BUN 8.2   < > 16   < > 6*    < > = values in this interval not displayed.       Physical Exam:  Vitals: /76   Pulse 81   Temp 36.8  C (98.2  F) (Oral)   Resp 16   Ht 1.549 m (5' 1\")   Wt 55.9 kg (123 lb 3.8 oz)   SpO2 95%   BMI 23.29 kg/m      Physical Exam:   Orientation:  Alert, oriented, and in no acute distress: Yes, appears very comfortable, resting in bed  Sedation: No    Motor Examination:  5/5 Strength in lower extremities: Yes    Sensory Level:   Decrease in sensation: Yes    Catheter Site:   Catheter entry site is clean/dry/intact: Yes    Tender: No      Relevant Medications:  Current Pain Medications:  Medications related to Pain Management (From now, onward)    Start     Dose/Rate Route Frequency Ordered Stop    02/20/23 0000  acetaminophen (TYLENOL) tablet 650 mg         650 mg Oral EVERY 4 HOURS PRN 02/17/23 2155 02/18/23 1112  HYDROmorphone (DILAUDID) injection 0.4 mg        See Hyperspace for full Linked Orders Report.    0.4 mg Intravenous EVERY 2 HOURS PRN 02/18/23 1113      02/18/23 1112  HYDROmorphone (DILAUDID) injection 0.2 mg        See Hyperspace for full Linked Orders Report.    0.2 mg Intravenous EVERY 2 HOURS PRN 02/18/23 1113      02/18/23 0800  polyethylene glycol (MIRALAX) Packet 17 g         17 g Oral DAILY 02/17/23 2155 02/18/23 0800  clonazePAM (klonoPIN) tablet 1 mg         1 mg Oral DAILY 02/17/23 2250 02/18/23 0000  clonazePAM (klonoPIN) tablet 1.5 mg         1.5 mg Oral EVERY EVENING 02/17/23 2256 02/17/23 2300  acetaminophen (TYLENOL) tablet 975 mg         975 mg Oral EVERY 8 HOURS 02/17/23 2155 02/20/23 5749    02/17/23 2870  senna-docusate " (SENOKOT-S/PERICOLACE) 8.6-50 MG per tablet 1 tablet         1 tablet Oral 2 TIMES DAILY 02/17/23 2155 02/17/23 2200  methocarbamol (ROBAXIN) tablet 1,000 mg        Note to Pharmacy: TOBY Sig:Take 2 tablets (1,000 mg) by mouth 3 times daily      1,000 mg Oral 3 TIMES DAILY 02/17/23 2159 02/17/23 2155  magnesium hydroxide (MILK OF MAGNESIA) suspension 30 mL         30 mL Oral DAILY PRN 02/17/23 2155 02/17/23 2155  bisacodyl (DULCOLAX) suppository 10 mg         10 mg Rectal DAILY PRN 02/17/23 2155 02/17/23 2155  HYDROmorphone (DILAUDID) tablet 4 mg        See Hyperspace for full Linked Orders Report.    4 mg Oral EVERY 4 HOURS PRN 02/17/23 2155 02/17/23 2155  HYDROmorphone (DILAUDID) tablet 6 mg        See Hyperspace for full Linked Orders Report.    6 mg Oral EVERY 4 HOURS PRN 02/17/23 2155 02/17/23 0830  bupivacaine (MARCAINE) 0.125 % in sodium chloride 0.9 % 250 mL EPIDURAL Infusion         8 mL/hr  EPIDURAL CONTINUOUS 02/17/23 0812            Primary Service Contacted with Recommendations? No

## 2023-02-18 NOTE — OR NURSING
Pt Bp 87/50, Dr. Espinoza with Anesthesia made aware- at bedside to assess. Dr. Espinoza adjusted epidural. No new orders.

## 2023-02-19 LAB
ANION GAP SERPL CALCULATED.3IONS-SCNC: 8 MMOL/L (ref 7–15)
BUN SERPL-MCNC: 5.6 MG/DL (ref 6–20)
CALCIUM SERPL-MCNC: 8.4 MG/DL (ref 8.6–10)
CHLORIDE SERPL-SCNC: 103 MMOL/L (ref 98–107)
CREAT SERPL-MCNC: 0.51 MG/DL (ref 0.51–0.95)
DEPRECATED HCO3 PLAS-SCNC: 29 MMOL/L (ref 22–29)
ERYTHROCYTE [DISTWIDTH] IN BLOOD BY AUTOMATED COUNT: 14.3 % (ref 10–15)
GFR SERPL CREATININE-BSD FRML MDRD: >90 ML/MIN/1.73M2
GLUCOSE BLDC GLUCOMTR-MCNC: 105 MG/DL (ref 70–99)
GLUCOSE BLDC GLUCOMTR-MCNC: 107 MG/DL (ref 70–99)
GLUCOSE BLDC GLUCOMTR-MCNC: 111 MG/DL (ref 70–99)
GLUCOSE BLDC GLUCOMTR-MCNC: 135 MG/DL (ref 70–99)
GLUCOSE BLDC GLUCOMTR-MCNC: 141 MG/DL (ref 70–99)
GLUCOSE SERPL-MCNC: 126 MG/DL (ref 70–99)
HCT VFR BLD AUTO: 35.7 % (ref 35–47)
HGB BLD-MCNC: 11.5 G/DL (ref 11.7–15.7)
MAGNESIUM SERPL-MCNC: 1.8 MG/DL (ref 1.7–2.3)
MCH RBC QN AUTO: 29.5 PG (ref 26.5–33)
MCHC RBC AUTO-ENTMCNC: 32.2 G/DL (ref 31.5–36.5)
MCV RBC AUTO: 92 FL (ref 78–100)
PHOSPHATE SERPL-MCNC: 3.6 MG/DL (ref 2.5–4.5)
PLATELET # BLD AUTO: 315 10E3/UL (ref 150–450)
POTASSIUM SERPL-SCNC: 4.2 MMOL/L (ref 3.4–5.3)
RBC # BLD AUTO: 3.9 10E6/UL (ref 3.8–5.2)
SODIUM SERPL-SCNC: 140 MMOL/L (ref 136–145)
WBC # BLD AUTO: 10.3 10E3/UL (ref 4–11)

## 2023-02-19 PROCEDURE — 120N000011 HC R&B TRANSPLANT UMMC

## 2023-02-19 PROCEDURE — 85027 COMPLETE CBC AUTOMATED: CPT | Performed by: SURGERY

## 2023-02-19 PROCEDURE — 250N000011 HC RX IP 250 OP 636: Performed by: SURGERY

## 2023-02-19 PROCEDURE — 99231 SBSQ HOSP IP/OBS SF/LOW 25: CPT | Mod: GC | Performed by: ANESTHESIOLOGY

## 2023-02-19 PROCEDURE — 250N000011 HC RX IP 250 OP 636

## 2023-02-19 PROCEDURE — 250N000013 HC RX MED GY IP 250 OP 250 PS 637: Performed by: NURSE PRACTITIONER

## 2023-02-19 PROCEDURE — 99231 SBSQ HOSP IP/OBS SF/LOW 25: CPT | Mod: FS | Performed by: SURGERY

## 2023-02-19 PROCEDURE — 36415 COLL VENOUS BLD VENIPUNCTURE: CPT | Performed by: SURGERY

## 2023-02-19 PROCEDURE — 80048 BASIC METABOLIC PNL TOTAL CA: CPT | Performed by: SURGERY

## 2023-02-19 PROCEDURE — 250N000013 HC RX MED GY IP 250 OP 250 PS 637: Performed by: SURGERY

## 2023-02-19 PROCEDURE — 83735 ASSAY OF MAGNESIUM: CPT | Performed by: SURGERY

## 2023-02-19 PROCEDURE — 250N000011 HC RX IP 250 OP 636: Performed by: NURSE PRACTITIONER

## 2023-02-19 PROCEDURE — 84100 ASSAY OF PHOSPHORUS: CPT | Performed by: SURGERY

## 2023-02-19 PROCEDURE — 258N000003 HC RX IP 258 OP 636

## 2023-02-19 RX ORDER — HYDROMORPHONE HYDROCHLORIDE 2 MG/1
4 TABLET ORAL
Status: DISCONTINUED | OUTPATIENT
Start: 2023-02-19 | End: 2023-02-25 | Stop reason: HOSPADM

## 2023-02-19 RX ORDER — HYDROMORPHONE HYDROCHLORIDE 2 MG/1
6 TABLET ORAL
Status: DISCONTINUED | OUTPATIENT
Start: 2023-02-19 | End: 2023-02-25 | Stop reason: HOSPADM

## 2023-02-19 RX ORDER — PANTOPRAZOLE SODIUM 40 MG/1
40 TABLET, DELAYED RELEASE ORAL 2 TIMES DAILY
Status: DISCONTINUED | OUTPATIENT
Start: 2023-02-19 | End: 2023-02-25 | Stop reason: HOSPADM

## 2023-02-19 RX ORDER — NICOTINE POLACRILEX 4 MG
15-30 LOZENGE BUCCAL
Status: DISCONTINUED | OUTPATIENT
Start: 2023-02-19 | End: 2023-02-19

## 2023-02-19 RX ORDER — PANTOPRAZOLE SODIUM 40 MG/1
40 TABLET, DELAYED RELEASE ORAL 2 TIMES DAILY
COMMUNITY

## 2023-02-19 RX ORDER — BISACODYL 10 MG
10 SUPPOSITORY, RECTAL RECTAL ONCE
Status: COMPLETED | OUTPATIENT
Start: 2023-02-19 | End: 2023-02-19

## 2023-02-19 RX ORDER — FAMOTIDINE 20 MG/1
20 TABLET, FILM COATED ORAL ONCE
Status: COMPLETED | OUTPATIENT
Start: 2023-02-19 | End: 2023-02-19

## 2023-02-19 RX ORDER — DEXTROSE MONOHYDRATE 25 G/50ML
25-50 INJECTION, SOLUTION INTRAVENOUS
Status: DISCONTINUED | OUTPATIENT
Start: 2023-02-19 | End: 2023-02-19

## 2023-02-19 RX ORDER — HYDROMORPHONE HYDROCHLORIDE 2 MG/1
2 TABLET ORAL ONCE
Status: COMPLETED | OUTPATIENT
Start: 2023-02-19 | End: 2023-02-19

## 2023-02-19 RX ORDER — HYDROMORPHONE HCL IN WATER/PF 6 MG/30 ML
0.2 PATIENT CONTROLLED ANALGESIA SYRINGE INTRAVENOUS
Status: DISCONTINUED | OUTPATIENT
Start: 2023-02-19 | End: 2023-02-23

## 2023-02-19 RX ADMIN — HYDROMORPHONE HYDROCHLORIDE 0.4 MG: 0.2 INJECTION, SOLUTION INTRAMUSCULAR; INTRAVENOUS; SUBCUTANEOUS at 05:11

## 2023-02-19 RX ADMIN — METHOCARBAMOL 1000 MG: 500 TABLET ORAL at 14:08

## 2023-02-19 RX ADMIN — SERTRALINE HYDROCHLORIDE 150 MG: 50 TABLET ORAL at 21:55

## 2023-02-19 RX ADMIN — CLONAZEPAM 1 MG: 1 TABLET ORAL at 07:55

## 2023-02-19 RX ADMIN — HYDROMORPHONE HYDROCHLORIDE 0.2 MG: 0.2 INJECTION, SOLUTION INTRAMUSCULAR; INTRAVENOUS; SUBCUTANEOUS at 12:08

## 2023-02-19 RX ADMIN — ACETAMINOPHEN 975 MG: 325 TABLET ORAL at 00:10

## 2023-02-19 RX ADMIN — HEPARIN SODIUM 5000 UNITS: 5000 INJECTION, SOLUTION INTRAVENOUS; SUBCUTANEOUS at 23:38

## 2023-02-19 RX ADMIN — PANCRELIPASE LIPASE, PANCRELIPASE PROTEASE, PANCRELIPASE AMYLASE 3 CAPSULE: 20000; 63000; 84000 CAPSULE, DELAYED RELEASE ORAL at 14:25

## 2023-02-19 RX ADMIN — HEPARIN SODIUM 5000 UNITS: 5000 INJECTION, SOLUTION INTRAVENOUS; SUBCUTANEOUS at 07:55

## 2023-02-19 RX ADMIN — HYDROMORPHONE HYDROCHLORIDE 2 MG: 2 TABLET ORAL at 10:47

## 2023-02-19 RX ADMIN — HYDROMORPHONE HYDROCHLORIDE 0.2 MG: 0.2 INJECTION, SOLUTION INTRAMUSCULAR; INTRAVENOUS; SUBCUTANEOUS at 19:44

## 2023-02-19 RX ADMIN — PANTOPRAZOLE SODIUM 40 MG: 40 TABLET, DELAYED RELEASE ORAL at 19:45

## 2023-02-19 RX ADMIN — Medication 1 TABLET: at 07:54

## 2023-02-19 RX ADMIN — HYDROMORPHONE HYDROCHLORIDE 0.4 MG: 0.2 INJECTION, SOLUTION INTRAMUSCULAR; INTRAVENOUS; SUBCUTANEOUS at 02:10

## 2023-02-19 RX ADMIN — TRAZODONE HYDROCHLORIDE 100 MG: 100 TABLET ORAL at 21:56

## 2023-02-19 RX ADMIN — HYDROMORPHONE HYDROCHLORIDE 6 MG: 2 TABLET ORAL at 04:13

## 2023-02-19 RX ADMIN — PANCRELIPASE LIPASE, PANCRELIPASE PROTEASE, PANCRELIPASE AMYLASE 2 CAPSULE: 20000; 63000; 84000 CAPSULE, DELAYED RELEASE ORAL at 18:15

## 2023-02-19 RX ADMIN — HEPARIN SODIUM 5000 UNITS: 5000 INJECTION, SOLUTION INTRAVENOUS; SUBCUTANEOUS at 00:10

## 2023-02-19 RX ADMIN — ACETAMINOPHEN 975 MG: 325 TABLET ORAL at 23:39

## 2023-02-19 RX ADMIN — SODIUM CHLORIDE, SODIUM LACTATE, POTASSIUM CHLORIDE, CALCIUM CHLORIDE AND DEXTROSE MONOHYDRATE: 5; 600; 310; 30; 20 INJECTION, SOLUTION INTRAVENOUS at 20:12

## 2023-02-19 RX ADMIN — SODIUM CHLORIDE, SODIUM LACTATE, POTASSIUM CHLORIDE, CALCIUM CHLORIDE AND DEXTROSE MONOHYDRATE: 5; 600; 310; 30; 20 INJECTION, SOLUTION INTRAVENOUS at 08:24

## 2023-02-19 RX ADMIN — POLYETHYLENE GLYCOL 3350 17 G: 17 POWDER, FOR SOLUTION ORAL at 07:55

## 2023-02-19 RX ADMIN — BUPIVACAINE HYDROCHLORIDE: 7.5 INJECTION, SOLUTION EPIDURAL; RETROBULBAR at 20:09

## 2023-02-19 RX ADMIN — HEPARIN SODIUM 5000 UNITS: 5000 INJECTION, SOLUTION INTRAVENOUS; SUBCUTANEOUS at 16:51

## 2023-02-19 RX ADMIN — HYDROMORPHONE HYDROCHLORIDE 4 MG: 2 TABLET ORAL at 10:37

## 2023-02-19 RX ADMIN — SENNOSIDES AND DOCUSATE SODIUM 1 TABLET: 50; 8.6 TABLET ORAL at 19:45

## 2023-02-19 RX ADMIN — ACETAMINOPHEN 975 MG: 325 TABLET ORAL at 16:51

## 2023-02-19 RX ADMIN — METHOCARBAMOL 1000 MG: 500 TABLET ORAL at 07:54

## 2023-02-19 RX ADMIN — HYDROMORPHONE HYDROCHLORIDE 6 MG: 2 TABLET ORAL at 18:13

## 2023-02-19 RX ADMIN — SENNOSIDES AND DOCUSATE SODIUM 1 TABLET: 50; 8.6 TABLET ORAL at 07:54

## 2023-02-19 RX ADMIN — METHOCARBAMOL 1000 MG: 500 TABLET ORAL at 19:45

## 2023-02-19 RX ADMIN — HYDROMORPHONE HYDROCHLORIDE 0.2 MG: 0.2 INJECTION, SOLUTION INTRAMUSCULAR; INTRAVENOUS; SUBCUTANEOUS at 07:50

## 2023-02-19 RX ADMIN — ACETAMINOPHEN 975 MG: 325 TABLET ORAL at 07:55

## 2023-02-19 RX ADMIN — FAMOTIDINE 20 MG: 20 TABLET ORAL at 14:29

## 2023-02-19 RX ADMIN — HYDROMORPHONE HYDROCHLORIDE 6 MG: 2 TABLET ORAL at 00:52

## 2023-02-19 RX ADMIN — HYDROMORPHONE HYDROCHLORIDE 6 MG: 2 TABLET ORAL at 23:39

## 2023-02-19 RX ADMIN — HYDROMORPHONE HYDROCHLORIDE 0.2 MG: 0.2 INJECTION, SOLUTION INTRAMUSCULAR; INTRAVENOUS; SUBCUTANEOUS at 15:37

## 2023-02-19 RX ADMIN — HYDROMORPHONE HYDROCHLORIDE 6 MG: 2 TABLET ORAL at 14:08

## 2023-02-19 RX ADMIN — CLONAZEPAM 1.5 MG: 0.5 TABLET ORAL at 19:45

## 2023-02-19 ASSESSMENT — ACTIVITIES OF DAILY LIVING (ADL)
ADLS_ACUITY_SCORE: 22

## 2023-02-19 NOTE — PLAN OF CARE
Goal Outcome Evaluation:      Plan of Care Reviewed With: patient, spouse     VSS. Alert and oriented. Midline abd incision CDI. Pain controlled with oral and IV dilaudid, scheduled tylenol and methocarbamol. Epidural infusing. PIV infusing D5LR at 775 ccs/hr. Miller removed and pt has voided spontaneously. Tolerating clear liquid diet. Denies nausea, passing flatus. Ambulates with SBA. Pt's  visiting. Continue with POC.

## 2023-02-19 NOTE — PROVIDER NOTIFICATION
Mayte Sumner NP is notified that pt's B/P has been soft in the low 90's/50's and  high 80's/50's; pt is asymptomatic.

## 2023-02-19 NOTE — PLAN OF CARE
"/64 (BP Location: Right arm)   Pulse 83   Temp 98.2  F (36.8  C) (Oral)   Resp 16   Ht 1.549 m (5' 1\")   Wt 55.9 kg (123 lb 3.8 oz)   SpO2 91%   BMI 23.29 kg/m     Time: 1404-5166  Reason for admission: Abdominal pain/exploratory lysis of adhesions.   Activity: SBA  Pain: Patient received PRN IV and oral dilaudid, and scheduled Tylenol and Robaxin for pain management with effectiveness.   Neuro: alert and oriented.   Cardiac: WDL  Resp: denied SOB, lung sounds clear bilaterally.   GI/: clear liquid diet, voids without difficulties, passing flatus, bowel sounds present x four quadrants.   Lines: L PIV, dextrose 5% in LR infusing at 75 ml/hr and bupivacaine epidural infusing at 8 ml/hr continuous.   Skin: ABD incision, approximated, liquid bandage.   Labs/Imaging: BG at 2200 109, BG at 0200, 135.   New changes to shift: No change.   Plan: continue with current plan of cares.                       "

## 2023-02-19 NOTE — PLAN OF CARE
Goal Outcome Evaluation:  Pt is sleepy, awakes easily with verbal stimulation. Pt rates abdominal pain 6-7/10. Epidural, Bupivacaine 0.125% is increased from 8 ml/hr to 10 ml/hr per anesthesia.  Decrease in pain with PRN dilaudid IV and dilaudid po, scheduled tylenol and robaxin.  Pt was sats to high 80's on RA intermittently, Pt is placed on 2 LPM NC, sats mid 90's.  D5 LR is infusing at 75 ml/hr. Up to bathroom with SBA, had adequate UOP. Abdomen is firm and distended; pt reported that she is passing gas, but NO BM since surgery. Walked in [place] x2 with nursing staff and ; pt needs to walk two more times today. Diet advanced to full liquid; pt felt more an=bdominal pain after few spoons of cream of wheat and ice cream; pt is taking it easy. Milk of magnesia offered; but pt prefers to take it in the morning per a doctor's recommendation.Uses IS with encouragement.   came to visit; supportive of pt  Continue with POC

## 2023-02-19 NOTE — PROGRESS NOTES
Pancreatitis Service - Daily Progress Note  02/18/2023    Assessment & Plan: 51 year old female with a history of idiopathic chronic pancreatitis s/p PD sphincterotomy and multiple PD stents (10-15) in [place] and at [facility], cholecystectomy, TPIAT at this facility in 8/2021. Her overall recovery has been excellent until recently, when she had recurrent abdominal pain, bloating/nausea, malaise, and fevers. Imaging and endoscopic workup demonstrated reflux of enteric contents into her biliary limb with widely patent hepaticojejunostomy. She was admitted 2/17 following laparotomy, lysis of adhesions, and lengthening of biliary limb to total ~100cm.      GI: surgery as above. Passing some flatus. OK for full liquids, but would wait until return of bowel function prior to initiating solids. Advised to go slowly. Start bowel regimen  Cardiorespiratory: Stable  Nutrition: FLD pending return of bowel function  Fluid/Electrolytes: D5 LR 75/hr, stop when PO adequate  : Miller removed uneventfully  Post-pancreatectomy diabetes: Hgb A1C 6. PTA Tresiba 7 units, and sliding scale correction every 4 hours.  Has CGM. Reinitiate at lower doses as diet advances  Infection: afebrile. No abx  Prophylaxis: DVT, consider heparin subcutaneous if patient not ambulating.   Pain control:   Post surgical pain: APS managing epidural- strengthened today with good relief. PRN IV/PO dilaudid.   Chronic neck pain: Neurontin 300 mg AM/600 mg PM.   Psych:   Hx anxiety, depression, insomnia: PTA trazodone, zoloft, and clonazepam ordered.  Activity: Ambulate QID minimum.   Anticipated LOS/Discharge: 5-6 days postop    Medical Decision Making: Medium  Subsequent visit 76136 (moderate level decision making)    JOSE ALBERTO/Fellow/Resident Provider: Mayte Sumner NP    Faculty: Dane Burt MD      __________________________________________________________________  Transplant History: Admitted following surgery as above  8/9/2021 ([place]),  "Postoperative day: 559     Interval History: History is obtained from the patient  Overnight events: Feels better today. Initially difficulties with pain control, but this improved with APS adjustment of epidural. Passing some flatus. Drinking water but not much else. No nausea. Has been up OOB.     ROS:   A 10-point review of systems was negative except as noted above.    Curent Meds:    acarbose  50 mg Oral Daily with supper     acetaminophen  975 mg Oral Q8H     clonazePAM  1 mg Oral Daily     clonazePAM  1.5 mg Oral QPM     heparin ANTICOAGULANT  5,000 Units Subcutaneous Q8H     insulin degludec  7 Units Subcutaneous Daily     lipase-protease-amylase  6 capsule Oral TID w/meals     methocarbamol  1,000 mg Oral TID     multivitamin w/minerals  1 tablet Oral Daily     polyethylene glycol  17 g Oral Daily     senna-docusate  1 tablet Oral BID     sertraline  150 mg Oral At Bedtime     sodium chloride (PF)  3 mL Intravenous Q8H     traZODone  100 mg Oral At Bedtime       Physical Exam:     Admit Weight: 49 kg (108 lb 1.6 oz)    Current Vitals:   /78 (BP Location: Right arm)   Pulse 72   Temp 98.4  F (36.9  C) (Oral)   Resp 16   Ht 1.549 m (5' 1\")   Wt 55.9 kg (123 lb 3.8 oz)   SpO2 96%   BMI 23.29 kg/m           Vital sign ranges:    Temp:  [97.9  F (36.6  C)-98.4  F (36.9  C)] 98.4  F (36.9  C)  Pulse:  [64-89] 72  Resp:  [10-20] 16  BP: ()/(40-79) 117/78  SpO2:  [91 %-100 %] 96 %  Patient Vitals for the past 24 hrs:   BP Temp Temp src Pulse Resp SpO2   02/18/23 2158 117/78 98.4  F (36.9  C) Oral 72 16 96 %   02/18/23 1741 104/64 98.2  F (36.8  C) Oral 83 16 91 %   02/18/23 1352 118/76 98.2  F (36.8  C) Oral 81 16 95 %   02/18/23 0900 127/79 98.1  F (36.7  C) Oral 73 16 100 %   02/18/23 0815 -- -- -- 73 18 97 %   02/18/23 0800 97/59 -- -- 66 13 98 %   02/18/23 0745 100/63 -- -- 66 10 98 %   02/18/23 0730 95/57 97.9  F (36.6  C) Oral 66 10 97 %   02/18/23 0715 (!) 82/53 -- -- 64 12 96 %   02/18/23 " 0713 (!) 82/53 -- -- 66 12 96 %   02/18/23 0709 (!) 88/50 -- -- 69 14 96 %   02/18/23 0700 (!) 80/44 -- -- 65 14 96 %   02/18/23 0645 (!) 85/48 -- -- 65 15 97 %   02/18/23 0630 -- -- -- 71 13 95 %   02/18/23 0600 (!) 85/40 -- -- 65 15 96 %   02/18/23 0500 (!) 87/50 -- -- 82 20 94 %   02/18/23 0400 105/59 -- -- 80 15 94 %   02/18/23 0300 102/60 -- -- 87 16 95 %   02/18/23 0200 112/63 -- -- 84 15 95 %   02/18/23 0100 100/69 -- -- 89 13 93 %     General Appearance: in no apparent distress.   Skin: normal, dry, no suspicious lesions or rashes  Heart: well perfused  Lungs: NLB on RA  Abdomen: The abdomen is flat, and  mildly tender generalized. The wound is dressed with dermabond. No drainage or erythema.   : shook is not present.    Extremities: edema: absent.    Data:   CMP  Recent Labs   Lab 02/18/23  2204 02/18/23  1631 02/18/23  1119 02/18/23  0743 02/18/23  0021 02/17/23  2231 02/17/23  1437   NA  --   --   --  139  --   --  139   POTASSIUM  --   --   --  4.0  --   --  4.1   CHLORIDE  --   --   --  106  --   --  103   CO2  --   --   --  25  --   --  28   * 120*   < > 146*  --    < > 119*   BUN  --   --   --  8.2  --   --  10.1   CR  --   --   --  0.59 0.63  --  0.59   GFRESTIMATED  --   --   --  >90 >90  --  >90   VIKAS  --   --   --  7.9*  --   --  7.8*   MAG  --   --   --  1.9  --   --   --    PHOS  --   --   --  3.7  --   --   --     < > = values in this interval not displayed.     CBC  Recent Labs   Lab 02/18/23  0743 02/18/23  0021 02/17/23  1437   HGB 11.8 13.2 12.8   WBC 12.6*  --  12.8*     --  354   A1C  --  6.3*  --      Coags  No lab results found in last 7 days.    Invalid input(s): XA   Urinalysis  Recent Labs   Lab Test 03/06/22  0851 08/19/21  0158   COLOR Dark Yellow* Straw   APPEARANCE Clear Clear   URINEGLC Negative Negative   URINEBILI Negative Negative   URINEKETONE Negative Negative   SG 1.035 1.009   UBLD Negative Negative   URINEPH 5.5 7.0   PROTEIN 50* Negative   NITRITE  Negative Negative   LEUKEST Negative Negative   RBCU 2 1   WBCU 1 <1

## 2023-02-19 NOTE — PROGRESS NOTES
Pancreatitis Service - Daily Progress Note  02/18/2023    Assessment & Plan: 51 year old female with a history of idiopathic chronic pancreatitis s/p PD sphincterotomy and multiple PD stents (10-15) in [place] and at [facility], cholecystectomy, TPIAT at this facility in 8/2021. Her overall recovery has been excellent until recently, when she had recurrent abdominal pain, bloating/nausea, malaise, and fevers. Imaging and endoscopic workup demonstrated reflux of enteric contents into her biliary limb with widely patent hepaticojejunostomy. She was admitted 2/17 following laparotomy, lysis of adhesions, and lengthening of biliary limb to total ~100cm.     POD # 2     GI:   S/p lengthening of biliary nuris limb: Passing some flatus. OK for full liquids, but would wait until return of bowel function prior to initiating solids.   Bowel regimen: Senna.    Cardiorespiratory: Stable. SBP 80s-90s at baseline.    Fluid/Electrolytes: D5 LR 75/hr, stop when PO adequate    : No acute issues.     Post-pancreatectomy diabetes: Hgb A1C 6, c-peptides positive. PTA Tresiba 7 units, and sliding scale correction every 4 hours. Start low sliding scale insulin with full liquids.    Infection: Afebrile    Prophylaxis: Heparin subcutaneous    Pain control:   Post surgical pain: Fair.  -Epidural  -APAP, scheduled  -PO dilaudid, increase to q3H  -IV dilaudid, decrease to 0.2mg q2H PRN severe pain  Chronic neck pain: Neurontin 300 mg AM/600 mg PM.     Psych:   Hx anxiety, depression, insomnia: PTA trazodone, zoloft, and clonazepam ordered.    Activity: Ambulate QID minimum.   Anticipated LOS/Discharge: 5-6 days postop    JOSE ALBERTO/Fellow/Resident Provider: Mayte Sumner NP  25 minutes spent on chart review, exam, and discussion with Pain team.    Faculty: Dane Burt MD    Attestation: I saw and examined the patient with Mayte Sumner NP, and the transplant team. I independently reviewed all pertinent laboratory and imaging  "results and made independent management decisions including immunosuppression management. I spent 30 minutes in care of this patient.  Dane Burt MD    __________________________________________________________________    Interval History: History is obtained from the patient  Overnight events: Moderate incisional pain. + flatus, no BM. No nausea.    ROS:   A 10-point review of systems was negative except as noted above.    Curent Meds:    acarbose  50 mg Oral Daily with supper     acetaminophen  975 mg Oral Q8H     bisacodyl  10 mg Rectal Once     clonazePAM  1 mg Oral Daily     clonazePAM  1.5 mg Oral QPM     famotidine  20 mg Oral Once     heparin ANTICOAGULANT  5,000 Units Subcutaneous Q8H     insulin aspart  1-3 Units Subcutaneous TID AC     insulin aspart  1-3 Units Subcutaneous At Bedtime     insulin degludec  7 Units Subcutaneous Daily     lipase-protease-amylase  6 capsule Oral TID w/meals     methocarbamol  1,000 mg Oral TID     multivitamin w/minerals  1 tablet Oral Daily     pantoprazole  40 mg Oral BID     polyethylene glycol  17 g Oral Daily     senna-docusate  1 tablet Oral BID     sertraline  150 mg Oral At Bedtime     sodium chloride (PF)  3 mL Intravenous Q8H     traZODone  100 mg Oral At Bedtime       Physical Exam:     Admit Weight: 49 kg (108 lb 1.6 oz)    Current Vitals:   BP 94/65 (BP Location: Right arm)   Pulse 72   Temp 98.5  F (36.9  C) (Oral)   Resp 16   Ht 1.549 m (5' 1\")   Wt 56.2 kg (123 lb 14.4 oz)   SpO2 93%   BMI 23.41 kg/m      Vital sign ranges:    Temp:  [98.2  F (36.8  C)-98.5  F (36.9  C)] 98.5  F (36.9  C)  Pulse:  [63-85] 72  Resp:  [16-18] 16  BP: ()/(55-78) 94/65  SpO2:  [86 %-96 %] 93 %  Patient Vitals for the past 24 hrs:   BP Temp Temp src Pulse Resp SpO2 Weight   02/19/23 1208 94/65 -- -- 72 16 -- --   02/19/23 1200 97/62 -- -- 64 -- -- --   02/19/23 1111 91/63 -- -- 68 16 -- --   02/19/23 1100 90/60 -- -- 63 16 -- --   02/19/23 1056 -- -- -- -- -- " 93 % --   02/19/23 1054 (!) 88/55 -- -- 67 16 (!) 86 % --   02/19/23 1049 92/57 -- -- 73 16 93 % --   02/19/23 1002 90/60 98.5  F (36.9  C) Oral 80 18 91 % --   02/19/23 0750 97/61 -- -- 65 -- 93 % --   02/19/23 0748 97/61 -- -- 65 -- -- --   02/19/23 0550 91/58 -- -- -- -- -- --   02/19/23 0532 (!) 89/61 98.4  F (36.9  C) Oral 72 18 94 % 56.2 kg (123 lb 14.4 oz)   02/19/23 0220 122/75 98.5  F (36.9  C) Oral 85 18 94 % --   02/18/23 2158 117/78 98.4  F (36.9  C) Oral 72 16 96 % --   02/18/23 1741 104/64 98.2  F (36.8  C) Oral 83 16 91 % --     General Appearance: in no apparent distress.   Skin: normal, dry, no suspicious lesions or rashes  Heart: well perfused  Lungs: NLB on RA  Abdomen: The abdomen is flat, and  mildly tender generalized. The wound is dressed with dermabond. No drainage or erythema.   : shook is not present.    Extremities: edema: absent.    Data:   CMP  Recent Labs   Lab 02/19/23  1224 02/19/23  0817 02/19/23  0727 02/18/23  1119 02/18/23  0743   NA  --   --  140  --  139   POTASSIUM  --   --  4.2  --  4.0   CHLORIDE  --   --  103  --  106   CO2  --   --  29  --  25   * 107* 126*   < > 146*   BUN  --   --  5.6*  --  8.2   CR  --   --  0.51  --  0.59   GFRESTIMATED  --   --  >90  --  >90   VIKAS  --   --  8.4*  --  7.9*   MAG  --   --  1.8  --  1.9   PHOS  --   --  3.6  --  3.7    < > = values in this interval not displayed.     CBC  Recent Labs   Lab 02/19/23  0727 02/18/23  0743 02/18/23  0021   HGB 11.5* 11.8 13.2   WBC 10.3 12.6*  --     324  --    A1C  --   --  6.3*     Coags  No lab results found in last 7 days.    Invalid input(s): XA   Urinalysis  Recent Labs   Lab Test 03/06/22  0851 08/19/21  0158   COLOR Dark Yellow* Straw   APPEARANCE Clear Clear   URINEGLC Negative Negative   URINEBILI Negative Negative   URINEKETONE Negative Negative   SG 1.035 1.009   UBLD Negative Negative   URINEPH 5.5 7.0   PROTEIN 50* Negative   NITRITE Negative Negative   LEUKEST Negative Negative    RBCU 2 1   WBCU 1 <1

## 2023-02-19 NOTE — PROGRESS NOTES
S: Paged by bedside RN that patient having increased pain since this morning. Patient sitting up bed and appears comfortable. Very pleasant affect. She describes having some increased pain below the umbilicus that is crampy in character. She believes that she was comfortable earlier this morning when I saw her secondary due prn meds that she had just gotten. She continues to deny symptoms of last, weakness, bowel/bladder dysfunction.     O: Site C/D/I and at appropriate level. Patient has a b/l ~ T6 level but endorses pain below the umbilicus. Strength 5/5    A/P: 52F s/p ex ;ap, LAD. Increased continuous epidural rate from 8ml/hr to 10ml/hr. Plan to re-evaluate later this afternoon.    Ty Ceron MD  CA-3/PGY-5  Dept of Anesthesiology

## 2023-02-19 NOTE — PROGRESS NOTES
"Pain Service Progress Note  [facility]  Date: 02/19/2023       Patient Name: Meme Robins  MRN: [id number]  Age: 52 year old  Sex: female    Assessment:  Meme Robins is a 53 y/o F with a PMHx significant for Cervical DDD (sx of numbness in her fingers when with neck flexion), migraines, h/o PE, chronic pancreatitis s/p John procedure, multiple ERCPs, and s/p islet cell transplant, chronic abdominal pain and h/o adrenal insufficiency with most recent testing being normal who is now s/p ex lap for lysis of adhesions and nuris-en-y revision for tx of abdominal pain.     Procedure: Exploratory Laparotomy, lysis of adhesions, possible nuris-en-y revision     Date of Surgery: 2/17/23    Date of Catheter Placement: 2/17/23    Plan/Recommendations:  1. Regional Anesthesia/Analgesia  -Continuous Catheter Type/Site: Epidural T9-10  Continue Bupivacaine 0.125% continuous infusion at 8 mL/hr     Plan to maintain catheter while chest tubes in place, max of 7 days     2. Anticoagulation  -Please contact Inpatient Pain Service before ordering or making any anticoagulation changes        3. Multimodal Analgesia  - multimodals per primary service orders     Pain Service will continue to follow.     Discussed with attending anesthesiologist    Please Page the Pain Team Via [employer]: \"PAIN MANAGEMENT ACUTE INPATIENT/ [facility]\"    Ty Ceron MD  CA-3/PGY-5  Dept of Anesthesiology   02/19/2023     Overnight Events: NAEO. Patient states she is happy with her level of pain control and was able to get on and off sleep last night. She notes she is still having aching abdominal pain, but denies any incisional pain, and has been able to ambulate in her room as well as rest comfortably. She denies any symptoms of last, weakness, or bladder dysfunction.     Tubes/Drains: No    Subjective:  doing okay   Nausea: No  Vomiting: No  Pruritus: No  Symptoms of LAST: No    Pain Location:  Generalized abdominal " "Ache    Pain Intensity:    Pain at Rest: 4/10   Pain with Activity: 7/10  Comfort Goal: 4/10   Satisfied with your level of pain control: Yes    Diet: Clear Liquid Diet (diabetic); Other - please comment    Relevant Labs:  Recent Labs   Lab Test 02/18/23  0743 03/07/22  0640 03/06/22  0848 08/16/21  0638 08/15/21  0743   INR  --   --  1.01  --   --       < > 518*   < > 327   PTT  --   --   --   --  34   BUN 8.2   < > 16   < > 6*    < > = values in this interval not displayed.       Physical Exam:  Vitals: BP 97/61 (BP Location: Right arm)   Pulse 65   Temp 36.9  C (98.4  F) (Oral)   Resp 18   Ht 1.549 m (5' 1\")   Wt 56.2 kg (123 lb 14.4 oz)   SpO2 94%   BMI 23.41 kg/m      Physical Exam:   Orientation:  Alert, oriented, and in no acute distress: Yes, appears very comfortable, resting in bed  Sedation: No    Motor Examination:  5/5 Strength in lower extremities: Yes    Sensory Level:   Decrease in sensation: Yes    Catheter Site:   Catheter entry site is clean/dry/intact: Yes    Tender: No      Relevant Medications:  Current Pain Medications:  Medications related to Pain Management (From now, onward)    Start     Dose/Rate Route Frequency Ordered Stop    02/20/23 0000  acetaminophen (TYLENOL) tablet 650 mg         650 mg Oral EVERY 4 HOURS PRN 02/17/23 2155 02/18/23 1112  HYDROmorphone (DILAUDID) injection 0.4 mg        See Hyperspace for full Linked Orders Report.    0.4 mg Intravenous EVERY 2 HOURS PRN 02/18/23 1113      02/18/23 1112  HYDROmorphone (DILAUDID) injection 0.2 mg        See Hyperspace for full Linked Orders Report.    0.2 mg Intravenous EVERY 2 HOURS PRN 02/18/23 1113      02/18/23 0800  polyethylene glycol (MIRALAX) Packet 17 g         17 g Oral DAILY 02/17/23 2155 02/18/23 0800  clonazePAM (klonoPIN) tablet 1 mg         1 mg Oral DAILY 02/17/23 2250 02/18/23 0000  clonazePAM (klonoPIN) tablet 1.5 mg         1.5 mg Oral EVERY EVENING 02/17/23 4832      02/17/23 7494  " acetaminophen (TYLENOL) tablet 975 mg         975 mg Oral EVERY 8 HOURS 02/17/23 2155 02/20/23 2359 02/17/23 2300  senna-docusate (SENOKOT-S/PERICOLACE) 8.6-50 MG per tablet 1 tablet         1 tablet Oral 2 TIMES DAILY 02/17/23 2155 02/17/23 2200  methocarbamol (ROBAXIN) tablet 1,000 mg        Note to Pharmacy: PTA Sig:Take 2 tablets (1,000 mg) by mouth 3 times daily      1,000 mg Oral 3 TIMES DAILY 02/17/23 2159 02/17/23 2155  magnesium hydroxide (MILK OF MAGNESIA) suspension 30 mL         30 mL Oral DAILY PRN 02/17/23 2155 02/17/23 2155  bisacodyl (DULCOLAX) suppository 10 mg         10 mg Rectal DAILY PRN 02/17/23 2155 02/17/23 2155  HYDROmorphone (DILAUDID) tablet 4 mg        See Hyperspace for full Linked Orders Report.    4 mg Oral EVERY 4 HOURS PRN 02/17/23 2155 02/17/23 2155  HYDROmorphone (DILAUDID) tablet 6 mg        See Hyperspace for full Linked Orders Report.    6 mg Oral EVERY 4 HOURS PRN 02/17/23 2155 02/17/23 0830  bupivacaine (MARCAINE) 0.125 % in sodium chloride 0.9 % 250 mL EPIDURAL Infusion         8 mL/hr  EPIDURAL CONTINUOUS 02/17/23 0812            Primary Service Contacted with Recommendations? No

## 2023-02-20 LAB
ANION GAP SERPL CALCULATED.3IONS-SCNC: 10 MMOL/L (ref 7–15)
ATRIAL RATE - MUSE: 93 BPM
BUN SERPL-MCNC: 4.6 MG/DL (ref 6–20)
CALCIUM SERPL-MCNC: 8.7 MG/DL (ref 8.6–10)
CHLORIDE SERPL-SCNC: 105 MMOL/L (ref 98–107)
CREAT SERPL-MCNC: 0.52 MG/DL (ref 0.51–0.95)
DEPRECATED HCO3 PLAS-SCNC: 26 MMOL/L (ref 22–29)
DIASTOLIC BLOOD PRESSURE - MUSE: NORMAL MMHG
ERYTHROCYTE [DISTWIDTH] IN BLOOD BY AUTOMATED COUNT: 14 % (ref 10–15)
GFR SERPL CREATININE-BSD FRML MDRD: >90 ML/MIN/1.73M2
GLUCOSE BLDC GLUCOMTR-MCNC: 122 MG/DL (ref 70–99)
GLUCOSE BLDC GLUCOMTR-MCNC: 134 MG/DL (ref 70–99)
GLUCOSE SERPL-MCNC: 126 MG/DL (ref 70–99)
HCT VFR BLD AUTO: 37 % (ref 35–47)
HGB BLD-MCNC: 11.6 G/DL (ref 11.7–15.7)
INTERPRETATION ECG - MUSE: NORMAL
MAGNESIUM SERPL-MCNC: 1.8 MG/DL (ref 1.7–2.3)
MCH RBC QN AUTO: 29.4 PG (ref 26.5–33)
MCHC RBC AUTO-ENTMCNC: 31.4 G/DL (ref 31.5–36.5)
MCV RBC AUTO: 94 FL (ref 78–100)
P AXIS - MUSE: 63 DEGREES
PHOSPHATE SERPL-MCNC: 4.2 MG/DL (ref 2.5–4.5)
PLATELET # BLD AUTO: 317 10E3/UL (ref 150–450)
POTASSIUM SERPL-SCNC: 4 MMOL/L (ref 3.4–5.3)
PR INTERVAL - MUSE: 148 MS
QRS DURATION - MUSE: 74 MS
QT - MUSE: 372 MS
QTC - MUSE: 462 MS
R AXIS - MUSE: -9 DEGREES
RBC # BLD AUTO: 3.95 10E6/UL (ref 3.8–5.2)
SODIUM SERPL-SCNC: 141 MMOL/L (ref 136–145)
SYSTOLIC BLOOD PRESSURE - MUSE: NORMAL MMHG
T AXIS - MUSE: 20 DEGREES
VENTRICULAR RATE- MUSE: 93 BPM
WBC # BLD AUTO: 8.4 10E3/UL (ref 4–11)

## 2023-02-20 PROCEDURE — 250N000013 HC RX MED GY IP 250 OP 250 PS 637: Performed by: NURSE PRACTITIONER

## 2023-02-20 PROCEDURE — 258N000003 HC RX IP 258 OP 636

## 2023-02-20 PROCEDURE — 36415 COLL VENOUS BLD VENIPUNCTURE: CPT | Performed by: SURGERY

## 2023-02-20 PROCEDURE — 99231 SBSQ HOSP IP/OBS SF/LOW 25: CPT | Performed by: ANESTHESIOLOGY

## 2023-02-20 PROCEDURE — 84100 ASSAY OF PHOSPHORUS: CPT | Performed by: SURGERY

## 2023-02-20 PROCEDURE — 85027 COMPLETE CBC AUTOMATED: CPT | Performed by: SURGERY

## 2023-02-20 PROCEDURE — 120N000011 HC R&B TRANSPLANT UMMC

## 2023-02-20 PROCEDURE — 250N000011 HC RX IP 250 OP 636: Performed by: SURGERY

## 2023-02-20 PROCEDURE — 250N000011 HC RX IP 250 OP 636: Performed by: NURSE PRACTITIONER

## 2023-02-20 PROCEDURE — 80048 BASIC METABOLIC PNL TOTAL CA: CPT | Performed by: SURGERY

## 2023-02-20 PROCEDURE — 83735 ASSAY OF MAGNESIUM: CPT | Performed by: SURGERY

## 2023-02-20 PROCEDURE — 250N000013 HC RX MED GY IP 250 OP 250 PS 637: Performed by: SURGERY

## 2023-02-20 PROCEDURE — 250N000011 HC RX IP 250 OP 636

## 2023-02-20 PROCEDURE — 99024 POSTOP FOLLOW-UP VISIT: CPT | Mod: FS | Performed by: SURGERY

## 2023-02-20 RX ADMIN — HYDROMORPHONE HYDROCHLORIDE 0.2 MG: 0.2 INJECTION, SOLUTION INTRAMUSCULAR; INTRAVENOUS; SUBCUTANEOUS at 13:39

## 2023-02-20 RX ADMIN — Medication 1 TABLET: at 08:43

## 2023-02-20 RX ADMIN — SODIUM CHLORIDE, SODIUM LACTATE, POTASSIUM CHLORIDE, CALCIUM CHLORIDE AND DEXTROSE MONOHYDRATE: 5; 600; 310; 30; 20 INJECTION, SOLUTION INTRAVENOUS at 08:41

## 2023-02-20 RX ADMIN — SENNOSIDES AND DOCUSATE SODIUM 1 TABLET: 50; 8.6 TABLET ORAL at 20:04

## 2023-02-20 RX ADMIN — ACARBOSE 50 MG: 25 TABLET ORAL at 16:21

## 2023-02-20 RX ADMIN — METHOCARBAMOL 1000 MG: 500 TABLET ORAL at 08:43

## 2023-02-20 RX ADMIN — HYDROMORPHONE HYDROCHLORIDE 6 MG: 2 TABLET ORAL at 07:07

## 2023-02-20 RX ADMIN — ACETAMINOPHEN 975 MG: 325 TABLET ORAL at 08:43

## 2023-02-20 RX ADMIN — METHOCARBAMOL 1000 MG: 500 TABLET ORAL at 20:04

## 2023-02-20 RX ADMIN — HYDROMORPHONE HYDROCHLORIDE 0.2 MG: 0.2 INJECTION, SOLUTION INTRAMUSCULAR; INTRAVENOUS; SUBCUTANEOUS at 08:44

## 2023-02-20 RX ADMIN — HYDROMORPHONE HYDROCHLORIDE 6 MG: 2 TABLET ORAL at 11:22

## 2023-02-20 RX ADMIN — TRAZODONE HYDROCHLORIDE 100 MG: 100 TABLET ORAL at 22:01

## 2023-02-20 RX ADMIN — HEPARIN SODIUM 5000 UNITS: 5000 INJECTION, SOLUTION INTRAVENOUS; SUBCUTANEOUS at 16:20

## 2023-02-20 RX ADMIN — SERTRALINE HYDROCHLORIDE 150 MG: 50 TABLET ORAL at 22:00

## 2023-02-20 RX ADMIN — HYDROMORPHONE HYDROCHLORIDE 6 MG: 2 TABLET ORAL at 22:00

## 2023-02-20 RX ADMIN — SENNOSIDES AND DOCUSATE SODIUM 1 TABLET: 50; 8.6 TABLET ORAL at 08:43

## 2023-02-20 RX ADMIN — HEPARIN SODIUM 5000 UNITS: 5000 INJECTION, SOLUTION INTRAVENOUS; SUBCUTANEOUS at 08:43

## 2023-02-20 RX ADMIN — CLONAZEPAM 1.5 MG: 0.5 TABLET ORAL at 20:04

## 2023-02-20 RX ADMIN — PANCRELIPASE LIPASE, PANCRELIPASE PROTEASE, PANCRELIPASE AMYLASE 3 CAPSULE: 20000; 63000; 84000 CAPSULE, DELAYED RELEASE ORAL at 20:05

## 2023-02-20 RX ADMIN — PANTOPRAZOLE SODIUM 40 MG: 40 TABLET, DELAYED RELEASE ORAL at 08:43

## 2023-02-20 RX ADMIN — SODIUM CHLORIDE, SODIUM LACTATE, POTASSIUM CHLORIDE, CALCIUM CHLORIDE AND DEXTROSE MONOHYDRATE: 5; 600; 310; 30; 20 INJECTION, SOLUTION INTRAVENOUS at 21:00

## 2023-02-20 RX ADMIN — ACETAMINOPHEN 975 MG: 325 TABLET ORAL at 16:21

## 2023-02-20 RX ADMIN — BUPIVACAINE HYDROCHLORIDE: 7.5 INJECTION, SOLUTION EPIDURAL; RETROBULBAR at 21:00

## 2023-02-20 RX ADMIN — HYDROMORPHONE HYDROCHLORIDE 0.2 MG: 0.2 INJECTION, SOLUTION INTRAMUSCULAR; INTRAVENOUS; SUBCUTANEOUS at 20:04

## 2023-02-20 RX ADMIN — PANCRELIPASE LIPASE, PANCRELIPASE PROTEASE, PANCRELIPASE AMYLASE 6 CAPSULE: 20000; 63000; 84000 CAPSULE, DELAYED RELEASE ORAL at 08:42

## 2023-02-20 RX ADMIN — HYDROMORPHONE HYDROCHLORIDE 0.2 MG: 0.2 INJECTION, SOLUTION INTRAMUSCULAR; INTRAVENOUS; SUBCUTANEOUS at 03:21

## 2023-02-20 RX ADMIN — HYDROMORPHONE HYDROCHLORIDE 6 MG: 2 TABLET ORAL at 16:20

## 2023-02-20 RX ADMIN — MAGNESIUM HYDROXIDE 30 ML: 400 SUSPENSION ORAL at 11:22

## 2023-02-20 RX ADMIN — PANTOPRAZOLE SODIUM 40 MG: 40 TABLET, DELAYED RELEASE ORAL at 20:04

## 2023-02-20 RX ADMIN — CLONAZEPAM 1 MG: 1 TABLET ORAL at 08:43

## 2023-02-20 RX ADMIN — METHOCARBAMOL 1000 MG: 500 TABLET ORAL at 13:45

## 2023-02-20 ASSESSMENT — ACTIVITIES OF DAILY LIVING (ADL)
ADLS_ACUITY_SCORE: 22

## 2023-02-20 NOTE — DISCHARGE SUMMARY
[facility]    Discharge Summary  Transplant Surgery    Date of Admission:  2/17/2023  Date of Discharge:  {DISCHARGE DATE:234506}  Discharging Provider: ABBEY Yancey CNP    Discharge Diagnoses   {                 :3354261}    History of Present Illness   Meme Robins is an 52 year old female with a history of idiopathic chronic pancreatitis s/p PD sphincterotomy and multiple PD stents (10-15) in [place] and at [facility], cholecystectomy, TPIAT at this facility in 8/2021. Her overall recovery has been excellent until recently, when she had recurrent abdominal pain, bloating/nausea, malaise, and fevers. Imaging and endoscopic workup demonstrated reflux of enteric contents into her biliary limb with widely patent hepaticojejunostomy. She was admitted 2/17/23 following laparotomy, lysis of adhesions, and lengthening of biliary limb to total ~100cm.     Hospital Course   GI:   S/p lengthening of biliary nuris limb: Tolerating oral diet, bowel function returned, and ambulating by discharge.  Bowel regimen: Senna and Miralax.      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 Home regimen is Tresiba 7 units, and sliding scale correction every 4 hours.      Pain control:   Post surgical pain: Challenging pain control.  -Epidural, discontinued on ***  -Acetaminophen, scheduled  -Oral dilaudid ***  Chronic neck pain: Neurontin 300 mg AM/600 mg PM.       Significant Results and Procedures   2/17/23  LAPAROTOMY, EXPLORATORY, lyses of adhesions  POSSIBLE REVISION OF NURIS-EN-Y  Surgeon:         Surgeon(s) and Role:     * Dane Burt MD - Primary    Pending Results   These results will be followed up by ***  Unresulted Labs Ordered in the Past 30 Days of this Admission     Date and Time Order Name Status Description    2/17/2023 10:27 AM Prepare red blood cells (unit) Preliminary     2/17/2023 10:27 AM Prepare red blood  "cells (unit) Preliminary           Code Status   Full    Primary Care Physician   Padmini Hatch    {Do you want to add a physical exam section?:243818}    Time Spent on this Encounter   {How much time did you spend on discharge?:91041690}    Discharge Disposition   {                 :5309125::\"Discharged to home\"}  Condition at discharge: {CONDITION:173955::\"Stable\"}    Consultations This Hospital Stay   NURSING TO CONSULT FOR VASCULAR ACCESS CARE IP CONSULT  SPIRITUAL HEALTH SERVICES IP CONSULT    Discharge Orders  ***  No discharge procedures on file.  Discharge Medications  ***  Current Discharge Medication List      CONTINUE these medications which have NOT CHANGED    Details   acarbose (PRECOSE) 50 MG tablet Take 1 tablet (50 mg) by mouth daily (with dinner)  Qty: 30 tablet, Refills: 1    Comments: Please call Tenisha Pearce Rn at  with any questions.  Associated Diagnoses: Hypoglycemia      blood glucose (NO BRAND SPECIFIED) lancets standard To use to test glucose level in the blood  Use to test blood sugar  6  times daily as directed. To accompany glucose monitor brands per insurance coverage.  One touch Delica lancets  Qty: 100 each, Refills: 0    Associated Diagnoses: Post-pancreatectomy diabetes (H)      blood glucose (NO BRAND SPECIFIED) test strip Use to test blood sugar  6  times daily as directed. To accompany glucose monitor brands per insurance coverage: One Touch Verio strip  Qty: 100 strip, Refills: 3    Associated Diagnoses: Post-pancreatectomy diabetes (H)      clonazePAM (KLONOPIN) 1 MG tablet 1mg morning and 1.5mg every evening      Continuous Blood Gluc Sensor (DEXCOM G6 SENSOR) MISC USE AS DIRECTED FOR CONTINUOUS GLUCOSE MONITORING, CHANGE EVERY 10 DAYS  Qty: 9 each, Refills: 3    Associated Diagnoses: Type 1 diabetes mellitus without complication (H)      Continuous Blood Gluc Transmit (DEXCOM G6 TRANSMITTER) MISC USE AND CHANGE 1 TRANSMITTER EVERY 3 MONTHS  Qty: 1 each, Refills: " 3    Associated Diagnoses: Type 1 diabetes mellitus without complication (H)      gabapentin (NEURONTIN) 300 MG capsule Take 1 capsule (300 mg) by mouth 2 times daily  Qty: 60 capsule, Refills: 1    Associated Diagnoses: S/P pancreatic islet cell transplantation (H)      Glucagon (GVOKE HYPOPEN 2-PACK) 1 MG/0.2ML SOAJ Inject 1 each Subcutaneous once as needed (for severe hypoglycemia) Use x1 dose and then call seek emergent care for treatment  Qty: 0.4 mL, Refills: 1    Comments: Please call Maddi Almaraz, RN Transplant Coordinator, with fill/formulary issues: [phone]  Associated Diagnoses: Post-pancreatectomy diabetes (H)      HYDROmorphone (DILAUDID) 4 MG tablet Take 1-2 tablets (4-6 mg) every 4 hours as needed for severe abdominal pain  Qty: 56 tablet, Refills: 0    Associated Diagnoses: Chronic abdominal pain      Injection Device for insulin (INPEN 100-BLUE-PARVEZ-HUMALOG) NORA Use as directed for Insulin infusion  Qty: 1 each, Refills: 1    Comments: Please call Tenisha Pearce RNCC with any questions   Associated Diagnoses: Type 1 diabetes mellitus without complication (H)      insulin degludec (TRESIBA FLEXTOUCH) 100 UNIT/ML pen Take 7 units daily  Qty: 15 mL, Refills: 0    Associated Diagnoses: Type 1 diabetes mellitus without complication (H)      lipase-protease-amylase (ZENPEP) 36124-667860 units CPEP Take by mouth 3 times daily (with meals) Take up to 140,000 units with meals and take up to 80,000 with snacks      Melatonin 10 MG TABS tablet Take 10 mg by mouth nightly as needed for sleep      methocarbamol (ROBAXIN) 500 MG tablet Take 2 tablets (1,000 mg) by mouth 3 times daily  Qty: 90 tablet, Refills: 0    Associated Diagnoses: S/P pancreatic islet cell transplantation (H)      multivitamin w/minerals (THERA-VIT-M) tablet Take 1 tablet by mouth daily  Qty: 30 tablet, Refills: 1    Associated Diagnoses: S/P pancreatic islet cell transplantation (H)      ondansetron (ZOFRAN-ODT) 4 MG  ODT tab Take 4 mg by mouth as needed       pantoprazole (PROTONIX) 40 MG EC tablet Take 40 mg by mouth 2 times daily      sertraline (ZOLOFT) 100 MG tablet Take 150 mg by mouth At Bedtime      traZODone (DESYREL) 100 MG tablet Take 100 mg by mouth At Bedtime           Allergies   Allergies   Allergen Reactions     Metoclopramide      Morphine      Penicillins      Prochlorperazine      Promethazine      Data   {What data do you want to display?:702922}

## 2023-02-20 NOTE — PLAN OF CARE
Status: Pt POD 3 s/p laparotomy, lysis of adhesions, and lengthening of biliary limb  Vitals: VSS on RA, cont. Pulse ox on  Neuros: AOx4  IV: PIV infusing D5LR @ 75 mL/hr  Resp/trach: LS clear  Diet: Full liquid diet, minimal intake  Bowel status: PTA, given milk of mag this am - stomach distended  : Voiding in BR  Skin: Epidural dsg CDI  Pain: Pt getting dilaudid PO and dilaudid IV prn and scheduled robaxin.  Activity: Up ind in room.  Social:  at bedside most of shift, supportive with cares.  Plan: Control pain and encourage adequate intake. Continue to monitor and follow POC.

## 2023-02-20 NOTE — PLAN OF CARE
Goal Outcome Evaluation:      Plan of Care Reviewed With: patient    Overall Patient Progress: no changeOverall Patient Progress: no change    Outcome Evaluation: encourage oral intake. Ensure Max protein 3 times daily

## 2023-02-20 NOTE — PROGRESS NOTES
"CLINICAL NUTRITION SERVICES - ASSESSMENT NOTE     Nutrition Prescription    Malnutrition Status:    Moderate malnutrition in the context of acute on chronic illness    Recommendations already ordered by Registered Dietitian (RD):  Ensure max protein with meals (varied flavors)  Discontinue Glucerna     Future/Additional Recommendations:  -- monitor oral intake of meals and supplements  -- monitor need for nutrition education.      REASON FOR ASSESSMENT  Meme Robins is a/an 52 year old female assessed by the dietitian for Admission Nutrition Risk Screen for positive    Per chart review patient with a history of idiopathic chronic pancreatitis s/p PD sphincterotomy and multiple PD stents (10-15), cholecystectomy, TPIAT     NUTRITION HISTORY  Meme reports her appetite has been decreased since Oct 2022. She takes a protein supplement at home. She reports she takes her zenpep at home. She also watches her carbohydrate intake     CURRENT NUTRITION ORDERS  Diet: Full Liquid with Glucerna between meals     LABS  Labs reviewed  (2/20): BUN 4.6 mg/dL (L), Cr 0.52 mg/dL     MEDICATIONS  Medications reviewed  Zenpep 44961 6 capsules with meals (2264 units of lipase/kg/meal)  Thera-vit-m  Senokot-S    ANTHROPOMETRICS  Height: 154.9 cm (5' 1\")  Most Recent Weight: 56.2 kg (124 lb)    IBW: 47.7 kg  BMI: Normal BMI  Weight History:   Wt Readings from Last 10 Encounters:   02/20/23 56.2 kg (124 lb)   03/08/22 49.1 kg (108 lb 3.2 oz)   11/18/21 52.6 kg (116 lb)   11/18/21 52.8 kg (116 lb 6.5 oz)   09/14/21 54.3 kg (119 lb 12.8 oz)   09/07/21 53.8 kg (118 lb 8 oz)   09/02/21 56.9 kg (125 lb 7.1 oz)   09/02/21 56.9 kg (125 lb 6.4 oz)   08/21/21 54.1 kg (119 lb 3.2 oz)   08/20/21 55.1 kg (121 lb 8 oz)   Meme reports her usual body weight is 116 pounds. She feels her current weight is due to fluid   Dosing Weight: 53 kg (uasual body weight)    ASSESSED NUTRITION NEEDS  Estimated Energy Needs: 7152-0684 kcals/day (25 - 30 " kcals/kg)  Justification: Maintenance  Estimated Protein Needs: 53-64 grams protein/day (1 - 1.2 grams of pro/kg)  Justification: Increased needs  Estimated Fluid Needs: (1 mL/kcal)   Justification: Maintenance    PHYSICAL FINDINGS  See malnutrition section below.    MALNUTRITION  % Intake: </=75% for >/= 1 month (severe)  % Weight Loss: None noted  Subcutaneous Fat Loss: Facial region:  mild and Thoracic/intercostal: mild  Muscle Loss: Temporal:  mild and Thoracic region (clavicle, acromium bone, deltoid, trapezius, pectoral):  mild  Fluid Accumulation/Edema: None noted  Malnutrition Diagnosis: Moderate malnutrition in the context of acute on chronic illness    NUTRITION DIAGNOSIS  Inadequate oral intake related to decreased appetite as evidenced by patient self report       INTERVENTIONS  Implementation  Nutrition Education: Provided education on RD role in nutrition POC and nutritional supplements   Medical food supplement therapy     Goals  Patient to consume % of nutritionally adequate meal trays TID, or the equivalent with supplements/snacks.     Monitoring/Evaluation  Progress toward goals will be monitored and evaluated per protocol.    Kristine Dyson, MS/RD/KATJA  7A/Obs RD pager: [phone]  Weekend/Holiday RD pager: [phone]

## 2023-02-20 NOTE — PROGRESS NOTES
Pancreatitis Service - Daily Progress Note  02/18/2023    Assessment & Plan:  Meme Robins is a 52 year old female with a history of idiopathic chronic pancreatitis s/p PD sphincterotomy and multiple PD stents (10-15) in [place] and at [facility], cholecystectomy, TPIAT at this facility in 8/2021. Her overall recovery has been excellent until recently, when she had recurrent abdominal pain, bloating/nausea, malaise, and fevers. Imaging and endoscopic workup demonstrated reflux of enteric contents into her biliary limb with widely patent hepaticojejunostomy. She was admitted 2/17 following laparotomy, lysis of adhesions, and lengthening of biliary limb to total ~100cm.     POD # 3     GI:   S/p lengthening of biliary nuris limb: Passing some flatus. OK for full liquids, but would wait until return of bowel function prior to initiating solids. Will start po supplements.   Bowel regimen: Senna. Add MOM    Cardiorespiratory: Stable. SBP 80s-100s at baseline.    Fluid/Electrolytes: D5 LR 75/hr, stop when PO adequate    : No acute issues.     Post-pancreatectomy diabetes: Hgb A1C 6, c-peptides positive. PTA Tresiba 7 units, and sliding scale correction every 4 hours. Start low sliding scale insulin with full liquids.    Infection: Afebrile    Prophylaxis: Heparin subcutaneous    Pain control:   Post surgical pain: adequate  -Epidural 8ml/hr  -APAP, scheduled  -PO dilaudid, increase to q3H  -IV dilaudid, decrease to 0.2mg q2H PRN severe pain  Chronic neck pain: Neurontin 300 mg AM/600 mg PM.     Psych:   Hx anxiety, depression, insomnia: PTA trazodone, zoloft, and clonazepam ordered.    Activity: Ambulate QID minimum.   Anticipated LOS/Discharge: 5-6 days postop    JOSE ALBERTO/Fellow/Resident Provider: Aliza Ridley NP    25 minutes spent on chart review, exam, and discussion with Pain team.    Faculty: Dane Burt MD    Attestation: I saw and examined the patient with Aliza Ridley NP, and the  "transplant team. I independently reviewed all pertinent laboratory and imaging results and made independent management decisions. I spent 30 minutes in care of this patient.  Dane Burt MD    __________________________________________________________________    Interval History: History is obtained from the patient  Overnight events: Moderate incisional pain-tolerable. + flatus, no BM. No nausea. Self limiting food since not eating. Up within room.     ROS:   A 10-point review of systems was negative except as noted above.    Curent Meds:    acarbose  50 mg Oral Daily with supper     acetaminophen  975 mg Oral Q8H     clonazePAM  1 mg Oral Daily     clonazePAM  1.5 mg Oral QPM     heparin ANTICOAGULANT  5,000 Units Subcutaneous Q8H     insulin aspart  1-3 Units Subcutaneous TID AC     insulin aspart  1-3 Units Subcutaneous At Bedtime     insulin degludec  7 Units Subcutaneous Daily     lipase-protease-amylase  6 capsule Oral TID w/meals     methocarbamol  1,000 mg Oral TID     multivitamin w/minerals  1 tablet Oral Daily     pantoprazole  40 mg Oral BID     polyethylene glycol  17 g Oral Daily     senna-docusate  1 tablet Oral BID     sertraline  150 mg Oral At Bedtime     sodium chloride (PF)  3 mL Intravenous Q8H     traZODone  100 mg Oral At Bedtime       Physical Exam:     Admit Weight: 49 kg (108 lb 1.6 oz)    Current Vitals:   /72 (BP Location: Right arm)   Pulse 69   Temp 98.5  F (36.9  C) (Oral)   Resp 16   Ht 1.549 m (5' 1\")   Wt 56.2 kg (124 lb)   SpO2 93%   BMI 23.43 kg/m      Vital sign ranges:    Temp:  [98.5  F (36.9  C)-98.7  F (37.1  C)] 98.5  F (36.9  C)  Pulse:  [] 69  Resp:  [15-16] 16  BP: ()/(65-84) 103/72  SpO2:  [91 %-93 %] 93 %  Patient Vitals for the past 24 hrs:   BP Temp Temp src Pulse Resp SpO2 Weight   02/20/23 1203 -- -- -- -- -- -- 56.2 kg (124 lb)   02/20/23 0934 -- 98.5  F (36.9  C) Oral 69 16 -- --   02/20/23 0609 103/72 98.6  F (37  C) Oral 69 15 " 93 % --   02/19/23 2119 119/68 98.6  F (37  C) Oral 105 15 93 % --   02/19/23 1729 124/84 98.7  F (37.1  C) Oral 71 16 91 % --   02/19/23 1208 94/65 -- -- 72 16 -- --     General Appearance: in no apparent distress.   Skin: normal, dry, no suspicious lesions or rashes  Heart: well perfused  Lungs: NLB on RA  Abdomen: The abdomen is slightly distended, mildly tender generalized. The wound is closed with dermabond. No drainage or erythema.   : voiding  Extremities: edema: absent.    Data:   CMP  Recent Labs   Lab 02/20/23  0635 02/19/23 2123 02/19/23  0817 02/19/23  0727     --   --  140   POTASSIUM 4.0  --   --  4.2   CHLORIDE 105  --   --  103   CO2 26  --   --  29   * 111*   < > 126*   BUN 4.6*  --   --  5.6*   CR 0.52  --   --  0.51   GFRESTIMATED >90  --   --  >90   VIKAS 8.7  --   --  8.4*   MAG 1.8  --   --  1.8   PHOS 4.2  --   --  3.6    < > = values in this interval not displayed.     CBC  Recent Labs   Lab 02/20/23  0635 02/19/23  0727 02/18/23  0743 02/18/23  0021   HGB 11.6* 11.5*   < > 13.2   WBC 8.4 10.3   < >  --     315   < >  --    A1C  --   --   --  6.3*    < > = values in this interval not displayed.     Coags  No lab results found in last 7 days.    Invalid input(s): XA   Urinalysis  Recent Labs   Lab Test 03/06/22  0851 08/19/21  0158   COLOR Dark Yellow* Straw   APPEARANCE Clear Clear   URINEGLC Negative Negative   URINEBILI Negative Negative   URINEKETONE Negative Negative   SG 1.035 1.009   UBLD Negative Negative   URINEPH 5.5 7.0   PROTEIN 50* Negative   NITRITE Negative Negative   LEUKEST Negative Negative   RBCU 2 1   WBCU 1 <1

## 2023-02-20 NOTE — PLAN OF CARE
"Shift: 0031-2195  /72 (BP Location: Right arm)   Pulse 69   Temp 98.6  F (37  C) (Oral)   Resp 15   Ht 1.549 m (5' 1\")   Wt 56.2 kg (123 lb 14.4 oz)   SpO2 93%   BMI 23.41 kg/m       VSS on RA, continuous  pulse ox   BG- ACHS - 111 at bedtime   Pain/Nausea/PRN'S-  PRN oral and IV dilaudid  given (See MAR).  Scheduled tylenol and robaxin. Epidural Bupivacaine 10 ml/hr continuous. Pt denies nausea.  Diet- full liquids, poor apatite   LDA- PIV infusing.  Gtt/IVF- D 5 LR @ 75 ml/hr, Epidural continuous   GI/- voiding adequately and without difficulty, Last BM prior to surgery, pt states she's passing flatus, abdomen distend.Scheduled senna given. States she will take milk of magnesium in the morning.  Skin- midline abdominal incision derma bonded  Activity- up w/ stand by assist    Plan- cont/ with plan of care.  "

## 2023-02-20 NOTE — PROGRESS NOTES
SPIRITUAL HEALTH SERVICES Progress Note  [facility], 7A  On-Call    Reason for Visit: Consult for emotional support    Patient/Family Understanding of Illness and Goals of Care - Meme offered an overview related to her illness experience through the years and what that has meant for life, especially related to being a mom to her three sons. Her , Brendan, arrived at the conclusion of our conversation.    Distress and Loss - She acknowledged and affirmed the hardship of her illness processes without consciously wanting to be mired in it. She, for example, spoke to her memory of recently feeling well for about one calendar year. So, on the one hand, she grieves not having that physical wellness currently but that her recall of it stirs her sense of longing and motivation to return to it.      Strengths, Coping, and Resources -   Prioritizing who she is as a parent for her, now, three grown sons has offered her a sense of purpose for many years.  Prayer group: She indicated that she, along with her , are in a couples/prayer group and that it offers a positive sense of community and support.    Meaning, Beliefs, and Spirituality -   She named her Mandaen fide still having a significant Cheondoism expression with that being her background and she additionally named how her Mandaen fide and sense of coping has broadened since COVID.   Suffering/Fide: Also articulated was how she has sought to make sense of her suffering and integrate it into her understanding of her experience.    Plan of Care - I will inform unit  of care provided.    Hiro Carter, MPH, MDiv, [facility]  Staff   Pager: [phone]

## 2023-02-20 NOTE — PROGRESS NOTES
"Pain Service Progress Note  [facility]  Date: 02/20/2023         Patient Name: Meme Robins  MRN: [id number]  Age: 52 year old  Sex: female     Assessment:  Meme Robins is a 53 y/o F with a PMHx significant for Cervical DDD (sx of numbness in her fingers when with neck flexion), migraines, h/o PE, chronic pancreatitis s/p John procedure, multiple ERCPs, and s/p islet cell transplant, chronic abdominal pain and h/o adrenal insufficiency with most recent testing being normal who is now s/p ex lap for lysis of adhesions and nuris-en-y revision for tx of abdominal pain.      Procedure: Exploratory Laparotomy, lysis of adhesions, possible nuris-en-y revision      Date of Surgery: 2/17/23     Date of Catheter Placement: 2/17/23     Plan/Recommendations:  1. Regional Anesthesia/Analgesia  -Continuous Catheter Type/Site: Epidural T9-10  Continue Bupivacaine 0.125% continuous infusion at 8 mL/hr     Plan to maintain epidural, max of 5 days     2. Anticoagulation  -Please contact Inpatient Pain Service before ordering or making any anticoagulation changes        3. Multimodal Analgesia  - multimodals per primary service orders     Pain Service will continue to follow.          Please Page the Pain Team Via [facility]: \"PAIN MANAGEMENT ACUTE INPATIENT/ [facility]\"     Keagan Ferrer MD, MD  February 20, 2023        Overnight Events: Denies nausea or symptoms of LAST.  Pain well controlled since increase in infusion yesterday.       Tubes/Drains: No     Subjective:  feeling okay   Nausea: No  Vomiting: No  Pruritus: No  Symptoms of LAST: No     Pain Location:  Generalized abdominal Ache     Pain Intensity:    Pain at Rest: 4/10   Pain with Activity: 7/10  Comfort Goal: 4/10   Satisfied with your level of pain control: Yes     Diet: Clear Liquid Diet (diabetic); Other - please comment     Relevant Labs:          Recent Labs   Lab Test 02/18/23  0743 03/07/22  0640 03/06/22  0848 08/16/21  0638 " 08/15/21  0743   INR  --   --  1.01  --   --       < > 518*   < > 327   PTT  --   --   --   --  34   BUN 8.2   < > 16   < > 6*    < > = values in this interval not displayed.         Physical Exam:  Temp: 98.6  F (37  C) Temp  Min: 98.5  F (36.9  C)  Max: 98.7  F (37.1  C)  Resp: 15 Resp  Min: 15  Max: 18  SpO2: 93 % SpO2  Min: 86 %  Max: 93 %  Pulse: 69 Pulse  Min: 63  Max: 105    No data recorded  BP: 103/72 Systolic (24hrs), Av , Min:88 , Max:124   Diastolic (24hrs), Av, Min:55, Max:84         Physical Exam:   Orientation:  Alert, oriented, and in no acute distress: Yes, appears very comfortable, resting in bed  Sedation: No     Motor Examination:  5/5 Strength in lower extremities: Yes     Sensory Level:   Decrease in sensation: Yes     Catheter Site:   Catheter entry site is clean/dry/intact: Yes     Tender: No        Relevant Medications:  Current Pain Medications:             Medications related to Pain Management (From now, onward)     Start     Dose/Rate Route Frequency Ordered Stop     23 0000   acetaminophen (TYLENOL) tablet 650 mg         650 mg Oral EVERY 4 HOURS PRN 23 1112   HYDROmorphone (DILAUDID) injection 0.4 mg        See Hyperspace for full Linked Orders Report.    0.4 mg Intravenous EVERY 2 HOURS PRN 23 1113       02/18/23 1112   HYDROmorphone (DILAUDID) injection 0.2 mg        See Hyperspace for full Linked Orders Report.    0.2 mg Intravenous EVERY 2 HOURS PRN 23 1113       02/18/23 0800   polyethylene glycol (MIRALAX) Packet 17 g         17 g Oral DAILY 23 0800   clonazePAM (klonoPIN) tablet 1 mg         1 mg Oral DAILY 23 0000   clonazePAM (klonoPIN) tablet 1.5 mg         1.5 mg Oral EVERY EVENING 23 2300   acetaminophen (TYLENOL) tablet 975 mg         975 mg Oral EVERY 8 HOURS 23 2155 23 2612     23 2300   senna-docusate  (SENOKOT-S/PERICOLACE) 8.6-50 MG per tablet 1 tablet         1 tablet Oral 2 TIMES DAILY 02/17/23 2155 02/17/23 2200   methocarbamol (ROBAXIN) tablet 1,000 mg        Note to Pharmacy: TOBY Sig:Take 2 tablets (1,000 mg) by mouth 3 times daily      1,000 mg Oral 3 TIMES DAILY 02/17/23 2159 02/17/23 2155   magnesium hydroxide (MILK OF MAGNESIA) suspension 30 mL         30 mL Oral DAILY PRN 02/17/23 2155 02/17/23 2155   bisacodyl (DULCOLAX) suppository 10 mg         10 mg Rectal DAILY PRN 02/17/23 2155 02/17/23 2155   HYDROmorphone (DILAUDID) tablet 4 mg        See Hyperspace for full Linked Orders Report.    4 mg Oral EVERY 4 HOURS PRN 02/17/23 2155 02/17/23 2155   HYDROmorphone (DILAUDID) tablet 6 mg        See Hyperspace for full Linked Orders Report.    6 mg Oral EVERY 4 HOURS PRN 02/17/23 2155 02/17/23 0830   bupivacaine (MARCAINE) 0.125 % in sodium chloride 0.9 % 250 mL EPIDURAL Infusion         8 mL/hr  EPIDURAL CONTINUOUS 02/17/23 0812               Primary Service Contacted with Recommendations? No      Patient seen for 30 minutes greater than 50% in person         Cosigned by:      Revision History

## 2023-02-21 LAB
ANION GAP SERPL CALCULATED.3IONS-SCNC: 10 MMOL/L (ref 7–15)
BUN SERPL-MCNC: 5.7 MG/DL (ref 6–20)
CALCIUM SERPL-MCNC: 8.8 MG/DL (ref 8.6–10)
CHLORIDE SERPL-SCNC: 102 MMOL/L (ref 98–107)
CREAT SERPL-MCNC: 0.51 MG/DL (ref 0.51–0.95)
DEPRECATED HCO3 PLAS-SCNC: 26 MMOL/L (ref 22–29)
ERYTHROCYTE [DISTWIDTH] IN BLOOD BY AUTOMATED COUNT: 13.8 % (ref 10–15)
GFR SERPL CREATININE-BSD FRML MDRD: >90 ML/MIN/1.73M2
GLUCOSE BLDC GLUCOMTR-MCNC: 122 MG/DL (ref 70–99)
GLUCOSE BLDC GLUCOMTR-MCNC: 122 MG/DL (ref 70–99)
GLUCOSE BLDC GLUCOMTR-MCNC: 125 MG/DL (ref 70–99)
GLUCOSE BLDC GLUCOMTR-MCNC: 134 MG/DL (ref 70–99)
GLUCOSE BLDC GLUCOMTR-MCNC: 135 MG/DL (ref 70–99)
GLUCOSE SERPL-MCNC: 150 MG/DL (ref 70–99)
HCT VFR BLD AUTO: 37.3 % (ref 35–47)
HGB BLD-MCNC: 12.1 G/DL (ref 11.7–15.7)
MAGNESIUM SERPL-MCNC: 1.8 MG/DL (ref 1.7–2.3)
MCH RBC QN AUTO: 29.8 PG (ref 26.5–33)
MCHC RBC AUTO-ENTMCNC: 32.4 G/DL (ref 31.5–36.5)
MCV RBC AUTO: 92 FL (ref 78–100)
PHOSPHATE SERPL-MCNC: 4 MG/DL (ref 2.5–4.5)
PLATELET # BLD AUTO: 350 10E3/UL (ref 150–450)
POTASSIUM SERPL-SCNC: 4 MMOL/L (ref 3.4–5.3)
RBC # BLD AUTO: 4.06 10E6/UL (ref 3.8–5.2)
SODIUM SERPL-SCNC: 138 MMOL/L (ref 136–145)
WBC # BLD AUTO: 10 10E3/UL (ref 4–11)

## 2023-02-21 PROCEDURE — 250N000011 HC RX IP 250 OP 636: Performed by: NURSE PRACTITIONER

## 2023-02-21 PROCEDURE — 250N000013 HC RX MED GY IP 250 OP 250 PS 637: Performed by: NURSE PRACTITIONER

## 2023-02-21 PROCEDURE — 80048 BASIC METABOLIC PNL TOTAL CA: CPT | Performed by: SURGERY

## 2023-02-21 PROCEDURE — 01996 DLY HOSP MGMT EDRL RX ADMIN: CPT | Performed by: PHYSICIAN ASSISTANT

## 2023-02-21 PROCEDURE — 999N000248 HC STATISTIC IV INSERT WITH US BY RN

## 2023-02-21 PROCEDURE — 250N000011 HC RX IP 250 OP 636

## 2023-02-21 PROCEDURE — 258N000003 HC RX IP 258 OP 636

## 2023-02-21 PROCEDURE — 250N000011 HC RX IP 250 OP 636: Performed by: SURGERY

## 2023-02-21 PROCEDURE — 99024 POSTOP FOLLOW-UP VISIT: CPT | Mod: FS | Performed by: SURGERY

## 2023-02-21 PROCEDURE — 84100 ASSAY OF PHOSPHORUS: CPT | Performed by: SURGERY

## 2023-02-21 PROCEDURE — 85027 COMPLETE CBC AUTOMATED: CPT | Performed by: SURGERY

## 2023-02-21 PROCEDURE — 250N000013 HC RX MED GY IP 250 OP 250 PS 637: Performed by: SURGERY

## 2023-02-21 PROCEDURE — 83735 ASSAY OF MAGNESIUM: CPT | Performed by: SURGERY

## 2023-02-21 PROCEDURE — 120N000011 HC R&B TRANSPLANT UMMC

## 2023-02-21 PROCEDURE — 36415 COLL VENOUS BLD VENIPUNCTURE: CPT | Performed by: SURGERY

## 2023-02-21 PROCEDURE — 999N000127 HC STATISTIC PERIPHERAL IV START W US GUIDANCE

## 2023-02-21 RX ORDER — HEPARIN SODIUM 5000 [USP'U]/.5ML
5000 INJECTION, SOLUTION INTRAVENOUS; SUBCUTANEOUS 3 TIMES DAILY
Status: COMPLETED | OUTPATIENT
Start: 2023-02-21 | End: 2023-02-21

## 2023-02-21 RX ORDER — HEPARIN SODIUM 5000 [USP'U]/.5ML
5000 INJECTION, SOLUTION INTRAVENOUS; SUBCUTANEOUS EVERY 8 HOURS
Status: DISCONTINUED | OUTPATIENT
Start: 2023-02-22 | End: 2023-02-23

## 2023-02-21 RX ADMIN — MAGNESIUM HYDROXIDE 30 ML: 400 SUSPENSION ORAL at 12:36

## 2023-02-21 RX ADMIN — CLONAZEPAM 1 MG: 1 TABLET ORAL at 08:08

## 2023-02-21 RX ADMIN — HYDROMORPHONE HYDROCHLORIDE 6 MG: 2 TABLET ORAL at 02:18

## 2023-02-21 RX ADMIN — HEPARIN SODIUM 5000 UNITS: 5000 INJECTION, SOLUTION INTRAVENOUS; SUBCUTANEOUS at 19:32

## 2023-02-21 RX ADMIN — METHOCARBAMOL 1000 MG: 500 TABLET ORAL at 08:09

## 2023-02-21 RX ADMIN — HYDROMORPHONE HYDROCHLORIDE 6 MG: 2 TABLET ORAL at 15:03

## 2023-02-21 RX ADMIN — HYDROMORPHONE HYDROCHLORIDE 6 MG: 2 TABLET ORAL at 09:01

## 2023-02-21 RX ADMIN — HYDROMORPHONE HYDROCHLORIDE 0.2 MG: 0.2 INJECTION, SOLUTION INTRAMUSCULAR; INTRAVENOUS; SUBCUTANEOUS at 00:15

## 2023-02-21 RX ADMIN — TRAZODONE HYDROCHLORIDE 100 MG: 100 TABLET ORAL at 21:12

## 2023-02-21 RX ADMIN — CLONAZEPAM 1.5 MG: 0.5 TABLET ORAL at 19:31

## 2023-02-21 RX ADMIN — BUPIVACAINE HYDROCHLORIDE: 7.5 INJECTION, SOLUTION EPIDURAL; RETROBULBAR at 21:52

## 2023-02-21 RX ADMIN — SENNOSIDES AND DOCUSATE SODIUM 1 TABLET: 50; 8.6 TABLET ORAL at 08:08

## 2023-02-21 RX ADMIN — HYDROMORPHONE HYDROCHLORIDE 6 MG: 2 TABLET ORAL at 05:30

## 2023-02-21 RX ADMIN — HEPARIN SODIUM 5000 UNITS: 5000 INJECTION, SOLUTION INTRAVENOUS; SUBCUTANEOUS at 00:10

## 2023-02-21 RX ADMIN — PANTOPRAZOLE SODIUM 40 MG: 40 TABLET, DELAYED RELEASE ORAL at 19:30

## 2023-02-21 RX ADMIN — SODIUM CHLORIDE, SODIUM LACTATE, POTASSIUM CHLORIDE, CALCIUM CHLORIDE AND DEXTROSE MONOHYDRATE: 5; 600; 310; 30; 20 INJECTION, SOLUTION INTRAVENOUS at 11:17

## 2023-02-21 RX ADMIN — MAGNESIUM HYDROXIDE 30 ML: 400 SUSPENSION ORAL at 21:53

## 2023-02-21 RX ADMIN — PANTOPRAZOLE SODIUM 40 MG: 40 TABLET, DELAYED RELEASE ORAL at 08:08

## 2023-02-21 RX ADMIN — PANCRELIPASE LIPASE, PANCRELIPASE PROTEASE, PANCRELIPASE AMYLASE 6 CAPSULE: 20000; 63000; 84000 CAPSULE, DELAYED RELEASE ORAL at 18:00

## 2023-02-21 RX ADMIN — HYDROMORPHONE HYDROCHLORIDE 0.2 MG: 0.2 INJECTION, SOLUTION INTRAMUSCULAR; INTRAVENOUS; SUBCUTANEOUS at 08:05

## 2023-02-21 RX ADMIN — HEPARIN SODIUM 5000 UNITS: 5000 INJECTION, SOLUTION INTRAVENOUS; SUBCUTANEOUS at 14:04

## 2023-02-21 RX ADMIN — METHOCARBAMOL 1000 MG: 500 TABLET ORAL at 14:04

## 2023-02-21 RX ADMIN — Medication 1 TABLET: at 08:09

## 2023-02-21 RX ADMIN — HYDROMORPHONE HYDROCHLORIDE 6 MG: 2 TABLET ORAL at 21:12

## 2023-02-21 RX ADMIN — HYDROMORPHONE HYDROCHLORIDE 6 MG: 2 TABLET ORAL at 18:01

## 2023-02-21 RX ADMIN — METHOCARBAMOL 1000 MG: 500 TABLET ORAL at 19:31

## 2023-02-21 RX ADMIN — SENNOSIDES AND DOCUSATE SODIUM 1 TABLET: 50; 8.6 TABLET ORAL at 19:30

## 2023-02-21 RX ADMIN — HYDROMORPHONE HYDROCHLORIDE 6 MG: 2 TABLET ORAL at 11:55

## 2023-02-21 RX ADMIN — HYDROMORPHONE HYDROCHLORIDE 0.2 MG: 0.2 INJECTION, SOLUTION INTRAMUSCULAR; INTRAVENOUS; SUBCUTANEOUS at 23:43

## 2023-02-21 RX ADMIN — PANCRELIPASE LIPASE, PANCRELIPASE PROTEASE, PANCRELIPASE AMYLASE 6 CAPSULE: 20000; 63000; 84000 CAPSULE, DELAYED RELEASE ORAL at 13:06

## 2023-02-21 RX ADMIN — SERTRALINE HYDROCHLORIDE 150 MG: 50 TABLET ORAL at 21:12

## 2023-02-21 RX ADMIN — HEPARIN SODIUM 5000 UNITS: 5000 INJECTION, SOLUTION INTRAVENOUS; SUBCUTANEOUS at 08:08

## 2023-02-21 ASSESSMENT — ACTIVITIES OF DAILY LIVING (ADL)
ADLS_ACUITY_SCORE: 20
ADLS_ACUITY_SCORE: 22
ADLS_ACUITY_SCORE: 22
ADLS_ACUITY_SCORE: 20
ADLS_ACUITY_SCORE: 22
ADLS_ACUITY_SCORE: 20
ADLS_ACUITY_SCORE: 22
ADLS_ACUITY_SCORE: 22

## 2023-02-21 NOTE — PLAN OF CARE
Goal Outcome Evaluation:  A&Ox4. AVSS. Abdominal incision pain decrease with scheduled tylenol, robaxin, PRN dilaudid po, dilaudid IV and epidural, Bupivacaine 0.125% is infusing at  10 ml/hr. Abdomen is firm and distended. Pt tolerates full liquid diet. Pt reported passing gas; but No Bm yet. Pt had scheduled margaret and PRN milk of magnesia x1. D5%LR is infusing at 75 ml/hr.  Walked in marcus x4 with .   Continue with POC

## 2023-02-21 NOTE — PROGRESS NOTES
Pancreatitis Service - Daily Progress Note  02/18/2023    Assessment & Plan:  Meme Robins is a 52 year old female with a history of idiopathic chronic pancreatitis s/p PD sphincterotomy and multiple PD stents (10-15) in [place] and at [facility], cholecystectomy, TPIAT at this facility in 8/2021. Her overall recovery has been excellent until recently, when she had recurrent abdominal pain, bloating/nausea, malaise, and fevers. Imaging and endoscopic workup demonstrated reflux of enteric contents into her biliary limb with widely patent hepaticojejunostomy. She was admitted 2/17 following laparotomy, lysis of adhesions, and lengthening of biliary limb to total ~100cm.     POD # 4     GI:   S/p lengthening of biliary nuris limb: Passing some flatus. OK for full liquids, but would wait until return of bowel function prior to initiating solids. Will start po supplements.   Bowel regimen: Senna. Add MOM    Cardiorespiratory: Stable. SBP 80s-100s at baseline.    Fluid/Electrolytes: D5 LR 75/hr, stop when PO adequate    : No acute issues.     Post-pancreatectomy diabetes: Hgb A1C 6, c-peptides positive. PTA Tresiba 6 units, and sliding scale correction every 4 hours. On low sliding scale insulin with full liquids+ acarbose 50mg QPM.  -120s. Will reach out to [facility] to coordinate Follow up.     Infection: Afebrile, WBC 10    Prophylaxis: Heparin subcutaneous    Pain control:   Post surgical pain: adequate  -Epidural 10ml/hr-plan to remove 2/22  -APAP, scheduled  -PO dilaudid, increase to q3H  -IV dilaudid, decrease to 0.2mg q2H PRN severe pain  Chronic neck pain: Neurontin 300 mg AM/600 mg PM.     Psych:   Hx anxiety, depression, insomnia: PTA trazodone, zoloft, and clonazepam ordered.    Activity: Ambulate QID minimum.   Anticipated LOS/Discharge: POD 6 days postop    JOSE ALBERTO/Fellow/Resident Provider: Aliza Ridley NP    25 minutes spent on chart review, exam, and discussion with Pain  "team.    Faculty: Dane Burt MD    Attestation: I saw and examined the patient with Aliza Ridley NP, and the transplant team. I independently reviewed all pertinent laboratory and imaging results and made independent management decisions. I spent 30 minutes in care of this patient.  Dane Burt MD    __________________________________________________________________    Interval History: History is obtained from the patient    Overnight events: Pain control tolerable. Aware epidural will be removed tomorrow. + flatus, no BM. No nausea. Up within room.     ROS:   A 10-point review of systems was negative except as noted above.    Curent Meds:    acarbose  50 mg Oral Daily with supper     clonazePAM  1 mg Oral Daily     clonazePAM  1.5 mg Oral QPM     heparin ANTICOAGULANT  5,000 Units Subcutaneous Q8H     insulin aspart  1-3 Units Subcutaneous TID AC     insulin aspart  1-3 Units Subcutaneous At Bedtime     insulin degludec  7 Units Subcutaneous Daily     lipase-protease-amylase  6 capsule Oral TID w/meals     methocarbamol  1,000 mg Oral TID     multivitamin w/minerals  1 tablet Oral Daily     pantoprazole  40 mg Oral BID     polyethylene glycol  17 g Oral Daily     senna-docusate  1 tablet Oral BID     sertraline  150 mg Oral At Bedtime     sodium chloride (PF)  3 mL Intravenous Q8H     traZODone  100 mg Oral At Bedtime       Physical Exam:     Admit Weight: 49 kg (108 lb 1.6 oz)    Current Vitals:   /79 (BP Location: Right arm)   Pulse 74   Temp 98.8  F (37.1  C) (Oral)   Resp 16   Ht 1.549 m (5' 1\")   Wt 54.9 kg (121 lb 1.6 oz)   SpO2 94%   BMI 22.88 kg/m      Vital sign ranges:    Temp:  [98.1  F (36.7  C)-98.8  F (37.1  C)] 98.8  F (37.1  C)  Pulse:  [59-75] 74  Resp:  [16] 16  BP: (113-128)/(77-83) 128/79  SpO2:  [93 %-94 %] 94 %  Patient Vitals for the past 24 hrs:   BP Temp Temp src Pulse Resp SpO2 Weight   02/21/23 0522 128/79 98.8  F (37.1  C) Oral 74 16 94 % --   02/21/23 " 0219 122/77 98.4  F (36.9  C) Oral 59 16 93 % 54.9 kg (121 lb 1.6 oz)   02/20/23 2146 113/81 98.3  F (36.8  C) Oral 70 16 93 % --   02/20/23 1751 114/83 98.2  F (36.8  C) Oral 75 16 93 % --   02/20/23 1409 117/78 98.1  F (36.7  C) Oral 72 16 -- --   02/20/23 1203 -- -- -- -- -- -- 56.2 kg (124 lb)   02/20/23 0934 -- 98.5  F (36.9  C) Oral 69 16 -- --     General Appearance: in no apparent distress.   Skin: normal, dry, no suspicious lesions or rashes  Heart: well perfused  Lungs: NLB on RA  Abdomen: The abdomen is slightly distended, mildly tender generalized. The wound is closed with dermabond. No drainage or erythema.   : voiding  Extremities: edema: absent.    Data:   CMP  Recent Labs   Lab 02/21/23  0621 02/21/23  0217 02/20/23  1657 02/20/23  0635     --   --  141   POTASSIUM 4.0  --   --  4.0   CHLORIDE 102  --   --  105   CO2 26  --   --  26   * 125*   < > 126*   BUN 5.7*  --   --  4.6*   CR 0.51  --   --  0.52   GFRESTIMATED >90  --   --  >90   VIKAS 8.8  --   --  8.7   MAG 1.8  --   --  1.8   PHOS 4.0  --   --  4.2    < > = values in this interval not displayed.     CBC  Recent Labs   Lab 02/21/23  0621 02/20/23  0635 02/18/23  0743 02/18/23  0021   HGB 12.1 11.6*   < > 13.2   WBC 10.0 8.4   < >  --     317   < >  --    A1C  --   --   --  6.3*    < > = values in this interval not displayed.     Coags  No lab results found in last 7 days.    Invalid input(s): XA   Urinalysis  Recent Labs   Lab Test 03/06/22  0851 08/19/21  0158   COLOR Dark Yellow* Straw   APPEARANCE Clear Clear   URINEGLC Negative Negative   URINEBILI Negative Negative   URINEKETONE Negative Negative   SG 1.035 1.009   UBLD Negative Negative   URINEPH 5.5 7.0   PROTEIN 50* Negative   NITRITE Negative Negative   LEUKEST Negative Negative   RBCU 2 1   WBCU 1 <1

## 2023-02-21 NOTE — PLAN OF CARE
"BP 93/72 (BP Location: Right arm)   Pulse 65   Temp 98.8  F (37.1  C) (Oral)   Resp 16   Ht 1.549 m (5' 1\")   Wt 54.9 kg (121 lb 1.6 oz)   SpO2 96%   BMI 22.88 kg/m      Shift: 2965-4242  VS: Stable on RA, afebrile  Neuro: AOx4  BG: ACHS  Labs: WDL  Respiratory: WDL  Pain/Nausea/PRN: Pain well controlled with dilaudid 6 mg every 3 hours PO. Patient also has epidural infusion running continuously, will be removed tomorrow.   Diet: Regular - good appetite.  LDA: L PIV with D5LR running at 75/hr, epidural infusion.  GI/: Voiding to hat, good urine output, no BM this shift, using PRN milk of mag.  Skin: Abdominal incision ALONDRA, CDI.  Mobility: UAL  Plan: Continue plan of care.    Handoff given to following RN.    "

## 2023-02-21 NOTE — PROGRESS NOTES
"Pain Service Progress Note  [facility]  Date: 02/21/2023       Patient Name: Meme Robins  MRN: [id number]  Age: 52 year old  Sex: female      Assessment:  Meme Robins is a 51 y/o F with a PMHx significant for Cervical DDD (sx of numbness in her fingers when with neck flexion), migraines, h/o PE, chronic pancreatitis s/p John procedure, multiple ERCPs, and s/p islet cell transplant, chronic abdominal pain and h/o adrenal insufficiency with most recent testing being normal who is now s/p ex lap for lysis of adhesions and nuris-en-y revision for tx of abdominal pain.      Procedure: Exploratory Laparotomy, lysis of adhesions, possible nuris-en-y revision      Date of Surgery: 2/17/23     Date of Catheter Placement: 2/17/23     Plan/Recommendations:  1. Regional Anesthesia/Analgesia  -Continuous Catheter Type/Site: Epidural T9-10  Continue Bupivacaine 0.125% continuous infusion at 10 mL/hr     Plan to maintain epidural, max of 5 days. Plan on epidural removal on 2/22/23-Meme and RN are aware.     2. Anticoagulation  -Please contact Inpatient Pain Service before ordering or making any anticoagulation changes  -heparin ANTICOAGULANT injection 5,000 Units SC, Q8H         3. Multimodal Analgesia  - multimodals per primary service orders.  On Dilaudid 4-6mg PO Q 3 hours PRN (used 36mg yesterday)  and IV Dialudid 0.2 Q 2 hours PRN (used 0. 8mg total yesterday).  Advise Meme to use less IV Dilaudid in order to prepare for pain management outside of hospital setting.  Advise to stay on top of pain management with PO Dilaudid.  Meme is well versed in her care and pain management.   Outpatient, pain is being managed by pain specialist in [place].  PTA was using 2-4mg up to 4x /day PRN-states is very judicious with use.     Pain Service will continue to follow.      Please Page the Pain Team Via [employer]: \"PAIN MANAGEMENT ACUTE INPATIENT/ [facility]\"    Shannon Barnett PA-C  02/21/2023 " "    Overnight Events: no major acute event.        Subjective:  States pain control is \"okay.\"  Has walked more in the hallways yesterday so may have \"overdone\" her activities and thus has increased pain.  Also states that she is using IV Dilaudid for rescue as she gets behind on PO Dilaudid.  States PO Dilaudid Q 3 hours PRN is very beneficial.      Symptoms of LAST: No    Pain Location:  abdomen    Pain Intensity:    Pain at Rest: 7/10   Satisfied with your level of pain control: Yes    Diet: Full Liquid Diet  Snacks/Supplements Adult: Ensure Max Protein (bariatric); With Meals    Relevant Labs:  Recent Labs   Lab Test 02/21/23  0621 03/07/22  0640 03/06/22  0848 08/16/21  0638 08/15/21  0743   INR  --   --  1.01  --   --       < > 518*   < > 327   PTT  --   --   --   --  34   BUN 5.7*   < > 16   < > 6*    < > = values in this interval not displayed.       Physical Exam:  Vitals: BP (!) 131/91 (BP Location: Right arm)   Pulse 80   Temp 98.9  F (37.2  C) (Oral)   Resp 16   Ht 1.549 m (5' 1\")   Wt 54.9 kg (121 lb 1.6 oz)   SpO2 96%   BMI 22.88 kg/m      Physical Exam:   Orientation:  Alert, oriented, and in no acute distress: Yes  Sedation: No    Motor Examination:  5/5 Strength in lower extremities: Yes      Catheter Site:   Catheter entry site is clean/dry/intact: Yes    Tender: No      Relevant Medications:  Current Pain Medications:  Medications related to Pain Management (From now, onward)    Start     Dose/Rate Route Frequency Ordered Stop    02/20/23 0000  acetaminophen (TYLENOL) tablet 650 mg         650 mg Oral EVERY 4 HOURS PRN 02/17/23 2155      02/19/23 1230  HYDROmorphone (DILAUDID) tablet 4 mg        See Hyperspace for full Linked Orders Report.    4 mg Oral EVERY 3 HOURS PRN 02/19/23 1201      02/19/23 1230  HYDROmorphone (DILAUDID) tablet 6 mg        See Hyperspace for full Linked Orders Report.    6 mg Oral EVERY 3 HOURS PRN 02/19/23 1201      02/19/23 1200  HYDROmorphone (DILAUDID) " injection 0.2 mg        See Hyperspace for full Linked Orders Report.    0.2 mg Intravenous EVERY 2 HOURS PRN 02/19/23 1201      02/18/23 0800  polyethylene glycol (MIRALAX) Packet 17 g         17 g Oral DAILY 02/17/23 2155 02/18/23 0800  clonazePAM (klonoPIN) tablet 1 mg         1 mg Oral DAILY 02/17/23 2250 02/18/23 0000  clonazePAM (klonoPIN) tablet 1.5 mg         1.5 mg Oral EVERY EVENING 02/17/23 2256 02/17/23 2300  senna-docusate (SENOKOT-S/PERICOLACE) 8.6-50 MG per tablet 1 tablet         1 tablet Oral 2 TIMES DAILY 02/17/23 2155 02/17/23 2200  methocarbamol (ROBAXIN) tablet 1,000 mg        Note to Pharmacy: PTA Sig:Take 2 tablets (1,000 mg) by mouth 3 times daily      1,000 mg Oral 3 TIMES DAILY 02/17/23 2159 02/17/23 2155  magnesium hydroxide (MILK OF MAGNESIA) suspension 30 mL         30 mL Oral DAILY PRN 02/17/23 2155 02/17/23 2155  bisacodyl (DULCOLAX) suppository 10 mg         10 mg Rectal DAILY PRN 02/17/23 2155 02/17/23 0830  bupivacaine (MARCAINE) 0.125 % in sodium chloride 0.9 % 250 mL EPIDURAL Infusion         10 mL/hr  EPIDURAL CONTINUOUS 02/17/23 0812

## 2023-02-21 NOTE — PLAN OF CARE
"/79 (BP Location: Right arm)   Pulse 74   Temp 98.8  F (37.1  C) (Oral)   Resp 16   Ht 1.549 m (5' 1\")   Wt 54.9 kg (121 lb 1.6 oz)   SpO2 94%   BMI 22.88 kg/m      4823-8014  Neuro: Pt. alert & Ox4  Behavior: Pt. calm & cooperative with cares.   Activity: Pt. up with SBA.  Vital: AVSS on RA.  LDAs: Left PIV with D5LR at 75cc/ hour. Epidural at 10cc/hour.  Cardiac: WNL  BG: ACHS. 2am B  Respiratory: LS clear  GI/: Pt. voiding w/o difficulty. No stools since prior to surgery, pt. passing flatus.   Skin: Midline incision with liquid bandage & ALONDRA.  Pain/Nausea: Abdominal pain managed with Epidural, prn IV Dilaudid, prn oral Dilaudid x2. Pt. denies nausea.   Diet: Full liquids  Labs/Imaging:  Morning labs pending.    Plan: Continue to follow POC & notify MD with change in status.                           "

## 2023-02-22 LAB
ANION GAP SERPL CALCULATED.3IONS-SCNC: 11 MMOL/L (ref 7–15)
BUN SERPL-MCNC: 6.2 MG/DL (ref 6–20)
CALCIUM SERPL-MCNC: 8.7 MG/DL (ref 8.6–10)
CHLORIDE SERPL-SCNC: 102 MMOL/L (ref 98–107)
CREAT SERPL-MCNC: 0.52 MG/DL (ref 0.51–0.95)
DEPRECATED HCO3 PLAS-SCNC: 26 MMOL/L (ref 22–29)
ERYTHROCYTE [DISTWIDTH] IN BLOOD BY AUTOMATED COUNT: 13.8 % (ref 10–15)
GFR SERPL CREATININE-BSD FRML MDRD: >90 ML/MIN/1.73M2
GLUCOSE BLDC GLUCOMTR-MCNC: 112 MG/DL (ref 70–99)
GLUCOSE BLDC GLUCOMTR-MCNC: 124 MG/DL (ref 70–99)
GLUCOSE BLDC GLUCOMTR-MCNC: 145 MG/DL (ref 70–99)
GLUCOSE BLDC GLUCOMTR-MCNC: 95 MG/DL (ref 70–99)
GLUCOSE SERPL-MCNC: 143 MG/DL (ref 70–99)
HCT VFR BLD AUTO: 40 % (ref 35–47)
HGB BLD-MCNC: 13.2 G/DL (ref 11.7–15.7)
MAGNESIUM SERPL-MCNC: 2.4 MG/DL (ref 1.7–2.3)
MCH RBC QN AUTO: 30.3 PG (ref 26.5–33)
MCHC RBC AUTO-ENTMCNC: 33 G/DL (ref 31.5–36.5)
MCV RBC AUTO: 92 FL (ref 78–100)
PHOSPHATE SERPL-MCNC: 4 MG/DL (ref 2.5–4.5)
PLATELET # BLD AUTO: 391 10E3/UL (ref 150–450)
POTASSIUM SERPL-SCNC: 4.5 MMOL/L (ref 3.4–5.3)
RBC # BLD AUTO: 4.36 10E6/UL (ref 3.8–5.2)
SODIUM SERPL-SCNC: 139 MMOL/L (ref 136–145)
WBC # BLD AUTO: 10.3 10E3/UL (ref 4–11)

## 2023-02-22 PROCEDURE — 84100 ASSAY OF PHOSPHORUS: CPT | Performed by: SURGERY

## 2023-02-22 PROCEDURE — 01996 DLY HOSP MGMT EDRL RX ADMIN: CPT | Performed by: PHYSICIAN ASSISTANT

## 2023-02-22 PROCEDURE — 36415 COLL VENOUS BLD VENIPUNCTURE: CPT | Performed by: SURGERY

## 2023-02-22 PROCEDURE — 80048 BASIC METABOLIC PNL TOTAL CA: CPT | Performed by: SURGERY

## 2023-02-22 PROCEDURE — 85014 HEMATOCRIT: CPT | Performed by: SURGERY

## 2023-02-22 PROCEDURE — 99024 POSTOP FOLLOW-UP VISIT: CPT | Mod: FS | Performed by: SURGERY

## 2023-02-22 PROCEDURE — 250N000013 HC RX MED GY IP 250 OP 250 PS 637: Performed by: NURSE PRACTITIONER

## 2023-02-22 PROCEDURE — 250N000011 HC RX IP 250 OP 636: Performed by: SURGERY

## 2023-02-22 PROCEDURE — 120N000011 HC R&B TRANSPLANT UMMC

## 2023-02-22 PROCEDURE — 250N000011 HC RX IP 250 OP 636: Performed by: NURSE PRACTITIONER

## 2023-02-22 PROCEDURE — 250N000013 HC RX MED GY IP 250 OP 250 PS 637: Performed by: SURGERY

## 2023-02-22 PROCEDURE — 83735 ASSAY OF MAGNESIUM: CPT | Performed by: SURGERY

## 2023-02-22 RX ORDER — GABAPENTIN 300 MG/1
300 CAPSULE ORAL EVERY MORNING
Status: DISCONTINUED | OUTPATIENT
Start: 2023-02-23 | End: 2023-02-25 | Stop reason: HOSPADM

## 2023-02-22 RX ORDER — GABAPENTIN 300 MG/1
600 CAPSULE ORAL EVERY EVENING
Status: DISCONTINUED | OUTPATIENT
Start: 2023-02-22 | End: 2023-02-25 | Stop reason: HOSPADM

## 2023-02-22 RX ADMIN — HYDROMORPHONE HYDROCHLORIDE 6 MG: 2 TABLET ORAL at 16:24

## 2023-02-22 RX ADMIN — HYDROMORPHONE HYDROCHLORIDE 6 MG: 2 TABLET ORAL at 01:56

## 2023-02-22 RX ADMIN — SERTRALINE HYDROCHLORIDE 150 MG: 50 TABLET ORAL at 22:12

## 2023-02-22 RX ADMIN — ACETAMINOPHEN 650 MG: 325 TABLET ORAL at 12:59

## 2023-02-22 RX ADMIN — HEPARIN SODIUM 5000 UNITS: 5000 INJECTION, SOLUTION INTRAVENOUS; SUBCUTANEOUS at 22:14

## 2023-02-22 RX ADMIN — HYDROMORPHONE HYDROCHLORIDE 0.2 MG: 0.2 INJECTION, SOLUTION INTRAMUSCULAR; INTRAVENOUS; SUBCUTANEOUS at 03:51

## 2023-02-22 RX ADMIN — MAGNESIUM HYDROXIDE 30 ML: 400 SUSPENSION ORAL at 19:55

## 2023-02-22 RX ADMIN — METHOCARBAMOL 1000 MG: 500 TABLET ORAL at 14:15

## 2023-02-22 RX ADMIN — TRAZODONE HYDROCHLORIDE 100 MG: 100 TABLET ORAL at 22:12

## 2023-02-22 RX ADMIN — HYDROMORPHONE HYDROCHLORIDE 0.2 MG: 0.2 INJECTION, SOLUTION INTRAMUSCULAR; INTRAVENOUS; SUBCUTANEOUS at 14:08

## 2023-02-22 RX ADMIN — METHOCARBAMOL 1000 MG: 500 TABLET ORAL at 19:45

## 2023-02-22 RX ADMIN — PANCRELIPASE LIPASE, PANCRELIPASE PROTEASE, PANCRELIPASE AMYLASE 6 CAPSULE: 20000; 63000; 84000 CAPSULE, DELAYED RELEASE ORAL at 18:28

## 2023-02-22 RX ADMIN — HYDROMORPHONE HYDROCHLORIDE 6 MG: 2 TABLET ORAL at 05:24

## 2023-02-22 RX ADMIN — SENNOSIDES AND DOCUSATE SODIUM 1 TABLET: 50; 8.6 TABLET ORAL at 08:29

## 2023-02-22 RX ADMIN — GABAPENTIN 600 MG: 300 CAPSULE ORAL at 19:45

## 2023-02-22 RX ADMIN — HYDROMORPHONE HYDROCHLORIDE 6 MG: 2 TABLET ORAL at 08:32

## 2023-02-22 RX ADMIN — HYDROMORPHONE HYDROCHLORIDE 0.2 MG: 0.2 INJECTION, SOLUTION INTRAMUSCULAR; INTRAVENOUS; SUBCUTANEOUS at 21:32

## 2023-02-22 RX ADMIN — HYDROMORPHONE HYDROCHLORIDE 6 MG: 2 TABLET ORAL at 23:24

## 2023-02-22 RX ADMIN — MAGNESIUM HYDROXIDE 30 ML: 400 SUSPENSION ORAL at 08:29

## 2023-02-22 RX ADMIN — CLONAZEPAM 1.5 MG: 0.5 TABLET ORAL at 19:45

## 2023-02-22 RX ADMIN — PANTOPRAZOLE SODIUM 40 MG: 40 TABLET, DELAYED RELEASE ORAL at 08:29

## 2023-02-22 RX ADMIN — METHOCARBAMOL 1000 MG: 500 TABLET ORAL at 08:29

## 2023-02-22 RX ADMIN — HYDROMORPHONE HYDROCHLORIDE 6 MG: 2 TABLET ORAL at 19:42

## 2023-02-22 RX ADMIN — CLONAZEPAM 1 MG: 1 TABLET ORAL at 08:28

## 2023-02-22 RX ADMIN — Medication 1 TABLET: at 08:29

## 2023-02-22 RX ADMIN — PANTOPRAZOLE SODIUM 40 MG: 40 TABLET, DELAYED RELEASE ORAL at 19:46

## 2023-02-22 RX ADMIN — SENNOSIDES AND DOCUSATE SODIUM 1 TABLET: 50; 8.6 TABLET ORAL at 19:46

## 2023-02-22 RX ADMIN — SODIUM CHLORIDE, SODIUM LACTATE, POTASSIUM CHLORIDE, CALCIUM CHLORIDE AND DEXTROSE MONOHYDRATE: 5; 600; 310; 30; 20 INJECTION, SOLUTION INTRAVENOUS at 01:56

## 2023-02-22 RX ADMIN — PANCRELIPASE LIPASE, PANCRELIPASE PROTEASE, PANCRELIPASE AMYLASE 4 CAPSULE: 20000; 63000; 84000 CAPSULE, DELAYED RELEASE ORAL at 10:53

## 2023-02-22 RX ADMIN — HEPARIN SODIUM 5000 UNITS: 5000 INJECTION, SOLUTION INTRAVENOUS; SUBCUTANEOUS at 14:07

## 2023-02-22 RX ADMIN — HYDROMORPHONE HYDROCHLORIDE 6 MG: 2 TABLET ORAL at 11:23

## 2023-02-22 RX ADMIN — PANCRELIPASE LIPASE, PANCRELIPASE PROTEASE, PANCRELIPASE AMYLASE 6 CAPSULE: 20000; 63000; 84000 CAPSULE, DELAYED RELEASE ORAL at 10:54

## 2023-02-22 ASSESSMENT — ACTIVITIES OF DAILY LIVING (ADL)
ADLS_ACUITY_SCORE: 20

## 2023-02-22 NOTE — PROGRESS NOTES
Pain Service Progress Note  [facility]  Date: 02/22/2023       Patient Name: Meme Robins  MRN: [id number]  Age: 52 year old  Sex: female      Assessment:  Meme Robins is a 51 y/o F with a PMHx significant for Cervical DDD (sx of numbness in her fingers when with neck flexion), migraines, h/o PE, chronic pancreatitis s/p John procedure, multiple ERCPs, and s/p islet cell transplant, chronic abdominal pain and h/o adrenal insufficiency with most recent testing being normal who is now s/p ex lap for lysis of adhesions and nuris-en-y revision for tx of abdominal pain.      Procedure: Exploratory Laparotomy, lysis of adhesions, possible nuris-en-y revision      Date of Surgery: 2/17/23     Date of Catheter Placement: 2/17/23     Plan/Recommendations:  1. Regional Anesthesia/Analgesia  -Continuous Catheter Type/Site: Epidural T9-10  Continue Bupivacaine 0.125% continuous infusion at 10 mL/hr     At 0750, the epidural catheter was removed without complications, dark tip intact, site is c,d,i. Small bandage applied. RN made aware.     2. Anticoagulation  -Please contact Inpatient Pain Service before ordering or making any anticoagulation changes    -heparin ANTICOAGULANT injection 5,000 Units SC, Q8H, last dose of subcutaneous heparin prior to removal was on 2/21/23 at 1932. Must wait at least hour after catheter removal to resume.  RN made aware.        3. Multimodal Analgesia  - multimodals per primary service orders.  On Dilaudid 4-6mg PO Q 3 hours PRN and IV Dialudid 0.2 Q 2 hours prn.  Advise Meme to use less IV Dilaudid in order to prepare for pain management outside of hospital setting.  Advise to stay on top of pain management with PO Dilaudid.  Meme is well versed in her care and pain management.   Outpatient, pain is being managed by pain specialist in [place].  PTA was using 2-4mg up to 4x /day PRN-states is very judicious with use.     Pain Service will sign off.      Please Page  "the Pain Team Via [facility]: \"PAIN MANAGEMENT ACUTE INPATIENT/ [facility]\"   Shannon Barnett PA-C  - Click Here to Page the Pain Provider     02/22/2023     Overnight Events: No major acute event        Subjective: \"moving in the right direction.\"      Symptoms of LAST: No    Pain Location:  abdomen    Pain Intensity:    Pain at Rest: 5/10     Satisfied with your level of pain control: Yes    Diet: Snacks/Supplements Adult: Ensure Max Protein (bariatric); With Meals  Regular Diet Adult    Relevant Labs:  Recent Labs   Lab Test 02/22/23  0613 03/07/22  0640 03/06/22  0848 08/16/21  0638 08/15/21  0743   INR  --   --  1.01  --   --       < > 518*   < > 327   PTT  --   --   --   --  34   BUN 6.2   < > 16   < > 6*    < > = values in this interval not displayed.       Physical Exam:  Vitals: /73 (BP Location: Right arm)   Pulse 67   Temp 98.6  F (37  C) (Oral)   Resp 16   Ht 1.549 m (5' 1\")   Wt 55.4 kg (122 lb 1.6 oz)   SpO2 93%   BMI 23.07 kg/m      Physical Exam:   Orientation:  Alert, oriented, and in no acute distress: Yes  Sedation: No    Motor Examination:  5/5 Strength in lower extremities: Yes      Catheter Site:   Catheter entry site is clean/dry/intact: Yes    Tender: No      Relevant Medications:  Current Pain Medications:  Medications related to Pain Management (From now, onward)    Start     Dose/Rate Route Frequency Ordered Stop    02/21/23 2000  magnesium hydroxide (MILK OF MAGNESIA) suspension 30 mL         30 mL Oral 2 TIMES DAILY 02/21/23 1239      02/20/23 0000  acetaminophen (TYLENOL) tablet 650 mg         650 mg Oral EVERY 4 HOURS PRN 02/17/23 2155      02/19/23 1230  HYDROmorphone (DILAUDID) tablet 4 mg        See Hyperspace for full Linked Orders Report.    4 mg Oral EVERY 3 HOURS PRN 02/19/23 1201      02/19/23 1230  HYDROmorphone (DILAUDID) tablet 6 mg        See Hyperspace for full Linked Orders Report.    6 mg Oral EVERY 3 HOURS PRN 02/19/23 1201      02/19/23 1200  " HYDROmorphone (DILAUDID) injection 0.2 mg        See Hyperspace for full Linked Orders Report.    0.2 mg Intravenous EVERY 2 HOURS PRN 02/19/23 1201      02/18/23 0800  polyethylene glycol (MIRALAX) Packet 17 g         17 g Oral DAILY 02/17/23 2155 02/18/23 0800  clonazePAM (klonoPIN) tablet 1 mg         1 mg Oral DAILY 02/17/23 2250      02/18/23 0000  clonazePAM (klonoPIN) tablet 1.5 mg         1.5 mg Oral EVERY EVENING 02/17/23 2256 02/17/23 2300  senna-docusate (SENOKOT-S/PERICOLACE) 8.6-50 MG per tablet 1 tablet         1 tablet Oral 2 TIMES DAILY 02/17/23 2155 02/17/23 2200  methocarbamol (ROBAXIN) tablet 1,000 mg        Note to Pharmacy: PTA Sig:Take 2 tablets (1,000 mg) by mouth 3 times daily      1,000 mg Oral 3 TIMES DAILY 02/17/23 2159 02/17/23 2155  bisacodyl (DULCOLAX) suppository 10 mg         10 mg Rectal DAILY PRN 02/17/23 2155

## 2023-02-22 NOTE — PROGRESS NOTES
Pancreatitis Service - Daily Progress Note  02/18/2023    Assessment & Plan:  Meme Robins is a 52 year old female with a history of idiopathic chronic pancreatitis s/p PD sphincterotomy and multiple PD stents (10-15) in [place] and at [facility], cholecystectomy, TPIAT at this facility in 8/2021. Her overall recovery has been excellent until recently, when she had recurrent abdominal pain, bloating/nausea, malaise, and fevers. Imaging and endoscopic workup demonstrated reflux of enteric contents into her biliary limb with widely patent hepaticojejunostomy. She was admitted 2/17 following laparotomy, lysis of adhesions, and lengthening of biliary limb to total ~100cm.      GI:  S/p lengthening of biliary nuris limb: POD#5. Tolerating regular diet. Awaiting return of bowel function.  Bowel regimen: Senna, MOM, Miralax. Add Fleets suppository PRN.  Pancreatic insufficiency: PTA Zenpep.    Cardiorespiratory: Stable. SBP 80s-100s at baseline.    Fluid/Electrolytes: Stop IVF today with good PO intake.     : No acute issues.     Post-pancreatectomy diabetes: Hgb A1C 6, c-peptides positive. Discontinue Tresiba 6 units daily (stopped ~1 months ago). Continue PTA sliding scale correction AC/HS, acarbose 50 mg QPM. Will reach out to [facility] to coordinate Follow up.     Infection: Afebrile, WBC 10.    Prophylaxis: DVT (Heparin subcutaneous), GI (PPI)    Pain control:  Post surgical pain: Adequate control. Pain team following.   -Epidural removed 2/22  -Tylenol PRN  -PO Dilaudid Q3H PRN  -IV Dilaudid 0.2 mg q2H PRN for severe pain, patient encouraged to decrease use  -Robaxin TID  Chronic neck pain: Restart PTA Neurontin 300 mg AM/600 mg PM.     Psych:  Hx anxiety, depression, insomnia: PTA trazodone, Zoloft, and clonazepam ordered.    Activity: Ambulate QID minimum.     Anticipated LOS/Discharge: Discharge in 1-2 days pending adequate pain control and PO intake.     JOSE ALBERTO/Fellow/Resident Provider: Effie Gutierrez NP  "    Faculty: Dane Burt MD    Attestation: I saw and examined the patient with Effie Gutierrez NP, and the transplant team. I independently reviewed all pertinent laboratory and imaging results and made independent management decisions. I spent 30 minutes in care of this patient.  Dane Burt MD    __________________________________________________________________    Interval History: History is obtained from the patient    Overnight events: No acute events overnight. Pain control tolerable, worse with epidural removed.     ROS:   A 10-point review of systems was negative except as noted above.    Curent Meds:    acarbose  50 mg Oral Daily with supper     clonazePAM  1 mg Oral Daily     clonazePAM  1.5 mg Oral QPM     heparin ANTICOAGULANT  5,000 Units Subcutaneous Q8H     insulin aspart  1-3 Units Subcutaneous TID AC     insulin aspart  1-3 Units Subcutaneous At Bedtime     insulin degludec  7 Units Subcutaneous Daily     lipase-protease-amylase  6 capsule Oral TID w/meals     magnesium hydroxide  30 mL Oral BID     methocarbamol  1,000 mg Oral TID     multivitamin w/minerals  1 tablet Oral Daily     pantoprazole  40 mg Oral BID     polyethylene glycol  17 g Oral Daily     senna-docusate  1 tablet Oral BID     sertraline  150 mg Oral At Bedtime     sodium chloride (PF)  3 mL Intravenous Q8H     traZODone  100 mg Oral At Bedtime       Physical Exam:     Admit Weight: 49 kg (108 lb 1.6 oz)    Current Vitals:   /81 (BP Location: Right arm)   Pulse 85   Temp 98.4  F (36.9  C) (Oral)   Resp 16   Ht 1.549 m (5' 1\")   Wt 55.4 kg (122 lb 1.6 oz)   SpO2 95%   BMI 23.07 kg/m      Vital sign ranges:    Temp:  [98.3  F (36.8  C)-98.8  F (37.1  C)] 98.4  F (36.9  C)  Pulse:  [65-95] 85  Resp:  [16] 16  BP: ()/(70-81) 115/81  SpO2:  [90 %-96 %] 95 %  Patient Vitals for the past 24 hrs:   BP Temp Temp src Pulse Resp SpO2 Weight   02/22/23 1005 115/81 98.4  F (36.9  C) Oral 85 16 95 % -- "   02/22/23 0602 111/73 98.6  F (37  C) Oral 67 16 93 % --   02/22/23 0145 118/74 98.7  F (37.1  C) Oral 95 16 94 % 55.4 kg (122 lb 1.6 oz)   02/21/23 2136 103/70 98.3  F (36.8  C) Oral 74 16 90 % --   02/21/23 1744 109/72 98.4  F (36.9  C) Oral 72 16 90 % --   02/21/23 1421 93/72 98.8  F (37.1  C) Oral 65 16 96 % --     General Appearance: in no apparent distress.   Skin: normal, dry, no suspicious lesions or rashes  Heart: well perfused  Lungs: NLB on RA  Abdomen: The abdomen is slightly distended, mildly tender generalized. The wound is closed with dermabond. No drainage or erythema.   : voiding  Extremities: edema: absent.    Data:   CMP  Recent Labs   Lab 02/22/23  1223 02/22/23  0821 02/22/23  0613 02/21/23  0836 02/21/23  0621   NA  --   --  139  --  138   POTASSIUM  --   --  4.5  --  4.0   CHLORIDE  --   --  102  --  102   CO2  --   --  26  --  26   * 124* 143*   < > 150*   BUN  --   --  6.2  --  5.7*   CR  --   --  0.52  --  0.51   GFRESTIMATED  --   --  >90  --  >90   VIKAS  --   --  8.7  --  8.8   MAG  --   --  2.4*  --  1.8   PHOS  --   --  4.0  --  4.0    < > = values in this interval not displayed.     CBC  Recent Labs   Lab 02/22/23  0613 02/21/23  0621 02/18/23  0743 02/18/23  0021   HGB 13.2 12.1   < > 13.2   WBC 10.3 10.0   < >  --     350   < >  --    A1C  --   --   --  6.3*    < > = values in this interval not displayed.     Coags  No lab results found in last 7 days.    Invalid input(s): XA   Urinalysis  Recent Labs   Lab Test 03/06/22  0851 08/19/21  0158   COLOR Dark Yellow* Straw   APPEARANCE Clear Clear   URINEGLC Negative Negative   URINEBILI Negative Negative   URINEKETONE Negative Negative   SG 1.035 1.009   UBLD Negative Negative   URINEPH 5.5 7.0   PROTEIN 50* Negative   NITRITE Negative Negative   LEUKEST Negative Negative   RBCU 2 1   WBCU 1 <1

## 2023-02-22 NOTE — PLAN OF CARE
"Goal Outcome Evaluation:    Time: 2522-5169  VS: /70 (BP Location: Right arm)   Pulse 74   Temp 98.3  F (36.8  C) (Oral)   Resp 16   Ht 1.549 m (5' 1\")   Wt 54.9 kg (121 lb 1.6 oz)   SpO2 90%   BMI 22.88 kg/m    Activity:  Up independently to bathroom. Ambulated in unit x1 with   Pain: c/o of abd pain managed with continuous  epidural and PRN po Dilaudid   Neuro: A&O x 4. Denies nausea  Cardiac: WDL. Denies SOB, chest pain  Respiratory: RA  @ 90% stat  GI/: No BM this shift, voiding spontaneously    Diet/Nausea: Tolerating reg, denies nausea  Lines: (R) PIV with D5LR running at 75 ml/hr, epidural infusion.   Incisions/Drains/Skin: Abdominal incision ALONDRA.   Lab: BG ACHS 135   Electrolyte Replacements: None   Plan: Continue w/poc  New changes this shift: No acute change      "

## 2023-02-22 NOTE — PLAN OF CARE
"/77 (BP Location: Right arm)   Pulse 84   Temp 98.3  F (36.8  C) (Oral)   Resp 16   Ht 1.549 m (5' 1\")   Wt 55.4 kg (122 lb 1.6 oz)   SpO2 95%   BMI 23.07 kg/m      Shift: 6084-8965  VS: Stable on RA, afebrile  Neuro: AOx4  BG: ACHS  Labs: WDL  Respiratory: WDL  Pain/Nausea/PRN: Pain managed with IV and PO dilaudid. Epidural removed today.  Diet: Regular, fair appetite.  LDA: R PIV - SL  GI/: Up independently to bathroom, voiding without saving, large BM this shift.  Skin: Abdominal incision, ALONDRA, CDI.  Mobility: UAL  Plan: Progressing towards discharge.    Handoff given to following RN.    "

## 2023-02-22 NOTE — PROGRESS NOTES
"Goal outcome evaluation:  Time: 2300 - 0700  Plan of care reviewed with: Patient  Overall patient progress: No change  VS /73 (BP Location: Right arm)   Pulse 67   Temp 98.6  F (37  C) (Oral)   Resp 16   Ht 1.549 m (5' 1\")   Wt 55.4 kg (122 lb 1.6 oz)   SpO2 93%   BMI 23.07 kg/m        Activity: Assist x 1/SBA. Ambulated to the bathroom and back to bed  Neuros: A&O x4. Calls appropriately. Able to make needs known. Clear and appropriate speech. Follows instructions.   Cardiac: WDL. Denies chest pain   Respiratory: WDL on RA, sating around 93%. Denies SOB.  GI/: Voiding spontaneously. No BM this shift.  Diet: Regular diet  Skin/Incisions: Abdominal incision, liquid bandaged, ALONDRA, no drainage.  Lines/Drains: R PIV infusing D5LR @ 75 mL/hr  Labs: ACHS, last   Pain/Nausea: Around the clock pain, managed with Dilaudid IV and oral.  Plan: Continue POC      "

## 2023-02-22 NOTE — PHARMACY-ADMISSION MEDICATION HISTORY
Admission Medication History Completed by Pharmacy    See [facility] Admission Navigator for allergy information, preferred outpatient pharmacy, prior to admission medications and immunization status.     Medication History Sources:     Patient, SureScripts    Changes made to PTA medication list (reason):    Added: None    Deleted: None    Changed: Zenpep to 3 caps TID with meals and 2 caps with snacks, methocarbamol from TID to TID PRN    Additional Information:    Patient hasn't used Tresiba in about 1 month.    Prior to Admission medications    Medication Sig Last Dose Taking? Auth Provider Long Term End Date   blood glucose (NO BRAND SPECIFIED) lancets standard To use to test glucose level in the blood  Use to test blood sugar  6  times daily as directed. To accompany glucose monitor brands per insurance coverage.  One touch Delica lancets 2/16/2023 Yes Lyn Norman PA-C     blood glucose (NO BRAND SPECIFIED) test strip Use to test blood sugar  6  times daily as directed. To accompany glucose monitor brands per insurance coverage: One Touch Verio strip 2/16/2023 Yes Patricia Bermeo MD     clonazePAM (KLONOPIN) 1 MG tablet 1mg morning and 1.5mg every evening 2/16/2023 Yes Reported, Patient Yes    Continuous Blood Gluc Sensor (DEXCOM G6 SENSOR) MISC USE AS DIRECTED FOR CONTINUOUS GLUCOSE MONITORING, CHANGE EVERY 10 DAYS 2/16/2023 Yes Patricia Bermeo MD     Continuous Blood Gluc Transmit (DEXCOM G6 TRANSMITTER) MISC USE AND CHANGE 1 TRANSMITTER EVERY 3 MONTHS 2/16/2023 Yes Patricia Bermeo MD     gabapentin (NEURONTIN) 300 MG capsule Take 1 capsule (300 mg) by mouth 2 times daily  Patient taking differently: Take 300mg by mouth in the morning and take 600mg by mouth at bedtime. 2/16/2023 Yes Elfego Shirley MD Yes    Glucagon (GVOKE HYPOPEN 2-PACK) 1 MG/0.2ML SOAJ Inject 1 each Subcutaneous once as needed (for severe hypoglycemia) Use x1 dose and then call seek emergent care for treatment More than a  month Yes Patricia Bermeo MD Yes    HYDROmorphone (DILAUDID) 4 MG tablet Take 1-2 tablets (4-6 mg) every 4 hours as needed for severe abdominal pain 2/16/2023 Yes Esperanza Araujo NP No    Injection Device for insulin (INPEN 100-BLUE-PARVEZ-HUMALOG) NORA Use as directed for Insulin infusion 2/16/2023 Yes Patricia Bermeo MD     insulin degludec (TRESIBA FLEXTOUCH) 100 UNIT/ML pen Take 7 units daily Past Month Yes Patricia Bermeo MD     lipase-protease-amylase (ZENPEP) 23045-700948 units CPEP Take 3 capsules by mouth 3 times daily (with meals) Take up to 140,000 units with meals and take up to 80,000 with snacks 2/16/2023 Yes Reported, Patient     lipase-protease-amylase (ZENPEP) 19008-781862-563113 units CPEP Take 2 capsules by mouth Take with snacks or supplements  Yes Unknown, Entered By History     Melatonin 10 MG TABS tablet Take 10 mg by mouth nightly as needed for sleep 2/16/2023 Yes Unknown, Entered By History     methocarbamol (ROBAXIN) 500 MG tablet Take 2 tablets (1,000 mg) by mouth 3 times daily  Patient taking differently: Take 1,000 mg by mouth 3 times daily as needed for muscle spasms Past Week Yes Aliza Ridley NP     multivitamin w/minerals (THERA-VIT-M) tablet Take 1 tablet by mouth daily 2/16/2023 Yes Aliza Ridley NP     ondansetron (ZOFRAN-ODT) 4 MG ODT tab Take 4 mg by mouth every 8 hours as needed 2/16/2023 Yes Reported, Patient     pantoprazole (PROTONIX) 40 MG EC tablet Take 40 mg by mouth 2 times daily  Yes Reported, Patient     sertraline (ZOLOFT) 100 MG tablet Take 150 mg by mouth At Bedtime 2/16/2023 Yes Reported, Patient Yes    traZODone (DESYREL) 100 MG tablet Take 100 mg by mouth At Bedtime 2/16/2023 Yes Reported, Patient Yes    acarbose (PRECOSE) 50 MG tablet Take 1 tablet (50 mg) by mouth daily (with dinner)  at past month  Patricia Bermeo MD Yes        Date completed: 02/22/23    Medication history completed by: Yamel Self [facility]

## 2023-02-22 NOTE — PLAN OF CARE
"Blood pressure 109/72, pulse 72, temperature 98.4  F (36.9  C), temperature source Oral, resp. rate 16, height 1.549 m (5' 1\"), weight 54.9 kg (121 lb 1.6 oz), SpO2 90 %, not currently breastfeeding.    CARE FROM [id number]    No medical changes. VSS. No complaints of SOB, chest pain or new numbness and tingling. Remains on continuous epidural for pain control-- also continues to receive PRN dilaudid PO q 3.     No insulin administered prior to dinner.     Continue with plan of care  " You can access the FollowMyHealth Patient Portal offered by [facility] by registering at the following website: [email or web address]. By joining [facility]’s FollowMyHealth portal, you will also be able to view your health information using other applications (apps) compatible with our system.

## 2023-02-22 NOTE — PROGRESS NOTES
met with patient and family, imposed ashes and prayed with patient as requested. Patient and family appreciated the visit and spiritual support.

## 2023-02-23 LAB
ANION GAP SERPL CALCULATED.3IONS-SCNC: 11 MMOL/L (ref 7–15)
BUN SERPL-MCNC: 9.3 MG/DL (ref 6–20)
CALCIUM SERPL-MCNC: 9 MG/DL (ref 8.6–10)
CHLORIDE SERPL-SCNC: 99 MMOL/L (ref 98–107)
CREAT SERPL-MCNC: 0.55 MG/DL (ref 0.51–0.95)
DEPRECATED HCO3 PLAS-SCNC: 27 MMOL/L (ref 22–29)
ERYTHROCYTE [DISTWIDTH] IN BLOOD BY AUTOMATED COUNT: 14.3 % (ref 10–15)
GFR SERPL CREATININE-BSD FRML MDRD: >90 ML/MIN/1.73M2
GLUCOSE BLDC GLUCOMTR-MCNC: 116 MG/DL (ref 70–99)
GLUCOSE BLDC GLUCOMTR-MCNC: 118 MG/DL (ref 70–99)
GLUCOSE BLDC GLUCOMTR-MCNC: 119 MG/DL (ref 70–99)
GLUCOSE BLDC GLUCOMTR-MCNC: 123 MG/DL (ref 70–99)
GLUCOSE BLDC GLUCOMTR-MCNC: 123 MG/DL (ref 70–99)
GLUCOSE SERPL-MCNC: 118 MG/DL (ref 70–99)
HCT VFR BLD AUTO: 38.7 % (ref 35–47)
HGB BLD-MCNC: 12.4 G/DL (ref 11.7–15.7)
MAGNESIUM SERPL-MCNC: 2.4 MG/DL (ref 1.7–2.3)
MCH RBC QN AUTO: 29.5 PG (ref 26.5–33)
MCHC RBC AUTO-ENTMCNC: 32 G/DL (ref 31.5–36.5)
MCV RBC AUTO: 92 FL (ref 78–100)
PHOSPHATE SERPL-MCNC: 5.1 MG/DL (ref 2.5–4.5)
PLATELET # BLD AUTO: 300 10E3/UL (ref 150–450)
POTASSIUM SERPL-SCNC: 4.4 MMOL/L (ref 3.4–5.3)
RBC # BLD AUTO: 4.21 10E6/UL (ref 3.8–5.2)
SODIUM SERPL-SCNC: 137 MMOL/L (ref 136–145)
WBC # BLD AUTO: 8.8 10E3/UL (ref 4–11)

## 2023-02-23 PROCEDURE — 36415 COLL VENOUS BLD VENIPUNCTURE: CPT | Performed by: SURGERY

## 2023-02-23 PROCEDURE — 99024 POSTOP FOLLOW-UP VISIT: CPT | Mod: FS | Performed by: SURGERY

## 2023-02-23 PROCEDURE — 85027 COMPLETE CBC AUTOMATED: CPT | Performed by: SURGERY

## 2023-02-23 PROCEDURE — 80048 BASIC METABOLIC PNL TOTAL CA: CPT | Performed by: SURGERY

## 2023-02-23 PROCEDURE — 84100 ASSAY OF PHOSPHORUS: CPT | Performed by: SURGERY

## 2023-02-23 PROCEDURE — 83735 ASSAY OF MAGNESIUM: CPT | Performed by: SURGERY

## 2023-02-23 PROCEDURE — 120N000011 HC R&B TRANSPLANT UMMC

## 2023-02-23 PROCEDURE — 250N000013 HC RX MED GY IP 250 OP 250 PS 637

## 2023-02-23 PROCEDURE — 250N000011 HC RX IP 250 OP 636: Performed by: SURGERY

## 2023-02-23 PROCEDURE — 250N000011 HC RX IP 250 OP 636: Performed by: NURSE PRACTITIONER

## 2023-02-23 PROCEDURE — 250N000013 HC RX MED GY IP 250 OP 250 PS 637: Performed by: NURSE PRACTITIONER

## 2023-02-23 PROCEDURE — 250N000013 HC RX MED GY IP 250 OP 250 PS 637: Performed by: SURGERY

## 2023-02-23 RX ORDER — DIPHENHYDRAMINE HCL 25 MG
25-50 CAPSULE ORAL EVERY 6 HOURS PRN
Status: DISCONTINUED | OUTPATIENT
Start: 2023-02-23 | End: 2023-02-24

## 2023-02-23 RX ORDER — DIPHENHYDRAMINE HYDROCHLORIDE, ZINC ACETATE 2; .1 G/100G; G/100G
CREAM TOPICAL 3 TIMES DAILY PRN
Status: DISCONTINUED | OUTPATIENT
Start: 2023-02-23 | End: 2023-02-24

## 2023-02-23 RX ADMIN — MAGNESIUM HYDROXIDE 30 ML: 400 SUSPENSION ORAL at 08:05

## 2023-02-23 RX ADMIN — HYDROMORPHONE HYDROCHLORIDE 6 MG: 2 TABLET ORAL at 16:09

## 2023-02-23 RX ADMIN — HYDROMORPHONE HYDROCHLORIDE 0.2 MG: 0.2 INJECTION, SOLUTION INTRAMUSCULAR; INTRAVENOUS; SUBCUTANEOUS at 03:00

## 2023-02-23 RX ADMIN — PANTOPRAZOLE SODIUM 40 MG: 40 TABLET, DELAYED RELEASE ORAL at 08:04

## 2023-02-23 RX ADMIN — METHOCARBAMOL 1000 MG: 500 TABLET ORAL at 08:04

## 2023-02-23 RX ADMIN — DIPHENHYDRAMINE HYDROCHLORIDE 25 MG: 25 CAPSULE ORAL at 17:30

## 2023-02-23 RX ADMIN — CLONAZEPAM 1.5 MG: 0.5 TABLET ORAL at 20:32

## 2023-02-23 RX ADMIN — GABAPENTIN 300 MG: 300 CAPSULE ORAL at 08:04

## 2023-02-23 RX ADMIN — HEPARIN SODIUM 5000 UNITS: 5000 INJECTION, SOLUTION INTRAVENOUS; SUBCUTANEOUS at 06:01

## 2023-02-23 RX ADMIN — HYDROMORPHONE HYDROCHLORIDE 6 MG: 2 TABLET ORAL at 09:59

## 2023-02-23 RX ADMIN — SENNOSIDES AND DOCUSATE SODIUM 1 TABLET: 50; 8.6 TABLET ORAL at 10:07

## 2023-02-23 RX ADMIN — GABAPENTIN 600 MG: 300 CAPSULE ORAL at 20:32

## 2023-02-23 RX ADMIN — SERTRALINE HYDROCHLORIDE 150 MG: 50 TABLET ORAL at 22:37

## 2023-02-23 RX ADMIN — ONDANSETRON 4 MG: 4 TABLET, ORALLY DISINTEGRATING ORAL at 20:31

## 2023-02-23 RX ADMIN — DIPHENHYDRAMINE HYDROCHLORIDE, ZINC ACETATE: 2; .1 CREAM TOPICAL at 10:10

## 2023-02-23 RX ADMIN — Medication 1 TABLET: at 08:05

## 2023-02-23 RX ADMIN — HYDROMORPHONE HYDROCHLORIDE 6 MG: 2 TABLET ORAL at 07:07

## 2023-02-23 RX ADMIN — METHOCARBAMOL 1000 MG: 500 TABLET ORAL at 14:12

## 2023-02-23 RX ADMIN — PANCRELIPASE LIPASE, PANCRELIPASE PROTEASE, PANCRELIPASE AMYLASE 6 CAPSULE: 20000; 63000; 84000 CAPSULE, DELAYED RELEASE ORAL at 18:15

## 2023-02-23 RX ADMIN — DIPHENHYDRAMINE HYDROCHLORIDE, ZINC ACETATE: 2; .1 CREAM TOPICAL at 04:51

## 2023-02-23 RX ADMIN — HYDROMORPHONE HYDROCHLORIDE 6 MG: 2 TABLET ORAL at 19:22

## 2023-02-23 RX ADMIN — TRAZODONE HYDROCHLORIDE 100 MG: 100 TABLET ORAL at 22:37

## 2023-02-23 RX ADMIN — PANCRELIPASE LIPASE, PANCRELIPASE PROTEASE, PANCRELIPASE AMYLASE 6 CAPSULE: 20000; 63000; 84000 CAPSULE, DELAYED RELEASE ORAL at 08:10

## 2023-02-23 RX ADMIN — METHOCARBAMOL 1000 MG: 500 TABLET ORAL at 20:32

## 2023-02-23 RX ADMIN — HYDROMORPHONE HYDROCHLORIDE 6 MG: 2 TABLET ORAL at 04:11

## 2023-02-23 RX ADMIN — CLONAZEPAM 1 MG: 1 TABLET ORAL at 08:15

## 2023-02-23 RX ADMIN — DIPHENHYDRAMINE HYDROCHLORIDE 50 MG: 25 CAPSULE ORAL at 10:54

## 2023-02-23 RX ADMIN — HYDROMORPHONE HYDROCHLORIDE 6 MG: 2 TABLET ORAL at 22:37

## 2023-02-23 RX ADMIN — PANCRELIPASE LIPASE, PANCRELIPASE PROTEASE, PANCRELIPASE AMYLASE 6 CAPSULE: 20000; 63000; 84000 CAPSULE, DELAYED RELEASE ORAL at 13:05

## 2023-02-23 RX ADMIN — HYDROMORPHONE HYDROCHLORIDE 6 MG: 2 TABLET ORAL at 13:03

## 2023-02-23 RX ADMIN — PANTOPRAZOLE SODIUM 40 MG: 40 TABLET, DELAYED RELEASE ORAL at 20:32

## 2023-02-23 ASSESSMENT — ACTIVITIES OF DAILY LIVING (ADL)
ADLS_ACUITY_SCORE: 20

## 2023-02-23 NOTE — PLAN OF CARE
"Cares 3958-2471  /64 (BP Location: Left arm)   Pulse 84   Temp 97.9  F (36.6  C) (Oral)   Resp 16   Ht 1.549 m (5' 1\")   Wt 54 kg (119 lb 1.6 oz)   SpO2 96%   BMI 22.50 kg/m       VSS on RA.  BG- ACHS 95 at bedtime   Labs- AM labs pending  Pain/Nausea/PRN'S- oral and IV dilaudid given for pain. Denies nausea. PRN benadryl cream applied around incision for itching.  Diet- Regular  LDA- R. PIV SL  Gtt/IVF- none  GI/- voiding adequately, Lat BM 2/22  Skin- midline incision w/ steri strips, ALONDRA no drainage  Activity- UAL  Plan- continue with plan of care.                      "

## 2023-02-23 NOTE — PROGRESS NOTES
Pancreatitis Service - Daily Progress Note  02/18/2023    Assessment & Plan:  Meme Robins is a 52 year old female with a history of idiopathic chronic pancreatitis s/p PD sphincterotomy and multiple PD stents (10-15) in [place] and at [facility], cholecystectomy, TPIAT at this facility in 8/2021. Her overall recovery has been excellent until recently, when she had recurrent abdominal pain, bloating/nausea, malaise, and fevers. Imaging and endoscopic workup demonstrated reflux of enteric contents into her biliary limb with widely patent hepaticojejunostomy. She was admitted 2/17 following laparotomy, lysis of adhesions, and lengthening of biliary limb to total ~100 cm.      GI:  S/p lengthening of biliary nuris limb: POD#6. Tolerating regular diet. Postive bowel function. PRN Benadryl and ice provided for rash at incision.   Bowel regimen: Senna BID, MOM BID, Miralax daily, Fleets PRN.  Pancreatic insufficiency: PTA Zenpep.    Cardiorespiratory: Stable. SBP 80s-100s at baseline.    Fluid/Electrolytes: No issues.    : No issues.     Post-pancreatectomy diabetes: Hgb A1C 6, c-peptides positive. Discontinue Tresiba 6 units daily (stopped ~1 months ago). Continue PTA sliding scale correction AC/HS, acarbose 50 mg QPM. Will reach out to [facility] to coordinate Follow up.     Infection: Afebrile, WBC 8.8.    Prophylaxis: DVT (mechanical), GI (PPI)    Pain control:  Post surgical pain: Adequate control. Pain team following.   -Epidural removed 2/22  -Tylenol PRN  -PO Dilaudid Q3H PRN  -Stop IV Dilaudid today  -Robaxin TID  Chronic neck pain: Restart PTA Neurontin 300 mg AM/600 mg PM.     Psych:  Hx anxiety, depression, insomnia: PTA trazodone, Zoloft, and clonazepam ordered.    Activity: Ambulate QID minimum.     Anticipated LOS/Discharge: Discharge in 1-2 days pending adequate pain control and PO intake.     JOSE ALBERTO/Fellow/Resident Provider: Effie Gutierrez NP [id number]    Faculty: Dane Burt MD    Attestation:  "I saw and examined the patient with Effie Gutierrez, NP, and the transplant team. I independently reviewed all pertinent laboratory and imaging information and made independent management decisions. I agree with the findings and plan as documented in this note. I spent less than 30 minutes in the care of this patient.   Dane Burt MD  __________________________________________________________________    Interval History: History is obtained from the patient    Overnight events: Developed yasmeen-incisional itchy rash overnight that caused patient to feel restless and irritable. No drainage at incision. Pain well controlled on current regimen. Feels less bloated after having BMs. Appetite slowly improving.     ROS:   A 10-point review of systems was negative except as noted above.    Curent Meds:    acarbose  50 mg Oral Daily with supper     clonazePAM  1 mg Oral Daily     clonazePAM  1.5 mg Oral QPM     gabapentin  300 mg Oral QAM     gabapentin  600 mg Oral QPM     insulin aspart  1-3 Units Subcutaneous TID AC     insulin aspart  1-3 Units Subcutaneous At Bedtime     lipase-protease-amylase  6 capsule Oral TID w/meals     magnesium hydroxide  30 mL Oral BID     methocarbamol  1,000 mg Oral TID     multivitamin w/minerals  1 tablet Oral Daily     pantoprazole  40 mg Oral BID     polyethylene glycol  17 g Oral Daily     senna-docusate  1 tablet Oral BID     sertraline  150 mg Oral At Bedtime     sodium chloride (PF)  3 mL Intravenous Q8H     traZODone  100 mg Oral At Bedtime       Physical Exam:     Admit Weight: 49 kg (108 lb 1.6 oz)    Current Vitals:   /65 (BP Location: Left arm)   Pulse 80   Temp 98.3  F (36.8  C) (Oral)   Resp 16   Ht 1.549 m (5' 1\")   Wt 54 kg (119 lb 1.6 oz)   SpO2 96%   BMI 22.50 kg/m      Vital sign ranges:    Temp:  [97.9  F (36.6  C)-98.8  F (37.1  C)] 98.3  F (36.8  C)  Pulse:  [71-84] 80  Resp:  [16-18] 16  BP: (103-113)/(64-77) 110/65  SpO2:  [93 %-97 %] 96 %  Patient " Vitals for the past 24 hrs:   BP Temp Temp src Pulse Resp SpO2 Weight   02/23/23 1012 110/65 98.3  F (36.8  C) Oral 80 16 96 % --   02/23/23 0557 109/64 97.9  F (36.6  C) Oral 84 16 96 % 54 kg (119 lb 1.6 oz)   02/23/23 0141 108/76 98.8  F (37.1  C) Oral 76 18 94 % --   02/22/23 2146 112/75 98.7  F (37.1  C) Oral 71 16 93 % --   02/22/23 1736 103/77 98  F (36.7  C) Oral 74 16 97 % --   02/22/23 1412 113/77 98.3  F (36.8  C) Oral 84 16 95 % --     General Appearance: in no apparent distress.   Skin: normal, dry, no suspicious lesions  Heart: well perfused  Lungs: NLB on RA  Abdomen: The abdomen is slightly distended, mildly tender generalized. The wound is closed with dermabond; yasmeen-incisional rash and rash under right breast noted. No drainage.   : voiding  Extremities: edema: absent.    Data:   CMP  Recent Labs   Lab 02/23/23  1214 02/23/23  0802 02/22/23  0821 02/22/23  0613   NA  --  137  --  139   POTASSIUM  --  4.4  --  4.5   CHLORIDE  --  99  --  102   CO2  --  27  --  26   * 119*  118*   < > 143*   BUN  --  9.3  --  6.2   CR  --  0.55  --  0.52   GFRESTIMATED  --  >90  --  >90   VIKAS  --  9.0  --  8.7   MAG  --  2.4*  --  2.4*   PHOS  --  5.1*  --  4.0    < > = values in this interval not displayed.     CBC  Recent Labs   Lab 02/23/23  0802 02/22/23  0613 02/18/23  0743 02/18/23  0021   HGB 12.4 13.2   < > 13.2   WBC 8.8 10.3   < >  --     391   < >  --    A1C  --   --   --  6.3*    < > = values in this interval not displayed.     Coags  No lab results found in last 7 days.    Invalid input(s): XA   Urinalysis  Recent Labs   Lab Test 03/06/22  0851 08/19/21  0158   COLOR Dark Yellow* Straw   APPEARANCE Clear Clear   URINEGLC Negative Negative   URINEBILI Negative Negative   URINEKETONE Negative Negative   SG 1.035 1.009   UBLD Negative Negative   URINEPH 5.5 7.0   PROTEIN 50* Negative   NITRITE Negative Negative   LEUKEST Negative Negative   RBCU 2 1   WBCU 1 <1

## 2023-02-23 NOTE — PROGRESS NOTES
"Goal outcome evaluation:  Time: 1500 - 2300  Plan of care reviewed with: Patient  Overall patient progress: No change  VS /75 (BP Location: Left arm)   Pulse 71   Temp 98.7  F (37.1  C) (Oral)   Resp 16   Ht 1.549 m (5' 1\")   Wt 55.4 kg (122 lb 1.6 oz)   SpO2 93%   BMI 23.07 kg/m        Activity: Up ad rona. Ambulated to the bathroom and back to bed  Neuros: A&O x4. Calls appropriately. Able to make needs known. Clear and appropriate speech. Follows instructions.   Cardiac: WDL. Denies chest pain   Respiratory: WDL on RA, sating around 93%. Denies SOB.  GI/: Voiding spontaneously. No BM this shift.  Diet: Regular diet  Skin/Incisions: Abdominal incision, liquid bandaged, ALONDRA, no drainage.  Lines/Drains: R PIV SL.  Labs: ACHS, last BG   Pain/Nausea: Around the clock pain, managed with Dilaudid, both IV and oral. Tylenol available  Plan: Continue POC  "

## 2023-02-23 NOTE — PLAN OF CARE
"/71 (BP Location: Left arm)   Pulse 82   Temp 98.3  F (36.8  C) (Oral)   Resp 16   Ht 1.549 m (5' 1\")   Wt 54 kg (119 lb 1.6 oz)   SpO2 95%   BMI 22.50 kg/m      Shift: 3p-730p  VS: stable on RA, afebrile. Contact precautions maintained.   Cardiac: WDL, HRR  Neuro: Aox4, calm and cooperative  BG: ACHS, no novolog coverage needed this shift.   Labs: mag 2.4, Phos 5.1.   Respiratory: WDL, maintains oxygen saturation > 90% on RA  Pain/Nausea/PRN: Abd pain reported, using oral dilaudid with benadryl for pain. Does report intermittent nausea but declined intervention.   Diet: Regular diet and tolerates.   LDA: PIV SL.   GI/: Continent of bowel and bladder. LBM was 2/23/23. Voids urine without difficulty, not saving.   Skin: Midline incision approximated with dermabond. Some redness and irritation noted and patient is using benadryl and ice packs for discomfort.   Mobility: UAL  Plan: Continue to monitor.     Will give report to oncoming nurse. Pt left in stable condition, care relinquished at this time.     "

## 2023-02-24 PROBLEM — Z98.890 S/P EXPLORATORY LAPAROTOMY: Status: ACTIVE | Noted: 2023-02-24

## 2023-02-24 PROBLEM — Z90.410 POST-PANCREATECTOMY DIABETES (H): Status: ACTIVE | Noted: 2022-03-08

## 2023-02-24 PROBLEM — E89.1 POST-PANCREATECTOMY DIABETES (H): Status: ACTIVE | Noted: 2022-03-08

## 2023-02-24 PROBLEM — Z94.83 S/P PANCREATIC ISLET CELL TRANSPLANTATION (H): Status: ACTIVE | Noted: 2021-08-09

## 2023-02-24 PROBLEM — E44.0 MODERATE MALNUTRITION (H): Status: ACTIVE | Noted: 2023-02-24

## 2023-02-24 PROBLEM — R21 RASH AND NONSPECIFIC SKIN ERUPTION: Status: ACTIVE | Noted: 2023-02-24

## 2023-02-24 PROBLEM — G89.18 ACUTE POST-OPERATIVE PAIN: Status: ACTIVE | Noted: 2023-02-24

## 2023-02-24 PROBLEM — E13.9 POST-PANCREATECTOMY DIABETES (H): Status: ACTIVE | Noted: 2022-03-08

## 2023-02-24 LAB
ALBUMIN SERPL BCG-MCNC: 3.9 G/DL (ref 3.5–5.2)
ALP SERPL-CCNC: 124 U/L (ref 35–104)
ALT SERPL W P-5'-P-CCNC: 26 U/L (ref 10–35)
ANION GAP SERPL CALCULATED.3IONS-SCNC: 14 MMOL/L (ref 7–15)
AST SERPL W P-5'-P-CCNC: 30 U/L (ref 10–35)
BILIRUB DIRECT SERPL-MCNC: <0.2 MG/DL (ref 0–0.3)
BILIRUB SERPL-MCNC: 0.3 MG/DL
BUN SERPL-MCNC: 9.7 MG/DL (ref 6–20)
CALCIUM SERPL-MCNC: 9.7 MG/DL (ref 8.6–10)
CHLORIDE SERPL-SCNC: 97 MMOL/L (ref 98–107)
CREAT SERPL-MCNC: 0.55 MG/DL (ref 0.51–0.95)
DEPRECATED HCO3 PLAS-SCNC: 24 MMOL/L (ref 22–29)
ERYTHROCYTE [DISTWIDTH] IN BLOOD BY AUTOMATED COUNT: 14.5 % (ref 10–15)
GFR SERPL CREATININE-BSD FRML MDRD: >90 ML/MIN/1.73M2
GLUCOSE BLDC GLUCOMTR-MCNC: 106 MG/DL (ref 70–99)
GLUCOSE BLDC GLUCOMTR-MCNC: 113 MG/DL (ref 70–99)
GLUCOSE BLDC GLUCOMTR-MCNC: 120 MG/DL (ref 70–99)
GLUCOSE BLDC GLUCOMTR-MCNC: 142 MG/DL (ref 70–99)
GLUCOSE BLDC GLUCOMTR-MCNC: 154 MG/DL (ref 70–99)
GLUCOSE SERPL-MCNC: 119 MG/DL (ref 70–99)
HCT VFR BLD AUTO: 41.6 % (ref 35–47)
HGB BLD-MCNC: 13.8 G/DL (ref 11.7–15.7)
MAGNESIUM SERPL-MCNC: 2.4 MG/DL (ref 1.7–2.3)
MCH RBC QN AUTO: 30.1 PG (ref 26.5–33)
MCHC RBC AUTO-ENTMCNC: 33.2 G/DL (ref 31.5–36.5)
MCV RBC AUTO: 91 FL (ref 78–100)
PLATELET # BLD AUTO: 476 10E3/UL (ref 150–450)
POTASSIUM SERPL-SCNC: 4.7 MMOL/L (ref 3.4–5.3)
PROT SERPL-MCNC: 7.1 G/DL (ref 6.4–8.3)
RBC # BLD AUTO: 4.59 10E6/UL (ref 3.8–5.2)
SODIUM SERPL-SCNC: 135 MMOL/L (ref 136–145)
WBC # BLD AUTO: 10.4 10E3/UL (ref 4–11)

## 2023-02-24 PROCEDURE — 82248 BILIRUBIN DIRECT: CPT | Performed by: SURGERY

## 2023-02-24 PROCEDURE — 120N000011 HC R&B TRANSPLANT UMMC

## 2023-02-24 PROCEDURE — 250N000013 HC RX MED GY IP 250 OP 250 PS 637: Performed by: NURSE PRACTITIONER

## 2023-02-24 PROCEDURE — 80053 COMPREHEN METABOLIC PANEL: CPT | Performed by: SURGERY

## 2023-02-24 PROCEDURE — 250N000012 HC RX MED GY IP 250 OP 636 PS 637: Performed by: NURSE PRACTITIONER

## 2023-02-24 PROCEDURE — 85027 COMPLETE CBC AUTOMATED: CPT | Performed by: SURGERY

## 2023-02-24 PROCEDURE — 99024 POSTOP FOLLOW-UP VISIT: CPT | Mod: FS | Performed by: SURGERY

## 2023-02-24 PROCEDURE — 36415 COLL VENOUS BLD VENIPUNCTURE: CPT | Performed by: SURGERY

## 2023-02-24 PROCEDURE — 250N000011 HC RX IP 250 OP 636: Performed by: SURGERY

## 2023-02-24 PROCEDURE — 83735 ASSAY OF MAGNESIUM: CPT | Performed by: SURGERY

## 2023-02-24 PROCEDURE — 99252 IP/OBS CONSLTJ NEW/EST SF 35: CPT | Mod: GC | Performed by: DERMATOLOGY

## 2023-02-24 PROCEDURE — 250N000013 HC RX MED GY IP 250 OP 250 PS 637: Performed by: SURGERY

## 2023-02-24 RX ORDER — MAGNESIUM HYDROXIDE/ALUMINUM HYDROXICE/SIMETHICONE 120; 1200; 1200 MG/30ML; MG/30ML; MG/30ML
15 SUSPENSION ORAL EVERY 4 HOURS PRN
Status: DISCONTINUED | OUTPATIENT
Start: 2023-02-24 | End: 2023-02-25 | Stop reason: HOSPADM

## 2023-02-24 RX ORDER — HYDROXYZINE HYDROCHLORIDE 25 MG/1
25 TABLET, FILM COATED ORAL EVERY 6 HOURS PRN
Status: DISCONTINUED | OUTPATIENT
Start: 2023-02-24 | End: 2023-02-25 | Stop reason: HOSPADM

## 2023-02-24 RX ORDER — CALCIUM CARBONATE 500 MG/1
500 TABLET, CHEWABLE ORAL 2 TIMES DAILY PRN
Status: DISCONTINUED | OUTPATIENT
Start: 2023-02-24 | End: 2023-02-25 | Stop reason: HOSPADM

## 2023-02-24 RX ORDER — HYDROXYZINE HYDROCHLORIDE 25 MG/1
50 TABLET, FILM COATED ORAL EVERY 6 HOURS PRN
Status: DISCONTINUED | OUTPATIENT
Start: 2023-02-24 | End: 2023-02-25 | Stop reason: HOSPADM

## 2023-02-24 RX ORDER — TRIAMCINOLONE ACETONIDE 1 MG/G
CREAM TOPICAL 3 TIMES DAILY
Status: DISCONTINUED | OUTPATIENT
Start: 2023-02-24 | End: 2023-02-25 | Stop reason: HOSPADM

## 2023-02-24 RX ADMIN — CALCIUM CARBONATE (ANTACID) CHEW TAB 500 MG 500 MG: 500 CHEW TAB at 20:51

## 2023-02-24 RX ADMIN — ONDANSETRON 4 MG: 4 TABLET, ORALLY DISINTEGRATING ORAL at 03:37

## 2023-02-24 RX ADMIN — TRIAMCINOLONE ACETONIDE: 1 CREAM TOPICAL at 21:01

## 2023-02-24 RX ADMIN — PANTOPRAZOLE SODIUM 40 MG: 40 TABLET, DELAYED RELEASE ORAL at 08:11

## 2023-02-24 RX ADMIN — TRAZODONE HYDROCHLORIDE 100 MG: 100 TABLET ORAL at 22:28

## 2023-02-24 RX ADMIN — INSULIN ASPART 1 UNITS: 100 INJECTION, SOLUTION INTRAVENOUS; SUBCUTANEOUS at 17:06

## 2023-02-24 RX ADMIN — HYDROMORPHONE HYDROCHLORIDE 6 MG: 2 TABLET ORAL at 16:00

## 2023-02-24 RX ADMIN — METHOCARBAMOL 1000 MG: 500 TABLET ORAL at 20:52

## 2023-02-24 RX ADMIN — METHOCARBAMOL 1000 MG: 500 TABLET ORAL at 08:11

## 2023-02-24 RX ADMIN — HYDROMORPHONE HYDROCHLORIDE 6 MG: 2 TABLET ORAL at 03:34

## 2023-02-24 RX ADMIN — SERTRALINE HYDROCHLORIDE 150 MG: 50 TABLET ORAL at 22:28

## 2023-02-24 RX ADMIN — HYDROMORPHONE HYDROCHLORIDE 6 MG: 2 TABLET ORAL at 09:27

## 2023-02-24 RX ADMIN — HYDROMORPHONE HYDROCHLORIDE 6 MG: 2 TABLET ORAL at 19:13

## 2023-02-24 RX ADMIN — Medication 1 TABLET: at 08:12

## 2023-02-24 RX ADMIN — CLONAZEPAM 1 MG: 1 TABLET ORAL at 08:11

## 2023-02-24 RX ADMIN — ONDANSETRON 4 MG: 4 TABLET, ORALLY DISINTEGRATING ORAL at 12:50

## 2023-02-24 RX ADMIN — PANTOPRAZOLE SODIUM 40 MG: 40 TABLET, DELAYED RELEASE ORAL at 20:52

## 2023-02-24 RX ADMIN — CALCIUM CARBONATE (ANTACID) CHEW TAB 500 MG 500 MG: 500 CHEW TAB at 16:52

## 2023-02-24 RX ADMIN — ALUMINUM HYDROXIDE, MAGNESIUM HYDROXIDE, AND SIMETHICONE 15 ML: 200; 200; 20 SUSPENSION ORAL at 23:48

## 2023-02-24 RX ADMIN — PANCRELIPASE LIPASE, PANCRELIPASE PROTEASE, PANCRELIPASE AMYLASE 6 CAPSULE: 20000; 63000; 84000 CAPSULE, DELAYED RELEASE ORAL at 11:29

## 2023-02-24 RX ADMIN — ONDANSETRON 4 MG: 4 TABLET, ORALLY DISINTEGRATING ORAL at 19:16

## 2023-02-24 RX ADMIN — METHOCARBAMOL 1000 MG: 500 TABLET ORAL at 14:01

## 2023-02-24 RX ADMIN — PANCRELIPASE LIPASE, PANCRELIPASE PROTEASE, PANCRELIPASE AMYLASE 6 CAPSULE: 20000; 63000; 84000 CAPSULE, DELAYED RELEASE ORAL at 17:01

## 2023-02-24 RX ADMIN — CLONAZEPAM 1.5 MG: 0.5 TABLET ORAL at 20:52

## 2023-02-24 RX ADMIN — HYDROMORPHONE HYDROCHLORIDE 6 MG: 2 TABLET ORAL at 12:42

## 2023-02-24 RX ADMIN — HYDROMORPHONE HYDROCHLORIDE 6 MG: 2 TABLET ORAL at 06:33

## 2023-02-24 RX ADMIN — TRIAMCINOLONE ACETONIDE: 1 CREAM TOPICAL at 16:52

## 2023-02-24 RX ADMIN — GABAPENTIN 600 MG: 300 CAPSULE ORAL at 20:52

## 2023-02-24 RX ADMIN — GABAPENTIN 300 MG: 300 CAPSULE ORAL at 08:11

## 2023-02-24 RX ADMIN — HYDROMORPHONE HYDROCHLORIDE 6 MG: 2 TABLET ORAL at 22:43

## 2023-02-24 ASSESSMENT — ACTIVITIES OF DAILY LIVING (ADL)
ADLS_ACUITY_SCORE: 20

## 2023-02-24 NOTE — CONSULTS
[facility] Dermatology Consult Note  Encounter Date: Jan 20, 2023      Assessment & Plan:     # Allergic contact dermatitis abdominal around the operation wound  DDx adhesives/acrylates vs disinfectants  --> no signs for drug allergy     Ms. Meme Robins is a(n) 52 female who is admitted for Bonilla en Y revision. Dermatology was consulted for a new pruritic rash. Findings consistent with an allergic irrritant dermatitis. Suspect 2/2 surgical glue, adhesive, or antimicrobial topical preparation.  - start triamcinolone 0.1% cream TID PRN  - stop topical benadryl cream  - can stop PO benadryl as she is not finding it helpful.   - if needs something to help with sleep, can try hydroxyzine.   - we will schedule her in derm-allergy clinic. In addition to patch testing for adhesives/acrylates, preservatives, emulsifiers and topical antimicrobials we will also  into her PCN allergy (which occurred as a child). We will coordinate this visit with another appointment as she lives in [place].       Staff and Resident:     Dr Cadet - staff    Shaye Sotelo MD PGY-4  Medicine-Dermatology    Staff Physician Comments:  I saw and evaluated the patient with the resident and I agree with the assessment and plan as documented in the resident's note.    Hiro Cadet MD  Professor  Head of Dermato-Allergy Division  Department of Dermatology  [facility]    ____________________________________________    CC: No chief complaint on file.    HPI:  Ms. Meme Robins is a(n) 52 female who is admitted for Bonilla en Y revision. Dermatology was consulted for a new rash.  - started 2/23 overnight at the incision site and spreading over her trunk now.   - pruritic  - benadryl topical and PO not helping.     Patient is otherwise feeling well, without additional skin concerns.    Labs Reviewed:  N/A    Physical Exam:  Vitals: /71 (BP Location: Left arm)   Pulse 93   Temp 98.4  F  "(36.9  C) (Oral)   Resp 18   Ht 1.549 m (5' 1\")   Wt 51.5 kg (113 lb 8 oz)   SpO2 94%   BMI 21.45 kg/m    SKIN: focused exam of the abdomen notable for:  - scattered pink papules on the trunk from inferior breasts to the umbilicus. Some with erosions and hemorrhagic crust c/w excoriation.   - pink intact linear plaque c/w surgical site.   - No other lesions of concern on areas examined.                   Medications:  Current Facility-Administered Medications   Medication     acarbose (PRECOSE) tablet 50 mg     acetaminophen (TYLENOL) tablet 650 mg     bisacodyl (DULCOLAX) suppository 10 mg     calcium carbonate (TUMS) chewable tablet 500 mg     clonazePAM (klonoPIN) tablet 1 mg     clonazePAM (klonoPIN) tablet 1.5 mg     glucose gel 15-30 g    Or     dextrose 50 % injection 25-50 mL    Or     glucagon injection 1 mg     diphenhydrAMINE (BENADRYL) capsule 25-50 mg     diphenhydrAMINE-zinc acetate (BENADRYL) 2-0.1 % cream     gabapentin (NEURONTIN) capsule 300 mg     gabapentin (NEURONTIN) capsule 600 mg     HYDROmorphone (DILAUDID) tablet 4 mg    Or     HYDROmorphone (DILAUDID) tablet 6 mg     insulin aspart (NovoLOG) injection (RAPID ACTING)     insulin aspart (NovoLOG) injection (RAPID ACTING)     lipase-protease-amylase (ZENPEP) 25228-[address]-36048 units delayed release capsule 4 capsule     lipase-protease-amylase (ZENPEP) 00575-99925-34909 units delayed release capsule 6 capsule     magnesium hydroxide (MILK OF MAGNESIA) suspension 30 mL     medication instruction     methocarbamol (ROBAXIN) tablet 1,000 mg     multivitamin w/minerals (THERA-VIT-M) tablet 1 tablet     naloxone (NARCAN) injection 0.2 mg    Or     naloxone (NARCAN) injection 0.4 mg    Or     naloxone (NARCAN) injection 0.2 mg    Or     naloxone (NARCAN) injection 0.4 mg     ondansetron (ZOFRAN ODT) ODT tab 4 mg    Or     ondansetron (ZOFRAN) injection 4 mg     pantoprazole (PROTONIX) EC tablet 40 mg     polyethylene glycol (MIRALAX) Packet 17 " g     senna-docusate (SENOKOT-S/PERICOLACE) 8.6-50 MG per tablet 1 tablet     sertraline (ZOLOFT) tablet 150 mg     sodium chloride (PF) 0.9% PF flush 3 mL     sodium chloride (PF) 0.9% PF flush 3 mL     sodium chloride (PF) 0.9% PF flush 3 mL     sodium phosphate (FLEET ENEMA) 1 enema     traZODone (DESYREL) tablet 100 mg      Past Medical History:   Patient Active Problem List   Diagnosis     Abdominal pain, RUQ (right upper quadrant)     Acute upper respiratory infection     Anemia     Cellulitis and abscess     Chronic abdominal pain     Chronic pancreatitis (H)     Dysfunctional sphincter of Oddi     Diarrhea     Degeneration of cervical intervertebral disc     Dysthymic disorder     Iron deficiency anemia     Glucocorticoid deficiency (H)     Joint pain, hip     Long term current use of anticoagulant therapy     Other symptoms involving urinary system(788.99)     Nausea alone     Myalgia and myositis     Migraine     Malaise and fatigue     Major depressive disorder, single episode     Urinary tract infection     Pulmonary embolism (H)     Primary hypercoagulable state (H)     Hypoglycemia after GI (gastrointestinal) surgery     Postoperative abdominal pain     S/P pancreatic islet cell transplantation (H)     Pancreatic insufficiency     Abdominal pain, epigastric     Vomiting and diarrhea     Foreign body in intestine, initial encounter     Bile salt-induced diarrhea     Severe malnutrition (H)     Post-pancreatectomy diabetes (H)     Acute abdominal pain     Past Medical History:   Diagnosis Date     Adrenal insufficiency (H)     iatrogenic, around 2013, off treatment for several years as of 2021 visit     Anemia      Depression      Esophageal reflux      Idiopathic chronic pancreatitis (H)      Post-pancreatectomy diabetes (H)      Pulmonary embolism (H)        CC No referring provider defined for this encounter. on close of this encounter.

## 2023-02-24 NOTE — PROGRESS NOTES
[id number]    Reason for Admission: post-op lysis of adhesions and lengthening of biliary limb  Neuro: A/O x4  Behavior: appropriate, endorses anxiety regarding abdominal incision, increased nausea, and appropriateness for discharge 2/234.  Activity: IND  LDAs: PIV- SL  Cardiac: WDL  Respiratory: WDL; RA   GI/: WDL, voiding appropriately. Did not want to take scheduled milk of magnesia or senna.   Skin: Abdominal incision- positive for redness, itchiness, and scattered rash. Benadryl prescribed.   Pain: endorses 7/10 pain. PRN dilaudid administered  Diet: regular diet    Plan: discuss abdominal incision, resolution of nausea, home when appropriate.

## 2023-02-24 NOTE — PLAN OF CARE
"Blood pressure 110/74, pulse 86, temperature 97.6  F (36.4  C), temperature source Axillary, resp. rate 18, height 1.549 m (5' 1\"), weight 51.5 kg (113 lb 8 oz), SpO2 96 %, not currently breastfeeding.    Goal Outcome Evaluation:A&Ox4,up indep.c/o abdominal pain 5-6 /10 requested pain medication PO dilaudid 6 mg given every 3 hrs scheduled robaxin also given.LS clear,BS+,passing gas, no BM,fair appetite,c/o nausea  PO Zofran given.Abdominal incision with slight redness and mild abdominal rash, at bedside.Will continue to monitor.                        "

## 2023-02-24 NOTE — DISCHARGE SUMMARY
[facility]    Discharge Summary  Transplant Surgery    Date of Admission:  2/17/2023  Date of Discharge:  2/25/2023  Discharging Provider: Aliza Ridley NP / Dane Burt MD    Attestation: I saw and examined the patient with Aliza Ridley NP, and the transplant team. I independently reviewed all pertinent laboratory and imaging information and made independent management decisions. I agree with the findings and plan as documented in this note. I spent greater than 30 minutes in the care of this patient.   Dane Burt MD    Discharge Diagnoses   Active Problems:    Chronic abdominal pain    S/P pancreatic islet cell transplantation (H)    Post-pancreatectomy diabetes (H)    S/P exploratory laparotomy    Acute post-operative pain    Rash and nonspecific skin eruption    Moderate malnutrition (H)    Procedure/Surgery Information   Procedure: Procedure(s):  LAPAROTOMY, EXPLORATORY, lyses of adhesions  REVISION OF MAYRA-EN-Y   Surgeon(s): Surgeon(s) and Role:     * Dane Burt MD - Primary     * Kp Sage MD - Resident - Assisting       Non-operative procedures None performed     History of Present Illness   Meme Robins is a 52 year old female with a history of idiopathic chronic pancreatitis s/p PD sphincterotomy and multiple PD stents (10-15) in [place] and at [facility], cholecystectomy, TPIAT at this facility in 8/2021. Her overall recovery has been excellent until recently, when she had recurrent abdominal pain, bloating/nausea, malaise, and fevers. Imaging and endoscopic workup demonstrated reflux of enteric contents into her biliary limb with widely patent hepaticojejunostomy. She was admitted 2/17 following laparotomy, lysis of adhesions, and lengthening of biliary limb to total ~100 cm.     Hospital Course   Meme Robins was admitted on 2/17/2023.  The following problems were addressed during her  hospitalization:    S/p Ex lap, ROSEMARIE, and revision of Bonilla-en-Y: Patient recovering as expected post-operatively. Post op course c/b acute pain and per-incisional rash as below. Pain is now adequately controlled, she is tolerating a diet, has return of bowel function, and able to ambulate independently.     Acute post op pain; Chronic pain: Pain team initially aiding management; Epidural removed 2/22. Pain now adequately controlled on current regimen of PRN Tylenol, Robaxin TID, PO Dilaudid Q3H PRN, and PTA gabapentin. No new pain meds were prescribed as she reports she has a current supply.    Vivian-incisional rash: Initially vivian-incisional and has since spread over trunk causing pruritis/discomfort.  Dermatology consulted. Initiated on triamcinolone 0.1% cream TID PRN. Advised to use for additional 3 day course. Symptom improvement prior to discharge.     Moderate malnutrition in the context of acute on chronic illness; Pancreatic insufficiency: Nutrition consulted. Continue regular diet with supplements. PTA Zenpep with meals/snacks.    Post-pancreatectomy diabetes: Hgb A1C 6, c-peptides positive. Discontinue Tresiba 6 units daily (stopped ~1 months ago). Continue PTA sliding scale correction AC/HS, acarbose 50 mg QPM. Follows with Dr. Bermeo, last seen while inpatient on 2/24.  Will continue to follow virtually as an outpatient.     Transplant coordinator: Dalton Low [phone]      Discharge Disposition   Discharged to home   Condition at discharge: Stable    Pending Results   These results will be followed up by transplant team  Unresulted Labs Ordered in the Past 30 Days of this Admission     Date and Time Order Name Status Description    2/17/2023 10:27 AM Prepare red blood cells (unit) Preliminary     2/17/2023 10:27 AM Prepare red blood cells (unit) Preliminary         Final pathology results: No pathology submitted  Primary Care Physician   Padmini Hatch    Physical Exam   Temp: 98.3  F (36.8  C)  Temp src: Oral BP: 108/71 Pulse: 68   Resp: 18 SpO2: 92 % O2 Device: None (Room air)    Vitals:    02/22/23 0145 02/23/23 0557 02/24/23 0900   Weight: 55.4 kg (122 lb 1.6 oz) 54 kg (119 lb 1.6 oz) 51.5 kg (113 lb 8 oz)     Vital Signs with Ranges  Temp:  [97.9  F (36.6  C)-98.4  F (36.9  C)] 98.3  F (36.8  C)  Pulse:  [68-93] 68  Resp:  [18] 18  BP: ()/(70-85) 108/71  SpO2:  [92 %-100 %] 92 %  I/O last 3 completed shifts:  In: 940 [P.O.:940]  Out: 100 [Urine:100]       General Appearance: in no apparent distress.   Skin: normal, dry, no suspicious lesions  Heart: well perfused  Lungs: NLB on RA  Abdomen: The abdomen is slightly distended, mildly tender generalized. The wound is closed with dermabond; yasmeen-incisional rash throughout trunk-improving. No drainage at incision.  : voiding  Extremities: edema: absent.     Consultations This Hospital Stay   NURSING TO CONSULT FOR VASCULAR ACCESS CARE IP CONSULT  SPIRITUAL HEALTH SERVICES IP CONSULT  NURSING TO CONSULT FOR VASCULAR ACCESS CARE IP CONSULT  NURSING TO CONSULT FOR VASCULAR ACCESS CARE IP CONSULT  SPIRITUAL HEALTH SERVICES IP CONSULT  DERMATOLOGY IP CONSULT    Time Spent on this Encounter   I have spent greater than 30 minutes on this discharge.    Discharge Orders   Discharge Medications   Current Discharge Medication List      START taking these medications    Details   alum & mag hydroxide-simethicone (MAALOX) 200-200-20 MG/5ML SUSP suspension Take 15 mLs by mouth every 6 hours as needed for indigestion  Qty: 355 mL, Refills: 1    Associated Diagnoses: Indigestion      magnesium hydroxide (MILK OF MAGNESIA) 400 MG/5ML suspension Take 30 mLs by mouth 2 times daily  Qty: 354 mL, Refills: 1    Associated Diagnoses: Drug-induced constipation      triamcinolone (KENALOG) 0.1 % external cream Apply topically 3 times daily         CONTINUE these medications which have CHANGED    Details   gabapentin (NEURONTIN) 300 MG capsule Take 300mg by mouth in the  morning and take 600mg by mouth at bedtime.  Qty: 60 capsule, Refills: 1    Associated Diagnoses: S/P pancreatic islet cell transplantation (H)      insulin degludec (TRESIBA FLEXTOUCH) 100 UNIT/ML pen Take 7 units daily as needed  Qty: 15 mL, Refills: 0    Associated Diagnoses: Type 1 diabetes mellitus without complication (H)      methocarbamol (ROBAXIN) 500 MG tablet Take 2 tablets (1,000 mg) by mouth 3 times daily as needed for muscle spasms    Associated Diagnoses: S/P pancreatic islet cell transplantation (H)         CONTINUE these medications which have NOT CHANGED    Details   blood glucose (NO BRAND SPECIFIED) lancets standard To use to test glucose level in the blood  Use to test blood sugar  6  times daily as directed. To accompany glucose monitor brands per insurance coverage.  One touch Delica lancets  Qty: 100 each, Refills: 0    Associated Diagnoses: Post-pancreatectomy diabetes (H)      blood glucose (NO BRAND SPECIFIED) test strip Use to test blood sugar  6  times daily as directed. To accompany glucose monitor brands per insurance coverage: One Touch Verio strip  Qty: 100 strip, Refills: 3    Associated Diagnoses: Post-pancreatectomy diabetes (H)      clonazePAM (KLONOPIN) 1 MG tablet 1mg morning and 1.5mg every evening      Continuous Blood Gluc Sensor (DEXCOM G6 SENSOR) MISC USE AS DIRECTED FOR CONTINUOUS GLUCOSE MONITORING, CHANGE EVERY 10 DAYS  Qty: 9 each, Refills: 3    Associated Diagnoses: Type 1 diabetes mellitus without complication (H)      Continuous Blood Gluc Transmit (DEXCOM G6 TRANSMITTER) MISC USE AND CHANGE 1 TRANSMITTER EVERY 3 MONTHS  Qty: 1 each, Refills: 3    Associated Diagnoses: Type 1 diabetes mellitus without complication (H)      Glucagon (GVOKE HYPOPEN 2-PACK) 1 MG/0.2ML SOAJ Inject 1 each Subcutaneous once as needed (for severe hypoglycemia) Use x1 dose and then call seek emergent care for treatment  Qty: 0.4 mL, Refills: 1    Comments: Please call Maddi Almaraz RN  Transplant Coordinator, with fill/formulary issues: [phone]  Associated Diagnoses: Post-pancreatectomy diabetes (H)      HYDROmorphone (DILAUDID) 4 MG tablet Take 1-2 tablets (4-6 mg) every 4 hours as needed for severe abdominal pain  Qty: 56 tablet, Refills: 0    Associated Diagnoses: Chronic abdominal pain      Injection Device for insulin (INPEN 100-BLUE-PARVEZ-HUMALOG) NORA Use as directed for Insulin infusion  Qty: 1 each, Refills: 1    Comments: Please call Tenisha Pearce RNCC with any questions   Associated Diagnoses: Type 1 diabetes mellitus without complication (H)      !! lipase-protease-amylase (ZENPEP) 85415-262742 units CPEP Take 3 capsules by mouth 3 times daily (with meals) Take up to 140,000 units with meals and take up to 80,000 with snacks      !! lipase-protease-amylase (ZENPEP) [id number] units CPEP Take 2 capsules by mouth Take with snacks or supplements      Melatonin 10 MG TABS tablet Take 10 mg by mouth nightly as needed for sleep      multivitamin w/minerals (THERA-VIT-M) tablet Take 1 tablet by mouth daily  Qty: 30 tablet, Refills: 1    Associated Diagnoses: S/P pancreatic islet cell transplantation (H)      ondansetron (ZOFRAN-ODT) 4 MG ODT tab Take 4 mg by mouth every 8 hours as needed      pantoprazole (PROTONIX) 40 MG EC tablet Take 40 mg by mouth 2 times daily      sertraline (ZOLOFT) 100 MG tablet Take 150 mg by mouth At Bedtime      traZODone (DESYREL) 100 MG tablet Take 100 mg by mouth At Bedtime      acarbose (PRECOSE) 50 MG tablet Take 1 tablet (50 mg) by mouth daily (with dinner)  Qty: 30 tablet, Refills: 1    Comments: Please call Tenisha Pearce Rn at  with any questions.  Associated Diagnoses: Hypoglycemia       !! - Potential duplicate medications found. Please discuss with provider.             Reason for your hospital stay    You were admitted for a Bonilla-en-Y revision. Your post op course was c/b acute post op pain and abdominal rash. You are  discharging home in stable condition.     Activity    Your activity upon discharge: Walk at least four times a day, lift no greater than 10 pounds for 8 weeks from the time of surgery.  No driving while taking narcotics or 3 weeks after surgery.     [facility]ult [facility] Follow-up and recommended labs and tests    Resume any previous lab appointments.    Follow up with Dr. Burt (virtual visit) in 4 weeks.  Follow up with Dr. Burt in person if you have any issues or if you are in town for other appointments.  Follow up with Dr. Jean-Claude campos (virtual visit) per her recommendation   Follow up with Allergy clinic as able.     When to contact your care team    WHEN TO CONTACT YOUR  COORDINATOR:     Transplant Coordinator [phone]     Notify your coordinator if you have uncontrolled intense pain, increased redness or drainage from your incision, fever greater than 100F, decreased urine output.    If you have URGENT concerns after office hours, please call the hospital switchboard at [phone] and ask to have the organ transplant nurse on-call paged. If you have a life-threatening emergency, go to the nearest emergency room.     Wound care and dressings    Instructions to care for your wound at home: If you have staples in place, they will be removed about 3 weeks after surgery. Wash incision daily with soap and water. Do not soak or scrub.     Monitor and record    Monitor glucose 4 times per day.     Diet    Follow this diet upon discharge: Regular diet with supplements. Continue small, frequent meals until appetite returns.         Data   Results for orders placed or performed during the hospital encounter of 02/17/23   XR Abdomen Port 1 View    Narrative    Portable abdomen 1 view    INDICATION: Abdominal distention    COMPARISON: 2/27/22    FINDINGS: Surgical clips noted throughout the upper abdomen as before.  Benign pelvic calcifications unchanged as well. Mild prominent  retention of fecal material in the  distal large bowel. No evidence of  bowel obstruction, pneumatosis her obvious free air on this single  supine view.      Impression    IMPRESSION: Postsurgical changes in the abdomen again noted without  evidence of bowel obstruction.    JOSE D REYES MD         SYSTEM ID:  [id number]     Most Recent 3 CBC's:Recent Labs   Lab Test 02/25/23  0605 02/24/23  0543 02/23/23  0802   WBC 12.7* 10.4 8.8   HGB 14.8 13.8 12.4   MCV 90 91 92   * 476* 300     Most Recent 3 BMP's:Recent Labs   Lab Test 02/25/23  1428 02/25/23  1208 02/25/23  0754 02/25/23  0605 02/24/23  0759 02/24/23  0543 02/23/23  1214 02/23/23  0802   NA  --   --   --  136  --  135*  --  137   POTASSIUM  --   --   --  4.3  --  4.7  --  4.4   CHLORIDE  --   --   --  93*  --  97*  --  99   CO2  --   --   --  27  --  24  --  27   BUN  --   --   --  11.3  --  9.7  --  9.3   CR  --   --   --  0.55  --  0.55  --  0.55   ANIONGAP  --   --   --  16*  --  14  --  11   VIKAS  --   --   --  10.3*  --  9.7  --  9.0   * 143* 161* 134*   < > 119*   < > 119*  118*    < > = values in this interval not displayed.     Most Recent 2 LFT's:Recent Labs   Lab Test 02/24/23  0543 03/06/22  0848   AST 30 22   ALT 26 37   ALKPHOS 124* 171*   BILITOTAL 0.3 0.5     Most Recent 6 glucoses:Recent Labs   Lab Test 02/25/23  1428 02/25/23  1208 02/25/23  0754 02/25/23  0605 02/25/23  0229 02/24/23  2231   * 143* 161* 134* 131* 154*

## 2023-02-24 NOTE — PROGRESS NOTES
Pancreatitis Service - Daily Progress Note  02/18/2023    Assessment & Plan:  Meme Robins is a 52 year old female with a history of idiopathic chronic pancreatitis s/p PD sphincterotomy and multiple PD stents (10-15) in [place] and at [facility], cholecystectomy, TPIAT at this facility in 8/2021. Her overall recovery has been excellent until recently, when she had recurrent abdominal pain, bloating/nausea, malaise, and fevers. Imaging and endoscopic workup demonstrated reflux of enteric contents into her biliary limb with widely patent hepaticojejunostomy. She was admitted 2/17 following laparotomy, lysis of adhesions, and lengthening of biliary limb to total ~100 cm.      GI:  S/p lengthening of biliary nuris limb: POD#7. Tolerating regular diet. Postive bowel function.   Moderate malnutrition in the context of acute on chronic illness: Continue regular diet with supplements. Nutrition following.   Bowel regimen: Senna BID, MOM BID, Miralax daily, Fleets PRN.  Pancreatic insufficiency: PTA Zenpep.  GERD: Continue PPI, add PRN Tums.     Cardiorespiratory: Stable. SBP 80s-100s at baseline.    Fluid/Electrolytes: No issues.    : No issues.     Post-pancreatectomy diabetes: Hgb A1C 6, c-peptides positive. Discontinue Tresiba 6 units daily (stopped ~1 months ago). Continue PTA sliding scale correction AC/HS, acarbose 50 mg QPM. Will reach out to [facility] to coordinate Follow up.     Infection: Afebrile, WBC 10.    Prophylaxis: DVT (mechanical), GI (PPI)    Pain control:  Post surgical pain: Epidural removed 2/22. Adequate control. Continue PRN Tylenol, scheduled Robaxin TID, and PO Dilaudid Q3H PRN.   Chronic neck pain: Continue PTA Neurontin 300 mg AM/600 mg PM.     Psych:  Hx anxiety, depression, insomnia: PTA trazodone, Zoloft, and clonazepam ordered.    Derm:  Yasmeen-incisional rash: Initially yasmeen-incisional and has since spread over trunk causing pruritis/discomfort. Ice, benadryl cream, and PO Benadryl  "provided. Dermatology consulted.     Activity: Ambulate QID minimum.     Anticipated LOS/Discharge: Discharge in 1-2 days pending adequate pain control and PO intake.     JOSE ALBERTO/Fellow/Resident Provider: Effie Gutierrez NP [id number]    Faculty: Dane Burt MD    Attestation: I saw and examined the patient with Effie Gutierrez NP, and the transplant team. I independently reviewed all pertinent laboratory and imaging information and made independent management decisions. I agree with the findings and plan as documented in this note. I spent less than 30 minutes in the care of this patient.   Dane Burt MD  __________________________________________________________________    Interval History: History is obtained from the patient    Overnight events: No acute events overnight. Rash at incision continues to be itchy/irritated.     ROS:   A 10-point review of systems was negative except as noted above.    Curent Meds:    acarbose  50 mg Oral Daily with supper     clonazePAM  1 mg Oral Daily     clonazePAM  1.5 mg Oral QPM     gabapentin  300 mg Oral QAM     gabapentin  600 mg Oral QPM     insulin aspart  1-3 Units Subcutaneous TID AC     insulin aspart  1-3 Units Subcutaneous At Bedtime     lipase-protease-amylase  6 capsule Oral TID w/meals     magnesium hydroxide  30 mL Oral BID     methocarbamol  1,000 mg Oral TID     multivitamin w/minerals  1 tablet Oral Daily     pantoprazole  40 mg Oral BID     polyethylene glycol  17 g Oral Daily     senna-docusate  1 tablet Oral BID     sertraline  150 mg Oral At Bedtime     sodium chloride (PF)  3 mL Intravenous Q8H     traZODone  100 mg Oral At Bedtime       Physical Exam:     Admit Weight: 49 kg (108 lb 1.6 oz)    Current Vitals:   /74 (BP Location: Left arm)   Pulse 86   Temp 97.6  F (36.4  C) (Axillary)   Resp 18   Ht 1.549 m (5' 1\")   Wt 51.5 kg (113 lb 8 oz)   SpO2 96%   BMI 21.45 kg/m      Vital sign ranges:    Temp:  [97.6  F (36.4  C)-99  F (37.2 "  C)] 97.6  F (36.4  C)  Pulse:  [77-93] 86  Resp:  [16-18] 18  BP: (100-116)/(70-78) 110/74  SpO2:  [91 %-96 %] 96 %  Patient Vitals for the past 24 hrs:   BP Temp Temp src Pulse Resp SpO2 Weight   02/24/23 1400 110/74 97.6  F (36.4  C) Axillary 86 18 96 % --   02/24/23 1118 106/71 98.4  F (36.9  C) Oral 93 18 94 % --   02/24/23 0900 -- -- -- -- -- -- 51.5 kg (113 lb 8 oz)   02/24/23 0529 113/78 99  F (37.2  C) Oral 84 18 91 % --   02/23/23 2302 116/76 98.3  F (36.8  C) Oral 86 18 92 % --   02/23/23 1806 114/70 98.6  F (37  C) Oral 77 18 95 % --   02/23/23 1417 100/71 98.3  F (36.8  C) Oral 82 16 95 % --     General Appearance: in no apparent distress.   Skin: normal, dry, no suspicious lesions  Heart: well perfused  Lungs: NLB on RA  Abdomen: The abdomen is slightly distended, mildly tender generalized. The wound is closed with dermabond; yasmeen-incisional rash spreading throughout trunk. No drainage at incision.  : voiding  Extremities: edema: absent.    Data:   CMP  Recent Labs   Lab 02/24/23  1122 02/24/23  0759 02/24/23  0543 02/23/23  1214 02/23/23  0802 02/22/23  0821 02/22/23  0613   NA  --   --  135*  --  137  --  139   POTASSIUM  --   --  4.7  --  4.4  --  4.5   CHLORIDE  --   --  97*  --  99  --  102   CO2  --   --  24  --  27  --  26   * 106* 119*   < > 119*  118*   < > 143*   BUN  --   --  9.7  --  9.3  --  6.2   CR  --   --  0.55  --  0.55  --  0.52   GFRESTIMATED  --   --  >90  --  >90  --  >90   VIKAS  --   --  9.7  --  9.0  --  8.7   MAG  --   --  2.4*  --  2.4*  --  2.4*   PHOS  --   --   --   --  5.1*  --  4.0   ALBUMIN  --   --  3.9  --   --   --   --    BILITOTAL  --   --  0.3  --   --   --   --    ALKPHOS  --   --  124*  --   --   --   --    AST  --   --  30  --   --   --   --    ALT  --   --  26  --   --   --   --     < > = values in this interval not displayed.     CBC  Recent Labs   Lab 02/24/23  0543 02/23/23  0802 02/18/23  0743 02/18/23  0021   HGB 13.8 12.4   < > 13.2   WBC 10.4  8.8   < >  --    * 300   < >  --    A1C  --   --   --  6.3*    < > = values in this interval not displayed.     Coags  No lab results found in last 7 days.    Invalid input(s): XA   Urinalysis  Recent Labs   Lab Test 03/06/22  0851 08/19/21  0158   COLOR Dark Yellow* Straw   APPEARANCE Clear Clear   URINEGLC Negative Negative   URINEBILI Negative Negative   URINEKETONE Negative Negative   SG 1.035 1.009   UBLD Negative Negative   URINEPH 5.5 7.0   PROTEIN 50* Negative   NITRITE Negative Negative   LEUKEST Negative Negative   RBCU 2 1   WBCU 1 <1

## 2023-02-24 NOTE — PROGRESS NOTES
CLINICAL NUTRITION SERVICES - BRIEF NOTE     Nutrition Prescription    Recommendations already ordered by Registered Dietitian (RD):  Ensure Clear TID with meals    Future/Additional Recommendations:  -Small/frequent meals (small amounts every 1-2 hrs)  -Sitting up while and after eating   -Light walks as able after meals  -Avoid high fat/high fiber foods     EVALUATION OF THE PROGRESS TOWARD GOALS   Diet: Regular + Ensure Max protein TID with meals    Intake: Pt not eating much d/t reflux, nausea.  She has been taking small amounts of cherrios, banana, saltine crackers.  She feels the Ensure Max protein is too heavy.       INTERVENTIONS  Reviewed recommendations to help avoid nausea/reflux.  Recommended avoidance of high fat and high fiber foods as these will require more stomach acid and take longer to digest.  Encouraged soft dairy products (yogurt, cottage cheese).  Pt would like Ensure Clear.  Encouraged ongoing small/frequent snacks to avoid getting overly full.  Recommended sitting up while and after eating as well as light walks after meals.      Monitoring/Evaluation  Progress toward goals will be monitored and evaluated per protocol.     Effie Ramon, MS, RD, LD, CCTD, CNSC  7A/Obs unit pager [phone]  Weekend pager [phone]

## 2023-02-25 ENCOUNTER — APPOINTMENT (OUTPATIENT)
Dept: GENERAL RADIOLOGY | Facility: CLINIC | Age: 53
End: 2023-02-25
Attending: NURSE PRACTITIONER
Payer: COMMERCIAL

## 2023-02-25 VITALS
WEIGHT: 113.5 LBS | TEMPERATURE: 98.3 F | RESPIRATION RATE: 18 BRPM | BODY MASS INDEX: 21.43 KG/M2 | OXYGEN SATURATION: 92 % | HEIGHT: 61 IN | DIASTOLIC BLOOD PRESSURE: 71 MMHG | HEART RATE: 68 BPM | SYSTOLIC BLOOD PRESSURE: 108 MMHG

## 2023-02-25 LAB
ANION GAP SERPL CALCULATED.3IONS-SCNC: 16 MMOL/L (ref 7–15)
BUN SERPL-MCNC: 11.3 MG/DL (ref 6–20)
CALCIUM SERPL-MCNC: 10.3 MG/DL (ref 8.6–10)
CHLORIDE SERPL-SCNC: 93 MMOL/L (ref 98–107)
CREAT SERPL-MCNC: 0.55 MG/DL (ref 0.51–0.95)
DEPRECATED HCO3 PLAS-SCNC: 27 MMOL/L (ref 22–29)
ERYTHROCYTE [DISTWIDTH] IN BLOOD BY AUTOMATED COUNT: 14.2 % (ref 10–15)
GFR SERPL CREATININE-BSD FRML MDRD: >90 ML/MIN/1.73M2
GLUCOSE BLDC GLUCOMTR-MCNC: 131 MG/DL (ref 70–99)
GLUCOSE BLDC GLUCOMTR-MCNC: 143 MG/DL (ref 70–99)
GLUCOSE BLDC GLUCOMTR-MCNC: 160 MG/DL (ref 70–99)
GLUCOSE BLDC GLUCOMTR-MCNC: 161 MG/DL (ref 70–99)
GLUCOSE SERPL-MCNC: 134 MG/DL (ref 70–99)
HCT VFR BLD AUTO: 45.2 % (ref 35–47)
HGB BLD-MCNC: 14.8 G/DL (ref 11.7–15.7)
MAGNESIUM SERPL-MCNC: 2.3 MG/DL (ref 1.7–2.3)
MCH RBC QN AUTO: 29.5 PG (ref 26.5–33)
MCHC RBC AUTO-ENTMCNC: 32.7 G/DL (ref 31.5–36.5)
MCV RBC AUTO: 90 FL (ref 78–100)
PLATELET # BLD AUTO: 550 10E3/UL (ref 150–450)
POTASSIUM SERPL-SCNC: 4.3 MMOL/L (ref 3.4–5.3)
RBC # BLD AUTO: 5.02 10E6/UL (ref 3.8–5.2)
SODIUM SERPL-SCNC: 136 MMOL/L (ref 136–145)
WBC # BLD AUTO: 12.7 10E3/UL (ref 4–11)

## 2023-02-25 PROCEDURE — 74018 RADEX ABDOMEN 1 VIEW: CPT | Mod: 26 | Performed by: RADIOLOGY

## 2023-02-25 PROCEDURE — 83735 ASSAY OF MAGNESIUM: CPT | Performed by: SURGERY

## 2023-02-25 PROCEDURE — 250N000013 HC RX MED GY IP 250 OP 250 PS 637: Performed by: NURSE PRACTITIONER

## 2023-02-25 PROCEDURE — 250N000011 HC RX IP 250 OP 636: Performed by: SURGERY

## 2023-02-25 PROCEDURE — 250N000013 HC RX MED GY IP 250 OP 250 PS 637: Performed by: SURGERY

## 2023-02-25 PROCEDURE — 36415 COLL VENOUS BLD VENIPUNCTURE: CPT | Performed by: SURGERY

## 2023-02-25 PROCEDURE — 74018 RADEX ABDOMEN 1 VIEW: CPT

## 2023-02-25 PROCEDURE — 99024 POSTOP FOLLOW-UP VISIT: CPT | Mod: FS | Performed by: SURGERY

## 2023-02-25 PROCEDURE — 85027 COMPLETE CBC AUTOMATED: CPT | Performed by: SURGERY

## 2023-02-25 PROCEDURE — 82310 ASSAY OF CALCIUM: CPT | Performed by: SURGERY

## 2023-02-25 RX ORDER — TRIAMCINOLONE ACETONIDE 1 MG/G
CREAM TOPICAL 3 TIMES DAILY
COMMUNITY
Start: 2023-02-25

## 2023-02-25 RX ORDER — GABAPENTIN 300 MG/1
CAPSULE ORAL
Qty: 60 CAPSULE | Refills: 1 | COMMUNITY
Start: 2023-02-25

## 2023-02-25 RX ORDER — MAGNESIUM HYDROXIDE/ALUMINUM HYDROXICE/SIMETHICONE 120; 1200; 1200 MG/30ML; MG/30ML; MG/30ML
15 SUSPENSION ORAL EVERY 6 HOURS PRN
Qty: 355 ML | Refills: 1 | Status: SHIPPED | OUTPATIENT
Start: 2023-02-25

## 2023-02-25 RX ORDER — METHOCARBAMOL 500 MG/1
1000 TABLET, FILM COATED ORAL 3 TIMES DAILY PRN
COMMUNITY
Start: 2023-02-25

## 2023-02-25 RX ORDER — INSULIN DEGLUDEC 100 U/ML
INJECTION, SOLUTION SUBCUTANEOUS
Qty: 15 ML | Refills: 0 | COMMUNITY
Start: 2023-02-25 | End: 2023-02-25

## 2023-02-25 RX ADMIN — HYDROMORPHONE HYDROCHLORIDE 4 MG: 2 TABLET ORAL at 10:31

## 2023-02-25 RX ADMIN — TRIAMCINOLONE ACETONIDE: 1 CREAM TOPICAL at 08:28

## 2023-02-25 RX ADMIN — CLONAZEPAM 1 MG: 1 TABLET ORAL at 08:27

## 2023-02-25 RX ADMIN — INSULIN ASPART 1 UNITS: 100 INJECTION, SOLUTION INTRAVENOUS; SUBCUTANEOUS at 12:10

## 2023-02-25 RX ADMIN — METHOCARBAMOL 1000 MG: 500 TABLET ORAL at 08:26

## 2023-02-25 RX ADMIN — HYDROMORPHONE HYDROCHLORIDE 6 MG: 2 TABLET ORAL at 06:54

## 2023-02-25 RX ADMIN — PANCRELIPASE LIPASE, PANCRELIPASE PROTEASE, PANCRELIPASE AMYLASE 4 CAPSULE: 20000; 63000; 84000 CAPSULE, DELAYED RELEASE ORAL at 08:29

## 2023-02-25 RX ADMIN — METHOCARBAMOL 1000 MG: 500 TABLET ORAL at 14:04

## 2023-02-25 RX ADMIN — PANTOPRAZOLE SODIUM 40 MG: 40 TABLET, DELAYED RELEASE ORAL at 08:27

## 2023-02-25 RX ADMIN — ALUMINUM HYDROXIDE, MAGNESIUM HYDROXIDE, AND SIMETHICONE 15 ML: 200; 200; 20 SUSPENSION ORAL at 14:17

## 2023-02-25 RX ADMIN — HYDROMORPHONE HYDROCHLORIDE 4 MG: 2 TABLET ORAL at 14:04

## 2023-02-25 RX ADMIN — ONDANSETRON 4 MG: 4 TABLET, ORALLY DISINTEGRATING ORAL at 16:42

## 2023-02-25 RX ADMIN — TRIAMCINOLONE ACETONIDE: 1 CREAM TOPICAL at 14:18

## 2023-02-25 RX ADMIN — HYDROMORPHONE HYDROCHLORIDE 4 MG: 2 TABLET ORAL at 17:20

## 2023-02-25 RX ADMIN — HYDROMORPHONE HYDROCHLORIDE 6 MG: 2 TABLET ORAL at 02:26

## 2023-02-25 RX ADMIN — INSULIN ASPART 1 UNITS: 100 INJECTION, SOLUTION INTRAVENOUS; SUBCUTANEOUS at 08:28

## 2023-02-25 RX ADMIN — GABAPENTIN 300 MG: 300 CAPSULE ORAL at 08:27

## 2023-02-25 RX ADMIN — PANCRELIPASE LIPASE, PANCRELIPASE PROTEASE, PANCRELIPASE AMYLASE 6 CAPSULE: 20000; 63000; 84000 CAPSULE, DELAYED RELEASE ORAL at 12:15

## 2023-02-25 RX ADMIN — ALUMINUM HYDROXIDE, MAGNESIUM HYDROXIDE, AND SIMETHICONE 15 ML: 200; 200; 20 SUSPENSION ORAL at 06:53

## 2023-02-25 ASSESSMENT — ACTIVITIES OF DAILY LIVING (ADL)
ADLS_ACUITY_SCORE: 20

## 2023-02-25 NOTE — PLAN OF CARE
"VS: /71 (BP Location: Left arm)   Pulse 68   Temp 98.3  F (36.8  C) (Oral)   Resp 18   Ht 1.549 m (5' 1\")   Wt 51.5 kg (113 lb 8 oz)   SpO2 92%   BMI 21.45 kg/m      Cares: 2156-2295    Current condition: VSS on RA  Neuro: A&O x4  Cardio: WNL  Respiratory: WNL   GI/: Voiding, last BM 2/25  Skin: Abdominal incision dermabonded and ALONRDA  Diet: Regular - tolerated breakfast well, now eating a quesadilla    BG: ACHS (161, 143)  LDA: PIV SL   Mobility: UAL   Pain: 5/10 managed with PRN meds  PRN medications: Dilaudid 4mg x2  Plan of Care: Home this afternoon?      "

## 2023-02-25 NOTE — PROGRESS NOTES
Pancreatitis Service - Daily Progress Note  02/18/2023    Assessment & Plan:  Meme Robins is a 52 year old female with a history of idiopathic chronic pancreatitis s/p PD sphincterotomy and multiple PD stents (10-15) in [place] and at [facility], cholecystectomy, TPIAT at this facility in 8/2021. Her overall recovery has been excellent until recently, when she had recurrent abdominal pain, bloating/nausea, malaise, and fevers. Imaging and endoscopic workup demonstrated reflux of enteric contents into her biliary limb with widely patent hepaticojejunostomy. She was admitted 2/17 following laparotomy, lysis of adhesions, and lengthening of biliary limb to total ~100 cm.      GI:  S/p lengthening of biliary nuris limb: POD#8. Tolerating regular diet but reporting Heartburn/reflux. Postive bowel function. Will obtain AXR.  Moderate malnutrition in the context of acute on chronic illness: Continue regular diet with supplements. Nutrition following.   Bowel regimen: Senna BID, MOM BID, Miralax daily, Fleets PRN.  Pancreatic insufficiency: PTA Zenpep.  GERD: Continue PPI, add PRN Tums. Maalox PRN.     Cardiorespiratory: Stable. SBP 100s-110s.    Fluid/Electrolytes: No issues.    : No issues.     Post-pancreatectomy diabetes: Hgb A1C 6, c-peptides positive. Discontinue Tresiba 6 units daily (stopped ~1 months ago). Continue PTA sliding scale correction AC/HS, acarbose 50 mg QPM. Will reach out to [facility] to coordinate Follow up.     Infection: Afebrile, WBC 12.7    Prophylaxis: DVT (mechanical), GI (PPI)    Pain control:  Post surgical pain: Epidural removed 2/22. Adequate control. Continue PRN Tylenol, scheduled Robaxin TID, and PO Dilaudid Q3H PRN.   Chronic neck pain: Continue PTA Neurontin 300 mg AM/600 mg PM.     Psych:  Hx anxiety, depression, insomnia: PTA trazodone, Zoloft, and clonazepam ordered.    Derm:  Yasmeen-incisional rash: Initially yasmeen-incisional and has since spread over trunk causing  pruritis/discomfort. Ice, benadryl cream, and PO Benadryl provided. Dermatology consulted.     Activity: Ambulate QID minimum.     Anticipated LOS/Discharge: Discharge in 1-2 days pending adequate pain control and PO intake.     JOSE ALBERTO/Fellow/Resident Provider: Aliza Ridley NP [id number]    Faculty: Dane Burt MD    Attestation: I saw and examined the patient with Aliza Ridley NP, and the transplant team. I independently reviewed all pertinent laboratory and imaging information and made independent management decisions. I agree with the findings and plan as documented in this note. I spent less than 30 minutes in the care of this patient.   Dane Burt MD  __________________________________________________________________    Interval History: History is obtained from the patient    Overnight events: Continues to report reflux/belching.   Some relief with Maalox.  Rash improving with steroid cream. Pain controlled.    ROS:   A 10-point review of systems was negative except as noted above.    Curent Meds:    acarbose  50 mg Oral Daily with supper     clonazePAM  1 mg Oral Daily     clonazePAM  1.5 mg Oral QPM     gabapentin  300 mg Oral QAM     gabapentin  600 mg Oral QPM     insulin aspart  1-3 Units Subcutaneous TID AC     insulin aspart  1-3 Units Subcutaneous At Bedtime     lipase-protease-amylase  6 capsule Oral TID w/meals     magnesium hydroxide  30 mL Oral BID     methocarbamol  1,000 mg Oral TID     multivitamin w/minerals  1 tablet Oral Daily     pantoprazole  40 mg Oral BID     polyethylene glycol  17 g Oral Daily     senna-docusate  1 tablet Oral BID     sertraline  150 mg Oral At Bedtime     sodium chloride (PF)  3 mL Intravenous Q8H     traZODone  100 mg Oral At Bedtime     triamcinolone   Topical TID       Physical Exam:     Admit Weight: 49 kg (108 lb 1.6 oz)    Current Vitals:   /85 (BP Location: Left arm)   Pulse 93   Temp 98.4  F (36.9  C) (Oral)   Resp 18   Ht 1.549 m (5'  "1\")   Wt 51.5 kg (113 lb 8 oz)   SpO2 97%   BMI 21.45 kg/m      Vital sign ranges:    Temp:  [97.6  F (36.4  C)-98.4  F (36.9  C)] 98.4  F (36.9  C)  Pulse:  [84-93] 93  Resp:  [18] 18  BP: (106-126)/(71-85) 112/85  SpO2:  [92 %-100 %] 97 %  Patient Vitals for the past 24 hrs:   BP Temp Temp src Pulse Resp SpO2   02/25/23 0538 112/85 98.4  F (36.9  C) Oral -- 18 97 %   02/25/23 0200 126/85 98  F (36.7  C) Oral 93 18 100 %   02/24/23 2158 108/77 98.4  F (36.9  C) Oral 84 18 92 %   02/24/23 1400 110/74 97.6  F (36.4  C) Axillary 86 18 96 %   02/24/23 1118 106/71 98.4  F (36.9  C) Oral 93 18 94 %     General Appearance: in no apparent distress.   Skin: normal, dry, no suspicious lesions  Heart: well perfused  Lungs: NLB on RA  Abdomen: The abdomen is slightly distended, mildly tender generalized. The wound is closed with dermabond; yasmeen-incisional rash throughout trunk-improving. No drainage at incision.  : voiding  Extremities: edema: absent.    Data:   CMP  Recent Labs   Lab 02/25/23  0754 02/25/23  0605 02/24/23  0759 02/24/23  0543 02/23/23  1214 02/23/23  0802 02/22/23  0821 02/22/23  0613   NA  --  136  --  135*  --  137  --  139   POTASSIUM  --  4.3  --  4.7  --  4.4  --  4.5   CHLORIDE  --  93*  --  97*  --  99  --  102   CO2  --  27  --  24  --  27  --  26   * 134*   < > 119*   < > 119*  118*   < > 143*   BUN  --  11.3  --  9.7  --  9.3  --  6.2   CR  --  0.55  --  0.55  --  0.55  --  0.52   GFRESTIMATED  --  >90  --  >90  --  >90  --  >90   VIKAS  --  10.3*  --  9.7  --  9.0  --  8.7   MAG  --  2.3  --  2.4*  --  2.4*  --  2.4*   PHOS  --   --   --   --   --  5.1*  --  4.0   ALBUMIN  --   --   --  3.9  --   --   --   --    BILITOTAL  --   --   --  0.3  --   --   --   --    ALKPHOS  --   --   --  124*  --   --   --   --    AST  --   --   --  30  --   --   --   --    ALT  --   --   --  26  --   --   --   --     < > = values in this interval not displayed.     CBC  Recent Labs   Lab 02/25/23  0605 " 02/24/23  0543   HGB 14.8 13.8   WBC 12.7* 10.4   * 476*     Coags  No lab results found in last 7 days.    Invalid input(s): XA   Urinalysis  Recent Labs   Lab Test 03/06/22  0851 08/19/21  0158   COLOR Dark Yellow* Straw   APPEARANCE Clear Clear   URINEGLC Negative Negative   URINEBILI Negative Negative   URINEKETONE Negative Negative   SG 1.035 1.009   UBLD Negative Negative   URINEPH 5.5 7.0   PROTEIN 50* Negative   NITRITE Negative Negative   LEUKEST Negative Negative   RBCU 2 1   WBCU 1 <1

## 2023-02-25 NOTE — PLAN OF CARE
"VS: /85 (BP Location: Left arm)   Pulse 93   Temp 98.4  F (36.9  C) (Oral)   Resp 18   Ht 1.549 m (5' 1\")   Wt 51.5 kg (113 lb 8 oz)   SpO2 97%   BMI 21.45 kg/m      Cares: 1900 - 0730     Neuro: Aox4   Cardio: WDL  Respiratory: WDL on RA   GI/: voiding adequately w/out issues, LBM 2/24  Skin: midline abdominal incision derma bond ALONDRA   Diet: Regular   Labs: waiting on morning lab results   BG: ACHS + 2am (120 - 154)  LDA: Right PIV SL  Mobility: Ind.  Pain/Nausea: abdominal pain and indigestion, denies nausea   PRN medications: tums x1, dilaudid x1, maalox x1  Changes: Maalox Q4H PRN was added for indigestion  Plan of Care: continue with current POC and update MD with any changes     "

## 2023-02-25 NOTE — PROVIDER NOTIFICATION
7A [address] ERIK Valentine  pt is requesting Maalox for acid reflux. Pt tried tums twice today w/ not much relief. Karen CHONG, [phone]    MD put in PRN Q4H Maalox

## 2023-03-02 ENCOUNTER — TELEPHONE (OUTPATIENT)
Dept: ALLERGY | Facility: CLINIC | Age: 53
End: 2023-03-02
Payer: COMMERCIAL

## 2023-03-03 ENCOUNTER — TELEPHONE (OUTPATIENT)
Dept: TRANSPLANT | Facility: CLINIC | Age: 53
End: 2023-03-03
Payer: COMMERCIAL

## 2023-03-03 NOTE — TELEPHONE ENCOUNTER
Called Meme to check in to see how she is doing after discharging from the hospital.  Patient did send me a message about her blood sugars and will forward onto Dr Bermeo to assess.  Will check back in with patient next week and schedule a virtual follow up appointment with her surgeon.

## 2023-03-08 NOTE — OP NOTE
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 Dr. Sage was the primary assistant for the procedure and participated in all aspects of the case.   Anesthesia: General and Epidural  EBL: 25 ml  Fluids: crystalloid  UO: 600  Drains: none  Specimen: none.  Complications: None  Findings:  Dense adhesions between anterior stomach wall and superior aspect of midline incision- taken down. Prior GJ tube site not taken down due to adhesions. Moderate burden of visceral adhesions within bowel loops and between bowel and anterior abdominal wall- lysed sharply and with cautery. Biliary limb measured ~40-50 cm, but unable to measure length above right colon in right upper quadrant due to significant adhesive burden. Divided alimentary limb and reimplanted more distally to create a total biliary limb length of 100cm with a common channel of 250cm and full colon/ileocecal valve.   Complications: None.    Indication: The patient has recurrent cholangiitis suspected due to enteric content reflux up biliary limb.  After discussing the risks and benefits of surgery and potential complications, the patient provided informed consent.     DETAILS OF PROCEDURE:  The patient was brought to the operating room, placed in a supine position.  Perioperative prophylactic IV antibiotics were given.  Anesthesia was adminisitered. The abdomen was prepped and draped in the usual sterile fashion.  Time out was performed.    We reopened part of her prior laparotomy incision. We were careful in dissection and ensured safe entry was achieved into the abdomen. We encountered dense intraperitoneal adhesions between the viscera and the anterior  abdominal wall as well as within the viscera. These adhesions were carefully lysed over 90+ minutes using both scissors and cautery. Adhesions were most dense in the upper abdomen/midepigastrium region, and that region was not entered due to high risk of injury and no clinical need.    Adhesions were fully lysed in the small bowel and the anatomy delineated. We were able to trace both the biliary and alimentary limbs backward from the jejunojejunostomy. We measured 40-50cm length in the biliary limb, though this did not include any length above the right colon as it was too treacherous to enter that space. We divided the alimentary limb above the jejunojejunostomy and reimplanted this more distally to create a ~100cm biliary limb. This distance was chosen to create length and minimize the risk of reflux while also preserving sufficient common channel. The common channel length is 250cm, and she has her full colon and ileocecal valve.    The reimplantation was performed as a hand sewn side to side functional end to side bowel anastomosis, with running 4-0 prolene outer layer and 4-0 PDS inner layer. The mesenteric defect was closed with interrupted silk sutures. The abdomen was irrigated and bowel examined carefully for evidence of serosal injuries. None were appreciated.    We positioned the bowel in the abdomen to verify all mesentery was flat and straight. We then turned attention to closure. The midline fascia was closed with running 0 looped PDS. The deeper dermis was closed with interrupted 3-0 vicryl and the skin with running subcuticular 4-0 monocryl and skin glue.       All needle, sponge, and instrument counts were accurate.  The patient tolerated the procedure well without apparent complications and was trasfered to the PACU in good condition.  Faculty was present for critical portions of the procedure.

## 2023-03-10 ENCOUNTER — MYC MEDICAL ADVICE (OUTPATIENT)
Dept: OTHER | Age: 53
End: 2023-03-10

## 2023-03-15 ENCOUNTER — MYC MEDICAL ADVICE (OUTPATIENT)
Dept: OTHER | Age: 53
End: 2023-03-15

## 2023-03-15 NOTE — TELEPHONE ENCOUNTER
Called Meme back after getting a [employer] message stating she does not feel good. Left detailed message for a callback

## 2023-03-16 NOTE — TELEPHONE ENCOUNTER
"Talked to Meme after getting her [employer] message and she feels so much better now.  She had a low grade fever 100.3 with new abdominal pain late Friday afternoon.  She took some advil and relaxed and it slowly got better.  She currently rates her pain at 2/10.  We talked about how she is still only 4 weeks out from surgery so she is still healing and when she eats it can cause these \"flare-ups\" which is a normal process.  She understands to monitor this and reach out if she needs anything again.  She feels so good she said and its the first time in months that she was able to drive her car and is feeling better everyday.     "

## 2023-04-06 ENCOUNTER — TELEPHONE (OUTPATIENT)
Dept: TRANSPLANT | Facility: CLINIC | Age: 53
End: 2023-04-06

## 2023-04-06 NOTE — TELEPHONE ENCOUNTER
Meme calling and has some burning pain, with a knot under her incision, running a low grade fever of 99.8 since last night. Having some aches and chills. Patient stated she tested Negative for Covid today. Really concerned and would like for coordinator to call today.

## 2023-04-07 NOTE — TELEPHONE ENCOUNTER
Talked with Meme and she is still having pain but it is slightly better. Her pain has been consistent since Wednesday and its a burning pain behind her scar and in her right upper quadrant.  She did state that her allergies are bad right now and is sneezing a lot which is painful to her abdomen.  She has a low grade fever running 99.0 to about 99.5.  She also has been doing a lot more movement which could be causing more pain too. The knot that she has on her scar she explained is way better but still painful.  We discussed that this could all be from her scar trying to heal still after surgery and with her moving around more and sneezing a lot. She is just worried that more is going on with her low grade fever. She is alternating tylenol and Advil and she says it works pretty well.  She is going to take it easy this weekend and see how she feels on Monday.  She also started to open up her Zenpep capsules as they stated they were delayed release and she has been having more formed stools and food is staying down.

## 2023-04-10 ENCOUNTER — TELEPHONE (OUTPATIENT)
Dept: TRANSPLANT | Facility: CLINIC | Age: 53
End: 2023-04-10
Payer: COMMERCIAL

## 2023-04-10 NOTE — TELEPHONE ENCOUNTER
Meme calling regarding Post Surgical pain, temp is 101.3 feeling really bad wonder if she should go to the ER and would like to speak with  Dr. Javed terrell   personally  Regarding this, and would like a call back today

## 2023-04-10 NOTE — TELEPHONE ENCOUNTER
Fever 101.4 and incisional pain. It's gotten worse over the weekend so she talked to her internist and now she's in the ER being worked up. She missed a call from Dr. Burt so she wants to page them again and try to connect. Per the VM that was left, they were going into Liver transplant surgery. Dr. Moore. Writer explained if no callback then we'd stick with plan of having them call back when out of surgery or tomorrow. Pt is getting blood work, cultures, and CT of Abd.  Writer reassured pt is doing the right thing. Pt verbalized understanding and agrees with the plan.     Dr. Javed moore. Call returned. He is in the middle of surgery and will call the patient back tomorrow. Information relayed to patient and she verbalized understanding.

## 2023-04-11 NOTE — TELEPHONE ENCOUNTER
Talked with Meme just now.  She is doing okay, still has low grade fever with pain but is in better spirits.  She sees her PCP today at 1pm with more lab work to come and she is still waiting on her blood cultures. She will inform me of the plan once it is set.

## 2023-04-20 ENCOUNTER — VIRTUAL VISIT (OUTPATIENT)
Dept: TRANSPLANT | Facility: CLINIC | Age: 53
End: 2023-04-20
Attending: SURGERY
Payer: COMMERCIAL

## 2023-04-20 DIAGNOSIS — Z90.410 ACQUIRED TOTAL ABSENCE OF PANCREAS: Primary | ICD-10-CM

## 2023-04-20 PROCEDURE — 99024 POSTOP FOLLOW-UP VISIT: CPT | Mod: VID | Performed by: SURGERY

## 2023-04-20 NOTE — LETTER
4/20/2023         RE: Meme Robins  [id number] [facility] [id number]        Dear Colleague,    Thank you for referring your patient, Meme Robins, to the [facility]. Please see a copy of my visit note below.    Chronic Pancreatitis Group Progress Note    Video visit:  Start time: 1:00 CST  End time: 1:24CST  Patient location: home  Provider location: [facility]    I had the pleasure of seeing Ms. Meme Robins today. 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 Her anatomy is as follows: 100cm biliary limb, 50cm alimentary limb, 250cm common channel, full large intestine including ileocecal valve.  She has been doing fairly well overall. Her prior symptoms (right upper quadrant pain, fevers/malaise, weakness) have resolved. She does occasionally have abdominal pain/bloating and greasy stools after fatty meals. She has noted improvement in this when she opens her creon capsule rather than taking it whole, presumptively allowing it to mix more quickly. She is taking 40,000 strength capsules, 3 with meals and 2 with snacks. She was complaining of some abdominal pain at her incision previously with burning, worsened by coughing/sneezing during allergy season, but this has resolved.     Pancreatic exocrine insufficiency:     Greasy stools: []  none   [x]  rarely    []  several times/wk   []  daily      Comment:   Diabetes management:       Lab Results   Component Value Date    A1C 5.5 08/04/2021    A1C 6.0 05/19/2021      Pain management:   Weaning PO dilaudid  Prescription Medications as of 4/20/2023         Rx Number Disp Refills Start End Last Dispensed Date Next Fill Date Owning Pharmacy    acarbose (PRECOSE) 50 MG tablet  30 tablet 1 5/16/2022    [facility]  [place] - [address]    Sig: Take 1 tablet (50 mg) by mouth daily (with dinner)    Class: E-Prescribe    Notes to Pharmacy: Please call Tenisha Pearce Rn at  with any questions.    Route: Oral    alum & mag hydroxide-simethicone (MAALOX) 200-200-20 MG/5ML SUSP suspension  355 mL 1 2023    [address]    Sig: Take 15 mLs by mouth every 6 hours as needed for indigestion    Class: E-Prescribe    Route: Oral    Renewals       Renewal requests to authorizing provider (Aliza Ridley NP) <b>prohibited</b>            blood glucose (NO BRAND SPECIFIED) lancets standard  100 each 0 2021    [address]    Sig: To use to test glucose level in the blood  Use to test blood sugar  6  times daily as directed. To accompany glucose monitor brands per insurance coverage.  One touch Delica lancets    Class: Local Print    blood glucose (NO BRAND SPECIFIED) test strip  100 strip 3 2021    [id number] - [place], [place] - [address] AT [facility] [address]    Sig: Use to test blood sugar  6  times daily as directed. To accompany glucose monitor brands per insurance coverage: One Touch Verio strip    Class: E-Prescribe    clonazePAM (KLONOPIN) 1 MG tablet    2021        Simg morning and 1.5mg every evening    Class: Historical    Continuous Blood Gluc Sensor (DEXCOM G6 SENSOR) MISC  9 each 3 2022    [facility] [address]    Sig: USE AS DIRECTED FOR CONTINUOUS GLUCOSE MONITORING, CHANGE EVERY 10 DAYS    Class: E-Prescribe    Continuous Blood Gluc Transmit (DEXCOM G6 TRANSMITTER) MISC  1 each 3 10/31/2022    [facility] - [address]    Sig: USE AND CHANGE 1 TRANSMITTER EVERY 3 MONTHS    Class: E-Prescribe    gabapentin  (NEURONTIN) 300 MG capsule  60 capsule 1 2/25/2023        Sig: Take 300mg by mouth in the morning and take 600mg by mouth at bedtime.    Class: Historical    Glucagon (GVOKE HYPOPEN 2-PACK) 1 MG/0.2ML SOAJ  0.4 mL 1 8/26/2021    [address]    Sig: Inject 1 each Subcutaneous once as needed (for severe hypoglycemia) Use x1 dose and then call seek emergent care for treatment    Class: E-Prescribe    Notes to Pharmacy: Please call Maddi Almaraz RN Transplant Coordinator, with fill/formulary issues: [phone]    Route: Subcutaneous    HYDROmorphone (DILAUDID) 4 MG tablet  56 tablet 0 8/26/2021    [address]    Sig: Take 1-2 tablets (4-6 mg) every 4 hours as needed for severe abdominal pain    Class: E-Prescribe    Earliest Fill Date: 8/26/2021    Injection Device for insulin (INPEN 100-BLUE-PARVEZ-HUMALOG) NORA  1 each 1 6/17/2022    [id number] - [place], [place] - [address] [place] [address]    Sig: Use as directed for Insulin infusion    Class: E-Prescribe    Notes to Pharmacy: Please call Tenisha VILLASENOR with any questions     lipase-protease-amylase (ZENPEP) 91421-552844 units CPEP            Sig: Take 3 capsules by mouth 3 times daily (with meals) Take up to 140,000 units with meals and take up to 80,000 with snacks    Class: Historical    Route: Oral    lipase-protease-amylase (ZENPEP) 03624-910828-091219 units CPEP            Sig: Take 2 capsules by mouth Take with snacks or supplements    Class: Historical    Route: Oral    magnesium hydroxide (MILK OF MAGNESIA) 400 MG/5ML suspension  354 mL 1 2/25/2023    [address]    Sig: Take 30 mLs by mouth 2 times daily    Class: E-Prescribe    Route: Oral    Renewals       Renewal requests to authorizing provider (Aliza Ridley NP) <b>prohibited</b>             Melatonin 10 MG TABS tablet            Sig: Take 10 mg by mouth nightly as needed for sleep    Class: Historical    Route: Oral    methocarbamol (ROBAXIN) 500 MG tablet    2/25/2023        Sig: Take 2 tablets (1,000 mg) by mouth 3 times daily as needed for muscle spasms    Class: Historical    Route: Oral    multivitamin w/minerals (THERA-VIT-M) tablet  30 tablet 1 8/20/2021    [place], [place] - [address] [facility]    Sig: Take 1 tablet by mouth daily    Class: E-Prescribe    Route: Oral    Renewals       Renewal requests to authorizing provider (Aliza Ridley NP) <b>prohibited</b>            ondansetron (ZOFRAN-ODT) 4 MG ODT tab            Sig: Take 4 mg by mouth every 8 hours as needed    Class: Historical    Route: Oral    pantoprazole (PROTONIX) 40 MG EC tablet            Sig: Take 40 mg by mouth 2 times daily    Class: Historical    Route: Oral    sertraline (ZOLOFT) 100 MG tablet    2/1/2021        Sig: Take 150 mg by mouth At Bedtime    Class: Historical    Route: Oral    traZODone (DESYREL) 100 MG tablet    2/1/2021        Sig: Take 100 mg by mouth At Bedtime    Class: Historical    Route: Oral    triamcinolone (KENALOG) 0.1 % external cream    2/25/2023        Sig: Apply topically 3 times daily    Class: Historical    Route: Topical          Physical exam:   General Appearance: in no apparent distress.   BP: ()/()   Arterial Line BP: ()/()    Skin: Normal, no rashes or jaundice  Heart: deferred- video visit  Lungs: no audible wheezes or increased work of breathing.  Abdomen: The abdomen is flat, and the wound is healing well. No evident hernia  Edema: absent.        Latest Ref Rng & Units 2/25/2023     2:29 AM 2/25/2023     6:05 AM 2/25/2023     7:54 AM 2/25/2023    12:08 PM 2/25/2023     2:28 PM   Chronic Pancreatitis   Glucose 70 - 99 mg/dL  134        GLUCOSE BY METER POCT 70 - 99 mg/dL 131    161   143   160     Hemoglobin 11.7 - 15.7 g/dL  14.8         Platelet Count 150 - 450 10e3/uL  550            Assessment and Plan: She is doing well s/p TP-IAT with lengthening of her nuris limb. She is having some malabsorption type symptoms that seem fairly mild, and could maybe be well addressed by increasing her creon or changing the method of delivery, or potentially adding a low dose antimotility agent. I will discuss this with Dr. Mitchell to figure out an optimal strategy.    I have asked her to check in with us in the next month or so with an update. If she is doing well I will plan to see her in person at her next TPIAT follow up.    Total time: 30 min  Dane Burt mD

## 2023-04-20 NOTE — PROGRESS NOTES
Chronic Pancreatitis Group Progress Note    Video visit:  Start time: 1:00 CST  End time: 1:24CST  Patient location: home  Provider location: [facility]    I had the pleasure of seeing Ms. Meme Robins today. 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 Her anatomy is as follows: 100cm biliary limb, 50cm alimentary limb, 250cm common channel, full large intestine including ileocecal valve.  She has been doing fairly well overall. Her prior symptoms (right upper quadrant pain, fevers/malaise, weakness) have resolved. She does occasionally have abdominal pain/bloating and greasy stools after fatty meals. She has noted improvement in this when she opens her creon capsule rather than taking it whole, presumptively allowing it to mix more quickly. She is taking 40,000 strength capsules, 3 with meals and 2 with snacks. She was complaining of some abdominal pain at her incision previously with burning, worsened by coughing/sneezing during allergy season, but this has resolved.     Pancreatic exocrine insufficiency:     Greasy stools: []  none   [x]  rarely    []  several times/wk   []  daily      Comment:   Diabetes management:       Lab Results   Component Value Date    A1C 5.5 08/04/2021    A1C 6.0 05/19/2021      Pain management:   Weaning PO dilaudid  Prescription Medications as of 4/20/2023       Rx Number Disp Refills Start End Last Dispensed Date Next Fill Date Owning Pharmacy    acarbose (PRECOSE) 50 MG tablet  30 tablet 1 5/16/2022    [facility] [address] [address]    Sig: Take 1 tablet (50 mg) by mouth daily (with dinner)    Class: E-Prescribe    Notes to Pharmacy: Please call Tenisha Pearce Rn at  with any questions.    Route: Oral    alum & mag  hydroxide-simethicone (MAALOX) 200-200-20 MG/5ML SUSP suspension  355 mL 1 2023    [facility] - [address]    Sig: Take 15 mLs by mouth every 6 hours as needed for indigestion    Class: E-Prescribe    Route: Oral    Renewals     Renewal requests to authorizing provider (Aliza Ridley, NP) <b>prohibited</b>          blood glucose (NO BRAND SPECIFIED) lancets standard  100 each 0 2021    [facility] - [address]    Sig: To use to test glucose level in the blood  Use to test blood sugar  6  times daily as directed. To accompany glucose monitor brands per insurance coverage.  One touch Delica lancets    Class: Local Print    blood glucose (NO BRAND SPECIFIED) test strip  100 strip 3 2021    [id number] - [place], [place] - [address] AT [facility] [address]    Sig: Use to test blood sugar  6  times daily as directed. To accompany glucose monitor brands per insurance coverage: One Touch Verio strip    Class: E-Prescribe    clonazePAM (KLONOPIN) 1 MG tablet    2021        Simg morning and 1.5mg every evening    Class: Historical    Continuous Blood Gluc Sensor (DEXCOM G6 SENSOR) MISC  9 each 3 2022    [facility] [address]    Sig: USE AS DIRECTED FOR CONTINUOUS GLUCOSE MONITORING, CHANGE EVERY 10 DAYS    Class: E-Prescribe    Continuous Blood Gluc Transmit (DEXCOM G6 TRANSMITTER) MISC  1 each 3 10/31/2022    [facility] [address]    Sig: USE AND CHANGE 1 TRANSMITTER EVERY 3 MONTHS    Class: E-Prescribe    gabapentin (NEURONTIN) 300 MG capsule  60 capsule 1 2023        Sig: Take 300mg by mouth in the morning and take 600mg by mouth at bedtime.    Class: Historical    Glucagon (GVOKE HYPOPEN 2-PACK) 1 MG/0.2ML SOAJ  0.4 mL 1 2021    [facility] -  [address]    Sig: Inject 1 each Subcutaneous once as needed (for severe hypoglycemia) Use x1 dose and then call seek emergent care for treatment    Class: E-Prescribe    Notes to Pharmacy: Please call Maddi Almaraz RN Transplant Coordinator, with fill/formulary issues: [phone]    Route: Subcutaneous    HYDROmorphone (DILAUDID) 4 MG tablet  56 tablet 0 8/26/2021    [facility] - [address]    Sig: Take 1-2 tablets (4-6 mg) every 4 hours as needed for severe abdominal pain    Class: E-Prescribe    Earliest Fill Date: 8/26/2021    Injection Device for insulin (INPEN 100-BLUE-PARVEZ-HUMALOG) NORA  1 each 1 6/17/2022    [id number] [place], [place] - [address] [place] [address]    Sig: Use as directed for Insulin infusion    Class: E-Prescribe    Notes to Pharmacy: Please call Tenisha VILLASENOR with any questions     lipase-protease-amylase (ZENPEP) [id number] units CPEP            Sig: Take 3 capsules by mouth 3 times daily (with meals) Take up to 140,000 units with meals and take up to 80,000 with snacks    Class: Historical    Route: Oral    lipase-protease-amylase (ZENPEP) [id number] units [facility]            Sig: Take 2 capsules by mouth Take with snacks or supplements    Class: Historical    Route: Oral    magnesium hydroxide (MILK OF MAGNESIA) 400 MG/5ML suspension  354 mL 1 2/25/2023    [address]    Sig: Take 30 mLs by mouth 2 times daily    Class: E-Prescribe    Route: Oral    Renewals     Renewal requests to authorizing provider (Aliza Ridley NP) <b>prohibited</b>          Melatonin 10 MG TABS tablet            Sig: Take 10 mg by mouth nightly as needed for sleep    Class: Historical    Route: Oral    methocarbamol (ROBAXIN) 500 MG tablet    2/25/2023        Sig: Take 2 tablets (1,000 mg) by mouth 3 times daily as needed for  muscle spasms    Class: Historical    Route: Oral    multivitamin w/minerals (THERA-VIT-M) tablet  30 tablet 1 8/20/2021    [facility] [place], [place] - [address] [facility]    Sig: Take 1 tablet by mouth daily    Class: E-Prescribe    Route: Oral    Renewals     Renewal requests to authorizing provider (Aliza Ridley NP) <b>prohibited</b>          ondansetron (ZOFRAN-ODT) 4 MG ODT tab            Sig: Take 4 mg by mouth every 8 hours as needed    Class: Historical    Route: Oral    pantoprazole (PROTONIX) 40 MG EC tablet            Sig: Take 40 mg by mouth 2 times daily    Class: Historical    Route: Oral    sertraline (ZOLOFT) 100 MG tablet    2/1/2021        Sig: Take 150 mg by mouth At Bedtime    Class: Historical    Route: Oral    traZODone (DESYREL) 100 MG tablet    2/1/2021        Sig: Take 100 mg by mouth At Bedtime    Class: Historical    Route: Oral    triamcinolone (KENALOG) 0.1 % external cream    2/25/2023        Sig: Apply topically 3 times daily    Class: Historical    Route: Topical        Physical exam:   General Appearance: in no apparent distress.   BP: ()/()   Arterial Line BP: ()/()    Skin: Normal, no rashes or jaundice  Heart: deferred- video visit  Lungs: no audible wheezes or increased work of breathing.  Abdomen: The abdomen is flat, and the wound is healing well. No evident hernia  Edema: absent.        Latest Ref Rng & Units 2/25/2023     2:29 AM 2/25/2023     6:05 AM 2/25/2023     7:54 AM 2/25/2023    12:08 PM 2/25/2023     2:28 PM   Chronic Pancreatitis   Glucose 70 - 99 mg/dL  134        GLUCOSE BY METER POCT 70 - 99 mg/dL 131    161   143   160     Hemoglobin 11.7 - 15.7 g/dL  14.8        Platelet Count 150 - 450 10e3/uL  550            Assessment and Plan: She is doing well s/p TP-IAT with lengthening of her nuris limb. She is having some malabsorption type symptoms that seem fairly mild, and could maybe be well addressed by increasing her creon or changing  the method of delivery, or potentially adding a low dose antimotility agent. I will discuss this with Dr. Mitchell to figure out an optimal strategy.    I have asked her to check in with us in the next month or so with an update. If she is doing well I will plan to see her in person at her next TPIAT follow up.    Total time: 30 min  Dane Burt mD

## 2023-06-04 ENCOUNTER — HEALTH MAINTENANCE LETTER (OUTPATIENT)
Age: 53
End: 2023-06-04

## 2023-07-06 ENCOUNTER — TELEPHONE (OUTPATIENT)
Dept: TRANSPLANT | Facility: CLINIC | Age: 53
End: 2023-07-06

## 2023-07-07 NOTE — TELEPHONE ENCOUNTER
Meme is still inpatient in  and hopefully discharging today.  Meme is sending me her EGD imaging so we can look and talk about her at our chronic pancreatitis working group this coming Wednesday.  She is feeling better but concerned that once discharged it will all come back.  She is having hypoglycemia issues and she is not on any insulin.  She was on long acting recently but stopped that mid June.  Her GI team wanted us to know as well that even when she has been NPO inpatient she is still having low BS.  She is on a low residue diet and her EGD that was done on 7/5 said:     Findings:  1. Esophagus :  Z-line was at 38cm and regular.  The rest of the examined mucosa was normal.  2. Stomach: Few small fundic gland polyps. The examined gastric mucosa was normal. Retroflexion was performed in the stomach and the  mucosa in the fundus and cardia appeared normal.  3. Duodenum: Surgical anastomosis noted. The efferent limb intubated and another anastomosis noted in the distal length. A  small 2mm superficial ulcer noted at the anastomotic site at the surgical suture site. Mucosa appeared normal otherwise. Bile noted in the  distal efferent limb.    Appointment was made to see Dr Burt next Thursday after we talk about her case on next Wednesday

## 2023-07-11 ENCOUNTER — TELEPHONE (OUTPATIENT)
Dept: TRANSPLANT | Facility: CLINIC | Age: 53
End: 2023-07-11

## 2023-07-11 NOTE — TELEPHONE ENCOUNTER
Patient Call: Voicemail  Date/Time: 7/11/23 926 am  Reason for call: Dr. Pena GI Doctor from [facility] wanting to talk about patients resent hospitalization

## 2023-07-13 ENCOUNTER — VIRTUAL VISIT (OUTPATIENT)
Dept: TRANSPLANT | Facility: CLINIC | Age: 53
End: 2023-07-13
Attending: SURGERY
Payer: COMMERCIAL

## 2023-07-13 DIAGNOSIS — Z94.83 S/P PANCREATIC ISLET CELL TRANSPLANTATION (H): Primary | ICD-10-CM

## 2023-07-13 DIAGNOSIS — G89.29 CHRONIC ABDOMINAL PAIN: ICD-10-CM

## 2023-07-13 DIAGNOSIS — R10.9 CHRONIC ABDOMINAL PAIN: ICD-10-CM

## 2023-07-13 PROCEDURE — 99207 PR NO BILLABLE SERVICE THIS VISIT: CPT | Mod: VID | Performed by: SURGERY

## 2023-07-13 NOTE — LETTER
7/13/2023         RE: Meme Robins  [address] [facility] [id number]        Dear Colleague,    Thank you for referring your patient, Meme Robins, to the [facility]. Please see a copy of my visit note below.    Chronic Pancreatitis Group Progress Note    Video visit:  Start time: 11:12 CST  End time: 11:36CST  Patient location: home  Provider location: [facility]    I had the pleasure of seeing Ms. Meme Robins today.  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 Her anatomy is as follows: 100cm biliary limb, 50cm alimentary limb, 250cm common channel, full large intestine including ileocecal valve.  She was initially doing well, but has since had a recurrence of symptoms. She feels many of the same symptoms she felt previously, with malaise and RUQ abdominal pain after eating. She is moving her bowels regularly. She is still hopeful for improvement but is disheartened with the backsliding. She does not report fevers.     Pancreatic exocrine insufficiency:     Greasy stools: []  none   [x]  rarely    []  several times/wk   []  daily      Comment:   Diabetes management:       Lab Results   Component Value Date    A1C 5.5 08/04/2021    A1C 6.0 05/19/2021      Pain management:   Remains on some PO narcotics with recent return of symptoms  Prescription Medications as of 12/3/2023         Rx Number Disp Refills Start End Last Dispensed Date Next Fill Date Owning Pharmacy    acarbose (PRECOSE) 50 MG tablet  30 tablet 1 5/16/2022    [facility] [facility] [place], [place] - [address]    Sig: Take 1 tablet (50 mg) by mouth daily (with dinner)    Class: E-Prescribe    Notes to Pharmacy: Please call Tenisha Pearce Rn at  with any questions.    Route: Oral    alum & mag hydroxide-simethicone (MAALOX) 200-200-20  MG/5ML SUSP suspension  355 mL 1 2023    [address]    Sig: Take 15 mLs by mouth every 6 hours as needed for indigestion    Class: E-Prescribe    Route: Oral    Renewals       Renewal requests to authorizing provider (Aliza Ridley, NP) <b>prohibited</b>            blood glucose (NO BRAND SPECIFIED) lancets standard  100 each 0 2021    [address]    Sig: To use to test glucose level in the blood  Use to test blood sugar  6  times daily as directed. To accompany glucose monitor brands per insurance coverage.  One touch Delica lancets    Class: Local Print    blood glucose (NO BRAND SPECIFIED) test strip  100 strip 3 2021    [id number] - [place], [place] - [address] AT [facility] OF [address]    Sig: Use to test blood sugar  6  times daily as directed. To accompany glucose monitor brands per insurance coverage: One Touch Verio strip    Class: E-Prescribe    clonazePAM (KLONOPIN) 1 MG tablet    2021        Simg morning and 1.5mg every evening    Class: Historical    Continuous Blood Gluc Sensor (DEXCOM G6 SENSOR) MISC  9 each 3 2022    [facility] [address]    Sig: USE AS DIRECTED FOR CONTINUOUS GLUCOSE MONITORING, CHANGE EVERY 10 DAYS    Class: E-Prescribe    Continuous Blood Gluc Transmit (DEXCOM G6 TRANSMITTER) MISC  1 each 3 10/31/2022    [facility] [address]    Sig: USE AND CHANGE 1 TRANSMITTER EVERY 3 MONTHS    Class: E-Prescribe    gabapentin (NEURONTIN) 300 MG capsule  60 capsule 1 2023        Sig: Take 300mg by mouth in the morning and take 600mg by mouth at bedtime.    Class: Historical    Glucagon (GVOKE HYPOPEN 2-PACK) 1 MG/0.2ML SOAJ  0.4 mL 1 2021    [address]     Sig: Inject 1 each Subcutaneous once as needed (for severe hypoglycemia) Use x1 dose and then call seek emergent care for treatment    Class: E-Prescribe    Notes to Pharmacy: Please call Maddi Almaraz RN Transplant Coordinator, with fill/formulary issues: [phone]    Route: Subcutaneous    HYDROmorphone (DILAUDID) 4 MG tablet  56 tablet 0 8/26/2021    [address]    Sig: Take 1-2 tablets (4-6 mg) every 4 hours as needed for severe abdominal pain    Class: E-Prescribe    Earliest Fill Date: 8/26/2021    Injection Device for insulin (INPEN 100-BLUE-PARVEZ-HUMALOG) NORA  1 each 1 6/17/2022    [id number] - [place], [place] - [address] [place] [address]    Sig: Use as directed for Insulin infusion    Class: E-Prescribe    Notes to Pharmacy: Please call Tenisha Pearce RNCC with any questions     lipase-protease-amylase (ZENPEP) [id number] units CPEP            Sig: Take 3 capsules by mouth 3 times daily (with meals) Take up to 140,000 units with meals and take up to 80,000 with snacks    Class: Historical    Route: Oral    lipase-protease-amylase (ZENPEP) [id number] units CPEP            Sig: Take 2 capsules by mouth Take with snacks or supplements    Class: Historical    Route: Oral    magnesium hydroxide (MILK OF MAGNESIA) 400 MG/5ML suspension  354 mL 1 2/25/2023    [address]    Sig: Take 30 mLs by mouth 2 times daily    Class: E-Prescribe    Route: Oral    Renewals       Renewal requests to authorizing provider (Aliza Ridley NP) <b>prohibited</b>            Melatonin 10 MG TABS tablet            Sig: Take 10 mg by mouth nightly as needed for sleep    Class: Historical    Route: Oral    methocarbamol (ROBAXIN) 500 MG tablet    2/25/2023        Sig: Take 2 tablets (1,000 mg) by mouth 3 times daily as needed for muscle spasms    Class: Historical     Route: Oral    multivitamin w/minerals (THERA-VIT-M) tablet  30 tablet 1 8/20/2021    [facility] - [place], [place] - [address] [facility]    Sig: Take 1 tablet by mouth daily    Class: E-Prescribe    Route: Oral    Renewals       Renewal requests to authorizing provider (Aliza Ridley, NP) <b>prohibited</b>            Nutritional Supplements (BOOST HIGH PROTEIN) LIQD   0 8/4/2023        Sig: After above baseline labs are drawn, give: 6 mL/kg to maximum of 360 mL; the beverage is to be consumed within 5 minutes.    Class: Historical    Notes to Pharmacy: If on pancreatic enzymes, patient may take home enzymes with Boost beverage.      Patients may take long acting insulin (Levemir and Lantus).      Patient should NOT cover Boost with Novolog, Humalog, Apidra, or regular insulin.    ondansetron (ZOFRAN-ODT) 4 MG ODT tab            Sig: Take 4 mg by mouth every 8 hours as needed    Class: Historical    Route: Oral    pantoprazole (PROTONIX) 40 MG EC tablet            Sig: Take 40 mg by mouth 2 times daily    Class: Historical    Route: Oral    sertraline (ZOLOFT) 100 MG tablet    2/1/2021        Sig: Take 150 mg by mouth At Bedtime    Class: Historical    Route: Oral    traZODone (DESYREL) 100 MG tablet    2/1/2021        Sig: Take 100 mg by mouth At Bedtime    Class: Historical    Route: Oral    triamcinolone (KENALOG) 0.1 % external cream    2/25/2023        Sig: Apply topically 3 times daily    Class: Historical    Route: Topical          Physical exam:   General Appearance: in no apparent distress.   BP: ()/()   Arterial Line BP: ()/()    Skin: Normal, no rashes or jaundice  Heart: deferred- video visit  Lungs: no audible wheezes or increased work of breathing.  Abdomen: The abdomen is flat, and the wound is healing well. No evident hernia  Edema: absent.        Latest Ref Rng & Units 2/25/2023     6:05 AM 2/25/2023     7:54 AM 2/25/2023    12:08 PM 2/25/2023     2:28 PM 6/26/2023    11:54  AM   Chronic Pancreatitis   Glucose 70 - 99 mg/dL 134        Glucose (External) 65 - 99 mg/dL     118       GLUCOSE BY METER POCT 70 - 99 mg/dL  161  143  160     Hemoglobin 11.7 - 15.7 g/dL 14.8        Hemoglobin (External) 11.7 - 15.5 g/dL     14.9       Platelet Count 150 - 450 10e3/uL 550        Albumin (External) 3.6 - 5.1 g/dL     4.7           This result is from an external source.       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 We have attempted surgical management but she has a recurrence of symptoms. I would ask that she have an upper GI study performed to see if the structural finding remains.  We discussed options if it does. I do not feel comfortable lengthening her biliary limb further as she already has some mild malabsorptive symptoms and would not have sufficient common channel length left at that point. There is a hypothesis that some of this may be related to dysbiosis and we could attempt teratment for SIBO and recolonization with probiotics in the absence of a formalized study protocol. I will discuss this with her local GI provider. We will keep her updated as new options become available and I have asked that she keep our team abreast of her health and if any worsening occurs.    Total time: 25 min  Dane Burt mD      Again, thank you for allowing me to participate in the care of your patient.        Sincerely,        Dane Burt MD

## 2023-07-19 ENCOUNTER — MYC MEDICAL ADVICE (OUTPATIENT)
Dept: TRANSPLANT | Facility: CLINIC | Age: 53
End: 2023-07-19
Payer: COMMERCIAL

## 2023-07-24 ENCOUNTER — TELEPHONE (OUTPATIENT)
Dept: TRANSPLANT | Facility: CLINIC | Age: 53
End: 2023-07-24
Payer: COMMERCIAL

## 2023-07-25 NOTE — TELEPHONE ENCOUNTER
Meme Robins  2:39?PM (8 hours ago)  to me    Thank you for explaining! I have a televisit with Dr COTA on Thursday so I ll be able to explain to him my bowels are never that full with the exception of being on IV pain meds. Also, I was refluxing on imaging as an outpatient in January when I had the same tests without a heavy stool burden. I m now back to loose muddy stools at home.     I like the idea of seeing you at 4:15 on 9/20 then doing the boost test on the 21st. I can fly or drive in on the 20th. Therefore, only having 1 night in a hotel. I m not sure yet on time of the appointment on 9/21 but I m sure Dalton has it all organized!! He s super!    Thank you for your expertise with Acarbose. You are always appreciated for the hard work you do for your patients. Have a great week!    Believe!  Meme    On Jul 24, 2023, at 10:04 AM, Patricia Bermeo MD <[email or web address]> wrote:        ?  I think the short-answer to your question about motility is that when things are not moving forward like they should, they move backwards, including refluxing into places where they should not be.  Since the nuris limb is now very long, and there is no physical obstruction (not physically blocked and refluxing back), it suggests the gut is just not moving well, which is a common problem.   So what is 'pushing it backwards' is a failure of everything to keep moving forward like it should so it just backs up.  There is nothing about the bile stent that would make you reflux up the long nuris limb from your intestines, so no, at least the imaging findings from reflux into the bile tree are not from the stent.   There was no other findings to suggest physical damage to the bile duct (no stricture etc) that I might think of from a stent.    I think there are a couple ways you could do the labs for follow up visit.  Yes, you could do labs in the morning on 9/20 then see me, then see  on 9/21.  I was actually thinking that you could even  "come up on 9/20, see me first in the afternoon, do labs in the morning on 9/21 and then see Dr. COTA, so you only need to stay 1  night.    Labs are easy enough to schedule on either day, so either 9/20 or 9/21 labs is OK from my standpoint.     Patricia Bermeo MD  Professor, Pediatric Endocrinology, and Surgery  Co-Director, Total Pancreatectomy and Islet Autotransplant Program  Santi BASS and Natalya Love Chair  Phone:  [phone]  Fax:  [phone]  Mailing Address:  [facility] [address] [place] [address]  Studies:  POST:  www.tpiat.study  T1DAPC: http://t1dapc.net/  CPDPC: https://cpdpc.[facility].org/  CITR: https://www.citregistry.org/    The information transmitted in this e-mail is intended only for the person or entity to which it is addressed and may contain confidential and/or privileged material, including \"protected health information.\" If you are not the intended recipient, you are hereby notified that any review, retransmission, dissemination, distribution, or copying of this message is strictly prohibited. If you have received this communication in error, please destroy and delete this message from any computer and contact us immediately by return e-mail.      On Sat, Jul 22, 2023 at 3:11?PM Meme Robins <[email or web address]> wrote:  Hi, thank you for your reply. Can you please explain what they saw to be a motility issue? If it has anything to do with the stool in my colon when these tests were done, I was very full. As soon as I am given IV pain meds my stools don t go like it so they stop up unlike when I m on just oral pain meds or nothing. Outside of the hospital my bowels empty a couple times a day with very muddy and leading up to the hospital dumping diarrhea. Now, I ve emptied most of my stool. If that is coming into play, please disregard all that stool from the recent imaging.     I am so pleased for a conservative approach! However, the medicine Dr COTA suggested was Reglan. " It s listed on my allergies. It and compazine, phenergan, morphine & Penicillin. I m hoping there is another med to help with my symptoms sooner than later.  Dr COTA did say prior to the surgery in February it might not fix it. What s then pulling or pushing the bile backwards and making an ulcer?    Brendan & I really want to understand what & why this is happening all over again. I can t keep losing weight, be nauseated all day, bad stools, lack of appetite, and pain without care. Could all of this be a result of the stent being left in me for over 6 months post TPIAT? Could it have damaged my bile duct? My Drs here cannot answer these questions and how to treat the refluxing because they are deferring it back to where I had the TPIAT.     The good news is I m home and out of the hospital! I always believe there will be an answer and never lose hope. I m still a success of this surgery being much better off than before 8/9/21.     Thank you again for giving me a much improved life. I think of these things that have happened since the TPIAT as bumps in my journey. God has given me great gifts along the way. I m grateful for you and the rest of the team!    As far as my 2 year visit, we would say the hesitation is the cost to us continues to pile up and the extra days Brendan has had to take off work. With that being said, would I be doing the boost test with labs around 9:30 before & after on 9/20? Then see you in the afternoon and see Dr COTA at 10:30 on 9/21?    Thank you for answering our questions! Have a great weekend!    Believe!  Meme    On Jul 21, 2023, at 10:37 PM, Patricia Bermeo MD <[email or web address]> wrote:    ?  Meme Colin.  Yes, we did discuss on Wed and received/reviewed the imaging with the bile reflux.  The underlying problem looks like a motility issue resulting in the continued reflux so I know that Dr. Burt was looking into what options might be available for improving motility, but there was no surgical  "intervention that would help.    Re Acarbose- Yes.  OK with me to start it.  I think trying a lower dose makes perfect sense to try.      Patricia Bermeo MD  Professor, Pediatric Endocrinology, and Surgery  Co-Director, Total Pancreatectomy and Islet Autotransplant Program  Santi BASS and Natalya Love Chair  Phone:  [phone]  Fax:  [phone]  Mailing Address:  [facility] [address] [place] [address]  Studies:  POST:  www.iSoccer.study  T1DAPC: http://t1dapc.net/  CPDPC: https://cpdpc.[facility].org/  CITR: https://www.citregistry.org/    The information transmitted in this e-mail is intended only for the person or entity to which it is addressed and may contain confidential and/or privileged material, including \"protected health information.\" If you are not the intended recipient, you are hereby notified that any review, retransmission, dissemination, distribution, or copying of this message is strictly prohibited. If you have received this communication in error, please destroy and delete this message from any computer and contact us immediately by return e-mail.      On Fri, Jul 21, 2023 at 12:52?PM Meme Robins <[email or web address]> wrote:  Hi Dr. Bermeo,     Sounds nice to get away for a [employer] day holiday! I heard from [place] today about your clinic change. Brendan and I need to discuss those dates when he gets back today from traveling for work and will need to get back to [place].     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 I was told by Dr Burt I would be discussed at this past Wednesday s round table but have not heard back. Do you know what they think is going on and what to do about it?     Were you asked if you are ok with me starting on 25mg of Acarbose? I had my endo visit here earlier in the week and they didn t want to add any GI upset by starting it again. But, they think starting me on 25mg might minimize side effects.     I m still " "very symptomatic and my GI and internist here have referred my care to Dr Burt and your TPIAT team.     Thank you for understanding from a patients concern with no feedback as of yet. I appreciate all the care all of you give me.     Believe!  Meme    On Jul 20, 2023, at 11:49 PM, Patricia Bermeo MD <[email or web address]> wrote:    ?  Hi, Meme.     So sorry I filed this one and forgot to respond.    I am sorry to hear you are struggling-- we also want you to feel good!   Your islets are working so yes, one good thing :)    You probably heard from Dalton I had to make a change in my travel schedule (guess my Sept [facility] meeting was originally scheduled conflicting with a [employer] holiday so they moved it to the week prior!)   I intentionally requested the clinic to open up Wed afternoon for me right before I fly out, so that I can see you when you are here for Dr. Burt (and a couple other out of town patients) so at least your two appointments can be w/in a 24 hour period.  It will be really tova to see you again in person.    Patricia Bermeo MD  Professor, Pediatric Endocrinology, and Surgery   Fax:  [phone]  Mailing Address:  [address] [address]  Studies:  POST:  www.tpiat.study  T1DAPC: http://t1dapc.net/  CPDPC: https://cpdpc.[email or web address]/  CITR: https://www.citregistry.org/    The information transmitted in this e-mail is intended only for the person or entity to which it is addressed and may contain confidential and/or privileged material, including \"protected health information.\" If you are not the intended recipient, you are hereby notified that any review, retransmission, dissemination, distribution, or copying of this message is strictly prohibited. If you have received this communication in error, please destroy and delete this message from any computer and contact us immediately by return e-mail.      On Mon, Jul 17, 2023 at 11:25?AM Meme Robins " [email or web address]> wrote:  Hi Dr Bermeo!    Thank you so much for your expert opinion on my BS and what s happening with the lows. All I can think to say is  Amen !! How glorious to know my islets are really active!     This is a very nice positive in light of what s still going on with my bile duct refluxing backwards still. Dr Burt plans to discuss my case with the results of the Small Bowel Follow Barium test done this past Saturday and the ulcer at your round table this week.  Brendan & I can t help to wonder if this is a blood flow issue from possible damage done of having the stent from TPIAT left in my bile duct for over 6 months.     I just want to go back to how good I felt at my 3 month visit!     Thanks for everything! Take good care.     Believe!  Meme    On Jul 10, 2023, at 8:33 AM, Patricia Bermeo MD <[email or web address]> wrote:    ?  Hi, Meme.    I am out of the office this week.  I am sorry to hear you are not feeling well still.  The quickest way for your doctors to connect to Dr. Butr would be for the primary or consulting physician to call the hospital  [phone] and use the provider line (option 4 I think) to get to the page .  I do know he is in an organ transplant surgery today.    For reactive hypoglycemia question-- yes and no.  Classically reactive hypoglycemia refers to hypoglycemia that occurs after eating so that does not apply when you are NPO.  But the 'reactive' part is really the islets over-reacting to a high.  When I look at your Dexcom, all of your true lows (<70) are following higher numbers in the 160- lower 200s range.  Actually Sun and this morning look quite good, except for the low after midnight sunday, this looks quite similar to what it would look like if you put a CGM on me!  Practically like a non-diabetic  <[email or web address]>        Now, if you look at the days before this you see more of the lows, but they follow the big excursions to  "200-range.      The reason these 'reactive' episodes happen is that all of the beta cells in your islet have their own 'thermostat' that senses glucose, and the higher then glucose, the more they are stimulated to create and release insulin, and if you start high, it is easy for those little cells to over do it.  We can see this exact same thing happen if we take a healthy volunteer in one of our studies and give them a big bolus of dextrose to make their BG high.  In that sense, it is really difficult for me to 'treat' these type of lows because they are a normal islet response, so the best approach is actually trying to prevent that high in the first place (which is what acarbose basically does w/ meals).   I almost wonder if it was possible you were getting a medication with dextrose in the hospital with those spikes, or starting clears w/ carb fluids?  For whatever reason these two days looked a bit worse, and you can the times your islets really kicked in and brought you down a bit too low.    <[email or web address]>      Patricia Bermeo MD  Professor, Pediatric Endocrinology, and Surgery  Co-Director, Total Pancreatectomy and Islet Autotransplant Program  Santi BASS and Natalya Love Chair  Phone:  [phone]  Fax:  [phone]  Mailing Address:  [address] [address]  Studies:  POST:  www.tpiat.study  T1DAPC: http://t1dapc.net/  CPDPC: https://cpdpc.[email or web address]/  CITR: https://www.citregistry.org/    The information transmitted in this e-mail is intended only for the person or entity to which it is addressed and may contain confidential and/or privileged material, including \"protected health information.\" If you are not the intended recipient, you are hereby notified that any review, retransmission, dissemination, distribution, or copying of this message is strictly prohibited. If you have received this communication in error, please destroy and delete this message from any computer and " contact us immediately by return e-mail.      On Sun, Jul 9, 2023 at 10:35?PM Meme Robins <[email or web address]> wrote:  Hi,    I hope you all had a great weekend. Mine was not so good. The pain in my upper mid to right of my stomach has raised significantly. my Drs started me on a low residue and strict diabetes diet. The pain and nausea has increased significantly as well. I thought I d be home & out of the hospital by now and I m not. My Drs here are deferring all to you for help with my case.    [facility] was suppose to overnight my images from the CT Scan straight to [place]. I m hoping they did and you have them there. I emailed you the EGD images already. Did those come over ok? Can you see the ulcer? Also, why would my B12 blood levels & liver enzymes be so elevated?    My blood sugars kept plummeting as you know before I came here and Dr Bermeo suggested I was possibly having  reactive hypoglycemia  and could try arbacose at dinner. In here, I have been NPO for the majority of time until Friday they started me on clears then full liquids. The lows were happening still without food or liquids. Is this still considered  reactive hypoglycemia ?    Other questions that have come up are  Is what is happening with the elevated liver enzymes and ulcer by my surgery site a result of the last surgery? Could I still be refluxing back up my bile duct and that is why an ulcer formed due to the acidity of what s refluxing? I have lost 10lbs and had extreme fatigue, loss of appetite, diarrhea/dumping, and nausea. All of these have gradually increased since after the revision surgery.    Please let me know your answers and any questions you want answered by me or the Drs here. Thank you for taking time out to help me to get better.    Meme Rogers  --  Sent with [employer] for iOS.  [email or web address]

## 2023-07-27 ENCOUNTER — VIRTUAL VISIT (OUTPATIENT)
Dept: TRANSPLANT | Facility: CLINIC | Age: 53
End: 2023-07-27
Attending: SURGERY
Payer: COMMERCIAL

## 2023-07-27 DIAGNOSIS — G89.29 CHRONIC ABDOMINAL PAIN: ICD-10-CM

## 2023-07-27 DIAGNOSIS — R10.9 CHRONIC ABDOMINAL PAIN: ICD-10-CM

## 2023-07-27 DIAGNOSIS — E89.1 POST-PANCREATECTOMY DIABETES (H): Primary | ICD-10-CM

## 2023-07-27 DIAGNOSIS — Z90.410 POST-PANCREATECTOMY DIABETES (H): Primary | ICD-10-CM

## 2023-07-27 DIAGNOSIS — E13.9 POST-PANCREATECTOMY DIABETES (H): Primary | ICD-10-CM

## 2023-07-27 PROCEDURE — 99207 PR NO BILLABLE SERVICE THIS VISIT: CPT | Mod: VID | Performed by: SURGERY

## 2023-07-27 NOTE — LETTER
7/27/2023         RE: Meme Robins  [id number] [facility] [id number]        Dear Colleague,    Thank you for referring your patient, Meme Robins, to the [facility]. Please see a copy of my visit note below.    Chronic Pancreatitis Group Progress Note    Video visit:  Start time: 2:15 CST  End time: 2:35CST  Patient location: home  Provider location: [facility]    I had the pleasure of seeing Ms. Meme Robins today.  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 Her anatomy is as follows: 100cm biliary limb, 50cm alimentary limb, 250cm common channel, full large intestine including ileocecal valve.  She was initially doing well, but has since had a recurrence of symptoms. She feels many of the same symptoms she felt previously, with malaise and RUQ abdominal pain after eating. She is moving her bowels regularly. She is still hopeful for improvement but is disheartened with the backsliding. She does not report fevers.   Her symptoms today are largely unchanged. She has undergone repeat UGI study locally that does indeed show recurrence of the same problem. She has had minimal improvement with SIBO treatment and wants to discuss other options.    Pancreatic exocrine insufficiency:     Greasy stools: []  none   [x]  rarely    []  several times/wk   []  daily      Comment:   Diabetes management:       Lab Results   Component Value Date    A1C 5.5 08/04/2021    A1C 6.0 05/19/2021      Pain management:   Remains on some PO narcotics with recent return of symptoms  Prescription Medications as of 12/3/2023         Rx Number Disp Refills Start End Last Dispensed Date Next Fill Date Owning Pharmacy    acarbose (PRECOSE) 50 MG tablet  30 tablet 1 5/16/2022    [facility], [place] - [address]    Sig: Take  1 tablet (50 mg) by mouth daily (with dinner)    Class: E-Prescribe    Notes to Pharmacy: Please call Tenisha Pearce Rn at  with any questions.    Route: Oral    alum & mag hydroxide-simethicone (MAALOX) 200-200-20 MG/5ML SUSP suspension  355 mL 1 2023    [facility] - [place], [place] - [address] [place] [address]    Sig: Take 15 mLs by mouth every 6 hours as needed for indigestion    Class: E-Prescribe    Route: Oral    Renewals       Renewal requests to authorizing provider (Aliza Ridley, NP) <b>prohibited</b>            blood glucose (NO BRAND SPECIFIED) lancets standard  100 each 0 2021    [address]    Sig: To use to test glucose level in the blood  Use to test blood sugar  6  times daily as directed. To accompany glucose monitor brands per insurance coverage.  One touch Delica lancets    Class: Local Print    blood glucose (NO BRAND SPECIFIED) test strip  100 strip 3 2021    [id number] - [place], [place] - [address] AT [facility] OF [address]    Sig: Use to test blood sugar  6  times daily as directed. To accompany glucose monitor brands per insurance coverage: One Touch Verio strip    Class: E-Prescribe    clonazePAM (KLONOPIN) 1 MG tablet    2021        Simg morning and 1.5mg every evening    Class: Historical    Continuous Blood Gluc Sensor (DEXCOM G6 SENSOR) MISC  9 each 3 2022    [facility] - [address]    Sig: USE AS DIRECTED FOR CONTINUOUS GLUCOSE MONITORING, CHANGE EVERY 10 DAYS    Class: E-Prescribe    Continuous Blood Gluc Transmit (DEXCOM G6 TRANSMITTER) MISC  1 each 3 10/31/2022    [facility] - [address]    Sig: USE AND CHANGE 1 TRANSMITTER EVERY 3 MONTHS    Class: E-Prescribe    gabapentin (NEURONTIN) 300 MG capsule  60 capsule 1 2023        Sig: Take 300mg by  mouth in the morning and take 600mg by mouth at bedtime.    Class: Historical    Glucagon (GVOKE HYPOPEN 2-PACK) 1 MG/0.2ML SOAJ  0.4 mL 1 8/26/2021    [address]    Sig: Inject 1 each Subcutaneous once as needed (for severe hypoglycemia) Use x1 dose and then call seek emergent care for treatment    Class: E-Prescribe    Notes to Pharmacy: Please call Maddi Almaraz RN Transplant Coordinator, with fill/formulary issues: [phone]    Route: Subcutaneous    HYDROmorphone (DILAUDID) 4 MG tablet  56 tablet 0 8/26/2021    [address]    Sig: Take 1-2 tablets (4-6 mg) every 4 hours as needed for severe abdominal pain    Class: E-Prescribe    Earliest Fill Date: 8/26/2021    Injection Device for insulin (INPEN 100-BLUE-PARVEZ-HUMALOG) NORA  1 each 1 6/17/2022    [id number] [place], [place] - [address] [place] [address]    Sig: Use as directed for Insulin infusion    Class: E-Prescribe    Notes to Pharmacy: Please call Tenisha Pearce RNCC with any questions     lipase-protease-amylase (ZENPEP) [id number] units CPEP            Sig: Take 3 capsules by mouth 3 times daily (with meals) Take up to 140,000 units with meals and take up to 80,000 with snacks    Class: Historical    Route: Oral    lipase-protease-amylase (ZENPEP) [id number] units CPEP            Sig: Take 2 capsules by mouth Take with snacks or supplements    Class: Historical    Route: Oral    magnesium hydroxide (MILK OF MAGNESIA) 400 MG/5ML suspension  354 mL 1 2/25/2023    [address]    Sig: Take 30 mLs by mouth 2 times daily    Class: E-Prescribe    Route: Oral    Renewals       Renewal requests to authorizing provider (Aliza Ridley NP) <b>prohibited</b>            Melatonin 10 MG TABS tablet            Sig: Take 10 mg by mouth nightly  as needed for sleep    Class: Historical    Route: Oral    methocarbamol (ROBAXIN) 500 MG tablet    2/25/2023        Sig: Take 2 tablets (1,000 mg) by mouth 3 times daily as needed for muscle spasms    Class: Historical    Route: Oral    multivitamin w/minerals (THERA-VIT-M) tablet  30 tablet 1 8/20/2021    [facility] [place], [place] - [address] [facility]    Sig: Take 1 tablet by mouth daily    Class: E-Prescribe    Route: Oral    Renewals       Renewal requests to authorizing provider (Aliza Ridley NP) <b>prohibited</b>            Nutritional Supplements (BOOST HIGH PROTEIN) LIQD   0 8/4/2023        Sig: After above baseline labs are drawn, give: 6 mL/kg to maximum of 360 mL; the beverage is to be consumed within 5 minutes.    Class: Historical    Notes to Pharmacy: If on pancreatic enzymes, patient may take home enzymes with Boost beverage.      Patients may take long acting insulin (Levemir and Lantus).      Patient should NOT cover Boost with Novolog, Humalog, Apidra, or regular insulin.    ondansetron (ZOFRAN-ODT) 4 MG ODT tab            Sig: Take 4 mg by mouth every 8 hours as needed    Class: Historical    Route: Oral    pantoprazole (PROTONIX) 40 MG EC tablet            Sig: Take 40 mg by mouth 2 times daily    Class: Historical    Route: Oral    sertraline (ZOLOFT) 100 MG tablet    2/1/2021        Sig: Take 150 mg by mouth At Bedtime    Class: Historical    Route: Oral    traZODone (DESYREL) 100 MG tablet    2/1/2021        Sig: Take 100 mg by mouth At Bedtime    Class: Historical    Route: Oral    triamcinolone (KENALOG) 0.1 % external cream    2/25/2023        Sig: Apply topically 3 times daily    Class: Historical    Route: Topical          Physical exam:   General Appearance: in no apparent distress.   BP: ()/()   Arterial Line BP: ()/()    Skin: Normal, no rashes or jaundice  Heart: deferred- video visit  Lungs: no audible wheezes or increased work of breathing.  Abdomen:  The abdomen is flat, and the wound is healing well. No evident hernia  Edema: absent.        Latest Ref Rng & Units 2/25/2023     6:05 AM 2/25/2023     7:54 AM 2/25/2023    12:08 PM 2/25/2023     2:28 PM 6/26/2023    11:54 AM   Chronic Pancreatitis   Glucose 70 - 99 mg/dL 134        Glucose (External) 65 - 99 mg/dL     118       GLUCOSE BY METER POCT 70 - 99 mg/dL  161  143  160     Hemoglobin 11.7 - 15.7 g/dL 14.8        Hemoglobin (External) 11.7 - 15.5 g/dL     14.9       Platelet Count 150 - 450 10e3/uL 550        Albumin (External) 3.6 - 5.1 g/dL     4.7           This result is from an external source.       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 We have attempted surgical management but she has a recurrence of symptoms. I would ask that she have an upper GI study performed to see if the structural finding remains.  We discussed the findings of her study. This suggests underlying dysmotility, but is complicated by the fact that she is not overtly constipated and is having regular bowel movements. Additionally, she is intolerant of many of the limited promotility agents that are available. There may be some role for pursuit or correction of dysbiosis but this is still not formalized in our program. We will continue working on this and reach out to her when we have a protocol we can attempt.    Total time: 25 min  Dane Burt mD      Again, thank you for allowing me to participate in the care of your patient.        Sincerely,        Dane Burt MD

## 2023-08-04 ENCOUNTER — TELEPHONE (OUTPATIENT)
Dept: TRANSPLANT | Facility: CLINIC | Age: 53
End: 2023-08-04
Payer: COMMERCIAL

## 2023-08-04 DIAGNOSIS — K86.89 PANCREATIC INSUFFICIENCY: ICD-10-CM

## 2023-08-04 DIAGNOSIS — Z94.83 S/P PANCREATIC ISLET CELL TRANSPLANTATION (H): ICD-10-CM

## 2023-08-04 DIAGNOSIS — R10.9 CHRONIC ABDOMINAL PAIN: Primary | ICD-10-CM

## 2023-08-04 DIAGNOSIS — G89.29 CHRONIC ABDOMINAL PAIN: Primary | ICD-10-CM

## 2023-08-04 DIAGNOSIS — E43 SEVERE MALNUTRITION (H): ICD-10-CM

## 2023-09-14 ENCOUNTER — TELEPHONE (OUTPATIENT)
Dept: TRANSPLANT | Facility: CLINIC | Age: 53
End: 2023-09-14
Payer: COMMERCIAL

## 2023-09-14 NOTE — TELEPHONE ENCOUNTER
Patient Call: General  Route to LPN    Reason for call: patient returning call to [facility]. No further details were given to this writer.     Call back needed? Yes    Return Call Needed  Same as documented in contacts section  When to return call?: Same day: Route High Priority

## 2023-09-15 ENCOUNTER — TELEPHONE (OUTPATIENT)
Dept: TRANSPLANT | Facility: CLINIC | Age: 53
End: 2023-09-15
Payer: COMMERCIAL

## 2023-09-15 NOTE — TELEPHONE ENCOUNTER
Patient called to appogize for not being able to come up her for her annual visits.  She really wants to be seen by our GI team mostly as her 2 issues are reflux, and SMALL INTESTINE BACTERIAL OVERGROWTH (SIBO).  I sent referral to our GI team.  Her home GI doctor Les Sandoval  at [facility] in  is following and helping her currently with SMALL INTESTINE BACTERIAL OVERGROWTH (SIBO) treatment but would also like collaborative help with her current issues.

## 2023-09-20 ENCOUNTER — PATIENT OUTREACH (OUTPATIENT)
Dept: GASTROENTEROLOGY | Facility: CLINIC | Age: 53
End: 2023-09-20
Payer: COMMERCIAL

## 2023-09-20 NOTE — TELEPHONE ENCOUNTER
Called pt, left message regarding follow up with DR Mitchell regarding recurrent SIBO  Added to potential FMT study list.    ML

## 2023-10-14 ENCOUNTER — HEALTH MAINTENANCE LETTER (OUTPATIENT)
Age: 53
End: 2023-10-14

## 2023-10-26 ENCOUNTER — TRANSFERRED RECORDS (OUTPATIENT)
Dept: HEALTH INFORMATION MANAGEMENT | Facility: CLINIC | Age: 53
End: 2023-10-26

## 2023-12-03 NOTE — PROGRESS NOTES
Chronic Pancreatitis Group Progress Note    Video visit:  Start time: 2:15 CST  End time: 2:35CST  Patient location: home  Provider location: [facility]    I had the pleasure of seeing Ms. Meme Robins today.  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 Her anatomy is as follows: 100cm biliary limb, 50cm alimentary limb, 250cm common channel, full large intestine including ileocecal valve.  She was initially doing well, but has since had a recurrence of symptoms. She feels many of the same symptoms she felt previously, with malaise and RUQ abdominal pain after eating. She is moving her bowels regularly. She is still hopeful for improvement but is disheartened with the backsliding. She does not report fevers.   Her symptoms today are largely unchanged. She has undergone repeat UGI study locally that does indeed show recurrence of the same problem. She has had minimal improvement with SIBO treatment and wants to discuss other options.    Pancreatic exocrine insufficiency:     Greasy stools: []  none   [x]  rarely    []  several times/wk   []  daily      Comment:   Diabetes management:       Lab Results   Component Value Date    A1C 5.5 08/04/2021    A1C 6.0 05/19/2021      Pain management:   Remains on some PO narcotics with recent return of symptoms  Prescription Medications as of 12/3/2023         Rx Number Disp Refills Start End Last Dispensed Date Next Fill Date Owning Pharmacy    acarbose (PRECOSE) 50 MG tablet  30 tablet 1 5/16/2022    [facility] [address] [facility] [address]    Sig: Take 1 tablet (50 mg) by mouth daily (with dinner)    Class: E-Prescribe    Notes to Pharmacy: Please call Tenisha Pearce Rn at  with any questions.    Route: Oral    alum & mag hydroxide-simethicone (MAALOX) 200-200-20 MG/5ML SUSP suspension  355 mL  1 2023    [address]    Sig: Take 15 mLs by mouth every 6 hours as needed for indigestion    Class: E-Prescribe    Route: Oral    Renewals       Renewal requests to authorizing provider (Aliza Ridley NP) <b>prohibited</b>            blood glucose (NO BRAND SPECIFIED) lancets standard  100 each 0 2021    [address]    Sig: To use to test glucose level in the blood  Use to test blood sugar  6  times daily as directed. To accompany glucose monitor brands per insurance coverage.  One touch Delica lancets    Class: Local Print    blood glucose (NO BRAND SPECIFIED) test strip  100 strip 3 2021    [facility] #[id number] [place], [place] - [address] AT [facility] OF [address]    Sig: Use to test blood sugar  6  times daily as directed. To accompany glucose monitor brands per insurance coverage: One Touch Verio strip    Class: E-Prescribe    clonazePAM (KLONOPIN) 1 MG tablet    2021        Simg morning and 1.5mg every evening    Class: Historical    Continuous Blood Gluc Sensor (DEXCOM G6 SENSOR) MISC  9 each 3 2022    [facility] [address]    Sig: USE AS DIRECTED FOR CONTINUOUS GLUCOSE MONITORING, CHANGE EVERY 10 DAYS    Class: E-Prescribe    Continuous Blood Gluc Transmit (DEXCOM G6 TRANSMITTER) MISC  1 each 3 10/31/2022    [facility] [address]    Sig: USE AND CHANGE 1 TRANSMITTER EVERY 3 MONTHS    Class: E-Prescribe    gabapentin (NEURONTIN) 300 MG capsule  60 capsule 1 2023        Sig: Take 300mg by mouth in the morning and take 600mg by mouth at bedtime.    Class: Historical    Glucagon (GVOKE HYPOPEN 2-PACK) 1 MG/0.2ML SOAJ  0.4 mL 1 2021    [address]    Sig: Inject 1 each Subcutaneous  once as needed (for severe hypoglycemia) Use x1 dose and then call seek emergent care for treatment    Class: E-Prescribe    Notes to Pharmacy: Please call Maddi Almaraz RN Transplant Coordinator, with fill/formulary issues: [phone]    Route: Subcutaneous    HYDROmorphone (DILAUDID) 4 MG tablet  56 tablet 0 8/26/2021    [address]    Sig: Take 1-2 tablets (4-6 mg) every 4 hours as needed for severe abdominal pain    Class: E-Prescribe    Earliest Fill Date: 8/26/2021    Injection Device for insulin (INPEN 100-BLUE-PARVEZ-HUMALOG) NORA  1 each 1 6/17/2022    [id number] - [place], [place] - [address] [place] [address]    Sig: Use as directed for Insulin infusion    Class: E-Prescribe    Notes to Pharmacy: Please call Tenisha Pearce RNCC with any questions     lipase-protease-amylase (ZENPEP) [id number] units CPEP            Sig: Take 3 capsules by mouth 3 times daily (with meals) Take up to 140,000 units with meals and take up to 80,000 with snacks    Class: Historical    Route: Oral    lipase-protease-amylase (ZENPEP) [id number] units CPEP            Sig: Take 2 capsules by mouth Take with snacks or supplements    Class: Historical    Route: Oral    magnesium hydroxide (MILK OF MAGNESIA) 400 MG/5ML suspension  354 mL 1 2/25/2023    [address]    Sig: Take 30 mLs by mouth 2 times daily    Class: E-Prescribe    Route: Oral    Renewals       Renewal requests to authorizing provider (Aliza Ridley NP) <b>prohibited</b>            Melatonin 10 MG TABS tablet            Sig: Take 10 mg by mouth nightly as needed for sleep    Class: Historical    Route: Oral    methocarbamol (ROBAXIN) 500 MG tablet    2/25/2023        Sig: Take 2 tablets (1,000 mg) by mouth 3 times daily as needed for muscle spasms    Class: Historical    Route: Oral    multivitamin  w/minerals (THERA-VIT-M) tablet  30 tablet 1 8/20/2021    [facility] - [place], [place] - [address] [facility]    Sig: Take 1 tablet by mouth daily    Class: E-Prescribe    Route: Oral    Renewals       Renewal requests to authorizing provider (Aliza Ridley, NP) <b>prohibited</b>            Nutritional Supplements (BOOST HIGH PROTEIN) LIQD   0 8/4/2023        Sig: After above baseline labs are drawn, give: 6 mL/kg to maximum of 360 mL; the beverage is to be consumed within 5 minutes.    Class: Historical    Notes to Pharmacy: If on pancreatic enzymes, patient may take home enzymes with Boost beverage.      Patients may take long acting insulin (Levemir and Lantus).      Patient should NOT cover Boost with Novolog, Humalog, Apidra, or regular insulin.    ondansetron (ZOFRAN-ODT) 4 MG ODT tab            Sig: Take 4 mg by mouth every 8 hours as needed    Class: Historical    Route: Oral    pantoprazole (PROTONIX) 40 MG EC tablet            Sig: Take 40 mg by mouth 2 times daily    Class: Historical    Route: Oral    sertraline (ZOLOFT) 100 MG tablet    2/1/2021        Sig: Take 150 mg by mouth At Bedtime    Class: Historical    Route: Oral    traZODone (DESYREL) 100 MG tablet    2/1/2021        Sig: Take 100 mg by mouth At Bedtime    Class: Historical    Route: Oral    triamcinolone (KENALOG) 0.1 % external cream    2/25/2023        Sig: Apply topically 3 times daily    Class: Historical    Route: Topical          Physical exam:   General Appearance: in no apparent distress.   BP: ()/()   Arterial Line BP: ()/()    Skin: Normal, no rashes or jaundice  Heart: deferred- video visit  Lungs: no audible wheezes or increased work of breathing.  Abdomen: The abdomen is flat, and the wound is healing well. No evident hernia  Edema: absent.        Latest Ref Rng & Units 2/25/2023     6:05 AM 2/25/2023     7:54 AM 2/25/2023    12:08 PM 2/25/2023     2:28 PM 6/26/2023    11:54 AM   Chronic Pancreatitis    Glucose 70 - 99 mg/dL 134        Glucose (External) 65 - 99 mg/dL     118       GLUCOSE BY METER POCT 70 - 99 mg/dL  161  143  160     Hemoglobin 11.7 - 15.7 g/dL 14.8        Hemoglobin (External) 11.7 - 15.5 g/dL     14.9       Platelet Count 150 - 450 10e3/uL 550        Albumin (External) 3.6 - 5.1 g/dL     4.7           This result is from an external source.       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 We have attempted surgical management but she has a recurrence of symptoms. I would ask that she have an upper GI study performed to see if the structural finding remains.  We discussed the findings of her study. This suggests underlying dysmotility, but is complicated by the fact that she is not overtly constipated and is having regular bowel movements. Additionally, she is intolerant of many of the limited promotility agents that are available. There may be some role for pursuit or correction of dysbiosis but this is still not formalized in our program. We will continue working on this and reach out to her when we have a protocol we can attempt.    Total time: 25 min  Dane Burt mD

## 2023-12-03 NOTE — PROGRESS NOTES
Chronic Pancreatitis Group Progress Note    Video visit:  Start time: 11:12 CST  End time: 11:36CST  Patient location: home  Provider location: [facility]    I had the pleasure of seeing Ms. Meme Robins today.  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 Her anatomy is as follows: 100cm biliary limb, 50cm alimentary limb, 250cm common channel, full large intestine including ileocecal valve.  She was initially doing well, but has since had a recurrence of symptoms. She feels many of the same symptoms she felt previously, with malaise and RUQ abdominal pain after eating. She is moving her bowels regularly. She is still hopeful for improvement but is disheartened with the backsliding. She does not report fevers.     Pancreatic exocrine insufficiency:     Greasy stools: []  none   [x]  rarely    []  several times/wk   []  daily      Comment:   Diabetes management:       Lab Results   Component Value Date    A1C 5.5 08/04/2021    A1C 6.0 05/19/2021      Pain management:   Remains on some PO narcotics with recent return of symptoms  Prescription Medications as of 12/3/2023         Rx Number Disp Refills Start End Last Dispensed Date Next Fill Date Owning Pharmacy    acarbose (PRECOSE) 50 MG tablet  30 tablet 1 5/16/2022    [address] - [place], [place] - [address]    Sig: Take 1 tablet (50 mg) by mouth daily (with dinner)    Class: E-Prescribe    Notes to Pharmacy: Please call Tenisha Pearce Rn at  with any questions.    Route: Oral    alum & mag hydroxide-simethicone (MAALOX) 200-200-20 MG/5ML SUSP suspension  355 mL 1 2/25/2023    [facility] - [place], [place] - [address] [facility]    Sig: Take 15 mLs by mouth every 6 hours as needed for indigestion    Class: E-Prescribe    Route: Oral    Renewals       Renewal  requests to authorizing provider (Aliza Ridley NP) <b>prohibited</b>            blood glucose (NO BRAND SPECIFIED) lancets standard  100 each 0 2021    [facility] [address]    Sig: To use to test glucose level in the blood  Use to test blood sugar  6  times daily as directed. To accompany glucose monitor brands per insurance coverage.  One touch Delica lancets    Class: Local Print    blood glucose (NO BRAND SPECIFIED) test strip  100 strip 3 2021    [id number] - [place], [place] - [address] AT [facility] [address]    Sig: Use to test blood sugar  6  times daily as directed. To accompany glucose monitor brands per insurance coverage: One Touch Verio strip    Class: E-Prescribe    clonazePAM (KLONOPIN) 1 MG tablet    2021        Simg morning and 1.5mg every evening    Class: Historical    Continuous Blood Gluc Sensor (DEXCOM G6 SENSOR) MISC  9 each 3 2022    [facility] [address]    Sig: USE AS DIRECTED FOR CONTINUOUS GLUCOSE MONITORING, CHANGE EVERY 10 DAYS    Class: E-Prescribe    Continuous Blood Gluc Transmit (DEXCOM G6 TRANSMITTER) MISC  1 each 3 10/31/2022    [facility] [address]    Sig: USE AND CHANGE 1 TRANSMITTER EVERY 3 MONTHS    Class: E-Prescribe    gabapentin (NEURONTIN) 300 MG capsule  60 capsule 1 2023        Sig: Take 300mg by mouth in the morning and take 600mg by mouth at bedtime.    Class: Historical    Glucagon (GVOKE HYPOPEN 2-PACK) 1 MG/0.2ML SOAJ  0.4 mL 1 2021    [facility] [address]    Sig: Inject 1 each Subcutaneous once as needed (for severe hypoglycemia) Use x1 dose and then call seek emergent care for treatment    Class: E-Prescribe    Notes to Pharmacy: Please call Maddi Almaraz RN Transplant Coordinator, with  fill/formulary issues: [phone]    Route: Subcutaneous    HYDROmorphone (DILAUDID) 4 MG tablet  56 tablet 0 8/26/2021    [address]    Sig: Take 1-2 tablets (4-6 mg) every 4 hours as needed for severe abdominal pain    Class: E-Prescribe    Earliest Fill Date: 8/26/2021    Injection Device for insulin (INPEN 100-BLUE-PARVEZ-HUMALOG) NORA  1 each 1 6/17/2022    [id number] - [place], [place] - [address] [place] [address]    Sig: Use as directed for Insulin infusion    Class: E-Prescribe    Notes to Pharmacy: Please call Tenisha Pearce RNCC with any questions     lipase-protease-amylase (ZENPEP) 23347-927016 units CPEP            Sig: Take 3 capsules by mouth 3 times daily (with meals) Take up to 140,000 units with meals and take up to 80,000 with snacks    Class: Historical    Route: Oral    lipase-protease-amylase (ZENPEP) [id number] units CPEP            Sig: Take 2 capsules by mouth Take with snacks or supplements    Class: Historical    Route: Oral    magnesium hydroxide (MILK OF MAGNESIA) 400 MG/5ML suspension  354 mL 1 2/25/2023    [address]    Sig: Take 30 mLs by mouth 2 times daily    Class: E-Prescribe    Route: Oral    Renewals       Renewal requests to authorizing provider (Aliza Ridley NP) <b>prohibited</b>            Melatonin 10 MG TABS tablet            Sig: Take 10 mg by mouth nightly as needed for sleep    Class: Historical    Route: Oral    methocarbamol (ROBAXIN) 500 MG tablet    2/25/2023        Sig: Take 2 tablets (1,000 mg) by mouth 3 times daily as needed for muscle spasms    Class: Historical    Route: Oral    multivitamin w/minerals (THERA-VIT-M) tablet  30 tablet 1 8/20/2021    [address]    Sig: Take 1 tablet by mouth daily    Class: E-Prescribe    Route: Oral     Renewals       Renewal requests to authorizing provider (Aliza Ridley NP) <b>prohibited</b>            Nutritional Supplements (BOOST HIGH PROTEIN) LIQD   0 8/4/2023        Sig: After above baseline labs are drawn, give: 6 mL/kg to maximum of 360 mL; the beverage is to be consumed within 5 minutes.    Class: Historical    Notes to Pharmacy: If on pancreatic enzymes, patient may take home enzymes with Boost beverage.      Patients may take long acting insulin (Levemir and Lantus).      Patient should NOT cover Boost with Novolog, Humalog, Apidra, or regular insulin.    ondansetron (ZOFRAN-ODT) 4 MG ODT tab            Sig: Take 4 mg by mouth every 8 hours as needed    Class: Historical    Route: Oral    pantoprazole (PROTONIX) 40 MG EC tablet            Sig: Take 40 mg by mouth 2 times daily    Class: Historical    Route: Oral    sertraline (ZOLOFT) 100 MG tablet    2/1/2021        Sig: Take 150 mg by mouth At Bedtime    Class: Historical    Route: Oral    traZODone (DESYREL) 100 MG tablet    2/1/2021        Sig: Take 100 mg by mouth At Bedtime    Class: Historical    Route: Oral    triamcinolone (KENALOG) 0.1 % external cream    2/25/2023        Sig: Apply topically 3 times daily    Class: Historical    Route: Topical          Physical exam:   General Appearance: in no apparent distress.   BP: ()/()   Arterial Line BP: ()/()    Skin: Normal, no rashes or jaundice  Heart: deferred- video visit  Lungs: no audible wheezes or increased work of breathing.  Abdomen: The abdomen is flat, and the wound is healing well. No evident hernia  Edema: absent.        Latest Ref Rng & Units 2/25/2023     6:05 AM 2/25/2023     7:54 AM 2/25/2023    12:08 PM 2/25/2023     2:28 PM 6/26/2023    11:54 AM   Chronic Pancreatitis   Glucose 70 - 99 mg/dL 134        Glucose (External) 65 - 99 mg/dL     118       GLUCOSE BY METER POCT 70 - 99 mg/dL  161  143  160     Hemoglobin 11.7 - 15.7 g/dL 14.8        Hemoglobin (External) 11.7 -  15.5 g/dL     14.9       Platelet Count 150 - 450 10e3/uL 550        Albumin (External) 3.6 - 5.1 g/dL     4.7           This result is from an external source.       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 We have attempted surgical management but she has a recurrence of symptoms. I would ask that she have an upper GI study performed to see if the structural finding remains.  We discussed options if it does. I do not feel comfortable lengthening her biliary limb further as she already has some mild malabsorptive symptoms and would not have sufficient common channel length left at that point. There is a hypothesis that some of this may be related to dysbiosis and we could attempt teratment for SIBO and recolonization with probiotics in the absence of a formalized study protocol. I will discuss this with her local GI provider. We will keep her updated as new options become available and I have asked that she keep our team abreast of her health and if any worsening occurs.    Total time: 25 min  Dane Burt mD

## 2023-12-06 ENCOUNTER — DOCUMENTATION ONLY (OUTPATIENT)
Dept: TRANSPLANT | Facility: CLINIC | Age: 53
End: 2023-12-06
Payer: COMMERCIAL

## 2024-01-24 ENCOUNTER — TELEPHONE (OUTPATIENT)
Dept: TRANSPLANT | Facility: CLINIC | Age: 54
End: 2024-01-24
Payer: COMMERCIAL

## 2024-01-24 NOTE — TELEPHONE ENCOUNTER
Please call Dr. Maravilla, ID doctor from [facility]# [phone], wanted to discuss patient.

## 2024-01-25 NOTE — TELEPHONE ENCOUNTER
Talked with Dr Smith ID doc from [facility], who just gave me an update about Meme.  She had a HIDA scan done and everything looks fine, she has ESBL and E Coli in her blood, and they are working with their transplant team as well and will continue to medically manage patient.

## 2024-01-26 ENCOUNTER — TELEPHONE (OUTPATIENT)
Dept: TRANSPLANT | Facility: CLINIC | Age: 54
End: 2024-01-26
Payer: COMMERCIAL

## 2024-01-26 NOTE — TELEPHONE ENCOUNTER
"Pt called clinic again requesting Rx for abx and prednisone be sent to [facility] in [place]. Pharmacy pended. She is not qualified to schedule a virtual appt at  now since she is in [place] and does not want to travel 50 minutes to the nearest urgent care.     1. ONSET: \"When did the nasal discharge start?\"       Sore throat started 4 days ago, funny nose started 3 days ago. Facial pain/sinus congestion now.   2. AMOUNT: \"How much discharge is there?\"       Moderate and green/clear.   3. COUGH: \"Do you have a cough?\" If yes, ask: \"Describe the color of your sputum\" (clear, white, yellow, green)      Yes, productive yellow cough.   4. RESPIRATORY DISTRESS: \"Describe your breathing.\"       Regular, some SOB and chest discomfort due to coughing and has been using Flovent and Albuterol inhalers more often.   5. FEVER: \"Do you have a fever?\" If so, ask: \"What is your temperature, how was it measured, and when did it start?\"      No.   6. SEVERITY: \"Overall, how bad are you feeling right now?\" (e.g., doesn't interfere with normal activities, staying home from school/work, staying in bed)       Moderate, does not feel good, wants medication.   7. OTHER SYMPTOMS: \"Do you have any other symptoms?\" (e.g., sore throat, earache, wheezing, vomiting)      Smoker, history of asthma. Sometimes using son's neb which is at home, but does not have one now. Would like an Rx for it eventually.     Pt did schedule a future OV with Dr. Machado on Apr 27th when she is back in [place] to discuss MRI and coverage going forward.      Future Office Visit:   Next 5 appointments (look out 90 days)    Apr 27, 2021  5:40 PM  Office Visit with Evaristo Machado MD  [facility] ([facility] ) [address] [address] [phone]                   " Patient Call: General  Route to LPN    Reason for call: Patient wanting to update Coordinator, that she's in the hospital in [place], no other details was provided, but would like to have a call back today.    Call back needed? Yes    Return Call Needed  Same as documented in contacts section  When to return call?: Same day: Route High Priority

## 2024-03-02 ENCOUNTER — HEALTH MAINTENANCE LETTER (OUTPATIENT)
Age: 54
End: 2024-03-02

## 2024-06-20 NOTE — TELEPHONE ENCOUNTER
Patient reluctant to increase htn Rx, start weight loss, or lipid Rx . Lab, then RV in 4 months .    Talked with Meme who was recently admitted with Cholangitis and was found to have E Coli in blood as well. Her team of doctors in [place] would like some collaboration help with her.  The inpatient ID team said this infection was all from her reflux once again.  She is resistant to the SIBO antibiotics and she does not know off hand what she is currently on, but I am sending for her records to be sent to us so we can assess. ID in [place] told her that oral antibiotics will not work and needs IV.  Once we get her records I will let our team assess

## 2024-07-11 ENCOUNTER — TELEPHONE (OUTPATIENT)
Dept: TRANSPLANT | Facility: CLINIC | Age: 54
End: 2024-07-11
Payer: COMMERCIAL

## 2024-07-12 NOTE — TELEPHONE ENCOUNTER
Pt called on-call RNCC. Pt is visiting son in [place]. She is currently inpt at [facility]. She is planned to have surgery this morning at 0930 (2 hours behind of our time zone) needs Op note from Dr. Shirley (8/9/21) and op note from Dr. Burt (2/17/24) sent.   Would like a call back from primary RNCC to [facility] base.

## 2024-07-20 ENCOUNTER — HEALTH MAINTENANCE LETTER (OUTPATIENT)
Age: 54
End: 2024-07-20

## 2024-08-20 ENCOUNTER — TELEPHONE (OUTPATIENT)
Dept: TRANSPLANT | Facility: CLINIC | Age: 54
End: 2024-08-20
Payer: COMMERCIAL

## 2024-09-12 ENCOUNTER — DOCUMENTATION ONLY (OUTPATIENT)
Dept: TRANSPLANT | Facility: CLINIC | Age: 54
End: 2024-09-12
Payer: COMMERCIAL

## 2024-09-13 NOTE — PROGRESS NOTES
"3 year post transplant follow up hx      Opioids: Yes. She takes 4 mg of dilaudid PO as needed, typically \"every couple of days\"   Insulin: No  Other (Non-Insulin) Diabetes Meds: Yes, she currently takes acarbose  "

## 2024-09-19 NOTE — PROGRESS NOTES
Diabetes Consult Daily  Progress Note          Assessment/Plan:   Meme Robins is a 51-year-old woman with chronic pancreatitis secondary to Idiopathic pancreatitis s/p John's procedure X 2 who is s/p open total pancreatectomy with autologous islet cell transplantation (Islet equivalents/kilogram: 2386), splenectomy, and incidental appendectomy with GJ tube placement on August 9, 2021 with Dr. Shirley.       Plan     - continue glargine 15 units BID for now.  May be sufficient in light of pt's increasing activity  - aspart 1 per 50 mg/dL cof BG>120 Q4h  - aspart 1 unit per 12 grams carb QID meals and PRN snacks  - hypoglycemia protocol  - PRN D10W 85 ml/h for hypoglycemia prevention in case of TF interruption once on full dose glargine  - will need insulin adjustments for nutrition changes, particularly if changes back to standard formula (Vital 1.2 @ 55 ml/hr --247 g CHO)  - diabetes education review has been requested       Outpatient diabetes follow up: to be reviewed  Plan discussed with patient, , bedside RN           Interval History:     The last 24 hours progress and nursing notes reviewed.    Tolerating goal rate TF, transitioned to subcutaneous insulin regimen, monitoring for change in need in setting of infection investigation.  Off IV insulin at ?? Last charted at 1800, but due to stop at 2200  BG between midnight at 0800--- 102-130s.    Meme was up early with urgent, ultimately incontinent, stool.  Says it was a lot of effort to clean up, then did therapy session.  Was feeling tired from that and from earlier elevated temp.  Tolerating g-tube clamping with short venting.  Did okay w/ clears yesterday.  Planning to try fulls this afternoon.  She's very aware of how carb may impact BG.  Reviewed possible apps to add to her phone for carb counting-- calorie jaime for ex        Recent Labs   Lab 08/18/21  0729 08/18/21  0635 08/18/21  0353 08/17/21  5035 08/17/21  0799  "08/17/21 2059   * 106* 133* 102* 76 115*           Nutrition:     Orders Placed This Encounter      Combination Diet Full Liquid; No Fat Diet            PTA Regimen:     BG monitoring via CGMS and fingerstick          Review of Systems:   See interval hx          Medications:   no steroid         Physical Exam:     Gen: Alert, in NAD , bed,  bedside and supportive  HEENT: NC/aT hearing intact to conversational volume  Resp: Unlabored  Neuro: oriented x3, communicating clearly  Psych: calm mood  BP (!) 133/98 (BP Location: Left arm)   Pulse 82   Temp 99.9  F (37.7  C) (Oral)   Resp 16   Ht 1.549 m (5' 1\")   Wt 56.6 kg (124 lb 12.8 oz)   SpO2 98%   BMI 23.58 kg/m               Data:     Lab Results   Component Value Date    A1C 5.5 08/04/2021    A1C 6.0 05/19/2021          No results found for: HEBMP, [id number], CREAT      CBC RESULTS:   Recent Labs   Lab Test 08/18/21  0729   WBC 18.3*   RBC 3.00*   HGB 8.4*   HCT 26.8*   MCV 89   MCH 28.0   MCHC 31.3*   RDW 14.6   *     Recent Labs   Lab Test 08/18/21  0729 08/18/21  0635 08/17/21  0618     --  140   POTASSIUM 3.2*  --  3.4   CHLORIDE 103  --  102   CO2 34*  --  32   ANIONGAP 2*  --  6   * 106* 173*   BUN 8  --  6*   CR 0.48*  --  0.45*   VIKAS 8.0*  --  8.2*     Liver Function Studies -   Recent Labs   Lab Test 08/11/21  0635   PROTTOTAL 4.5*   ALBUMIN 2.0*   BILITOTAL 0.3   ALKPHOS 76   AST 76*   *     Lab Results   Component Value Date    INR 1.45 08/09/2021    INR 0.99 08/09/2021    INR 1.00 08/04/2021       I spent a total of 35 minutes bedside and on the inpatient unit managing the glycemic care of Meme Robins. Over 50% of my time on the unit was spent counseling the patient and  and/or coordinating care regarding acute glycemic mgmnt.  See note for details.            Effie Motta APRN -[id number]  To contact Endocrine Diabetes service:   From 8AM-4PM: page inpatient diabetes provider that is " following the patient that day (see filed or incomplete progress notes/consult notes).  If uncertain of provider assignment: page job code [phone].  For questions or updates from 4PM-8AM: page the diabetes job code for on call fellow: [phone]    Please notify inpatient diabetes service if changes are planned that will impact glycemia, such as to steroids, enteral feeding, parenteral feeding, dextrose fluids or procedures requiring prolonged NPO status.                   (V5) oriented

## 2024-12-01 ENCOUNTER — HEALTH MAINTENANCE LETTER (OUTPATIENT)
Age: 54
End: 2024-12-01

## 2025-01-12 ENCOUNTER — HEALTH MAINTENANCE LETTER (OUTPATIENT)
Age: 55
End: 2025-01-12

## 2025-03-15 ENCOUNTER — HEALTH MAINTENANCE LETTER (OUTPATIENT)
Age: 55
End: 2025-03-15

## 2025-06-28 ENCOUNTER — HEALTH MAINTENANCE LETTER (OUTPATIENT)
Age: 55
End: 2025-06-28

## (undated) DEVICE — SU SILK 2-0 TIE 12X30" A305H

## (undated) DEVICE — STPL LINEAR CUT 75MM TLC75

## (undated) DEVICE — SURGICEL ABSORBABLE HEMOSTAT SNOW 4"X4" 2083

## (undated) DEVICE — SOL NACL 0.9% IRRIG 1000ML BOTTLE 2F7124

## (undated) DEVICE — STPL RELOAD LINEAR CUT ENDOPATH ECHELON 60MM BLK GST60T

## (undated) DEVICE — LINEN TOWEL PACK X30 5481

## (undated) DEVICE — SPONGE LAP 18X18" X8435

## (undated) DEVICE — SURGICEL FIBRILLAR HEMOSTAT 4"X4" 1963

## (undated) DEVICE — DRAPE SHEET MED 44X70" 9355

## (undated) DEVICE — SU PROLENE 5-0 RB-1DA 36"  8556H

## (undated) DEVICE — SU SILK 3-0 SH CR 8X18" C013D

## (undated) DEVICE — TUBE FEEDING NEOCONNECT POLY ENFIT 8FR 24" PFTM8.0P-NC

## (undated) DEVICE — ADH SKIN CLOSURE PREMIERPRO EXOFIN 1.0ML 3470

## (undated) DEVICE — KIT ENDO FIRST STEP DISINFECTANT 200ML W/POUCH EP-4

## (undated) DEVICE — SU SILK 0 TIE 6X30" A306H

## (undated) DEVICE — ESU PENCIL SMOKE EVAC W/ROCKER SWITCH 0703-047-000

## (undated) DEVICE — PREP CHLORAPREP 26ML TINTED HI-LITE ORANGE 930815

## (undated) DEVICE — SU PDS II 0 TP-1 60" Z991G

## (undated) DEVICE — LINEN GOWN XLG 5407

## (undated) DEVICE — SU CHROMIC 4-0 RB-1 27" U203H

## (undated) DEVICE — SU MONOCRYL 4-0 PS-2 18" UND Y496G

## (undated) DEVICE — TUBING PRESSURE W/MALE TO FEMALE CONNECTOR 24" 50P124

## (undated) DEVICE — GLOVE BIOGEL PI MICRO INDICATOR UNDERGLOVE SZ 7.5 48975

## (undated) DEVICE — Device

## (undated) DEVICE — BLADE KNIFE SURG 15 371115

## (undated) DEVICE — MANOMETER VENOUS PRESSURE W/4-WAY STOPCOCK 35ML MX441

## (undated) DEVICE — CONNECTOR STOPCOCK 3 WAY MALE LL HI-FLO MX9311L

## (undated) DEVICE — DRAIN JACKSON PRATT ROUND SIL 19FR W/TROCAR LF JP-2232

## (undated) DEVICE — SU PROLENE 4-0 SHDA 36" 8521H

## (undated) DEVICE — DRAPE STERI FLUOROSCOPE 35X43"1012 LATEX FREE

## (undated) DEVICE — SU VICRYL 0 UR-6 27" J603H

## (undated) DEVICE — DRSG PRIMAPORE 02X3" 7133

## (undated) DEVICE — CATH TRAY FOLEY SURESTEP 16FR W/TMP PRB STLK LATEX A319416AM

## (undated) DEVICE — LINEN TOWEL PACK X6 WHITE 5487

## (undated) DEVICE — DRAPE SHEET REV FOLD 3/4 9349

## (undated) DEVICE — ESU ELEC BLADE 2.75" COATED/INSULATED E1455

## (undated) DEVICE — NDL COUNTER 40CT  31142311

## (undated) DEVICE — WIPES FOLEY CARE SURESTEP PROVON DFC100

## (undated) DEVICE — ANTIFOG SOLUTION W/FOAM PAD 31142527

## (undated) DEVICE — SU PDS II 6-0 RB-2DA 30" Z149H

## (undated) DEVICE — PREP CHLORAPREP 26ML TINTED ORANGE  260815

## (undated) DEVICE — SYR BULB IRRIG 50ML LATEX FREE 0035280

## (undated) DEVICE — GLOVE PROTEXIS W/NEU-THERA 6.5  2D73TE65

## (undated) DEVICE — SU SILK 3-0 TIE 12X30" A304H

## (undated) DEVICE — STPL POWERED ECHELON 60MM PSEE60A

## (undated) DEVICE — ESU HARMONIC LAPAROSCOPIC SHEARS HD 1000I 36CM HARHD36

## (undated) DEVICE — SU PROLENE 4-0 RB-1DA 36" 8557H

## (undated) DEVICE — DRAIN JACKSON PRATT RESERVOIR 100ML SU130-1305

## (undated) DEVICE — SOL NACL 0.9% IRRIG 500ML BOTTLE 2F7123

## (undated) DEVICE — STPL RELOAD REG TISSUE ECHELON 60 X 3.6MM BLUE GST60B

## (undated) DEVICE — SU PDS II 4-0 SH 27" Z315H

## (undated) DEVICE — SU SILK 4-0 TIE 12X30" A303H

## (undated) DEVICE — TUBING IV 69" STERILE 1C8160S

## (undated) DEVICE — ESU GROUND PAD ADULT W/CORD E7507

## (undated) DEVICE — GRASPING DEVICE RAPTOR RAT TOOTH 00711177

## (undated) DEVICE — SU VICRYL 3-0 SH 27" UND J416H

## (undated) DEVICE — SUCTION IRR STRYKERFLOW II W/TIP 250-070-520

## (undated) DEVICE — SU ETHILON 3-0 PS-1 18" 1663H

## (undated) DEVICE — NDL 30GA 0.5" 305106

## (undated) DEVICE — SOL WATER IRRIG 1000ML BOTTLE 2F7114

## (undated) DEVICE — DRAPE SLUSH/WARMER 66X44" ORS-320

## (undated) DEVICE — ESU ELEC BLADE HEX-LOCKING 2.5" E1450X

## (undated) DEVICE — SU MONOCRYL 4-0 PS-2 27" UND Y426H

## (undated) DEVICE — CLIP HORIZON MED BLUE 002200

## (undated) DEVICE — PAD CHUX UNDERPAD 23X24" 7136

## (undated) DEVICE — SYR 50ML LL W/O NDL 309653

## (undated) DEVICE — SU PROLENE 3-0 RB-1DA 36"  8558H

## (undated) DEVICE — SU PROLENE 6-0 RB-2DA 30" 8711H

## (undated) DEVICE — SU PDS II 3-0 SH 27" Z316H

## (undated) DEVICE — ESU LIGASURE IMPACT OPEN SEALER/DVDR CVD LG JAW LF4418

## (undated) DEVICE — SU PDS II 4-0 RB-1 27" Z304H

## (undated) DEVICE — ENDO TROCAR FIRST ENTRY KII FIOS Z-THRD 12X100MM CTF73

## (undated) DEVICE — DRAPE ISOLATION BAG 1003

## (undated) DEVICE — NDL BLUNT 15GA 1.5"

## (undated) DEVICE — BAG URINARY DRAIN 4000ML LF 153509

## (undated) DEVICE — TUBING SUCTION 10'X3/16" N510

## (undated) DEVICE — SU SILK 1 TIE 6X30" A307H

## (undated) DEVICE — CLIP HORIZON LG ORANGE 004200

## (undated) DEVICE — DRSG ABDOMINAL 07 1/2X8" 7197D

## (undated) DEVICE — DRAPE IOBAN INCISE 23X17" 6650EZ

## (undated) DEVICE — GLOVE BIOGEL PI MICRO INDICATOR UNDERGLOVE SZ 8.0 48980

## (undated) DEVICE — CONNECTOR FUNNEL TRANSITION ENFIT F00106

## (undated) DEVICE — CLIP HORIZON SM RED WIDE SLOT 001201

## (undated) DEVICE — SUCTION MANIFOLD NEPTUNE 2 SYS 4 PORT 0702-020-000

## (undated) RX ORDER — DIPHENHYDRAMINE HYDROCHLORIDE 50 MG/ML
INJECTION INTRAMUSCULAR; INTRAVENOUS
Status: DISPENSED
Start: 2022-03-07

## (undated) RX ORDER — GABAPENTIN 300 MG/1
CAPSULE ORAL
Status: DISPENSED
Start: 2023-02-17

## (undated) RX ORDER — ONDANSETRON 2 MG/ML
INJECTION INTRAMUSCULAR; INTRAVENOUS
Status: DISPENSED
Start: 2022-03-07

## (undated) RX ORDER — SODIUM CHLORIDE 9 MG/ML
INJECTION, SOLUTION INTRAVENOUS
Status: DISPENSED
Start: 2023-02-18

## (undated) RX ORDER — METHOCARBAMOL 500 MG/1
TABLET, FILM COATED ORAL
Status: DISPENSED
Start: 2023-02-17

## (undated) RX ORDER — FENTANYL CITRATE-0.9 % NACL/PF 10 MCG/ML
PLASTIC BAG, INJECTION (ML) INTRAVENOUS
Status: DISPENSED
Start: 2022-03-07

## (undated) RX ORDER — DEXTROSE, SODIUM CHLORIDE, SODIUM LACTATE, POTASSIUM CHLORIDE, AND CALCIUM CHLORIDE 5; .6; .31; .03; .02 G/100ML; G/100ML; G/100ML; G/100ML; G/100ML
INJECTION, SOLUTION INTRAVENOUS
Status: DISPENSED
Start: 2021-08-09

## (undated) RX ORDER — FENTANYL CITRATE 50 UG/ML
INJECTION, SOLUTION INTRAMUSCULAR; INTRAVENOUS
Status: DISPENSED
Start: 2022-03-07

## (undated) RX ORDER — PROPOFOL 10 MG/ML
INJECTION, EMULSION INTRAVENOUS
Status: DISPENSED
Start: 2021-08-09

## (undated) RX ORDER — ROCURONIUM BROMIDE 50 MG/5 ML
SYRINGE (ML) INTRAVENOUS
Status: DISPENSED
Start: 2022-03-07

## (undated) RX ORDER — OXYCODONE HYDROCHLORIDE 10 MG/1
TABLET ORAL
Status: DISPENSED
Start: 2023-02-17

## (undated) RX ORDER — OXYCODONE HYDROCHLORIDE 5 MG/1
TABLET ORAL
Status: DISPENSED
Start: 2023-02-17

## (undated) RX ORDER — WATER 10 ML/10ML
INJECTION INTRAMUSCULAR; INTRAVENOUS; SUBCUTANEOUS
Status: DISPENSED
Start: 2022-03-07

## (undated) RX ORDER — AMOXICILLIN 250 MG
CAPSULE ORAL
Status: DISPENSED
Start: 2023-02-18

## (undated) RX ORDER — ACETAMINOPHEN 325 MG/1
TABLET ORAL
Status: DISPENSED
Start: 2021-08-09

## (undated) RX ORDER — HYDROMORPHONE HCL IN WATER/PF 6 MG/30 ML
PATIENT CONTROLLED ANALGESIA SYRINGE INTRAVENOUS
Status: DISPENSED
Start: 2022-03-07

## (undated) RX ORDER — FENTANYL CITRATE-0.9 % NACL/PF 10 MCG/ML
PLASTIC BAG, INJECTION (ML) INTRAVENOUS
Status: DISPENSED
Start: 2023-02-17

## (undated) RX ORDER — ONDANSETRON 2 MG/ML
INJECTION INTRAMUSCULAR; INTRAVENOUS
Status: DISPENSED
Start: 2021-08-09

## (undated) RX ORDER — FENTANYL CITRATE 50 UG/ML
INJECTION, SOLUTION INTRAMUSCULAR; INTRAVENOUS
Status: DISPENSED
Start: 2021-08-09

## (undated) RX ORDER — LIDOCAINE HYDROCHLORIDE 20 MG/ML
INJECTION, SOLUTION EPIDURAL; INFILTRATION; INTRACAUDAL; PERINEURAL
Status: DISPENSED
Start: 2022-03-07

## (undated) RX ORDER — APREPITANT 40 MG/1
CAPSULE ORAL
Status: DISPENSED
Start: 2023-02-17

## (undated) RX ORDER — METOPROLOL TARTRATE 1 MG/ML
INJECTION, SOLUTION INTRAVENOUS
Status: DISPENSED
Start: 2021-08-09

## (undated) RX ORDER — PROPOFOL 10 MG/ML
INJECTION, EMULSION INTRAVENOUS
Status: DISPENSED
Start: 2022-03-07

## (undated) RX ORDER — HYDROMORPHONE HYDROCHLORIDE 1 MG/ML
INJECTION, SOLUTION INTRAMUSCULAR; INTRAVENOUS; SUBCUTANEOUS
Status: DISPENSED
Start: 2021-08-09

## (undated) RX ORDER — GABAPENTIN 300 MG/1
CAPSULE ORAL
Status: DISPENSED
Start: 2021-08-09

## (undated) RX ORDER — FENTANYL CITRATE 50 UG/ML
INJECTION, SOLUTION INTRAMUSCULAR; INTRAVENOUS
Status: DISPENSED
Start: 2023-02-17

## (undated) RX ORDER — ERTAPENEM 1 G/1
INJECTION, POWDER, LYOPHILIZED, FOR SOLUTION INTRAMUSCULAR; INTRAVENOUS
Status: DISPENSED
Start: 2021-08-09

## (undated) RX ORDER — PROPOFOL 10 MG/ML
INJECTION, EMULSION INTRAVENOUS
Status: DISPENSED
Start: 2023-02-17

## (undated) RX ORDER — OXYCODONE HYDROCHLORIDE 5 MG/1
TABLET ORAL
Status: DISPENSED
Start: 2022-03-07

## (undated) RX ORDER — LIDOCAINE HYDROCHLORIDE 20 MG/ML
INJECTION, SOLUTION EPIDURAL; INFILTRATION; INTRACAUDAL; PERINEURAL
Status: DISPENSED
Start: 2021-08-09

## (undated) RX ORDER — ALBUMIN, HUMAN INJ 5% 5 %
SOLUTION INTRAVENOUS
Status: DISPENSED
Start: 2021-08-09

## (undated) RX ORDER — HEPARIN SODIUM 1000 [USP'U]/ML
INJECTION, SOLUTION INTRAVENOUS; SUBCUTANEOUS
Status: DISPENSED
Start: 2021-08-09

## (undated) RX ORDER — EPHEDRINE SULFATE 50 MG/ML
INJECTION, SOLUTION INTRAMUSCULAR; INTRAVENOUS; SUBCUTANEOUS
Status: DISPENSED
Start: 2021-08-09

## (undated) RX ORDER — CALCIUM CHLORIDE 100 MG/ML
INJECTION INTRAVENOUS; INTRAVENTRICULAR
Status: DISPENSED
Start: 2021-08-09

## (undated) RX ORDER — AMOXICILLIN 250 MG
CAPSULE ORAL
Status: DISPENSED
Start: 2023-02-17

## (undated) RX ORDER — ESMOLOL HYDROCHLORIDE 10 MG/ML
INJECTION INTRAVENOUS
Status: DISPENSED
Start: 2021-08-09

## (undated) RX ORDER — ACETAMINOPHEN 325 MG/1
TABLET ORAL
Status: DISPENSED
Start: 2023-02-17

## (undated) RX ORDER — HYDROMORPHONE HCL IN WATER/PF 6 MG/30 ML
PATIENT CONTROLLED ANALGESIA SYRINGE INTRAVENOUS
Status: DISPENSED
Start: 2023-02-17

## (undated) RX ORDER — HEPARIN SODIUM 5000 [USP'U]/.5ML
INJECTION, SOLUTION INTRAVENOUS; SUBCUTANEOUS
Status: DISPENSED
Start: 2023-02-17

## (undated) RX ORDER — ACETAMINOPHEN 325 MG/1
TABLET ORAL
Status: DISPENSED
Start: 2023-02-18

## (undated) RX ORDER — ONDANSETRON 2 MG/ML
INJECTION INTRAMUSCULAR; INTRAVENOUS
Status: DISPENSED
Start: 2023-02-17

## (undated) RX ORDER — EPHEDRINE SULFATE 50 MG/ML
INJECTION, SOLUTION INTRAMUSCULAR; INTRAVENOUS; SUBCUTANEOUS
Status: DISPENSED
Start: 2022-03-07

## (undated) RX ORDER — HEPARIN SODIUM 5000 [USP'U]/.5ML
INJECTION, SOLUTION INTRAVENOUS; SUBCUTANEOUS
Status: DISPENSED
Start: 2023-02-18

## (undated) RX ORDER — PAPAVERINE HYDROCHLORIDE 30 MG/ML
INJECTION INTRAMUSCULAR; INTRAVENOUS
Status: DISPENSED
Start: 2021-08-09

## (undated) RX ORDER — DEXTROSE, SODIUM CHLORIDE, SODIUM LACTATE, POTASSIUM CHLORIDE, AND CALCIUM CHLORIDE 5; .6; .31; .03; .02 G/100ML; G/100ML; G/100ML; G/100ML; G/100ML
INJECTION, SOLUTION INTRAVENOUS
Status: DISPENSED
Start: 2023-02-17

## (undated) RX ORDER — HEPARIN SODIUM (PORCINE) LOCK FLUSH IV SOLN 100 UNIT/ML 100 UNIT/ML
SOLUTION INTRAVENOUS
Status: DISPENSED
Start: 2021-05-20

## (undated) RX ORDER — SCOLOPAMINE TRANSDERMAL SYSTEM 1 MG/1
PATCH, EXTENDED RELEASE TRANSDERMAL
Status: DISPENSED
Start: 2023-02-18

## (undated) RX ORDER — FENTANYL CITRATE-0.9 % NACL/PF 10 MCG/ML
PLASTIC BAG, INJECTION (ML) INTRAVENOUS
Status: DISPENSED
Start: 2021-08-09

## (undated) RX ORDER — DEXTROSE, SODIUM CHLORIDE, SODIUM LACTATE, POTASSIUM CHLORIDE, AND CALCIUM CHLORIDE 5; .6; .31; .03; .02 G/100ML; G/100ML; G/100ML; G/100ML; G/100ML
INJECTION, SOLUTION INTRAVENOUS
Status: DISPENSED
Start: 2023-02-18

## (undated) RX ORDER — HYDROMORPHONE HYDROCHLORIDE 1 MG/ML
INJECTION, SOLUTION INTRAMUSCULAR; INTRAVENOUS; SUBCUTANEOUS
Status: DISPENSED
Start: 2023-02-17

## (undated) RX ORDER — LABETALOL HYDROCHLORIDE 5 MG/ML
INJECTION, SOLUTION INTRAVENOUS
Status: DISPENSED
Start: 2021-08-09

## (undated) RX ORDER — SCOLOPAMINE TRANSDERMAL SYSTEM 1 MG/1
PATCH, EXTENDED RELEASE TRANSDERMAL
Status: DISPENSED
Start: 2023-02-17

## (undated) RX ORDER — SCOLOPAMINE TRANSDERMAL SYSTEM 1 MG/1
PATCH, EXTENDED RELEASE TRANSDERMAL
Status: DISPENSED
Start: 2021-08-09